# Patient Record
Sex: MALE | Race: WHITE | NOT HISPANIC OR LATINO | Employment: OTHER | URBAN - METROPOLITAN AREA
[De-identification: names, ages, dates, MRNs, and addresses within clinical notes are randomized per-mention and may not be internally consistent; named-entity substitution may affect disease eponyms.]

---

## 2017-01-05 ENCOUNTER — APPOINTMENT (OUTPATIENT)
Dept: LAB | Facility: CLINIC | Age: 82
End: 2017-01-05
Payer: MEDICARE

## 2017-01-05 ENCOUNTER — ALLSCRIPTS OFFICE VISIT (OUTPATIENT)
Dept: OTHER | Facility: OTHER | Age: 82
End: 2017-01-05

## 2017-01-05 DIAGNOSIS — M10.072 IDIOPATHIC GOUT, LEFT ANKLE AND FOOT: ICD-10-CM

## 2017-01-05 LAB
ANION GAP SERPL CALCULATED.3IONS-SCNC: 10 MMOL/L (ref 4–13)
BUN SERPL-MCNC: 29 MG/DL (ref 5–25)
CALCIUM SERPL-MCNC: 8.6 MG/DL (ref 8.3–10.1)
CHLORIDE SERPL-SCNC: 106 MMOL/L (ref 100–108)
CO2 SERPL-SCNC: 24 MMOL/L (ref 21–32)
CREAT SERPL-MCNC: 1.54 MG/DL (ref 0.6–1.3)
GFR SERPL CREATININE-BSD FRML MDRD: 43.3 ML/MIN/1.73SQ M
GLUCOSE SERPL-MCNC: 147 MG/DL (ref 65–140)
POTASSIUM SERPL-SCNC: 3.6 MMOL/L (ref 3.5–5.3)
SODIUM SERPL-SCNC: 140 MMOL/L (ref 136–145)

## 2017-01-05 PROCEDURE — 80048 BASIC METABOLIC PNL TOTAL CA: CPT

## 2017-01-05 PROCEDURE — 36415 COLL VENOUS BLD VENIPUNCTURE: CPT

## 2017-01-20 ENCOUNTER — LAB REQUISITION (OUTPATIENT)
Dept: LAB | Facility: HOSPITAL | Age: 82
End: 2017-01-20
Payer: MEDICARE

## 2017-01-20 ENCOUNTER — ALLSCRIPTS OFFICE VISIT (OUTPATIENT)
Dept: OTHER | Facility: OTHER | Age: 82
End: 2017-01-20

## 2017-01-20 DIAGNOSIS — C44.622 SQUAMOUS CELL CARCINOMA OF SKIN OF RIGHT UPPER LIMB, INCLUDING SHOULDER: ICD-10-CM

## 2017-01-20 PROCEDURE — 88305 TISSUE EXAM BY PATHOLOGIST: CPT | Performed by: DERMATOLOGY

## 2017-01-24 ENCOUNTER — ALLSCRIPTS OFFICE VISIT (OUTPATIENT)
Dept: OTHER | Facility: OTHER | Age: 82
End: 2017-01-24

## 2017-01-30 ENCOUNTER — ALLSCRIPTS OFFICE VISIT (OUTPATIENT)
Dept: OTHER | Facility: OTHER | Age: 82
End: 2017-01-30

## 2017-01-30 ENCOUNTER — GENERIC CONVERSION - ENCOUNTER (OUTPATIENT)
Dept: OTHER | Facility: OTHER | Age: 82
End: 2017-01-30

## 2017-02-15 ENCOUNTER — APPOINTMENT (OUTPATIENT)
Dept: LAB | Facility: CLINIC | Age: 82
End: 2017-02-15
Payer: MEDICARE

## 2017-02-15 ENCOUNTER — ALLSCRIPTS OFFICE VISIT (OUTPATIENT)
Dept: OTHER | Facility: OTHER | Age: 82
End: 2017-02-15

## 2017-02-15 DIAGNOSIS — I10 ESSENTIAL (PRIMARY) HYPERTENSION: ICD-10-CM

## 2017-02-15 LAB
ANION GAP SERPL CALCULATED.3IONS-SCNC: 8 MMOL/L (ref 4–13)
BASOPHILS # BLD MANUAL: 0.09 THOUSAND/UL (ref 0–0.1)
BASOPHILS NFR MAR MANUAL: 1 % (ref 0–1)
BUN SERPL-MCNC: 21 MG/DL (ref 5–25)
CALCIUM SERPL-MCNC: 8.8 MG/DL (ref 8.3–10.1)
CHLORIDE SERPL-SCNC: 102 MMOL/L (ref 100–108)
CO2 SERPL-SCNC: 29 MMOL/L (ref 21–32)
CREAT SERPL-MCNC: 1.35 MG/DL (ref 0.6–1.3)
EOSINOPHIL # BLD MANUAL: 0.09 THOUSAND/UL (ref 0–0.4)
EOSINOPHIL NFR BLD MANUAL: 1 % (ref 0–6)
ERYTHROCYTE [DISTWIDTH] IN BLOOD BY AUTOMATED COUNT: 14.6 % (ref 11.6–15.1)
GFR SERPL CREATININE-BSD FRML MDRD: 50.4 ML/MIN/1.73SQ M
GLUCOSE SERPL-MCNC: 131 MG/DL (ref 65–140)
HCT VFR BLD AUTO: 40.8 % (ref 36.5–49.3)
HGB BLD-MCNC: 13.6 G/DL (ref 12–17)
LYMPHOCYTES # BLD AUTO: 1.39 THOUSAND/UL (ref 0.6–4.47)
LYMPHOCYTES # BLD AUTO: 15 % (ref 14–44)
MCH RBC QN AUTO: 30.8 PG (ref 26.8–34.3)
MCHC RBC AUTO-ENTMCNC: 33.3 G/DL (ref 31.4–37.4)
MCV RBC AUTO: 93 FL (ref 82–98)
MONOCYTES # BLD AUTO: 0.37 THOUSAND/UL (ref 0–1.22)
MONOCYTES NFR BLD: 4 % (ref 4–12)
NEUTROPHILS # BLD MANUAL: 7.3 THOUSAND/UL (ref 1.85–7.62)
NEUTS SEG NFR BLD AUTO: 79 % (ref 43–75)
NRBC BLD AUTO-RTO: 0 /100 WBCS
PLATELET # BLD AUTO: 301 THOUSANDS/UL (ref 149–390)
PLATELET BLD QL SMEAR: ADEQUATE
PMV BLD AUTO: 10.9 FL (ref 8.9–12.7)
POTASSIUM SERPL-SCNC: 4.1 MMOL/L (ref 3.5–5.3)
RBC # BLD AUTO: 4.41 MILLION/UL (ref 3.88–5.62)
RBC MORPH BLD: NORMAL
SODIUM SERPL-SCNC: 139 MMOL/L (ref 136–145)
WBC # BLD AUTO: 9.24 THOUSAND/UL (ref 4.31–10.16)

## 2017-02-15 PROCEDURE — 80048 BASIC METABOLIC PNL TOTAL CA: CPT

## 2017-02-15 PROCEDURE — 85007 BL SMEAR W/DIFF WBC COUNT: CPT

## 2017-02-15 PROCEDURE — 36415 COLL VENOUS BLD VENIPUNCTURE: CPT

## 2017-02-15 PROCEDURE — 85027 COMPLETE CBC AUTOMATED: CPT

## 2017-03-31 ENCOUNTER — LAB (OUTPATIENT)
Dept: LAB | Facility: CLINIC | Age: 82
End: 2017-03-31
Payer: MEDICARE

## 2017-03-31 ENCOUNTER — TRANSCRIBE ORDERS (OUTPATIENT)
Dept: LAB | Facility: CLINIC | Age: 82
End: 2017-03-31

## 2017-03-31 DIAGNOSIS — E03.9 HYPOTHYROIDISM: ICD-10-CM

## 2017-03-31 DIAGNOSIS — I10 ESSENTIAL (PRIMARY) HYPERTENSION: ICD-10-CM

## 2017-03-31 DIAGNOSIS — I82.409 ACUTE EMBOLISM AND THROMBOSIS OF DEEP VEIN OF LOWER EXTREMITY (HCC): ICD-10-CM

## 2017-03-31 DIAGNOSIS — E78.00 PURE HYPERCHOLESTEROLEMIA: ICD-10-CM

## 2017-03-31 DIAGNOSIS — Z12.11 ENCOUNTER FOR SCREENING FOR MALIGNANT NEOPLASM OF COLON: ICD-10-CM

## 2017-03-31 DIAGNOSIS — E11.21 TYPE 2 DIABETES MELLITUS WITH DIABETIC NEPHROPATHY (HCC): ICD-10-CM

## 2017-03-31 LAB
ALBUMIN SERPL BCP-MCNC: 3.5 G/DL (ref 3.5–5)
ALP SERPL-CCNC: 87 U/L (ref 46–116)
ALT SERPL W P-5'-P-CCNC: 55 U/L (ref 12–78)
ANION GAP SERPL CALCULATED.3IONS-SCNC: 7 MMOL/L (ref 4–13)
AST SERPL W P-5'-P-CCNC: 29 U/L (ref 5–45)
BASOPHILS # BLD AUTO: 0.03 THOUSANDS/ΜL (ref 0–0.1)
BASOPHILS NFR BLD AUTO: 0 % (ref 0–1)
BILIRUB SERPL-MCNC: 1.99 MG/DL (ref 0.2–1)
BUN SERPL-MCNC: 16 MG/DL (ref 5–25)
CALCIUM SERPL-MCNC: 8.8 MG/DL (ref 8.3–10.1)
CHLORIDE SERPL-SCNC: 103 MMOL/L (ref 100–108)
CHOLEST SERPL-MCNC: 135 MG/DL (ref 50–200)
CK SERPL-CCNC: 57 U/L (ref 39–308)
CO2 SERPL-SCNC: 31 MMOL/L (ref 21–32)
CREAT SERPL-MCNC: 1.11 MG/DL (ref 0.6–1.3)
EOSINOPHIL # BLD AUTO: 0.31 THOUSAND/ΜL (ref 0–0.61)
EOSINOPHIL NFR BLD AUTO: 3 % (ref 0–6)
ERYTHROCYTE [DISTWIDTH] IN BLOOD BY AUTOMATED COUNT: 13.7 % (ref 11.6–15.1)
EST. AVERAGE GLUCOSE BLD GHB EST-MCNC: 151 MG/DL
GFR SERPL CREATININE-BSD FRML MDRD: >60 ML/MIN/1.73SQ M
GLUCOSE P FAST SERPL-MCNC: 110 MG/DL (ref 65–99)
HBA1C MFR BLD: 6.9 % (ref 4.2–6.3)
HCT VFR BLD AUTO: 42.3 % (ref 36.5–49.3)
HDLC SERPL-MCNC: 41 MG/DL (ref 40–60)
HGB BLD-MCNC: 14.4 G/DL (ref 12–17)
LDLC SERPL CALC-MCNC: 66 MG/DL (ref 0–100)
LYMPHOCYTES # BLD AUTO: 2.16 THOUSANDS/ΜL (ref 0.6–4.47)
LYMPHOCYTES NFR BLD AUTO: 18 % (ref 14–44)
MCH RBC QN AUTO: 31 PG (ref 26.8–34.3)
MCHC RBC AUTO-ENTMCNC: 34 G/DL (ref 31.4–37.4)
MCV RBC AUTO: 91 FL (ref 82–98)
MONOCYTES # BLD AUTO: 0.78 THOUSAND/ΜL (ref 0.17–1.22)
MONOCYTES NFR BLD AUTO: 6 % (ref 4–12)
NEUTROPHILS # BLD AUTO: 8.87 THOUSANDS/ΜL (ref 1.85–7.62)
NEUTS SEG NFR BLD AUTO: 73 % (ref 43–75)
NRBC BLD AUTO-RTO: 0 /100 WBCS
PLATELET # BLD AUTO: 273 THOUSANDS/UL (ref 149–390)
PMV BLD AUTO: 11 FL (ref 8.9–12.7)
POTASSIUM SERPL-SCNC: 3.1 MMOL/L (ref 3.5–5.3)
PROT SERPL-MCNC: 7.6 G/DL (ref 6.4–8.2)
RBC # BLD AUTO: 4.64 MILLION/UL (ref 3.88–5.62)
SODIUM SERPL-SCNC: 141 MMOL/L (ref 136–145)
TRIGL SERPL-MCNC: 138 MG/DL
TSH SERPL DL<=0.05 MIU/L-ACNC: 4.22 UIU/ML (ref 0.36–3.74)
WBC # BLD AUTO: 12.18 THOUSAND/UL (ref 4.31–10.16)

## 2017-03-31 PROCEDURE — 83036 HEMOGLOBIN GLYCOSYLATED A1C: CPT

## 2017-03-31 PROCEDURE — G0328 FECAL BLOOD SCRN IMMUNOASSAY: HCPCS

## 2017-03-31 PROCEDURE — 36415 COLL VENOUS BLD VENIPUNCTURE: CPT

## 2017-03-31 PROCEDURE — 82550 ASSAY OF CK (CPK): CPT

## 2017-03-31 PROCEDURE — 85025 COMPLETE CBC W/AUTO DIFF WBC: CPT

## 2017-03-31 PROCEDURE — 80061 LIPID PANEL: CPT

## 2017-03-31 PROCEDURE — 80053 COMPREHEN METABOLIC PANEL: CPT

## 2017-03-31 PROCEDURE — 84443 ASSAY THYROID STIM HORMONE: CPT

## 2017-04-01 LAB — HEMOCCULT STL QL IA: POSITIVE

## 2017-04-06 ENCOUNTER — ALLSCRIPTS OFFICE VISIT (OUTPATIENT)
Dept: OTHER | Facility: OTHER | Age: 82
End: 2017-04-06

## 2017-04-07 RX ORDER — GLIPIZIDE 10 MG/1
10 TABLET, FILM COATED, EXTENDED RELEASE ORAL DAILY
COMMUNITY
End: 2018-07-30 | Stop reason: SDUPTHER

## 2017-04-07 RX ORDER — MOMETASONE FUROATE 1 MG/G
CREAM TOPICAL DAILY
COMMUNITY
End: 2018-04-07 | Stop reason: CLARIF

## 2017-04-07 RX ORDER — BETAMETHASONE DIPROPIONATE 0.5 MG/G
CREAM TOPICAL 2 TIMES DAILY
COMMUNITY
End: 2018-08-12 | Stop reason: ALTCHOICE

## 2017-04-07 RX ORDER — LISINOPRIL 10 MG/1
10 TABLET ORAL DAILY
COMMUNITY
End: 2018-07-26 | Stop reason: SDUPTHER

## 2017-04-07 RX ORDER — ALLOPURINOL 100 MG/1
100 TABLET ORAL DAILY
Status: ON HOLD | COMMUNITY
End: 2019-03-25 | Stop reason: CLARIF

## 2017-04-07 RX ORDER — ESOMEPRAZOLE MAGNESIUM 40 MG/1
40 CAPSULE, DELAYED RELEASE ORAL
COMMUNITY
End: 2018-12-30 | Stop reason: SDUPTHER

## 2017-04-07 RX ORDER — OMEGA-3 FATTY ACIDS/FISH OIL 300-1000MG
CAPSULE ORAL DAILY
Status: ON HOLD | COMMUNITY
End: 2019-03-25 | Stop reason: CLARIF

## 2017-04-07 RX ORDER — GLIPIZIDE 10 MG/1
10 TABLET ORAL
Status: ON HOLD | COMMUNITY
End: 2017-04-12 | Stop reason: SDUPTHER

## 2017-04-07 RX ORDER — AMLODIPINE BESYLATE 5 MG/1
5 TABLET ORAL DAILY
COMMUNITY
End: 2018-07-26 | Stop reason: ALTCHOICE

## 2017-04-07 RX ORDER — POTASSIUM CHLORIDE 750 MG/1
10 CAPSULE, EXTENDED RELEASE ORAL 2 TIMES DAILY
COMMUNITY
End: 2018-03-21 | Stop reason: SDUPTHER

## 2017-04-07 RX ORDER — LEVOTHYROXINE SODIUM 0.05 MG/1
50 TABLET ORAL DAILY
COMMUNITY
End: 2018-07-05 | Stop reason: SDUPTHER

## 2017-04-07 RX ORDER — COLCHICINE 0.6 MG/1
0.6 TABLET ORAL DAILY
COMMUNITY
End: 2018-11-29 | Stop reason: SDUPTHER

## 2017-04-07 RX ORDER — ATORVASTATIN CALCIUM 10 MG/1
10 TABLET, FILM COATED ORAL DAILY
COMMUNITY
End: 2019-02-05 | Stop reason: SDUPTHER

## 2017-04-07 RX ORDER — FUROSEMIDE 80 MG
80 TABLET ORAL 2 TIMES DAILY
COMMUNITY
End: 2018-06-15 | Stop reason: SDUPTHER

## 2017-04-11 ENCOUNTER — ANESTHESIA EVENT (OUTPATIENT)
Dept: PERIOP | Facility: HOSPITAL | Age: 82
End: 2017-04-11
Payer: MEDICARE

## 2017-04-12 ENCOUNTER — ANESTHESIA (OUTPATIENT)
Dept: PERIOP | Facility: HOSPITAL | Age: 82
End: 2017-04-12
Payer: MEDICARE

## 2017-04-12 ENCOUNTER — GENERIC CONVERSION - ENCOUNTER (OUTPATIENT)
Dept: OTHER | Facility: OTHER | Age: 82
End: 2017-04-12

## 2017-04-12 ENCOUNTER — HOSPITAL ENCOUNTER (EMERGENCY)
Facility: HOSPITAL | Age: 82
Discharge: HOME | End: 2017-04-12
Attending: EMERGENCY MEDICINE
Payer: MEDICARE

## 2017-04-12 ENCOUNTER — HOSPITAL ENCOUNTER (OUTPATIENT)
Facility: HOSPITAL | Age: 82
Setting detail: OUTPATIENT SURGERY
Discharge: HOME | End: 2017-04-12
Attending: INTERNAL MEDICINE | Admitting: INTERNAL MEDICINE
Payer: MEDICARE

## 2017-04-12 ENCOUNTER — APPOINTMENT (EMERGENCY)
Dept: RADIOLOGY | Facility: HOSPITAL | Age: 82
End: 2017-04-12
Payer: MEDICARE

## 2017-04-12 VITALS
HEIGHT: 70 IN | BODY MASS INDEX: 31.18 KG/M2 | WEIGHT: 217.81 LBS | SYSTOLIC BLOOD PRESSURE: 130 MMHG | HEART RATE: 79 BPM | TEMPERATURE: 99 F | OXYGEN SATURATION: 99 % | RESPIRATION RATE: 16 BRPM | DIASTOLIC BLOOD PRESSURE: 77 MMHG

## 2017-04-12 VITALS
OXYGEN SATURATION: 99 % | BODY MASS INDEX: 29.07 KG/M2 | WEIGHT: 203.04 LBS | DIASTOLIC BLOOD PRESSURE: 86 MMHG | HEIGHT: 70 IN | HEART RATE: 81 BPM | RESPIRATION RATE: 24 BRPM | SYSTOLIC BLOOD PRESSURE: 151 MMHG | TEMPERATURE: 98.2 F

## 2017-04-12 DIAGNOSIS — I48.91 NEW ONSET ATRIAL FIBRILLATION (HCC): Primary | ICD-10-CM

## 2017-04-12 LAB
ANION GAP SERPL CALCULATED.3IONS-SCNC: 11 MMOL/L (ref 4–13)
APTT PPP: 27 SECONDS (ref 24–36)
ATRIAL RATE: 357 BPM
BASOPHILS # BLD AUTO: 0.05 THOUSANDS/ΜL (ref 0–0.1)
BASOPHILS NFR BLD AUTO: 1 % (ref 0–1)
BUN SERPL-MCNC: 16 MG/DL (ref 5–25)
CALCIUM SERPL-MCNC: 8.4 MG/DL (ref 8.3–10.1)
CHLORIDE SERPL-SCNC: 105 MMOL/L (ref 100–108)
CO2 SERPL-SCNC: 26 MMOL/L (ref 21–32)
CREAT SERPL-MCNC: 1.13 MG/DL (ref 0.6–1.3)
EOSINOPHIL # BLD AUTO: 0.13 THOUSAND/ΜL (ref 0–0.61)
EOSINOPHIL NFR BLD AUTO: 1 % (ref 0–6)
ERYTHROCYTE [DISTWIDTH] IN BLOOD BY AUTOMATED COUNT: 12.9 % (ref 11.6–15.1)
GFR SERPL CREATININE-BSD FRML MDRD: >60 ML/MIN/1.73SQ M
GLUCOSE SERPL-MCNC: 142 MG/DL (ref 65–140)
GLUCOSE SERPL-MCNC: 144 MG/DL (ref 65–140)
HCT VFR BLD AUTO: 40.3 % (ref 36.5–49.3)
HGB BLD-MCNC: 13.9 G/DL (ref 12–17)
INR PPP: 1.19 (ref 0.86–1.16)
LYMPHOCYTES # BLD AUTO: 1.57 THOUSANDS/ΜL (ref 0.6–4.47)
LYMPHOCYTES NFR BLD AUTO: 14 % (ref 14–44)
MCH RBC QN AUTO: 31.2 PG (ref 26.8–34.3)
MCHC RBC AUTO-ENTMCNC: 34.5 G/DL (ref 31.4–37.4)
MCV RBC AUTO: 91 FL (ref 82–98)
MONOCYTES # BLD AUTO: 0.65 THOUSAND/ΜL (ref 0.17–1.22)
MONOCYTES NFR BLD AUTO: 6 % (ref 4–12)
NEUTROPHILS # BLD AUTO: 8.56 THOUSANDS/ΜL (ref 1.85–7.62)
NEUTS SEG NFR BLD AUTO: 78 % (ref 43–75)
NRBC BLD AUTO-RTO: 0 /100 WBCS
PLATELET # BLD AUTO: 239 THOUSANDS/UL (ref 149–390)
PMV BLD AUTO: 10 FL (ref 8.9–12.7)
POTASSIUM SERPL-SCNC: 3.4 MMOL/L (ref 3.5–5.3)
PROTHROMBIN TIME: 14.9 SECONDS (ref 12–14.3)
QRS AXIS: 5 DEGREES
QRSD INTERVAL: 92 MS
QT INTERVAL: 408 MS
QTC INTERVAL: 459 MS
RBC # BLD AUTO: 4.45 MILLION/UL (ref 3.88–5.62)
SODIUM SERPL-SCNC: 142 MMOL/L (ref 136–145)
T WAVE AXIS: 50 DEGREES
TROPONIN I SERPL-MCNC: <0.02 NG/ML
VENTRICULAR RATE: 76 BPM
WBC # BLD AUTO: 11 THOUSAND/UL (ref 4.31–10.16)

## 2017-04-12 PROCEDURE — 80048 BASIC METABOLIC PNL TOTAL CA: CPT

## 2017-04-12 PROCEDURE — 71020 HB CHEST X-RAY 2VW FRONTAL&LATL: CPT

## 2017-04-12 PROCEDURE — 85025 COMPLETE CBC W/AUTO DIFF WBC: CPT

## 2017-04-12 PROCEDURE — 85610 PROTHROMBIN TIME: CPT

## 2017-04-12 PROCEDURE — 84484 ASSAY OF TROPONIN QUANT: CPT

## 2017-04-12 PROCEDURE — 36415 COLL VENOUS BLD VENIPUNCTURE: CPT

## 2017-04-12 PROCEDURE — 93005 ELECTROCARDIOGRAM TRACING: CPT

## 2017-04-12 PROCEDURE — 82948 REAGENT STRIP/BLOOD GLUCOSE: CPT

## 2017-04-12 PROCEDURE — 99285 EMERGENCY DEPT VISIT HI MDM: CPT

## 2017-04-12 PROCEDURE — 85730 THROMBOPLASTIN TIME PARTIAL: CPT

## 2017-04-12 RX ORDER — SODIUM CHLORIDE, SODIUM LACTATE, POTASSIUM CHLORIDE, CALCIUM CHLORIDE 600; 310; 30; 20 MG/100ML; MG/100ML; MG/100ML; MG/100ML
50 INJECTION, SOLUTION INTRAVENOUS CONTINUOUS
Status: DISCONTINUED | OUTPATIENT
Start: 2017-04-12 | End: 2017-04-12 | Stop reason: HOSPADM

## 2017-04-12 RX ORDER — SODIUM CHLORIDE, SODIUM LACTATE, POTASSIUM CHLORIDE, CALCIUM CHLORIDE 600; 310; 30; 20 MG/100ML; MG/100ML; MG/100ML; MG/100ML
INJECTION, SOLUTION INTRAVENOUS CONTINUOUS PRN
Status: DISCONTINUED | OUTPATIENT
Start: 2017-04-12 | End: 2017-04-12 | Stop reason: SURG

## 2017-04-12 RX ADMIN — SODIUM CHLORIDE, SODIUM LACTATE, POTASSIUM CHLORIDE, AND CALCIUM CHLORIDE 50 ML/HR: .6; .31; .03; .02 INJECTION, SOLUTION INTRAVENOUS at 07:21

## 2017-04-12 RX ADMIN — SODIUM CHLORIDE, SODIUM LACTATE, POTASSIUM CHLORIDE, AND CALCIUM CHLORIDE: .6; .31; .03; .02 INJECTION, SOLUTION INTRAVENOUS at 07:34

## 2017-04-21 ENCOUNTER — ALLSCRIPTS OFFICE VISIT (OUTPATIENT)
Dept: OTHER | Facility: OTHER | Age: 82
End: 2017-04-21

## 2017-04-24 ENCOUNTER — APPOINTMENT (OUTPATIENT)
Dept: LAB | Facility: CLINIC | Age: 82
End: 2017-04-24
Payer: MEDICARE

## 2017-04-24 DIAGNOSIS — D72.829 ELEVATED WHITE BLOOD CELL COUNT: ICD-10-CM

## 2017-04-24 LAB
ALBUMIN SERPL BCP-MCNC: 3.4 G/DL (ref 3.5–5)
ALP SERPL-CCNC: 93 U/L (ref 46–116)
ALT SERPL W P-5'-P-CCNC: 74 U/L (ref 12–78)
ANION GAP SERPL CALCULATED.3IONS-SCNC: 7 MMOL/L (ref 4–13)
AST SERPL W P-5'-P-CCNC: 29 U/L (ref 5–45)
BASOPHILS # BLD AUTO: 0.03 THOUSANDS/ΜL (ref 0–0.1)
BASOPHILS NFR BLD AUTO: 0 % (ref 0–1)
BILIRUB SERPL-MCNC: 0.9 MG/DL (ref 0.2–1)
BUN SERPL-MCNC: 19 MG/DL (ref 5–25)
CALCIUM SERPL-MCNC: 8.8 MG/DL (ref 8.3–10.1)
CHLORIDE SERPL-SCNC: 104 MMOL/L (ref 100–108)
CO2 SERPL-SCNC: 32 MMOL/L (ref 21–32)
CREAT SERPL-MCNC: 0.96 MG/DL (ref 0.6–1.3)
EOSINOPHIL # BLD AUTO: 0.28 THOUSAND/ΜL (ref 0–0.61)
EOSINOPHIL NFR BLD AUTO: 3 % (ref 0–6)
ERYTHROCYTE [DISTWIDTH] IN BLOOD BY AUTOMATED COUNT: 13.5 % (ref 11.6–15.1)
GFR SERPL CREATININE-BSD FRML MDRD: >60 ML/MIN/1.73SQ M
GLUCOSE P FAST SERPL-MCNC: 123 MG/DL (ref 65–99)
HCT VFR BLD AUTO: 40.7 % (ref 36.5–49.3)
HGB BLD-MCNC: 13.7 G/DL (ref 12–17)
LDH SERPL-CCNC: 169 U/L (ref 81–234)
LYMPHOCYTES # BLD AUTO: 1.66 THOUSANDS/ΜL (ref 0.6–4.47)
LYMPHOCYTES NFR BLD AUTO: 17 % (ref 14–44)
MCH RBC QN AUTO: 31.2 PG (ref 26.8–34.3)
MCHC RBC AUTO-ENTMCNC: 33.7 G/DL (ref 31.4–37.4)
MCV RBC AUTO: 93 FL (ref 82–98)
MONOCYTES # BLD AUTO: 0.89 THOUSAND/ΜL (ref 0.17–1.22)
MONOCYTES NFR BLD AUTO: 9 % (ref 4–12)
NEUTROPHILS # BLD AUTO: 7.05 THOUSANDS/ΜL (ref 1.85–7.62)
NEUTS SEG NFR BLD AUTO: 71 % (ref 43–75)
NRBC BLD AUTO-RTO: 0 /100 WBCS
PLATELET # BLD AUTO: 247 THOUSANDS/UL (ref 149–390)
PMV BLD AUTO: 10.8 FL (ref 8.9–12.7)
POTASSIUM SERPL-SCNC: 3.8 MMOL/L (ref 3.5–5.3)
PROT SERPL-MCNC: 7.4 G/DL (ref 6.4–8.2)
RBC # BLD AUTO: 4.39 MILLION/UL (ref 3.88–5.62)
SODIUM SERPL-SCNC: 143 MMOL/L (ref 136–145)
WBC # BLD AUTO: 9.95 THOUSAND/UL (ref 4.31–10.16)

## 2017-04-24 PROCEDURE — 36415 COLL VENOUS BLD VENIPUNCTURE: CPT

## 2017-04-24 PROCEDURE — 85025 COMPLETE CBC W/AUTO DIFF WBC: CPT

## 2017-04-24 PROCEDURE — 80053 COMPREHEN METABOLIC PANEL: CPT

## 2017-04-24 PROCEDURE — 83615 LACTATE (LD) (LDH) ENZYME: CPT

## 2017-04-26 ENCOUNTER — ALLSCRIPTS OFFICE VISIT (OUTPATIENT)
Dept: OTHER | Facility: OTHER | Age: 82
End: 2017-04-26

## 2017-05-02 ENCOUNTER — ALLSCRIPTS OFFICE VISIT (OUTPATIENT)
Dept: OTHER | Facility: OTHER | Age: 82
End: 2017-05-02

## 2017-05-10 ENCOUNTER — TRANSCRIBE ORDERS (OUTPATIENT)
Dept: MRI IMAGING | Facility: CLINIC | Age: 82
End: 2017-05-10

## 2017-05-10 ENCOUNTER — APPOINTMENT (OUTPATIENT)
Dept: LAB | Facility: CLINIC | Age: 82
End: 2017-05-10
Payer: MEDICARE

## 2017-05-10 DIAGNOSIS — E78.5 HYPERLIPIDEMIA: ICD-10-CM

## 2017-05-10 LAB
BASOPHILS # BLD AUTO: 0.03 THOUSANDS/ΜL (ref 0–0.1)
BASOPHILS NFR BLD AUTO: 0 % (ref 0–1)
EOSINOPHIL # BLD AUTO: 0.3 THOUSAND/ΜL (ref 0–0.61)
EOSINOPHIL NFR BLD AUTO: 3 % (ref 0–6)
ERYTHROCYTE [DISTWIDTH] IN BLOOD BY AUTOMATED COUNT: 13.3 % (ref 11.6–15.1)
HCT VFR BLD AUTO: 41 % (ref 36.5–49.3)
HGB BLD-MCNC: 13.6 G/DL (ref 12–17)
LYMPHOCYTES # BLD AUTO: 1.54 THOUSANDS/ΜL (ref 0.6–4.47)
LYMPHOCYTES NFR BLD AUTO: 15 % (ref 14–44)
MCH RBC QN AUTO: 30.6 PG (ref 26.8–34.3)
MCHC RBC AUTO-ENTMCNC: 33.2 G/DL (ref 31.4–37.4)
MCV RBC AUTO: 92 FL (ref 82–98)
MONOCYTES # BLD AUTO: 0.77 THOUSAND/ΜL (ref 0.17–1.22)
MONOCYTES NFR BLD AUTO: 7 % (ref 4–12)
NEUTROPHILS # BLD AUTO: 7.76 THOUSANDS/ΜL (ref 1.85–7.62)
NEUTS SEG NFR BLD AUTO: 75 % (ref 43–75)
NRBC BLD AUTO-RTO: 0 /100 WBCS
PLATELET # BLD AUTO: 261 THOUSANDS/UL (ref 149–390)
PMV BLD AUTO: 10.8 FL (ref 8.9–12.7)
RBC # BLD AUTO: 4.44 MILLION/UL (ref 3.88–5.62)
WBC # BLD AUTO: 10.43 THOUSAND/UL (ref 4.31–10.16)

## 2017-05-10 PROCEDURE — 36415 COLL VENOUS BLD VENIPUNCTURE: CPT

## 2017-05-10 PROCEDURE — 85025 COMPLETE CBC W/AUTO DIFF WBC: CPT

## 2017-05-22 ENCOUNTER — GENERIC CONVERSION - ENCOUNTER (OUTPATIENT)
Dept: OTHER | Facility: OTHER | Age: 82
End: 2017-05-22

## 2017-05-22 ENCOUNTER — HOSPITAL ENCOUNTER (OUTPATIENT)
Dept: NON INVASIVE DIAGNOSTICS | Facility: CLINIC | Age: 82
Discharge: HOME | End: 2017-05-22
Payer: MEDICARE

## 2017-05-22 DIAGNOSIS — I48.91 ATRIAL FIBRILLATION (HCC): ICD-10-CM

## 2017-05-22 DIAGNOSIS — I10 ESSENTIAL (PRIMARY) HYPERTENSION: ICD-10-CM

## 2017-05-22 PROCEDURE — C8929 TTE W OR WO FOL WCON,DOPPLER: HCPCS

## 2017-05-22 PROCEDURE — A9502 TC99M TETROFOSMIN: HCPCS

## 2017-05-22 PROCEDURE — 78452 HT MUSCLE IMAGE SPECT MULT: CPT

## 2017-05-22 PROCEDURE — 93017 CV STRESS TEST TRACING ONLY: CPT

## 2017-05-22 RX ADMIN — REGADENOSON 0.4 MG: 0.08 INJECTION, SOLUTION INTRAVENOUS at 13:15

## 2017-05-22 RX ADMIN — PERFLUTREN 2 ML/MIN: 6.52 INJECTION, SUSPENSION INTRAVENOUS at 11:48

## 2017-05-23 LAB
ARRHY DURING EX: NORMAL
CHEST PAIN STATEMENT: NORMAL
MAX DIASTOLIC BP: 68 MMHG
MAX HEART RATE: 133 BPM
MAX PREDICTED HEART RATE: 136 BPM
MAX. SYSTOLIC BP: 142 MMHG
PROTOCOL NAME: NORMAL
REASON FOR TERMINATION: NORMAL
TARGET HR FORMULA: NORMAL
TEST INDICATION: NORMAL
TIME IN EXERCISE PHASE: 180 S

## 2017-05-24 ENCOUNTER — GENERIC CONVERSION - ENCOUNTER (OUTPATIENT)
Dept: OTHER | Facility: OTHER | Age: 82
End: 2017-05-24

## 2017-06-02 ENCOUNTER — ALLSCRIPTS OFFICE VISIT (OUTPATIENT)
Dept: OTHER | Facility: OTHER | Age: 82
End: 2017-06-02

## 2017-06-13 ENCOUNTER — ANESTHESIA EVENT (OUTPATIENT)
Dept: PERIOP | Facility: HOSPITAL | Age: 82
End: 2017-06-13
Payer: MEDICARE

## 2017-06-14 ENCOUNTER — ANESTHESIA (OUTPATIENT)
Dept: PERIOP | Facility: HOSPITAL | Age: 82
End: 2017-06-14
Payer: MEDICARE

## 2017-06-14 ENCOUNTER — GENERIC CONVERSION - ENCOUNTER (OUTPATIENT)
Dept: OTHER | Facility: OTHER | Age: 82
End: 2017-06-14

## 2017-06-14 ENCOUNTER — HOSPITAL ENCOUNTER (OUTPATIENT)
Facility: HOSPITAL | Age: 82
Setting detail: OUTPATIENT SURGERY
Discharge: HOME | End: 2017-06-14
Attending: INTERNAL MEDICINE | Admitting: INTERNAL MEDICINE
Payer: MEDICARE

## 2017-06-14 VITALS
BODY MASS INDEX: 28.43 KG/M2 | TEMPERATURE: 97.8 F | OXYGEN SATURATION: 99 % | RESPIRATION RATE: 23 BRPM | HEIGHT: 71 IN | WEIGHT: 203.1 LBS | HEART RATE: 84 BPM | DIASTOLIC BLOOD PRESSURE: 94 MMHG | SYSTOLIC BLOOD PRESSURE: 140 MMHG

## 2017-06-14 DIAGNOSIS — R19.5 POSITIVE FIT (FECAL IMMUNOCHEMICAL TEST): ICD-10-CM

## 2017-06-14 LAB — GLUCOSE SERPL-MCNC: 152 MG/DL (ref 65–140)

## 2017-06-14 PROCEDURE — 82948 REAGENT STRIP/BLOOD GLUCOSE: CPT

## 2017-06-14 PROCEDURE — 88305 TISSUE EXAM BY PATHOLOGIST: CPT | Performed by: INTERNAL MEDICINE

## 2017-06-14 RX ORDER — SODIUM CHLORIDE, SODIUM LACTATE, POTASSIUM CHLORIDE, CALCIUM CHLORIDE 600; 310; 30; 20 MG/100ML; MG/100ML; MG/100ML; MG/100ML
50 INJECTION, SOLUTION INTRAVENOUS CONTINUOUS
Status: DISCONTINUED | OUTPATIENT
Start: 2017-06-14 | End: 2017-06-14 | Stop reason: HOSPADM

## 2017-06-14 RX ORDER — PROPOFOL 10 MG/ML
INJECTION, EMULSION INTRAVENOUS AS NEEDED
Status: DISCONTINUED | OUTPATIENT
Start: 2017-06-14 | End: 2017-06-14 | Stop reason: SURG

## 2017-06-14 RX ADMIN — PROPOFOL 30 MG: 10 INJECTION, EMULSION INTRAVENOUS at 07:33

## 2017-06-14 RX ADMIN — PROPOFOL 20 MG: 10 INJECTION, EMULSION INTRAVENOUS at 07:26

## 2017-06-14 RX ADMIN — LIDOCAINE HYDROCHLORIDE 100 MG: 20 INJECTION, SOLUTION INTRAVENOUS at 07:19

## 2017-06-14 RX ADMIN — PROPOFOL 130 MG: 10 INJECTION, EMULSION INTRAVENOUS at 07:19

## 2017-06-14 RX ADMIN — PROPOFOL 30 MG: 10 INJECTION, EMULSION INTRAVENOUS at 07:21

## 2017-06-14 RX ADMIN — PROPOFOL 20 MG: 10 INJECTION, EMULSION INTRAVENOUS at 07:28

## 2017-06-14 RX ADMIN — PROPOFOL 20 MG: 10 INJECTION, EMULSION INTRAVENOUS at 07:23

## 2017-06-14 RX ADMIN — SODIUM CHLORIDE, SODIUM LACTATE, POTASSIUM CHLORIDE, AND CALCIUM CHLORIDE 50 ML/HR: .6; .31; .03; .02 INJECTION, SOLUTION INTRAVENOUS at 06:52

## 2017-07-10 ENCOUNTER — ALLSCRIPTS OFFICE VISIT (OUTPATIENT)
Dept: OTHER | Facility: OTHER | Age: 82
End: 2017-07-10

## 2017-07-26 ENCOUNTER — ALLSCRIPTS OFFICE VISIT (OUTPATIENT)
Dept: OTHER | Facility: OTHER | Age: 82
End: 2017-07-26

## 2017-08-03 ENCOUNTER — ALLSCRIPTS OFFICE VISIT (OUTPATIENT)
Dept: OTHER | Facility: OTHER | Age: 82
End: 2017-08-03

## 2017-09-14 ENCOUNTER — ALLSCRIPTS OFFICE VISIT (OUTPATIENT)
Dept: OTHER | Facility: OTHER | Age: 82
End: 2017-09-14

## 2017-09-14 ENCOUNTER — LAB REQUISITION (OUTPATIENT)
Dept: LAB | Facility: HOSPITAL | Age: 82
End: 2017-09-14
Payer: MEDICARE

## 2017-09-14 DIAGNOSIS — L98.9 DISORDER OF SKIN OR SUBCUTANEOUS TISSUE: ICD-10-CM

## 2017-09-14 PROCEDURE — 88312 SPECIAL STAINS GROUP 1: CPT | Performed by: DERMATOLOGY

## 2017-09-14 PROCEDURE — 88305 TISSUE EXAM BY PATHOLOGIST: CPT | Performed by: DERMATOLOGY

## 2017-09-26 ENCOUNTER — GENERIC CONVERSION - ENCOUNTER (OUTPATIENT)
Dept: OTHER | Facility: OTHER | Age: 82
End: 2017-09-26

## 2017-10-26 DIAGNOSIS — E11.9 TYPE 2 DIABETES MELLITUS WITHOUT COMPLICATIONS (HCC): ICD-10-CM

## 2017-10-26 DIAGNOSIS — E78.00 PURE HYPERCHOLESTEROLEMIA: ICD-10-CM

## 2017-10-26 NOTE — PROGRESS NOTES
Assessment  1  Changing skin lesion (709 9) (L98 9)   2  Screening for skin condition (V82 0) (Z13 89)    Plan   · Wound care as instructed ; Status:Complete;   Done: 07TKL0444    Discussion/Summary  Discussion Summary:   Changing skin lesion question of a eruptive squamous cell carcinoma/keratoacanthoma versus prurigo nodularis await result of biopsy before proceeding with further therapy as indicated  Chief Complaint  Chief Complaint Free Text Note Form: 6 week fup fir treatment with steroids  History of Present Illness  HPI: 43-year-old male seen in follow-up secondary to the presumed prurigo nodularis lesions have not resolved with topical steroid use and some new lesions have developed      Review of Systems  Complete Male Dermatology [facility] Staff- Est Patient:   Constitutional: Denies constitutional symptoms  Eyes: Denies eye symptoms  ENT:  denies ear symptoms, nasal symptoms, mouth or throat symptoms  Cardiovascular: Denies cardiovascular symptoms  Respiratory: Denies respiratory symptoms  Gastrointestinal: Denies gastrointestinal symptoms  Musculoskeletal: Denies musculoskeletal symptoms  Integumentary: Denies skin, hair and nail symptoms  Neurological: Denies neurologic symptoms  Psychiatric: Denies psychiatric symptoms  Endocrine: Denies endocrine symptoms  Hematologic/Lymphatic: Denies hematologic symptoms  Active Problems  1  Actinic keratosis (702 0) (L57 0)   2  Acute idiopathic gout of left foot (274 01) (M10 072)   3  Adult hypothyroidism (244 9) (E03 9)   4  Atrial fibrillation (427 31) (I48 91)   5  Backache (724 5) (M54 9)   6  Benign essential hypertension (401 1) (I10)   7  Bilateral edema of lower extremity (782 3) (R60 0)   8  Borderline hypothyroidism (794 5) (R94 6)   9  Bruise (924 9) (T14 8)   10  Changing skin lesion (709 9) (L98 9)   11  Chronic kidney disease, stage 3 (585 3) (N18 3)   12  Colon cancer screening (V76 51) (Z12 11)   13   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,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 21)   14  Diabetes mellitus type 2, controlled (250 00) (E11 9)   15  DVT (deep venous thrombosis) (453 40) (I82 409)   16  Fecal occult blood test positive (792 1) (R19 5)   17  Flu vaccine need (V04 81) (Z23)   18  Foot pain, left (729 5) (M79 672)   19  Heme positive stool (792 1) (R19 5)   20  Hypercholesterolemia (272 0) (E78 00)   21  Hyperlipidemia (272 4) (E78 5)   22  Hypokalemia (276 8) (E87 6)   23  Leukocytosis (288 60) (D72 829)   24  Muscle Cramps (729 82)   25  Need for influenza vaccination (V04 81) (Z23)   26  Need for pneumococcal vaccination (V03 82) (Z23)   27  Need for prophylactic vaccination against Streptococcus pneumoniae (pneumococcus)    (V03 82) (Z23)   28  Otalgia, unspecified laterality (388 70) (H92 09)   29  Otitis externa, unspecified laterality   30  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)   31  Psoriasis (696 1) (L40 9)   32  Rash (782 1) (R21)   33  SCC (squamous cell carcinoma), hand, right (173 62) (C44 622)   34  Screening for genitourinary condition (V81 6) (Z13 89)   35  Screening for skin condition (V82 0) (Z13 89)   36  Seborrheic dermatitis of scalp (690 18) (L21 9)   37  Seborrheic keratosis (702 19) (L82 1)   38  Special screening examination for neoplasm of prostate (V76 44) (Z12 5)   39  Type 2 diabetes, uncontrolled, with renal manifestation (250 42) (E11 29,E11 65)    Past Medical History  1  History of Allergic contact dermatitis due to plants, except food (692 6) (L23 7)   2  History of Coagulation test abnormality (790 92) (R79 1)   3  History of Eczema (692 9) (L30 9)   4  H/O nonmelanoma skin cancer (V10 83) (Z85 828)   5  History of cataract (V12 49) (Z86 69)   6  History of lichen planus (S08 1) (Z87 2)   7  History of Injury Of Multiple Blood Vessels Of The Upper Extremity (903 8)   8  History of Proteinuria (791 0) (R80 9)   9   History of Special screening examination for neoplasm of prostate (V76 44) (Z12 5)   10  History of Thrombophlebitis Of Deep Vessels Of The Lower Extremity (V12 51)   11  History of Visit For: Single Organ System Diabetic Foot Exam (V72 9)  Past Medical History Reviewed- Derm:   The past medical history was reviewed  Surgical History  1  History of Cataract Surgery   2  History of Cholecystectomy   3  History of Tonsillectomy  Surgical History Reviewed Claudetta Casey- Derm:   Surgical History reviewed      Family History  Mother    1  Family history of Alcoholism  Father    2  Family history of Acute Myocardial Infarction (V17 3)   3  Family history of Father  At Age 79  Family History Reviewed- Derm:   Family History was reviewed      Social History   · Denied: History of Alcohol Use (History)   · Marital History - Currently    · Never A Smoker   · Never Used Drugs   · Occupation: Retired   · Sedentary Lifestyle (V69 0)   · Denied: History of Smoking Cigarettes  Social History Reviewed  [place]- Derm: The social history was reviewed      Current Meds   1  Allopurinol 100 MG Oral Tablet; TAKE 1 TABLET DAILY; Therapy: 79Axp0343 to (Evaluate:57Ndk1367)  Requested for: 82Bvr8436; Last   Rx:19Ipq5178; Status: ACTIVE - Transmit to Pharmacy - Awaiting Verification Ordered   2  AmLODIPine Besylate 5 MG Oral Tablet; TAKE 1 TABLET DAILY AS DIRECTED; Therapy: 70KJJ1966 to (Evaluate:2017)  Requested for: 2017; Last   Rx:2017 Ordered   3  Atorvastatin Calcium 10 MG Oral Tablet; TAKE 1/2 TABLET DAILY; Therapy: 93VGE8067 to (Ludin Citizen)  Requested for: 18Gex6994; Last   Rx:05Gey4281 Ordered   4  Betamethasone Dipropionate Aug 0 05 % External Ointment; APPLY SPARINGLY TO   AFFECTED AREA( S) elbows , palms and feet  TWICE DAILY; Therapy: 82CWR1214 to (Last Rx:94Fnt2337)  Requested for: 79Zlr7825 Ordered   5  Colcrys 0 6 MG Oral Tablet; TAKE 1 TABLET DAILY AS DIRECTED; Therapy: 44VKY6534 to (Evaluate:2018); Last Rx:2017 Ordered   6  Esomeprazole Magnesium 40 MG Oral Capsule Delayed Release; TAKE 1 CAPSULE   DAILY; Therapy: 43TJT0302 to (Evaluate:08Lkm5195)  Requested for: 56JKC0807; Last   Rx:50Wra3980 Ordered   7  Folbee 2 5-25-1 MG Oral Tablet; take one tablet by mouth every day; Therapy: 11PLD2532 to (Evaluate:23Apr2018)  Requested for: 39Lrz9085; Last   Rx:74Qxq6725 Ordered   8  Furosemide 80 MG Oral Tablet; TAKE 1 TABLET DAILY AS DIRECTED; Therapy: 20GUL6320 to (Evaluate:04Jun2018)  Requested for: 63TVP1804; Last   Rx:09Jun2017; Status: ACTIVE - Renewal Denied Ordered   9  GlipiZIDE ER 10 MG Oral Tablet Extended Release 24 Hour; TAKE 1 TABLET Twice daily   before am an pm meal;   Therapy: 85LZG9650 to (Evaluate:61Bzd9001)  Requested for: 56Vhq7356; Last   Rx:64Kzy3513 Ordered   10  Levothyroxine Sodium 50 MCG Oral Tablet; take one tablet by mouth daily; Therapy: 80YKL5194 to (Evaluate:58Csp6717)  Requested for: 74Etw9033; Last    Rx:66Wgo3932 Ordered   11  Lisinopril 10 MG Oral Tablet; TAKE 1 TABLET DAILY; Therapy: 79FCF3601 to ([id number])  Requested for: 25Oct2016; Last    Rx:92Zuy4510 Ordered   12  Metoprolol Tartrate 25 MG Oral Tablet; TAKE 1 TABLET TWICE DAILY; Therapy: 44SRU8450 to (Evaluate:23Apr2017)  Requested for: 25Oct2016; Last    Rx:15Mhi8335 Ordered   13  Mometasone Furoate 0 1 % External Solution; APPLY TO EAR TWICE DAILY; Therapy: 36MQF3486 to (Last Rx:52Emf2959)  Requested for: 33AAA4988 Ordered   14  Omega-3 Fish Oil 1000 MG Oral Capsule; TAKE 1 CAPSULE DAILY; Therapy: 39GTJ2509 to Recorded   15  Potassium Chloride ER 10 MEQ Oral Tablet Extended Release; TAKE 1 TABLET TWICE    DAILY; Therapy: 58FBR0743 to (NDCFUGNB:63JEF2773)  Requested for: 30Uza2128; Last    Rx:40Ely4245 Ordered   16  Triamcinolone Acetonide 0 1 % External Ointment; APPLY TO PSORIASIS ON ENTIRE    BODY TWICE DAILY; Therapy: 58LEY5286 to (Evaluate:02Jun2017)  Requested for: 01KQV7771; Last    Rx:40Jok1742 Ordered   17  Xarelto 20 MG Oral Tablet; Take 1 tablet daily; Therapy: 97ZNH0262 to (Evaluate:75Pid3635)  Requested for: 83Cwc4527; Last    Rx:55Zda8075 Ordered  Medication List Reviewed: The medication list was reviewed and updated today  Allergies  1  No Known Drug Allergies    Physical Exam    Constitutional   General appearance: Appears healthy and well developed  Lymphatic   No visible disturbance  Musculoskeletal   Digits and nails: No clubbing, cyanosis or edema  Cutaneous and nail exam normal     Skin   Scalp skin texture and hair distribution: Normal skin texture on scalp, normal hair distribution  Head: Abnormal     Neck: Normal turgor, no rashes, no lesions  Chest: Normal turgor, no rashes, no lesions  Abdomen: Normal turgor, no rashes, no lesions  Back: Normal turgor, no rashes, no lesions  Right upper extremity: Normal turgor, no rashes, no lesions  Left upper extremity: Normal turgor, no rashes, no lesions  Right lower extremity: Abnormal     Left lower extremity: Abnormal     Neuro/Psych   Alert and oriented x 3  Displays comfort and cooperation during encounterl  Affect is normal     Finding 4mm keratotic papule L cutaneous lip 6mm keratotic papule noted on R thigh numerous other lesions noted on R leg  Procedure    Procedure: skin biopsy  Indications for the procedure include squamous cell carcinoma suspected  Risks, benefits, alternatives, infection risk, bleeding risk, risk of allergic reaction and risk of scarring were discussed with the patient--   verbal consent was obtained prior to the procedure  Procedure Note:   Anesthesia: 1 ml of lidocaine 1% without epinephrine  The lesion was located on the Left cutaneous lip  The patient was prepped using alcohol  a shave biopsy of the lesion was taken  The hemostasis of the wound was achieved with aluminum chloride  Dressing: The wound was cleaned and petroleum jelly was applied and a sterile dressing was placed  Specimen: the excised lesion was place in buffered formalin and sent for pathology  Skin Lesion #2:   Indications for the procedure include squamous cell carcinoma suspected  Procedure Note:  Anesthesia: 1 ml of lidocaine 1% without epinephrine  The lesion was located on the Right thigh  The patient was prepped using alcohol  a shave biopsy of the lesion was taken  The hemostasis of the wound was achieved with aluminum chloride  Dressing: The wound was cleaned and petroleum jelly was applied and a sterile dressing was placed  Specimen: the excised lesion was place in buffered formalin and sent for pathology  Post-Procedure:   Patient Status: the patient tolerated the procedure well  Complications: there were no complications  Follow-up in the office  patient will be called in event skin cancer is found  -- Patient can call the office in 7-10 days for results if not contacted  Health Management  Colon cancer screening   COLONOSCOPY; every 5 years; Last 03ZPI2718; Next Due: 28WQF4056; Active  EGD; every 3 years; Last 76MOF7912; Next Due: 46KDG7660; Active  Diabetes mellitus type 2, controlled   *VB - Eye Exam; every 1 year; Last 38Fud0467; Next Due: 24QDG9119; Active  *VB - Foot Exam; every 1 year; Last 02QAH6275; Next Due: 74ATU3637; Active  Health Maintenance   *VB - Fall Risk Assessment  (Dx Z13 89 Screen for Neurologic Disorder); every 1 year; Last  16Pfh7989; Next Due: 23XTY7805; Overdue  DIGITAL RECTAL EXAM; every 1 year; Next Due: 10Vhd7825; Overdue  Influenza; every 1 year; Last 77Sus5095; Next Due: 28NHE2731; Near Due    Future Appointments    Date/Time Provider Specialty Site   11/07/2017 09:00 AM MINO Contreras   [facility]ology [facility]  [facility]   11/21/2017 01:00 PM Corey Rueda, [facility] Internal Medicine [facility]     Signatures   Electronically signed by : MINO Victoria ; Sep 14 2017  1:01PM EST                       (Author)

## 2017-10-28 ENCOUNTER — APPOINTMENT (OUTPATIENT)
Dept: LAB | Facility: CLINIC | Age: 82
End: 2017-10-28
Payer: MEDICARE

## 2017-10-28 DIAGNOSIS — E78.00 PURE HYPERCHOLESTEROLEMIA: ICD-10-CM

## 2017-10-28 DIAGNOSIS — E11.9 TYPE 2 DIABETES MELLITUS WITHOUT COMPLICATIONS (HCC): ICD-10-CM

## 2017-10-28 LAB
ALBUMIN SERPL BCP-MCNC: 3.5 G/DL (ref 3.5–5)
ALP SERPL-CCNC: 76 U/L (ref 46–116)
ALT SERPL W P-5'-P-CCNC: 55 U/L (ref 12–78)
ANION GAP SERPL CALCULATED.3IONS-SCNC: 8 MMOL/L (ref 4–13)
AST SERPL W P-5'-P-CCNC: 31 U/L (ref 5–45)
BACTERIA UR QL AUTO: ABNORMAL /HPF
BASOPHILS # BLD AUTO: 0.02 THOUSANDS/ΜL (ref 0–0.1)
BASOPHILS NFR BLD AUTO: 0 % (ref 0–1)
BILIRUB SERPL-MCNC: 1.67 MG/DL (ref 0.2–1)
BILIRUB UR QL STRIP: NEGATIVE
BUN SERPL-MCNC: 19 MG/DL (ref 5–25)
CALCIUM SERPL-MCNC: 8.7 MG/DL (ref 8.3–10.1)
CHLORIDE SERPL-SCNC: 105 MMOL/L (ref 100–108)
CHOLEST SERPL-MCNC: 119 MG/DL (ref 50–200)
CLARITY UR: CLEAR
CO2 SERPL-SCNC: 29 MMOL/L (ref 21–32)
COLOR UR: YELLOW
CREAT SERPL-MCNC: 1.23 MG/DL (ref 0.6–1.3)
EOSINOPHIL # BLD AUTO: 0.25 THOUSAND/ΜL (ref 0–0.61)
EOSINOPHIL NFR BLD AUTO: 2 % (ref 0–6)
ERYTHROCYTE [DISTWIDTH] IN BLOOD BY AUTOMATED COUNT: 14.5 % (ref 11.6–15.1)
EST. AVERAGE GLUCOSE BLD GHB EST-MCNC: 154 MG/DL
GFR SERPL CREATININE-BSD FRML MDRD: 54 ML/MIN/1.73SQ M
GLUCOSE P FAST SERPL-MCNC: 93 MG/DL (ref 65–99)
GLUCOSE UR STRIP-MCNC: NEGATIVE MG/DL
HBA1C MFR BLD: 7 % (ref 4.2–6.3)
HCT VFR BLD AUTO: 42.7 % (ref 36.5–49.3)
HDLC SERPL-MCNC: 46 MG/DL (ref 40–60)
HGB BLD-MCNC: 14.2 G/DL (ref 12–17)
HGB UR QL STRIP.AUTO: NEGATIVE
HYALINE CASTS #/AREA URNS LPF: ABNORMAL /LPF
KETONES UR STRIP-MCNC: NEGATIVE MG/DL
LDLC SERPL CALC-MCNC: 53 MG/DL (ref 0–100)
LEUKOCYTE ESTERASE UR QL STRIP: NEGATIVE
LYMPHOCYTES # BLD AUTO: 1.97 THOUSANDS/ΜL (ref 0.6–4.47)
LYMPHOCYTES NFR BLD AUTO: 18 % (ref 14–44)
MCH RBC QN AUTO: 29.7 PG (ref 26.8–34.3)
MCHC RBC AUTO-ENTMCNC: 33.3 G/DL (ref 31.4–37.4)
MCV RBC AUTO: 89 FL (ref 82–98)
MONOCYTES # BLD AUTO: 1 THOUSAND/ΜL (ref 0.17–1.22)
MONOCYTES NFR BLD AUTO: 9 % (ref 4–12)
NEUTROPHILS # BLD AUTO: 7.83 THOUSANDS/ΜL (ref 1.85–7.62)
NEUTS SEG NFR BLD AUTO: 71 % (ref 43–75)
NITRITE UR QL STRIP: NEGATIVE
NON-SQ EPI CELLS URNS QL MICRO: ABNORMAL /HPF
NRBC BLD AUTO-RTO: 0 /100 WBCS
PH UR STRIP.AUTO: 6 [PH] (ref 4.5–8)
PLATELET # BLD AUTO: 254 THOUSANDS/UL (ref 149–390)
PMV BLD AUTO: 10.8 FL (ref 8.9–12.7)
POTASSIUM SERPL-SCNC: 3.7 MMOL/L (ref 3.5–5.3)
PROT SERPL-MCNC: 7.7 G/DL (ref 6.4–8.2)
PROT UR STRIP-MCNC: ABNORMAL MG/DL
RBC # BLD AUTO: 4.78 MILLION/UL (ref 3.88–5.62)
RBC #/AREA URNS AUTO: ABNORMAL /HPF
SODIUM SERPL-SCNC: 142 MMOL/L (ref 136–145)
SP GR UR STRIP.AUTO: 1.02 (ref 1–1.03)
TRIGL SERPL-MCNC: 98 MG/DL
TSH SERPL DL<=0.05 MIU/L-ACNC: 3.28 UIU/ML (ref 0.36–3.74)
UROBILINOGEN UR QL STRIP.AUTO: 0.2 E.U./DL
WBC # BLD AUTO: 11.1 THOUSAND/UL (ref 4.31–10.16)
WBC #/AREA URNS AUTO: ABNORMAL /HPF

## 2017-10-28 PROCEDURE — 83036 HEMOGLOBIN GLYCOSYLATED A1C: CPT

## 2017-10-28 PROCEDURE — 36415 COLL VENOUS BLD VENIPUNCTURE: CPT

## 2017-10-28 PROCEDURE — 80053 COMPREHEN METABOLIC PANEL: CPT

## 2017-10-28 PROCEDURE — 81001 URINALYSIS AUTO W/SCOPE: CPT

## 2017-10-28 PROCEDURE — 84443 ASSAY THYROID STIM HORMONE: CPT

## 2017-10-28 PROCEDURE — 80061 LIPID PANEL: CPT

## 2017-10-28 PROCEDURE — 85025 COMPLETE CBC W/AUTO DIFF WBC: CPT

## 2017-11-03 ENCOUNTER — APPOINTMENT (OUTPATIENT)
Dept: LAB | Facility: CLINIC | Age: 82
End: 2017-11-03
Payer: MEDICARE

## 2017-11-03 DIAGNOSIS — D72.829 ELEVATED WHITE BLOOD CELL COUNT: ICD-10-CM

## 2017-11-03 LAB
ALBUMIN SERPL BCP-MCNC: 3.4 G/DL (ref 3.5–5)
ALP SERPL-CCNC: 78 U/L (ref 46–116)
ALT SERPL W P-5'-P-CCNC: 61 U/L (ref 12–78)
ANION GAP SERPL CALCULATED.3IONS-SCNC: 4 MMOL/L (ref 4–13)
AST SERPL W P-5'-P-CCNC: 31 U/L (ref 5–45)
BASOPHILS # BLD AUTO: 0.03 THOUSANDS/ΜL (ref 0–0.1)
BASOPHILS NFR BLD AUTO: 0 % (ref 0–1)
BILIRUB SERPL-MCNC: 1.76 MG/DL (ref 0.2–1)
BUN SERPL-MCNC: 21 MG/DL (ref 5–25)
CALCIUM SERPL-MCNC: 8.4 MG/DL (ref 8.3–10.1)
CHLORIDE SERPL-SCNC: 104 MMOL/L (ref 100–108)
CO2 SERPL-SCNC: 32 MMOL/L (ref 21–32)
CREAT SERPL-MCNC: 1.24 MG/DL (ref 0.6–1.3)
EOSINOPHIL # BLD AUTO: 0.28 THOUSAND/ΜL (ref 0–0.61)
EOSINOPHIL NFR BLD AUTO: 3 % (ref 0–6)
ERYTHROCYTE [DISTWIDTH] IN BLOOD BY AUTOMATED COUNT: 14.5 % (ref 11.6–15.1)
GFR SERPL CREATININE-BSD FRML MDRD: 53 ML/MIN/1.73SQ M
GLUCOSE P FAST SERPL-MCNC: 113 MG/DL (ref 65–99)
HCT VFR BLD AUTO: 39.9 % (ref 36.5–49.3)
HGB BLD-MCNC: 13.4 G/DL (ref 12–17)
LDH SERPL-CCNC: 169 U/L (ref 81–234)
LYMPHOCYTES # BLD AUTO: 1.97 THOUSANDS/ΜL (ref 0.6–4.47)
LYMPHOCYTES NFR BLD AUTO: 18 % (ref 14–44)
MCH RBC QN AUTO: 30 PG (ref 26.8–34.3)
MCHC RBC AUTO-ENTMCNC: 33.6 G/DL (ref 31.4–37.4)
MCV RBC AUTO: 89 FL (ref 82–98)
MONOCYTES # BLD AUTO: 0.85 THOUSAND/ΜL (ref 0.17–1.22)
MONOCYTES NFR BLD AUTO: 8 % (ref 4–12)
NEUTROPHILS # BLD AUTO: 7.52 THOUSANDS/ΜL (ref 1.85–7.62)
NEUTS SEG NFR BLD AUTO: 71 % (ref 43–75)
NRBC BLD AUTO-RTO: 0 /100 WBCS
PLATELET # BLD AUTO: 273 THOUSANDS/UL (ref 149–390)
PMV BLD AUTO: 10.3 FL (ref 8.9–12.7)
POTASSIUM SERPL-SCNC: 3.5 MMOL/L (ref 3.5–5.3)
PROT SERPL-MCNC: 7.4 G/DL (ref 6.4–8.2)
RBC # BLD AUTO: 4.47 MILLION/UL (ref 3.88–5.62)
SODIUM SERPL-SCNC: 140 MMOL/L (ref 136–145)
WBC # BLD AUTO: 10.69 THOUSAND/UL (ref 4.31–10.16)

## 2017-11-03 PROCEDURE — 80053 COMPREHEN METABOLIC PANEL: CPT

## 2017-11-03 PROCEDURE — 36415 COLL VENOUS BLD VENIPUNCTURE: CPT

## 2017-11-03 PROCEDURE — 83615 LACTATE (LD) (LDH) ENZYME: CPT

## 2017-11-03 PROCEDURE — 85025 COMPLETE CBC W/AUTO DIFF WBC: CPT

## 2017-11-07 ENCOUNTER — ALLSCRIPTS OFFICE VISIT (OUTPATIENT)
Dept: OTHER | Facility: OTHER | Age: 82
End: 2017-11-07

## 2017-11-08 NOTE — PROGRESS NOTES
Assessment  1  Leukocytosis (288 60) (D72 829)    Plan  Leukocytosis    · (1) CBC/PLT/DIFF; Status:Active; Requested SVC:87YDS3473; [facility]; YQA:05IKQ7839;DKIWQXE; For:Leukocytosis; Ordered By:German Veloz;   · (1) COMPREHENSIVE METABOLIC PANEL; Status:Active; Requested FUT:59YGW9596; [facility]; IOH:68UVJ8502;TWFQCWK; For:Leukocytosis; Ordered By:German Veloz;   · (1) LD (LDH); Status:Active; Requested BWY:12CIF5325; [facility]; WUV:46WST8641;RMUYFUC; For:Leukocytosis; Ordered By:German Veloz;   · Follow-up visit in 1 year Evaluation and Treatment  Follow-up  Status: Hold For -  Scheduling  Requested for: 30UAB7744   Ordered; For: Leukocytosis; Ordered By: Ashu Vega Performed:  Due: 26SLI5095        Follow-up visit in 3 months Evaluation and Treatment  Follow-up  Status: Hold For - Scheduling  Requested for: 93Tib3381  Ordered; For: Leukocytosis; Ordered By: Ashu Vega  Performed:   Due: 07SLS6150     Discussion/Summary  Discussion Summary:   The patient is a pleasant 77-year-old male who was referred to see us for mild leukocytosis  I ordered a myeloproliferative workup to include a flow cytometry, PCR for BCR/ABL and a JAK2 mutation  His workup was all negative  I explained to him that Options at this point included a bone marrow biopsy versus observation  The patient at 80years of age wisheD to stay on observation  His most recent white count is stable and actually his neutrophil count has returned to normal  Her wishes to continue to stay on observation  I think this is reasonable  If his counts deteriorate we agreed to consider a bone marrow biopsy  I will now see him back in 6 months  He does understand the importance of follow-up with his fluctuating white count and the risk of mild MDS or other bone marrow disorder so will see me back in 6 months  He had his EGD and colonoscopy   There were some polyps removed along with gastric and squamous junctional mucosa with intestinal metaplasia for which she will continue to follow with our colleagues in GI  Counseling Documentation With Imm: The patient was counseled regarding diagnostic results,-- instructions for management,-- prognosis  Goals and Barriers: The patient has the current Goals: Workup of leukocytosis  The patent has the current Barriers: None  Patient's Capacity to Self-Care: Patient is able to Self-Care  Medication SE Review and Pt Understands Tx: The treatment plan was reviewed with the patient/guardian  The patient/guardian understands and agrees with the treatment plan      Chief Complaint  Chief Complaint Free Text Note Form: Slightly elevated white count with normal hemoglobin and platelet count      History of Present Illness  Previous Therapy:   Workup       Current Therapy: Workup       Interval History: The patient returns for follow-up visit  He had presented with an elevated white count so I ordered a myeloproliferative workup  This testing was all negative  Flow cytometry did not show any CLL or any evidence of leukemia  He did have neutrophilia  We had a discussion and he decided to just be put on observation  His most recent blood work actually shows his white count was slightly elevated with neutrophil count is now normal  The patient himself is at baseline  He had an EGD and colonoscopy for which she is following with our colleagues in GI  Denies any nausea denies any vomiting denies any diarrhea  Overall is at baseline  Does have 2 lesions on his calf on the left side which I advised him to show dermatology as they may be superficial skin cancers  The rest of his 14 point review of systems today was negative  Review of Systems  Complete-Male: As stated in history of present illness otherwise her 14 point review of systems today was negative  Active Problems  1  Actinic keratosis (702 0) (L57 0)   2   Acute idiopathic gout of left foot (274 01) (M10 072)   3  Adult hypothyroidism (244 9) (E03 9)   4  Atrial fibrillation (427 31) (I48 91)   5  Backache (724 5) (M54 9)   6  Benign essential hypertension (401 1) (I10)   7  Bilateral edema of lower extremity (782 3) (R60 0)   8  Borderline hypothyroidism (794 5) (R94 6)   9  Bruise (924 9) (T14 8XXA)   10  Changing skin lesion (709 9) (L98 9)   11  Chronic kidney disease, stage 3 (585 3) (N18 3)   12  Colon cancer screening (V76 51) (Z12 11)   13  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,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 21)   14  Diabetes mellitus type 2, controlled (250 00) (E11 9)   15  DVT (deep venous thrombosis) (453 40) (I82 409)   16  Fecal occult blood test positive (792 1) (R19 5)   17  Flu vaccine need (V04 81) (Z23)   18  Foot pain, left (729 5) (M79 672)   19  Heme positive stool (792 1) (R19 5)   20  Hypercholesterolemia (272 0) (E78 00)   21  Hyperlipidemia (272 4) (E78 5)   22  Hypokalemia (276 8) (E87 6)   23  Leukocytosis (288 60) (D72 829)   24  Muscle Cramps (729 82)   25  Need for influenza vaccination (V04 81) (Z23)   26  Need for pneumococcal vaccination (V03 82) (Z23)   27  Need for prophylactic vaccination against Streptococcus pneumoniae (pneumococcus)    (V03 82) (Z23)   28  Otalgia, unspecified laterality (388 70) (H92 09)   29  Otitis externa, unspecified laterality   30  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)   31  Psoriasis (696 1) (L40 9)   32  Rash (782 1) (R21)   33  SCC (squamous cell carcinoma), hand, right (173 62) (C44 622)   34  Screening for genitourinary condition (V81 6) (Z13 89)   35  Screening for skin condition (V82 0) (Z13 89)   36  Seborrheic dermatitis of scalp (690 18) (L21 9)   37  Seborrheic keratosis (702 19) (L82 1)   38  Special screening examination for neoplasm of prostate (V76 44) (Z12 5)   39  Type 2 diabetes, uncontrolled, with renal manifestation (250 42) (E11 29,E11 65)    Past Medical History  1   History of Allergic contact dermatitis due to plants, except food (692 6) (L23 7)   2  History of Coagulation test abnormality (790 92) (R79 1)   3  History of Eczema (692 9) (L30 9)   4  H/O nonmelanoma skin cancer (V10 83) (Z85 828)   5  History of cataract (V12 49) (Z86 69)   6  History of influenza vaccination (V49 89) (Z92 29)   7  History of lichen planus (Q42 5) (Z87 2)   8  History of Injury Of Multiple Blood Vessels Of The Upper Extremity (903 8)   9  History of Proteinuria (791 0) (R80 9)   10  History of Special screening examination for neoplasm of prostate (V76 44) (Z12 5)   11  History of Thrombophlebitis Of Deep Vessels Of The Lower Extremity (V12 51)   12  History of Visit For: Single Organ System Diabetic Foot Exam (V72 9)  Active Problems And Past Medical History Reviewed: The active problems and past medical history were reviewed and updated today  Hypercholesterolemia, hypertension      Surgical History  1  History of Cataract Surgery   2  History of Cholecystectomy   3  History of Tonsillectomy  Surgical History Reviewed: The surgical history was reviewed and updated today  Family History  Mother    1  Family history of Alcoholism  Father    2  Family history of Acute Myocardial Infarction (V17 3)   3  Family history of Father  At Age 79  Family History Reviewed: The family history was reviewed and updated today  Social History   · Denied: History of Alcohol Use (History)   · Marital History - Currently    · Never A Smoker   · Never Used Drugs   · Occupation: Retired   · Sedentary Lifestyle (V69 0)   · Denied: History of Smoking Cigarettes  Social History Reviewed: The social history was reviewed and updated today  Does not smoke does not drink      Current Meds   1  Allopurinol 100 MG Oral Tablet; TAKE 1 TABLET DAILY; Therapy: 94Hhs4762 to (Evaluate:76Tkr3009)  Requested for: 03Zir2531; Last   Rx:90Gjd1879; Status: ACTIVE - Transmit to Pharmacy - Awaiting Verification Ordered   2   AmLODIPine Besylate 5 MG Oral Tablet; TAKE 1 TABLET DAILY AS DIRECTED; Therapy: 21GVY7184 to (Evaluate:15Fdk3223)  Requested for: 58OKH3525; Last   Rx:13Oct2017 Ordered   3  Atorvastatin Calcium 10 MG Oral Tablet; TAKE 1/2 TABLET DAILY; Therapy: 79WKP8527 to (Galloon Rohit)  Requested for: 79Vqw3933; Last   Rx:45Ioc6051 Ordered   4  Betamethasone Dipropionate Aug 0 05 % External Ointment; APPLY SPARINGLY TO   AFFECTED AREA( S) elbows , palms and feet  TWICE DAILY; Therapy: 18DXX8548 to (Last Rx:92Zji5028)  Requested for: 78Fce9329 Ordered   5  Colcrys 0 6 MG Oral Tablet; TAKE 1 TABLET DAILY AS DIRECTED; Therapy: 71MAH6661 to (Evaluate:08Jan2018); Last Rx:13Jan2017 Ordered   6  Esomeprazole Magnesium 40 MG Oral Capsule Delayed Release; TAKE 1 CAPSULE   DAILY; Therapy: 58XFY7998 to (Evaluate:04Qlh1029)  Requested for: 15JKI3848; Last   Rx:24Rmt0891 Ordered   7  Folbee 2 5-25-1 MG Oral Tablet; take one tablet by mouth every day; Therapy: 95YFF5599 to (Evaluate:75Vdj8631)  Requested for: 28Apr2017; Last   Rx:28Apr2017 Ordered   8  Furosemide 80 MG Oral Tablet; TAKE 1 TABLET DAILY AS DIRECTED; Therapy: 48VWU6134 to (Evaluate:04Jun2018)  Requested for: 36ZHS1298; Last   Rx:09Jun2017; Status: ACTIVE - Renewal Denied Ordered   9  GlipiZIDE ER 10 MG Oral Tablet Extended Release 24 Hour; TAKE 1 TABLET Twice daily   before am an pm meal;   Therapy: 65PNC4085 to (Evaluate:61Imd2925)  Requested for: 82MUQ5028; Last   Rx:71Unw2409 Ordered   10  Levothyroxine Sodium 50 MCG Oral Tablet; take one tablet by mouth daily; Therapy: 27FGH0800 to (Evaluate:88Grh9781)  Requested for: 07Efp1090; Last    Rx:85Uxy3005 Ordered   11  Lisinopril 10 MG Oral Tablet; TAKE 1 TABLET DAILY; Therapy: 27GCR1478 to (77 873 135)  Requested for: 25Oct2016; Last    Rx:25Oct2016 Ordered   12  Metoprolol Tartrate 25 MG Oral Tablet; TAKE 1 TABLET TWICE DAILY;     Therapy: 32JKT3227 to (60 124 37 75)  Requested for: 34MJX0118; Last    Rx:03Oct2017 Ordered   13  Mometasone Furoate 0 1 % External Solution; APPLY TO EAR TWICE DAILY; Therapy: 96WEZ6293 to (Last Rx:69Mfn9979)  Requested for: 06XLQ3406 Ordered   14  Omega-3 Fish Oil 1000 MG Oral Capsule; TAKE 1 CAPSULE DAILY; Therapy: 12EMU9418 to Recorded   15  Potassium Chloride ER 10 MEQ Oral Tablet Extended Release; TAKE 1 TABLET TWICE    DAILY; Therapy: 72IIL4388 to (CEZEWKXZ:87ZKO0911)  Requested for: 90Lfx9774; Last    Rx:51Hae1353 Ordered   16  Triamcinolone Acetonide 0 1 % External Ointment; APPLY TO PSORIASIS ON ENTIRE    BODY TWICE DAILY; Therapy: 56NYH6308 to [phone])  Requested for: 18YDD8083; Last    Rx:11Olh4271 Ordered   17  Xarelto 20 MG Oral Tablet; Take 1 tablet daily; Therapy: 86YTK5420 to (Evaluate:06Vws2364)  Requested for: 03Weq5009; Last    Rx:04Llx8834 Ordered  Medication List Reviewed: The medication list was reviewed and updated today  Allergies  1  No Known Drug Allergies    Vitals  Vital Signs    Recorded: 10CFC4129 09:05AM   Temperature 96 7 F   Heart Rate 67   Respiration 16   Systolic 425   Diastolic 88   Height 5 ft 7 72 in   Weight 205 lb    BMI Calculated 31 43   BSA Calculated 2 06   O2 Saturation 98   Pain Scale 0     Physical Exam    Constitutional   General appearance: No acute distress, well appearing and well nourished  Eyes   Conjunctiva and lids: No swelling, erythema, or discharge  Pupils and irises: Equal, round and reactive to light  Ears, Nose, Mouth, and Throat   External inspection of ears and nose: Normal     Nasal mucosa, septum, and turbinates: Normal without edema or erythema  Oropharynx: Normal with no erythema, edema, exudate or lesions  Pulmonary   Respiratory effort: No increased work of breathing or signs of respiratory distress  Auscultation of lungs: Clear to auscultation, equal breath sounds bilaterally, no wheezes, no rales, no rhonci  Cardiovascular   Palpation of heart: Normal PMI, no thrills  Auscultation of heart: Normal rate and rhythm, normal S1 and S2, without murmurs  Examination of extremities for edema and/or varicosities: Normal     Carotid pulses: Normal     Abdomen   Abdomen: Non-tender, no masses  Liver and spleen: No hepatomegaly or splenomegaly  Lymphatic   Palpation of lymph nodes in neck: No lymphadenopathy  Musculoskeletal   Gait and station: Normal     Digits and nails: Normal without clubbing or cyanosis  Inspection/palpation of joints, bones, and muscles: Normal     Skin   Skin and subcutaneous tissue: Normal without rashes or lesions  Neurologic   Cranial nerves: Cranial nerves 2-12 intact  Sensation: No sensory loss  Psychiatric   Orientation to person, place and time: Normal     Mood and affect: Normal           ECOG 1       Results/Data  (1) CBC/PLT/DIFF 90GJI5764 07:48AM Jass Lewis    Order Number: [id number]     Test Name Result Flag Reference   WBC COUNT 10 69 Thousand/uL H 4 31-10 16   RBC COUNT 4 47 Million/uL  3 88-5 62   HEMOGLOBIN 13 4 g/dL  12 0-17 0   HEMATOCRIT 39 9 %  36 5-49 3   MCV 89 fL  82-98   MCH 30 0 pg  26 8-34 3   MCHC 33 6 g/dL  31 4-37 4   RDW 14 5 %  11 6-15 1   MPV 10 3 fL  8 9-12 7   PLATELET COUNT 517 Thousands/uL  149-390   nRBC AUTOMATED 0 /100 WBCs     NEUTROPHILS RELATIVE PERCENT 71 %  43-75   LYMPHOCYTES RELATIVE PERCENT 18 %  14-44   MONOCYTES RELATIVE PERCENT 8 %  4-12   EOSINOPHILS RELATIVE PERCENT 3 %  0-6   BASOPHILS RELATIVE PERCENT 0 %  0-1   NEUTROPHILS ABSOLUTE COUNT 7 52 Thousands/? ??L  1 85-7 62   LYMPHOCYTES ABSOLUTE COUNT 1 97 Thousands/? ??L  0 60-4 47   MONOCYTES ABSOLUTE COUNT 0 85 Thousand/? ??L  0 17-1 22   EOSINOPHILS ABSOLUTE COUNT 0 28 Thousand/? ??L  0 00-0 61   BASOPHILS ABSOLUTE COUNT 0 03 Thousands/? ??L  0 00-0 10     (1) COMPREHENSIVE METABOLIC PANEL 88PNG6043 51:57HG Jass Lewis    Order Number: [id number]     Test Name Result Flag Reference   SODIUM 140 mmol/L  136-145 POTASSIUM 3 5 mmol/L  3 5-5 3   CHLORIDE 104 mmol/L  100-108   CARBON DIOXIDE 32 mmol/L  21-32   ANION GAP (CALC) 4 mmol/L  4-13   BLOOD UREA NITROGEN 21 mg/dL  5-25   CREATININE 1 24 mg/dL  0 60-1 30   Standardized to IDMS reference method   CALCIUM 8 4 mg/dL  8 3-10 1   BILI, TOTAL 1 76 mg/dL H 0 20-1 00   ALK PHOSPHATAS 78 U/L     ALT (SGPT) 61 U/L  12-78   Specimen collection should occur prior to Sulfasalazine and/or Sulfapyridine administration due to the potential for falsely depressed results  AST(SGOT) 31 U/L  5-45   Specimen collection should occur prior to Sulfasalazine administration due to the potential for falsely depressed results  ALBUMIN 3 4 g/dL L 3 5-5 0   TOTAL PROTEIN 7 4 g/dL  6 4-8 2   eGFR 53 ml/min/1 73sq m     [facility] Disease Education Program recommendations are as follows:  GFR calculation is accurate only with a steady state creatinine  Chronic Kidney disease less than 60 ml/min/1 73 sq  meters  Kidney failure less than 15 ml/min/1 73 sq  meters  GLUCOSE FASTING 113 mg/dL H 65-99   Specimen collection should occur prior to Sulfasalazine administration due to the potential for falsely depressed results  Specimen collection should occur prior to Sulfapyridine administration due to the potential for falsely elevated results  (1) LD (LDH) 42NGA3684 07:48AM Robi Hernandez    Order Number: [id number]     Test Name Result Flag Reference   LD (LDH) 169 U/L       Health Management  Colon cancer screening   COLONOSCOPY; every 5 years; Last 08KOM8079; Next Due: 83ADV7129; Active  EGD; every 3 years; Last 60IUV7475; Next Due: 70MGR2886; Active  Diabetes mellitus type 2, controlled   *VB - Eye Exam; every 1 year; Last 85Xey2155; Next Due: 16QLN9187; Active  *VB - Foot Exam; every 1 year; Last 07BMY8114; Next Due: 83MEE4850; Active  Health Maintenance   *VB - Fall Risk Assessment  (Dx Z13 89 Screen for Neurologic Disorder); every 1 year; Last  09Czn6031;  Next Due: 81Zbd1225; Overdue  DIGITAL RECTAL EXAM; every 1 year; Next Due: 18Kvq8313; Overdue  Influenza; every 1 year; Last 56GPT6486; Next Due: 72FUO7963; Overdue    Future Appointments    Date/Time Provider Specialty Site   11/07/2017 09:00 AM MINO Pak   [facility]ology [facility]   11/21/2017 01:00 PM Negrito Estes, [facility]     Signatures   Electronically signed by : MINO Chua ; Nov 7 2017  9:15AM EST                       (Author)

## 2017-11-21 ENCOUNTER — ALLSCRIPTS OFFICE VISIT (OUTPATIENT)
Dept: OTHER | Facility: OTHER | Age: 82
End: 2017-11-21

## 2017-11-23 NOTE — PROGRESS NOTES
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)    Plan  Hyperlipidemia    · Follow-up visit in 6 months Evaluation and Treatment  Follow-up  Status: Hold For - Scheduling Requested for: 21Nov2017    Discussion/Summary  Discussion Summary:   Doing well continue follow-up with Dermatology  I reviewed all of his recent labs is A1c is stable CBC chemistries all stable  Medication SE Review and Pt Understands Tx: Possible side effects of new medications were reviewed with the patient/guardian today  The treatment plan was reviewed with the patient/guardian  The patient/guardian understands and agrees with the treatment plan      Chief Complaint  Chief Complaint Chronic Condition [facility]: Patient is here today for follow up of chronic conditions described in HPI  History of Present Illness  HPI: He is here for follow-up he is doing well ongoing problem itchy skin nodules followed closely by dermatology  Has a few on his scalp otherwise ambulatory well no shortness breath chest falls dizziness or syncope appetite is good sleeping well   Atrial Fibrillation (Follow-Up): The patient presents with permanent atrial fibrillation  The treatment strategy for this patient is rate control  He states his atrial fibrillation has been well controlled since the last visit  He has no comorbid illnesses  He has no significant interval events  Symptoms: The patient is currently asymptomatic  Disease Management: the patient is doing well with his goals  Hyperlipidemia (Follow-Up): The patient states his hyperlipidemia has been under good control since the last visit  Comorbid Illnesses: hypertension  He has no significant interval events     Symptoms: The patient is currently asymptomatic  Hypertension (Follow-Up): The patient presents for follow-up of essential hypertension  The patient states he has been doing well with his blood pressure control since the last visit  He has no comorbid illnesses  He has no significant interval events  Symptoms: The patient is currently asymptomatic  Disease Management: the patient is doing well with his blood pressure goals  Review of Systems  Complete-Male:  Constitutional: No fever or chills, feels well, no tiredness, no recent weight gain or weight loss  Eyes: No complaints of eye pain, no red eyes, no discharge from eyes, no itchy eyes  ENT: no complaints of earache, no hearing loss, no nosebleeds, no nasal discharge, no sore throat, no hoarseness  Cardiovascular: as noted in HPI  Respiratory: No complaints of shortness of breath, no wheezing, no cough, no SOB on exertion, no orthopnea or PND  Gastrointestinal: No complaints of abdominal pain, no constipation, no nausea or vomiting, no diarrhea or bloody stools  Genitourinary: No complaints of dysuria, no incontinence, no hesitancy, no nocturia, no genital lesion, no testicular pain  Musculoskeletal: No complaints of arthralgia, no myalgias, no joint swelling or stiffness, no limb pain or swelling  Integumentary: as noted in HPI  Neurological: No compliants of headache, no confusion, no convulsions, no numbness or tingling, no dizziness or fainting, no limb weakness, no difficulty walking  Psychiatric: Is not suicidal, no sleep disturbances, no anxiety or depression, no change in personality, no emotional problems  Endocrine: No complaints of proptosis, no hot flashes, no muscle weakness, no erectile dysfunction, no deepening of the voice, no feelings of weakness  Hematologic/Lymphatic: No complaints of swollen glands, no swollen glands in the neck, does not bleed easily, no easy bruising  ROS Reviewed:   ROS reviewed  Active Problems    1   Actinic keratosis (702  0) (L57 0)   2  Acute idiopathic gout of left foot (274 01) (M10 072)   3  Adult hypothyroidism (244 9) (E03 9)   4  Atrial fibrillation (427 31) (I48 91)   5  Backache (724 5) (M54 9)   6  Benign essential hypertension (401 1) (I10)   7  Bilateral edema of lower extremity (782 3) (R60 0)   8  Borderline hypothyroidism (794 5) (R94 6)   9  Bruise (924 9) (T14 8XXA)   10  Changing skin lesion (709 9) (L98 9)   11  Chronic kidney disease, stage 3 (585 3) (N18 3)   12  Colon cancer screening (V76 51) (Z12 11)   13  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,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 21)   14  Diabetes mellitus type 2, controlled (250 00) (E11 9)   15  DVT (deep venous thrombosis) (453 40) (I82 409)   16  Fecal occult blood test positive (792 1) (R19 5)   17  Flu vaccine need (V04 81) (Z23)   18  Foot pain, left (729 5) (M79 672)   19  Heme positive stool (792 1) (R19 5)   20  Hypercholesterolemia (272 0) (E78 00)   21  Hyperlipidemia (272 4) (E78 5)   22  Hypokalemia (276 8) (E87 6)   23  Leukocytosis (288 60) (D72 829)   24  Muscle Cramps (729 82)   25  Need for influenza vaccination (V04 81) (Z23)   26  Need for pneumococcal vaccination (V03 82) (Z23)   27  Need for prophylactic vaccination against Streptococcus pneumoniae (pneumococcus) (V03 82)  (Z23)   28  Otalgia, unspecified laterality (388 70) (H92 09)   29  Otitis externa, unspecified laterality   30  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)   31  Psoriasis (696 1) (L40 9)   32  Rash (782 1) (R21)   33  SCC (squamous cell carcinoma), hand, right (173 62) (C44 622)   34  Screening for genitourinary condition (V81 6) (Z13 89)   35  Screening for skin condition (V82 0) (Z13 89)   36  Seborrheic dermatitis of scalp (690 18) (L21 9)   37  Seborrheic keratosis (702 19) (L82 1)   38  Special screening examination for neoplasm of prostate (V76 44) (Z12 5)   39   Type 2 diabetes, uncontrolled, with renal manifestation (250 42) (E11 29,E11 65)    Past Medical History  1  History of Allergic contact dermatitis due to plants, except food (692 6) (L23 7)   2  History of Coagulation test abnormality (790 92) (R79 1)   3  History of Eczema (692 9) (L30 9)   4  H/O nonmelanoma skin cancer (V10 83) (Z85 828)   5  History of cataract (V12 49) (Z86 69)   6  History of influenza vaccination (V49 89) (Z92 29)   7  History of lichen planus (A30 9) (Z87 2)   8  History of Injury Of Multiple Blood Vessels Of The Upper Extremity (903 8)   9  History of Proteinuria (791 0) (R80 9)   10  History of Special screening examination for neoplasm of prostate (V76 44) (Z12 5)   11  History of Thrombophlebitis Of Deep Vessels Of The Lower Extremity (V12 51)   12  History of Visit For: Single Organ System Diabetic Foot Exam (V72 9)  Active Problems And Past Medical History Reviewed: The active problems and past medical history were reviewed and updated today  Surgical History  1  History of Cataract Surgery   2  History of Cholecystectomy   3  History of Tonsillectomy  Surgical History Reviewed: The surgical history was reviewed and updated today  Family History  Mother    1  Family history of Alcoholism  Father    2  Family history of Acute Myocardial Infarction (V17 3)   3  Family history of Father  At Age 79  Family History Reviewed: The family history was reviewed and updated today  Social History     · Denied: History of Alcohol Use (History)   · Marital History - Currently    · Never A Smoker   · Never Used Drugs   · Occupation: Retired   · Sedentary Lifestyle (V69 0)   · Denied: History of Smoking Cigarettes  Social History Reviewed: The social history was reviewed and updated today  Current Meds   1  Allopurinol 100 MG Oral Tablet; TAKE 1 TABLET DAILY; Therapy: 13Tex4866 to (Evaluate:08Bqj6755)  Requested for: 74Qjf2052; Last Rx:06Kde6849; Status: ACTIVE - Transmit to Pharmacy - Awaiting Verification Ordered   2   AmLODIPine Besylate 5 MG Oral Tablet; TAKE 1 TABLET DAILY AS DIRECTED; Therapy: 75FNJ2970 to (Evaluate:01Xeq4622)  Requested for: 53YBP5757; Last Rx:13Oct2017 Ordered   3  Atorvastatin Calcium 10 MG Oral Tablet; TAKE 1/2 TABLET DAILY; Therapy: 92FSF6421 to ([employer])  Requested for: 75Zxu2641; Last Rx:95Syq6562 Ordered   4  Betamethasone Dipropionate Aug 0 05 % External Ointment; APPLY SPARINGLY TO AFFECTED AREA( S) elbows , palms and feet  TWICE DAILY; Therapy: 03UXW9646 to (Last Rx:09Kqe9328)  Requested for: 71Vpx1185 Ordered   5  Colcrys 0 6 MG Oral Tablet; TAKE 1 TABLET DAILY AS DIRECTED; Therapy: 34AUM6016 to (Evaluate:08Jan2018); Last Rx:13Jan2017 Ordered   6  Esomeprazole Magnesium 40 MG Oral Capsule Delayed Release; TAKE 1 CAPSULE DAILY; Therapy: 40FRM3240 to (Evaluate:70Ccx6752)  Requested for: 32VUJ9006; Last Rx:34Ftz1559 Ordered   7  Folbee 2 5-25-1 MG Oral Tablet; take one tablet by mouth every day; Therapy: 41RYL2618 to (Evaluate:23Apr2018)  Requested for: 30Flp8257; Last Rx:28Apr2017 Ordered   8  Furosemide 80 MG Oral Tablet; TAKE 1 TABLET DAILY AS DIRECTED; Therapy: 06QZW7078 to (Evaluate:04Jun2018)  Requested for: 44KVY1220; Last Rx:09Jun2017; Status: ACTIVE - Renewal Denied Ordered   9  GlipiZIDE ER 10 MG Oral Tablet Extended Release 24 Hour; TAKE 1 TABLET Twice daily before am an pm meal; Therapy: 90JRT3050 to (Evaluate:16Nov2017)  Requested for: 60IKG4177; Last Rx:73Dpp4828 Ordered   10  Levothyroxine Sodium 50 MCG Oral Tablet; take one tablet by mouth daily; Therapy: 14SEG7736 to (Evaluate:24Mhz3839)  Requested for: 35Xfs4502; Last Rx:94Lfc6585  Ordered   11  Lisinopril 10 MG Oral Tablet; TAKE 1 TABLET DAILY; Therapy: 77RNQ9026 to (21 )  Requested for: 25Oct2016; Last Rx:25Oct2016  Ordered   12  Metoprolol Tartrate 25 MG Oral Tablet; TAKE 1 TABLET TWICE DAILY; Therapy: 81PNV8011 to (Evaluate:01Apr2018)  Requested for: 50DQB8130; Last Rx:03Oct2017  Ordered   13   Mometasone Furoate 0 1 % External Solution; APPLY TO EAR TWICE DAILY; Therapy: 07PJF0234 to (Last Rx:59Wdw3042)  Requested for: 93HTJ5024 Ordered   14  Omega-3 Fish Oil 1000 MG Oral Capsule; TAKE 1 CAPSULE DAILY; Therapy: 79RPP1991 to Recorded   15  Potassium Chloride ER 10 MEQ Oral Tablet Extended Release; TAKE 1 TABLET TWICE DAILY; Therapy: 40WDX5506 to (SUFSXQCZ:18VGX0293)  Requested for: 82Jyy0388; Last Rx:17Bzt5527  Ordered   16  Triamcinolone Acetonide 0 1 % External Ointment; APPLY TO PSORIASIS ON ENTIRE BODY TWICE  DAILY; Therapy: 43NRA7193 to (03 17 74 30 53)  Requested for: 52HTB1932; Last Rx:09Ooo1977  Ordered   17  Xarelto 20 MG Oral Tablet; Take 1 tablet daily; Therapy: 77HZA3108 to (Evaluate:91Juo0320)  Requested for: 42Yjc4948; Last Rx:64Vhd4121  Ordered  Medication List Reviewed: The medication list was reviewed and updated today  Allergies  1  No Known Drug Allergies    Vitals  Vital Signs    Recorded: 21Nov2017 01:18PM   Heart Rate 62   Systolic 769   Diastolic 72   Height 5 ft 7 in   Weight 204 lb 8 oz   BMI Calculated 32 03   BSA Calculated 2 04   O2 Saturation 94       Physical Exam   Constitutional  General appearance: No acute distress, well appearing and well nourished  Eyes  Conjunctiva and lids: No swelling, erythema, or discharge  Pupils and irises: Equal, round and reactive to light  Ears, Nose, Mouth, and Throat  External inspection of ears and nose: Normal    Otoscopic examination: Tympanic membrance translucent with normal light reflex  Canals patent without erythema  Nasal mucosa, septum, and turbinates: Normal without edema or erythema  Oropharynx: Normal with no erythema, edema, exudate or lesions  Pulmonary  Respiratory effort: No increased work of breathing or signs of respiratory distress  Auscultation of lungs: Clear to auscultation, equal breath sounds bilaterally, no wheezes, no rales, no rhonci  Cardiovascular  Palpation of heart: Normal PMI, no thrills     Auscultation of heart: Normal rate and rhythm, normal S1 and S2, without murmurs  Examination of extremities for edema and/or varicosities: Normal    Carotid pulses: Normal    Abdomen  Abdomen: Non-tender, no masses  Liver and spleen: No hepatomegaly or splenomegaly  Lymphatic  Palpation of lymph nodes in neck: No lymphadenopathy  Musculoskeletal  Gait and station: Normal    Digits and nails: Normal without clubbing or cyanosis  Inspection/palpation of joints, bones, and muscles: Normal    Skin  Skin and subcutaneous tissue: Abnormal  -- Scattered water 2 centimeter slightly irritated skin nodules legs and scalp few on hisback  Neurologic  Cranial nerves: Cranial nerves 2-12 intact  Reflexes: 2+ and symmetric  Sensation: No sensory loss  Psychiatric  Orientation to person, place and time: Normal    Mood and affect: Normal          Health Management  Colon cancer screening   COLONOSCOPY; every 5 years; Last 05MJR0954; Next Due: 71QKK1042; Active  EGD; every 3 years; Last 11VQF9672; Next Due: 57SKL7228; Active  Diabetes mellitus type 2, controlled   *VB - Eye Exam; every 1 year; Last 27Qez9085; Next Due: 94IHT9468; Active  *VB - Foot Exam; every 1 year; Last 08GFN8265; Next Due: 65OZL3621; Active  Health Maintenance   *VB - Fall Risk Assessment  (Dx Z13 89 Screen for Neurologic Disorder); every 1 year; Lxfu15Sxh2733; Next Due: 37TKC2074; Overdue  DIGITAL RECTAL EXAM; every 1 year; Next Due: 77Vjv7751; Overdue  Influenza; every 1 year; Last 99OGA1372; Next Due: 19TRM4040; Overdue    Future Appointments    Date/Time Provider Specialty Site   11/27/2018 09:00 AM MINO Kerr  Hematology [facility]   05/08/2018 01:00 PM Jackie Rajan [facility]   12/20/2017 08:35 AM MINO Guerrero   Dermatology [facility]       Signatures   Electronically signed by : Bonnie Marlow, [facility]; Nov 21 2017  7:39PM EST (Author)    Electronically signed by : MINO Moura ; Nov 22 2017 12:05PM EST

## 2017-12-20 ENCOUNTER — ALLSCRIPTS OFFICE VISIT (OUTPATIENT)
Dept: OTHER | Facility: OTHER | Age: 82
End: 2017-12-20

## 2017-12-21 NOTE — PROGRESS NOTES
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)   2  Screening for skin condition (V82 0) (Z13 89)   3  Seborrheic keratosis (702 19) (L82 1)   4  H/O nonmelanoma skin cancer (V10 83) (Z85 828)    Plan    · Follow-up visit in 6 months Evaluation and Treatment  Follow-up  Status: Hold For -    Scheduling  Requested for: 18Lni0329   · Use a sun block product with an SPF of 15 or more ; Status:Complete;   Done:    88TVF7984   · Betamethasone Dipropionate Aug 0 05 % External Ointment; APPLY SPARINGLY    TO AFFECTED AREA( S) legs    Discussion/Summary   Discussion Summary- [facility]:      Assessment #1: history of skin cancer  Care Plan:      No recurrence nothing else atypical sunblock recommended  Assessment #2: Prurigo nodularis  Care Plan:      Patient advised that these lesions are caused from him picking and rubbing at the skin  Patient does not have any insight to this process  Will go ahead and continue with the topical steroids and evaluate in 6 months or earlier if necessary  Assessment #3: Seborrheic keratosis  Care Plan:      Patient reassured these are normal growths we acquire with age no treatment needed  Assessment #4: Screening for dermatologic disorders  Care Plan:      Nothing else of concern noted on complete exam sun protection recommended follow-up in 6 weeks  Chief Complaint   Chief Complaint Free Text Note Form: patient concerned with moles/lesions on his right calf and on his head      History of Present Illness   HPI: 70-year-old male presents for follow-up for his prurigo nodularis and continued irritation on his scalp and legs patient still concerned about potential skin cancer with previous history noted   Not aware that he is picking at these areas biopsied obtained previously did show lesions consistent with prurigo nodularis      Review of Systems   [facility] Patient:      Constitutional: Denies constitutional symptoms  Eyes: Denies eye symptoms  ENT:  denies ear symptoms, nasal symptoms, mouth or throat symptoms  Cardiovascular: Denies cardiovascular symptoms  Respiratory: Denies respiratory symptoms  Gastrointestinal: Denies gastrointestinal symptoms  Musculoskeletal: Denies musculoskeletal symptoms  Integumentary: Denies skin, hair and nail symptoms  Neurological: Denies neurologic symptoms  Psychiatric: Denies psychiatric symptoms  Endocrine: Denies endocrine symptoms  Hematologic/Lymphatic: Denies hematologic symptoms  Active Problems   1  Actinic keratosis (702 0) (L57 0)   2  Acute idiopathic gout of left foot (274 01) (M10 072)   3  Adult hypothyroidism (244 9) (E03 9)   4  Atrial fibrillation (427 31) (I48 91)   5  Backache (724 5) (M54 9)   6  Benign essential hypertension (401 1) (I10)   7  Bilateral edema of lower extremity (782 3) (R60 0)   8  Borderline hypothyroidism (794 5) (R94 6)   9  Bruise (924 9) (T14 8XXA)   10  Changing skin lesion (709 9) (L98 9)   11  Chronic kidney disease, stage 3 (585 3) (N18 3)   12  Colon cancer screening (V76 51) (Z12 11)   13  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,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 21)   14  Diabetes mellitus type 2, controlled (250 00) (E11 9)   15  DVT (deep venous thrombosis) (453 40) (I82 409)   16  Fecal occult blood test positive (792 1) (R19 5)   17  Flu vaccine need (V04 81) (Z23)   18  Foot pain, left (729 5) (M79 672)   19  Heme positive stool (792 1) (R19 5)   20  Hypercholesterolemia (272 0) (E78 00)   21  Hyperlipidemia (272 4) (E78 5)   22  Hypokalemia (276 8) (E87 6)   23  Leukocytosis (288 60) (D72 829)   24  Muscle Cramps (729 82)   25  Need for influenza vaccination (V04 81) (Z23)   26  Need for pneumococcal vaccination (V03 82) (Z23)   27  Need for prophylactic vaccination against Streptococcus pneumoniae (pneumococcus)      (V03 82) (Z23)   28   Otalgia, unspecified laterality (388 70) (H92 09)   29  Otitis externa, unspecified laterality   30  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)   31  Psoriasis (696 1) (L40 9)   32  Rash (782 1) (R21)   33  SCC (squamous cell carcinoma), hand, right (173 62) (C44 622)   34  Screening for genitourinary condition (V81 6) (Z13 89)   35  Screening for skin condition (V82 0) (Z13 89)   36  Seborrheic dermatitis of scalp (690 18) (L21 9)   37  Seborrheic keratosis (702 19) (L82 1)   38  Special screening examination for neoplasm of prostate (V76 44) (Z12 5)   39  Type 2 diabetes, uncontrolled, with renal manifestation (250 42) (E11 29,E11 65)    Past Medical History   1  History of Allergic contact dermatitis due to plants, except food (692 6) (L23 7)   2  History of Coagulation test abnormality (790 92) (R79 1)   3  History of Eczema (692 9) (L30 9)   4  H/O nonmelanoma skin cancer (V10 83) (Z85 828)   5  History of cataract (V12 49) (Z86 69)   6  History of influenza vaccination (V49 89) (Z92 29)   7  History of lichen planus (C91 0) (Z87 2)   8  History of Injury Of Multiple Blood Vessels Of The Upper Extremity (903 8)   9  History of Proteinuria (791 0) (R80 9)   10  History of Special screening examination for neoplasm of prostate (V76 44) (Z12 5)   11  History of Thrombophlebitis Of Deep Vessels Of The Lower Extremity (V12 51)   12  History of Visit For: Single Organ System Diabetic Foot Exam (V72 9)  Past Medical History Reviewed- Derm:    The past medical history was reviewed  Surgical History   1  History of Cataract Surgery   2  History of Cholecystectomy   3  History of Tonsillectomy  Surgical History Reviewed [facility]:    Surgical History reviewed      Family History   Mother    1  Family history of Alcoholism  Father    2  Family history of Acute Myocardial Infarction (V17 3)   3   Family history of Father  At Age 79  Family History Reviewed- Derm:    Family History was reviewed      Social History    · Denied: History of Alcohol Use (History)   · Marital History - Currently    · Never A Smoker   · Never Used Drugs   · Occupation: Retired   · Sedentary Lifestyle (V69 0)   · Denied: History of Smoking Cigarettes  Social History Reviewed [facility]- Derm: The social history was reviewed      Current Meds    1  Allopurinol 100 MG Oral Tablet; TAKE ONE TABLET BY MOUTH ONCE DAILY; Therapy: 68Ppx2551 to (Evaluate:12Apr2018)  Requested for: 64Kyl0571; Last     Rx:35Lxv1990 Ordered   2  AmLODIPine Besylate 5 MG Oral Tablet; TAKE 1 TABLET DAILY AS DIRECTED; Therapy: 81XYK0112 to (Evaluate:11Apr2018)  Requested for: 83MXG1304; Last     Rx:95Mno2224 Ordered   3  Atorvastatin Calcium 10 MG Oral Tablet; TAKE ONE-HALF TABLET BY MOUTH ONCE     DAILY; Therapy: 94WHM6135 to (Evaluate:12Mar2018)  Requested for: 15Yfg4891; Last     Rx:71Fdb4209 Ordered   4  Betamethasone Dipropionate Aug 0 05 % External Ointment; APPLY SPARINGLY TO     AFFECTED AREA( S) elbows , palms and feet  TWICE DAILY; Therapy: 87VHV3981 to (Last Rx:22Mni5055)  Requested for: 23Hnj3026 Ordered   5  Colcrys 0 6 MG Oral Tablet; TAKE 1 TABLET DAILY AS DIRECTED; Therapy: 45YWC5203 to (Evaluate:08Jan2018); Last Rx:13Jan2017 Ordered   6  Esomeprazole Magnesium 40 MG Oral Capsule Delayed Release; TAKE 1 CAPSULE     DAILY; Therapy: 81TNM5191 to (Evaluate:57Pyd1190)  Requested for: 70RER9255; Last     Rx:17Cih1859 Ordered   7  Folbee 2 5-25-1 MG Oral Tablet; take one tablet by mouth every day; Therapy: 41KHS6306 to (Evaluate:23Apr2018)  Requested for: 28Apr2017; Last     Rx:28Apr2017 Ordered   8  Furosemide 80 MG Oral Tablet; TAKE 1 TABLET DAILY AS DIRECTED; Therapy: 44UGW8203 to (Evaluate:04Jun2018)  Requested for: 74YXY3341; Last     Rx:09Jun2017; Status: ACTIVE - Renewal Denied Ordered   9  GlipiZIDE ER 10 MG Oral Tablet Extended Release 24 Hour; TAKE ONE TABLET BY     MOUTH TWICE DAILY BEFORE  MORNING  AND  EVENING  MEALS;      Therapy: 94LNI4750 to (Merrill Faires)  Requested for: 69UTG4450; Last     Rx:85Ewp7584 Ordered   10  Levothyroxine Sodium 50 MCG Oral Tablet; take one tablet by mouth daily; Therapy: 66LPO6095 to (Evaluate:57Met4266)  Requested for: 08Tcz7771; Last      Rx:39Tld0670 Ordered   11  Lisinopril 10 MG Oral Tablet; TAKE 1 TABLET DAILY; Therapy: 31GNS1183 to (Cee Monteiro)  Requested for: 38KWH0934; Last      Rx:00Ofz3696 Ordered   12  Metoprolol Tartrate 25 MG Oral Tablet; TAKE 1 TABLET TWICE DAILY; Therapy: 95EOW0750 to (Evaluate:01Apr2018)  Requested for: 85GUZ8600; Last      Rx:87Mym8272 Ordered   13  Mometasone Furoate 0 1 % External Solution; APPLY TO EAR TWICE DAILY; Therapy: 14DML0618 to (Last Rx:80Jbq5872)  Requested for: 30END7230 Ordered   14  Omega-3 Fish Oil 1000 MG Oral Capsule; TAKE 1 CAPSULE DAILY; Therapy: 66BNR3281 to Recorded   15  Potassium Chloride ER 10 MEQ Oral Tablet Extended Release; TAKE 1 TABLET TWICE      DAILY; Therapy: 20SGK2528 to (WYDTSVNR:25QME8847)  Requested for: 13Fet0036; Last      Rx:77Yua3602 Ordered   16  Triamcinolone Acetonide 0 1 % External Ointment; APPLY TO PSORIASIS ON ENTIRE      BODY TWICE DAILY; Therapy: 87RHF9002 to (05 12 73 93 30)  Requested for: 06WSH3464; Last      Rx:44Mpc0895 Ordered   17  Xarelto 20 MG Oral Tablet; Take 1 tablet daily; Therapy: 14EKO6112 to (Evaluate:04Dee1609)  Requested for: 62Uvx5302; Last      Rx:85Zpj6616 Ordered  Medication List Reviewed: The medication list was reviewed and updated today  Allergies   1  No Known Drug Allergies    Physical Exam        Constitutional      General appearance: Appears healthy and well developed  Lymphatic      No visible disturbance  Musculoskeletal      Digits and nails: No clubbing, cyanosis or edema  Cutaneous and nail exam normal        Skin      Scalp skin texture and hair distribution: Normal skin texture on scalp, normal hair distribution         Head: Abnormal        Neck: Normal turgor, no rashes, no lesions  Chest: Normal turgor, no rashes, no lesions  Abdomen: Normal turgor, no rashes, no lesions  Back: Normal turgor, no rashes, no lesions  Right upper extremity: Normal turgor, no rashes, no lesions  Left upper extremity: Normal turgor, no rashes, no lesions  Right lower extremity: Abnormal        Left lower extremity: Abnormal        Neuro/Psych      Alert and oriented x 3  Displays comfort and cooperation during encounterl  Affect is normal        Finding Previous sites of skin cancer well healed without recurrence occasional excoriations no noted on the left posterior scalp keratotic indurated nodules noted on the dorsum of the hands as well as on the lower legs nothing else remarkable noted on exam       Health Management   Colon cancer screening   COLONOSCOPY; every 5 years; Last 89WSC8147; Next Due: 99XDS2649; Active  EGD; every 3 years; Last 64FTJ5351; Next Due: 61HDM6285; Active  Diabetes mellitus type 2, controlled   *VB - Eye Exam; every 1 year; Last 28Pcp8044; Next Due: 88AQK8747; Active  *VB - Foot Exam; every 1 year; Last 80AFL0423; Next Due: 85Rzk8081; Near Due  Health Maintenance   *VB - Fall Risk Assessment  (Dx Z13 89 Screen for Neurologic Disorder); every 1 year; Last    19Ebc2378; Next Due: 55UWW8325; Overdue  DIGITAL RECTAL EXAM; every 1 year; Next Due: 31Xvu0039; Overdue  Influenza; every 1 year; Last 54ACO9297; Next Due: 45GDP7943; Overdue    Future Appointments      Date/Time Provider Specialty Site   [facility]   11/27/2018 09:00 AM MINO Kerr   Hematology [facility]  [facility] 18     Signatures    Electronically signed by : MINO Granado ; Dec 20 2017  8:30AM EST                       (Author)

## 2018-01-12 VITALS
DIASTOLIC BLOOD PRESSURE: 82 MMHG | RESPIRATION RATE: 16 BRPM | TEMPERATURE: 97.6 F | BODY MASS INDEX: 31.71 KG/M2 | SYSTOLIC BLOOD PRESSURE: 130 MMHG | OXYGEN SATURATION: 98 % | HEIGHT: 68 IN | HEART RATE: 88 BPM | WEIGHT: 209.25 LBS

## 2018-01-12 NOTE — RESULT NOTES
Verified Results  (1) TISSUE EXAM 04Ztj3693 02:09PM Nikky Berrios Order Number: [id number]     Test Name Result Flag Reference   LAB AP CASE REPORT (Report)     Surgical Pathology Report             Case: [id number]                   Authorizing Provider: Chelle Thorne MD     Collected:      09/14/2017 1409        Pathologist:      Kaylan Anand MD      Received:      09/15/2017 5995        Specimens:  A) - Skin, Other, Upper lip                                      B) - Skin, Other, Right thigh   LAB AP FINAL DIAGNOSIS (Report)     A  Skin, Upper lip, shave biopsy:  - Prurigo nodularis with scar  - Special stain for fungus (PAS) negative  - Negative for malignancy on multiple examined levels  B  Skin, Right thigh, shave biopsy:  - Benign verrucous keratosis with concurrent features of prurigo   nodularis  - Negative for malignancy on multiple examined levels  Interpretation performed at [place] [address]  Electronically signed by Kaylan Anand MD on 9/25/2017 at 10:02 PM   LAB AP SURGICAL ADDITIONAL INFORMATION (Report)     All controls performed with the immunohistochemical stains reported above   reacted appropriately  These tests were developed and their performance   characteristics determined by [facility] or   [facility]  They may not be cleared or approved by the [place]  Food and Drug Administration  The FDA has determined that such clearance   or approval is not necessary  These tests are used for clinical purposes  They should not be regarded as investigational or for research  This   laboratory has been approved by CLIA 88, designated as a high-complexity   laboratory and is qualified to perform these tests  LAB AP GROSS DESCRIPTION (Report)     A  The specimen is received in formalin, labeled with the patient's name   and hospital number, and is designated upper lip   The specimen consists   of a tan portion of skin measuring 0 6 x 0 4 x 0 1 cm  The epithelial   surface exhibits a white macule measuring 0 4 x 0 3 cm which abuts the   nearest peripheral margin  The apparent margin of resection is painted   with green ink  Bisected and entirely submitted in one cassette  B  The specimen is received in formalin, labeled with the patient's name   and hospital number, and is designated right thigh  The specimen   consists of a tan skin shave measuring 0 9 x 0 9 x 0 1 cm  The epithelial   surface exhibits a white papule measuring 0 8 x 0 8 x 0 2 cm which abuts   the nearest peripheral margin  The apparent margin of resection is painted   with green ink  The specimen is serially sectioned revealing white dense   cut surfaces which abuts the deep inked margin  Entirely submitted in 2   cassettes with the ends of resection in cassette 1 and the center bisected   in cassette 2  Note: The estimated total formalin fixation time based upon information   provided by the submitting clinician and the standard processing schedule   is over 72 hours   AEK   LAB AP CLINICAL INFORMATION      TW Order Number: [id number]  SCC suspected

## 2018-01-13 VITALS
HEART RATE: 102 BPM | BODY MASS INDEX: 31.22 KG/M2 | HEIGHT: 68 IN | WEIGHT: 206 LBS | TEMPERATURE: 95.7 F | DIASTOLIC BLOOD PRESSURE: 78 MMHG | RESPIRATION RATE: 16 BRPM | SYSTOLIC BLOOD PRESSURE: 132 MMHG | OXYGEN SATURATION: 98 %

## 2018-01-13 VITALS
SYSTOLIC BLOOD PRESSURE: 112 MMHG | OXYGEN SATURATION: 96 % | BODY MASS INDEX: 28.64 KG/M2 | HEART RATE: 48 BPM | WEIGHT: 205.38 LBS | TEMPERATURE: 98.2 F | DIASTOLIC BLOOD PRESSURE: 60 MMHG

## 2018-01-13 VITALS
DIASTOLIC BLOOD PRESSURE: 70 MMHG | SYSTOLIC BLOOD PRESSURE: 142 MMHG | WEIGHT: 205.13 LBS | TEMPERATURE: 97.8 F | BODY MASS INDEX: 30.74 KG/M2 | OXYGEN SATURATION: 97 % | HEART RATE: 83 BPM

## 2018-01-13 VITALS
BODY MASS INDEX: 30.48 KG/M2 | HEIGHT: 68 IN | SYSTOLIC BLOOD PRESSURE: 136 MMHG | DIASTOLIC BLOOD PRESSURE: 70 MMHG | WEIGHT: 201.13 LBS

## 2018-01-14 VITALS
WEIGHT: 204.38 LBS | BODY MASS INDEX: 30.98 KG/M2 | HEART RATE: 76 BPM | HEIGHT: 68 IN | SYSTOLIC BLOOD PRESSURE: 166 MMHG | DIASTOLIC BLOOD PRESSURE: 90 MMHG

## 2018-01-14 VITALS
HEIGHT: 68 IN | BODY MASS INDEX: 31.07 KG/M2 | WEIGHT: 205 LBS | HEART RATE: 67 BPM | OXYGEN SATURATION: 98 % | SYSTOLIC BLOOD PRESSURE: 128 MMHG | TEMPERATURE: 96.7 F | RESPIRATION RATE: 16 BRPM | DIASTOLIC BLOOD PRESSURE: 88 MMHG

## 2018-01-14 VITALS
HEART RATE: 62 BPM | BODY MASS INDEX: 32.1 KG/M2 | HEIGHT: 67 IN | WEIGHT: 204.5 LBS | OXYGEN SATURATION: 94 % | SYSTOLIC BLOOD PRESSURE: 124 MMHG | DIASTOLIC BLOOD PRESSURE: 72 MMHG

## 2018-01-14 VITALS
RESPIRATION RATE: 12 BRPM | BODY MASS INDEX: 30.12 KG/M2 | WEIGHT: 203.38 LBS | HEART RATE: 56 BPM | DIASTOLIC BLOOD PRESSURE: 70 MMHG | HEIGHT: 69 IN | SYSTOLIC BLOOD PRESSURE: 128 MMHG

## 2018-01-14 VITALS
HEART RATE: 80 BPM | DIASTOLIC BLOOD PRESSURE: 76 MMHG | HEIGHT: 68 IN | BODY MASS INDEX: 31.89 KG/M2 | SYSTOLIC BLOOD PRESSURE: 132 MMHG | TEMPERATURE: 97.7 F | OXYGEN SATURATION: 97 % | WEIGHT: 210.38 LBS

## 2018-01-15 NOTE — RESULT NOTES
Verified Results  ECHO COMPLETE WITH CONTRAST IF INDICATED 10DPL6566 10:55AM Zaina Bond Order Number: [id number]    - Patient Instructions: To schedule this appointment, please contact Central Scheduling at [phone]  Test Name Result Flag Reference   ECHO COMPLETE WITH CONTRAST IF INDICATED (Report)     [address] [facility], [address] [place] [address]   [phone]     Transthoracic Echocardiogram   2D, M-mode, Doppler, and Color Doppler     Study date: 22-May-2017     Patient: Deni Atwood   MR number: [id number]   Account number: [de-identified]   : 1932   Age: 80 years   Gender: Male   Status: Outpatient   Location: [facility]   Height: 68 in   Weight: 209 lb   BP: 130/ 82 mmHg     Indications: Atrial fibrillation  Diagnoses: I48 0 - Atrial fibrillation     Sonographer: Hernandez RCS   Interpreting Physician: Alessandro Polanco MD   Primary Physician: Sourav Sykes MD   Referring Physician: Don Moyer: [facility]     SUMMARY     LEFT VENTRICLE:   Systolic function was normal  Ejection fraction was estimated in the range of 55 % to 65 %  There were no regional wall motion abnormalities  Features were consistent with a pseudonormal left ventricular filling pattern, with concomitant abnormal relaxation and increased filling pressure (grade 2 diastolic dysfunction)  LEFT ATRIUM:   The atrium was mildly dilated  MITRAL VALVE:   There was mild regurgitation  TRICUSPID VALVE:   There was mild regurgitation  HISTORY: PRIOR HISTORY: Chronic kidney diseas  Risk factors: hypertension, diabetes, medication-treated hypercholesterolemia, and a family history of coronary artery disease  PROCEDURE: The study was performed in the [address] [place] [address]  This was a routine study  The transthoracic approach was used   The study included complete 2D imaging, M-mode, complete spectral Doppler, and color Doppler  The   heart rate was 85 bpm, at the start of the study  Images were obtained from the parasternal, apical, subcostal, and suprasternal notch acoustic windows  Intravenous contrast (Definity solution [1 3 ml Definity/8 7ml normal saline   solution], 2 ml) was administered to opacify the left ventricle  Image quality was adequate  LEFT VENTRICLE: Size was normal  Systolic function was normal  Ejection fraction was estimated in the range of 55 % to 65 %  There were no regional wall motion abnormalities  Wall thickness was normal  DOPPLER: Features were consistent   with a pseudonormal left ventricular filling pattern, with concomitant abnormal relaxation and increased filling pressure (grade 2 diastolic dysfunction)  RIGHT VENTRICLE: The size was normal  Systolic function was normal  Wall thickness was normal      LEFT ATRIUM: The atrium was mildly dilated  RIGHT ATRIUM: Size was normal      MITRAL VALVE: Valve structure was normal  There was normal leaflet separation  DOPPLER: The transmitral velocity was within the normal range  There was no evidence for stenosis  There was mild regurgitation  AORTIC VALVE: The valve was trileaflet  Leaflets exhibited normal cuspal separation and sclerosis  DOPPLER: Transaortic velocity was within the normal range  There was no evidence for stenosis  There was no regurgitation  TRICUSPID VALVE: The valve structure was normal  There was normal leaflet separation  DOPPLER: The transtricuspid velocity was within the normal range  There was no evidence for stenosis  There was mild regurgitation  PULMONIC VALVE: Leaflets exhibited normal thickness, no calcification, and normal cuspal separation  DOPPLER: The transpulmonic velocity was within the normal range  There was no regurgitation  PERICARDIUM: There was no pericardial effusion  The pericardium was normal in appearance  AORTA: The root exhibited normal size       SYSTEMIC VEINS: IVC: The inferior vena cava was normal in size and course  Respirophasic changes were normal      MEASUREMENT TABLES     2D MEASUREMENTS   Left atrium  (Reference normals)   LA  4 58 mm  (--)     SYSTEM MEASUREMENT TABLES     Apical four chamber   4 chamber Left Atrium Volume Index; Planimetry; End Systole; Apical four chamber;: 26 74 cm2   Left Ventricular Ejection Fraction; Method of Disks, Single Plane; Apical four chamber;: 60 1 %   Left Ventricular End Diastolic Volume; Method of Disks, Single Plane; Apical four chamber;: 103 8 ml   Left Ventricular End Systolic Volume; Method of Disks, Single Plane; Apical four chamber;: 41 4 ml   Right Atrium Systolic Area; Planimetry; End Systole; Apical four chamber;: 22 5 cm2   Right Ventricular Internal Diastolic Dimension; End Diastole; Apical four chamber;: 41 2 mm   TAPSE: 24 mm     Unspecified Scan Mode   Aortic Root Diameter; End Systole;: 32 5 mm   Gradient Pressure, Peak; Simplified Bernoulli; Antegrade Flow; Systole;: 7 3 mm[Hg]   Gradient pressure, average; Simplified Bernoulli; Antegrade Flow; Systole;: 4 2 mm[Hg]   Cardiac Output; Teichholz; Systole;: 4 98 L/min   Interventricular Septum Diastolic Thickness; Teichholz; End Diastole;: 10 8 mm   Left Ventricle Internal End Diastolic Dimension; Teichholz;: 49 5 mm   Left Ventricle Internal Systolic Dimension; Teichholz; End Systole;: 36 3 mm   Left Ventricle Posterior Wall Diastolic Thickness; Teichholz; End Diastole;: 10 4 mm   Left Ventricular Ejection Fraction; Teichholz;: 51 9 %   Stroke volume;  Thor [place];: 60 ml   Mitral Valve Area; Area by Pressure Half-Time; Systole;: 3 73 cm2   Maximum Tricuspid valve regurgitation pressure gradient; Regurgitant Flow; Systole;: 33 2 mm[Hg]     [facility] Accredited [facility]     Prepared and electronically signed by     Alex Fernandes MD   Signed [id number] 12:57:25

## 2018-01-16 NOTE — RESULT NOTES
Verified Results  NM MYOCARDIAL PERFUSION SPECT (RX STRESS AND/OR REST) [id number] 10:56AM Margo Downs   TW Order Number: [id number]    - Patient Instructions: To schedule this appointment, please contact Central Scheduling at [phone]  Test Name Result Flag Reference   NM MYOCARDIAL PERFUSION SPECT (RX STRESS AND/OR REST) (Report)     [facility] [address] [place], [address] [place] [address]   [phone]     Rest/Stress Gated SPECT Myocardial Perfusion Imaging After Regadenoson     Patient: Deni Atwood   MR number: [id number]   Account number: [de-identified]   : 1932   Age: 80 years   Gender: Male   Status: Outpatient   Location: [facility]   Height: 68 in   Weight: 209 lb   BP: 142/ 68 mmHg     Allergies: NO KNOWN ALLERGIES     Diagnosis: I48 0 - Atrial fibrillation     Interpreting Physician: Alessandro Polanco MD   Referring Physician: Alessandro Polanco MD   Technician: Niyah Dueñas BS, BA, AART(N)   Group: [facility]   Other: Sarah Do MS, CCT     INDICATIONS: A-Fib     HISTORY: The patient is a 80year old  male  Chest pain status: no chest pain  Other symptoms: palpitations  Coronary artery disease risk factors: dyslipidemia, hypertension, family history of premature coronary artery disease,   and diabetes mellitus  Cardiovascular history: arrhythmia  Co-morbidity: history of renal disease and obesity  Medications: a beta blocker, an ACE inhibitor/ARB, a diuretic, a lipid lowering agent, an antihypertensive agent, diabetic   medications, and thyroid medications  REST ECG: Atrial fibrillation  PROCEDURE: The study was performed at [address] [place] [address]  A regadenoson infusion pharmacologic stress test was performed  Gated SPECT myocardial perfusion imaging was performed after stress and at rest  Systolic blood pressure   was 142 mmHg, at the start of the study   Diastolic blood pressure was 68 mmHg, at the start of the study  The heart rate was 78 bpm, at the start of the study  Regadenoson protocol:   HR bpm SBP mmHg DBP mmHg Rhythm/conduct   Baseline 78 142 68 A-fib, frequent PVC's   Immediate 117 122 60 same as above   1 min 130 -- -- same as above   2 min 90 142 68 same as above   No medications or fluids given  STRESS SUMMARY: Duration of pharmacologic stress was 3 min  Maximal heart rate during stress was 117 bpm  The rate-pressure product for the peak heart rate and blood pressure was 67542  There was no chest pain during stress  The stress   test was terminated due to protocol completion  The stress ECG was negative for ischemia  Arrhythmia during stress: isolated premature ventricular beats  ISOTOPE ADMINISTRATION:   Resting isotope administration Stress isotope administration   Agent Tetrofosmin Tetrofosmin   Dose 9 78 mCi 31 7 mCi   Date 05/22/2017 05/22/2017     MYOCARDIAL PERFUSION IMAGING:   The image quality was good  Left ventricular size was normal      PERFUSION DEFECTS:   - There were no perfusion defects  GATED SPECT:   The calculated left ventricular ejection fraction was 57 %  Left ventricular ejection fraction was within normal limits by visual estimate  There was no left ventricular regional abnormality  SUMMARY:   - Stress results: There was no chest pain during stress  - ECG conclusions: The stress ECG was negative for ischemia  - Perfusion imaging: There were no perfusion defects    - Gated SPECT: The calculated left ventricular ejection fraction was 57 %  Left ventricular ejection fraction was within normal limits by visual estimate  There was no left ventricular regional abnormality  IMPRESSIONS: Normal study  Myocardial perfusion imaging was normal at rest and with stress  Based on this study, the risk of a perioperative complication due to myocardial ischemia appears to be low       Prepared and signed by     Oscar Lassiter MD   Signed 05/23/2017 10:44:39

## 2018-01-17 NOTE — RESULT NOTES
Message   appt made     Verified Results  (1) TISSUE EXAM [id number] 12:00AM Becki Boydton     Test Name Result Flag Reference   LAB AP CASE REPORT (Report)     Surgical Pathology Report             Case: [id number]                   Authorizing Provider: Isma Wilson MD     Collected:      12/12/2016           Pathologist:      Lori Asencio MD      Received:      12/13/2016 1055        Specimens:  A) - Skin, Other, Right hand, r/o scc                                 B) - Skin, Other, Left hand   LAB AP FINAL DIAGNOSIS (Report)     A  Skin, Right hand, r/o scc, shave biopsy:  - Atypical squamous proliferation, favor invasive well-differentiated   squamous cell    carcinoma, 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 and present at   peripheral    border and base of biopsy  See Note  B  Skin, Left hand, shave biopsy:  - Prurigo nodularis  - Negative for malignancy  Interpretation performed at [facility], [address] [place] [address]  Electronically signed by Lori Asencio MD on 12/15/2016 at 3:52 PM   LAB AP NOTE (Report)     In specimen A, sections reveal prurigo nodularis with a focal atypical   squamous proliferation composed of endophytic detached and irregular   squamous nests with cytologic atypia and mitoses  Although   pseudoepitheliomatous hyperplasia is considered, given the atypia and   irregular contours of many of the nests, focal superficially invasive   squamous cell carcinoma arising in the background of prurigo nodularis is   favored  Margins are involved  Excision to ensure complete removal of the   lesion is recommended  LAB AP SURGICAL ADDITIONAL INFORMATION (Report)     These tests were developed and their performance characteristics   determined by [facility] or [facility]  They may not be cleared or approved by the [place]  Food and   Drug Administration   The FDA has determined that such clearance or   approval is not necessary  These tests are used for clinical purposes  They should not be regarded as investigational or for research  This   laboratory has been approved by [place] [id number], designated as a high-complexity   laboratory and is qualified to perform these tests  LAB AP GROSS DESCRIPTION (Report)     A  The specimen is received in formalin, labeled with the patient's name   and hospital number, and is designated skin shave right hand  The   specimen consists of a tan superficial shave of skin measuring 0 9 x 0 9 x   0 1 cm  The skin surface exhibits a tan to pale-tan keratotic papule   measuring 0 9 x 0 8 x 0 2 cm  The margin is inked blue  The skin surface   is inked red  The specimen is trisected  Entirely submitted  One cassette  B  The specimen is received in formalin, labeled with the patient's name   and hospital number, and is designated skin shave left hand  The   specimen consists of a tan superficial shave of skin measuring 0 9 x 0 8 x   0 1 cm  The skin surface exhibits a tan to pale-tan keratotic papule   measuring 0 8 x 0 8 x 0 1 cm  The margin is inked blue  The skin surface   is inked red  The specimen is trisected  Entirely submitted  One cassette  Note: The estimated total formalin fixation time based upon information   provided by the submitting clinician and the standard processing schedule   is 33 5 hours   [facility]   LAB AP CLINICAL INFORMATION      A  R  Hand R/O SCC  B  L  Hand prurigo nodularis

## 2018-01-17 NOTE — RESULT NOTES
Verified Results  (1) TISSUE EXAM 20Jan2017 11:31AM Debra Christian Order Number: [id number]     Test Name Result Flag Reference   LAB AP CASE REPORT (Report)     Surgical Pathology Report             Case: [id number]                   Authorizing Provider: Pastora Montalvo MD     Collected:      01/20/2017 1131        Pathologist:      Swapnil Martini MD      Received:      01/23/2017 1135        Specimen:  Skin, Other, Right hand   LAB AP FINAL DIAGNOSIS (Report)     A  Skin, Right hand, excision:  - Scar and no residual squamous cell carcinoma; negative for malignancy   - No perineural or lymph-vascular invasion identified   - Adjacent solar lentigo with hyperkeratotic seborrheic keratosis  Interpretation performed at [facility], [address] [place] [address] 18  Electronically signed by Swapnil Martini MD on 1/28/2017 at 4:00 PM   LAB AP SURGICAL ADDITIONAL INFORMATION (Report)     These tests were developed and their performance characteristics   determined by [facility] or [address] [place] [address]  They may not be cleared or approved by the [place]  Food and   Drug Administration  The FDA has determined that such clearance or   approval is not necessary  These tests are used for clinical purposes  They should not be regarded as investigational or for research  This   laboratory has been approved by [place] [id number], designated as a high-complexity   laboratory and is qualified to perform these tests  LAB AP GROSS DESCRIPTION (Report)     A  The specimen is received in formalin, labeled with the patient's name   and hospital number, and is designated right hand SCC check margins  The   specimen consists of an unoriented tan ellipse of skin measuring 2 x 1 cm,   excised to a depth of 0 1 cm   The skin surface is hairbearing and exhibits   a pale tan to white keratotic papule measuring 1 x 0 9 x 0 1 cm which   appears to extend to within less than 1 mm of the unoriented peripheral   margin  The margin is inked blue  The skin surface tips are inked red  The   specimen is sectioned revealing somewhat firm tan cut surfaces  Entirely   submitted  Three cassettes  Tips in cassette 1; center sequentially   submitted in cassettes 2-3, 3 pieces in each cassette  Note: The estimated total formalin fixation time based upon information   provided by the submitting clinician and the standard processing schedule   is over 72 hours   [facility]   LAB AP CLINICAL INFORMATION      TW Order Number: [id number]  [facility] check margins

## 2018-02-09 ENCOUNTER — TELEPHONE (OUTPATIENT)
Dept: INTERNAL MEDICINE CLINIC | Facility: CLINIC | Age: 83
End: 2018-02-09

## 2018-02-09 NOTE — TELEPHONE ENCOUNTER
PT LFT MSG @ [facility]  Stephanie Blend BONNIE CALLED W/MSG   Stephanie Blend PT SD HE NEEDS COME PAPER WORK COMPLETED BY   ABOUT HIM GOING INTO A FACILITY    THAT'S ALL INFO THAT SHE HAD

## 2018-02-12 ENCOUNTER — TELEPHONE (OUTPATIENT)
Dept: INTERNAL MEDICINE CLINIC | Facility: CLINIC | Age: 83
End: 2018-02-12

## 2018-02-14 ENCOUNTER — OFFICE VISIT (OUTPATIENT)
Dept: INTERNAL MEDICINE CLINIC | Facility: CLINIC | Age: 83
End: 2018-02-14
Payer: MEDICARE

## 2018-02-14 VITALS
TEMPERATURE: 98.9 F | HEART RATE: 80 BPM | DIASTOLIC BLOOD PRESSURE: 84 MMHG | BODY MASS INDEX: 29.15 KG/M2 | WEIGHT: 208.2 LBS | SYSTOLIC BLOOD PRESSURE: 132 MMHG | HEIGHT: 71 IN | OXYGEN SATURATION: 93 %

## 2018-02-14 DIAGNOSIS — J31.0 RHINITIS, UNSPECIFIED CHRONICITY, UNSPECIFIED TYPE: Primary | ICD-10-CM

## 2018-02-14 DIAGNOSIS — E11.21 CONTROLLED TYPE 2 DIABETES MELLITUS WITH DIABETIC NEPHROPATHY, WITHOUT LONG-TERM CURRENT USE OF INSULIN (HCC): Primary | ICD-10-CM

## 2018-02-14 PROBLEM — I48.91 ATRIAL FIBRILLATION (HCC): Status: ACTIVE | Noted: 2017-04-21

## 2018-02-14 PROCEDURE — 99214 OFFICE O/P EST MOD 30 MIN: CPT | Performed by: PHYSICIAN ASSISTANT

## 2018-02-14 RX ORDER — GABAPENTIN 100 MG/1
100 CAPSULE ORAL 3 TIMES DAILY
Qty: 270 CAPSULE | Refills: 0 | Status: SHIPPED | OUTPATIENT
Start: 2018-02-14 | End: 2018-02-28

## 2018-02-14 NOTE — PROGRESS NOTES
Assessment/Plan:  He is moving into assisted living or at [address] [place]  He needs this  He has been living alone independently however he has had lapses and judgment some forgetfulness and some problems taking his medication  Also mobility problems related to peripheral neuropathy affecting his feet or and generalized weakness  Last week he went to get some slippers from under his bed and was unable to get up to a standing position for over an hour  His [facility] forms were filled out    No problem-specific Assessment & Plan notes found for this encounter  Diagnoses and all orders for this visit:    Controlled type 2 diabetes mellitus with diabetic nephropathy, without long-term current use of insulin (HCC)  -     gabapentin (NEURONTIN) 100 mg capsule; Take 1 capsule (100 mg total) by mouth 3 (three) times a day for 90 days          Subjective:      Patient ID: Catalina Rahman is a 80 y o  male  Pain in his right foot  He has constant pain in his right big toe area for 2 months which she feels could be related to gout  But he actually has is neuropathic pain no inflammatory arthritis  He is here for forms to be filled out to get into assisted living  [place] He is otherwise stable  No chest pain shortness of breath palpitations dizziness nausea vomiting no incontinence  His walking is slowing down but he has had no recent falls        The following portions of the patient's history were reviewed and updated as appropriate: allergies, current medications, past family history, past medical history, past social history, past surgical history and problem list     Review of Systems   Constitutional: Negative for activity change, appetite change, chills, diaphoresis, fatigue, fever and unexpected weight change     HENT: Negative for congestion, dental problem, drooling, ear discharge, ear pain, facial swelling, hearing loss, nosebleeds, postnasal drip, rhinorrhea, sinus pain, sinus pressure, sneezing, sore throat, tinnitus, trouble swallowing and voice change  Eyes: Negative for photophobia, pain, discharge, redness, itching and visual disturbance  Respiratory: Negative for apnea, cough, choking, chest tightness, shortness of breath, wheezing and stridor  Cardiovascular: Negative for chest pain, palpitations and leg swelling  Gastrointestinal: Negative for abdominal distention, abdominal pain, anal bleeding, blood in stool, constipation, diarrhea, nausea, rectal pain and vomiting  Endocrine: Negative for cold intolerance, heat intolerance, polydipsia, polyphagia and polyuria  Genitourinary: Negative for decreased urine volume, difficulty urinating, dysuria, enuresis, flank pain, frequency, genital sores, hematuria and urgency  Musculoskeletal: Positive for gait problem  Negative for arthralgias, back pain, joint swelling, myalgias, neck pain and neck stiffness  Skin: Negative for color change, pallor, rash and wound  Neurological: Negative for dizziness, tremors, seizures, syncope, facial asymmetry, speech difficulty, weakness, light-headedness, numbness and headaches  Hematological: Negative for adenopathy  Does not bruise/bleed easily  Psychiatric/Behavioral: Positive for decreased concentration  Negative for agitation, behavioral problems, confusion, dysphoric mood, hallucinations, self-injury, sleep disturbance and suicidal ideas  The patient is not nervous/anxious and is not hyperactive  Objective:    /84 (BP Location: Left arm, Patient Position: Sitting)   Pulse 80   Temp 98 9 °F (37 2 °C)   Ht 5' 11" (1 803 m)   Wt 94 4 kg (208 lb 3 2 oz)   SpO2 93%   BMI 29 04 kg/m²      Physical Exam   Constitutional: He is oriented to person, place, and time  He appears well-developed  HENT:   Head: Normocephalic     Right Ear: External ear normal    Left Ear: External ear normal    Mouth/Throat: Oropharynx is clear and moist    Eyes: Conjunctivae are normal  Pupils are equal, round, and reactive to light  Neck: Neck supple  No thyromegaly present  Cardiovascular: Normal rate, regular rhythm and intact distal pulses  Murmur heard  Pulmonary/Chest: Effort normal and breath sounds normal    Abdominal: Soft  Bowel sounds are normal    Musculoskeletal: Normal range of motion  Neurological: He is alert and oriented to person, place, and time  He has normal reflexes  Stiff gait   Skin: Skin is warm and dry      mycosis of the toenails venous stasis skin changes of both feet foot pulses are felt   Psychiatric:   Memory and judgment are somewhat impaired

## 2018-02-15 ENCOUNTER — TELEPHONE (OUTPATIENT)
Dept: INTERNAL MEDICINE CLINIC | Facility: CLINIC | Age: 83
End: 2018-02-15

## 2018-02-15 RX ORDER — FLUTICASONE PROPIONATE 50 MCG
SPRAY, SUSPENSION (ML) NASAL
Qty: 1 BOTTLE | Refills: 0 | Status: ON HOLD | OUTPATIENT
Start: 2018-02-15 | End: 2019-03-25 | Stop reason: CLARIF

## 2018-02-15 NOTE — TELEPHONE ENCOUNTER
PATIENT WANTS TO KNOW WHAT MEDICATION [facility] ORDER FOR THE PATIENT  DOES NOT KNOW WHY THESE ORDERED    SOME GO TO MAIL ORDER AND SOME GO TO LOCAL PHARMACY

## 2018-02-15 NOTE — TELEPHONE ENCOUNTER
Pt called back, needs a letter stating to whom it may concern stating need and reason for care in facility    He said him or daughter will p/u when ready

## 2018-02-15 NOTE — TELEPHONE ENCOUNTER
I spoke w/ pt, he was very confused about the medications and which pharm he is using, says he is using 3 different pharmacies bc of cost of meds  He said he got a call from [facility] for refills of furosemide  Last time we prescribed this it was sent to [facility] so I told him to call [facility] to clarify  He is also asking for a seprate letter [place]  I told him the office note states reasons for going to lilliana dolan and his daughter p/u yesterday to give to lilliana dolan    He said [facility] needs separate letter but doesn't remember what it needs to say, he will call back when he finds out info

## 2018-02-24 ENCOUNTER — TELEPHONE (OUTPATIENT)
Dept: INTERNAL MEDICINE CLINIC | Facility: CLINIC | Age: 83
End: 2018-02-24

## 2018-02-24 NOTE — TELEPHONE ENCOUNTER
PT HAS BEEN TAKING ALLOPURINOL & COLCRYS FOR GOUT FOR A LONG TIME  Fernando VALENTIN LOOKED AT HIS FEET AND SD HE DOESN'T HAVE GOUT    BUT HAS DIABETIC NEUROPATHY & PRESCRIBED GABAPENTIN 100 MG TID  PT ASKED IF HE SHOULD DC THE GOUT MED  Fernando Valenzuela AND HE SD No  SINCE HE TOOK THE GABAPENTIN,  HIS FEET PUFFED UP &  MID WAY TO FROM HEEL TO KNEE IS SWOLLEN AND RED AND HE HAS RED MARKS ON HIS LEGS  Fernando Valenzuela FEET ARE  PNFUL  Fernando Valenzuela TRYING TO WALK IS DIFFICULT  Fernando Valenzuela WHEN HE STANDS UP HE FEELS OFF BALANCE  Fernando Valenzuela ALSO SD HE'S BEEN PUTTING TRIAMCINOLONE ACETONIDE OINTMENT USP 0 1% ON HIS LEGS, AND PUTS SWEAT SOCKS On  HE'S BEEN DOING THIS ALL ALONG      PLS ADVISE WHAT HE SOUL Do  HE IS VERY CONCERNED

## 2018-02-24 NOTE — TELEPHONE ENCOUNTER
Called pt and informed him of Dr Nguyen Roque message, pt understood and was thankful for a cb, pt stated he will call office back if he has any other issues

## 2018-02-27 ENCOUNTER — TELEPHONE (OUTPATIENT)
Dept: INTERNAL MEDICINE CLINIC | Facility: CLINIC | Age: 83
End: 2018-02-27

## 2018-02-27 DIAGNOSIS — L40.9 PSORIASIS: Primary | ICD-10-CM

## 2018-02-27 NOTE — TELEPHONE ENCOUNTER
Pt cld stating that he doesn't think the redness and swelling is going down  He states that the skin is very stiff  He doesn't think that the meds are working  What should he do? Please call pt and advise [phone]

## 2018-02-28 ENCOUNTER — OFFICE VISIT (OUTPATIENT)
Dept: INTERNAL MEDICINE CLINIC | Facility: CLINIC | Age: 83
End: 2018-02-28
Payer: MEDICARE

## 2018-02-28 ENCOUNTER — HOSPITAL ENCOUNTER (OUTPATIENT)
Dept: NON INVASIVE DIAGNOSTICS | Facility: CLINIC | Age: 83
Discharge: HOME | End: 2018-02-28
Payer: MEDICARE

## 2018-02-28 ENCOUNTER — OFFICE VISIT (OUTPATIENT)
Dept: DERMATOLOGY | Facility: CLINIC | Age: 83
End: 2018-02-28
Payer: MEDICARE

## 2018-02-28 VITALS
BODY MASS INDEX: 32.65 KG/M2 | DIASTOLIC BLOOD PRESSURE: 90 MMHG | TEMPERATURE: 97.8 F | HEART RATE: 65 BPM | OXYGEN SATURATION: 98 % | HEIGHT: 67 IN | SYSTOLIC BLOOD PRESSURE: 125 MMHG | WEIGHT: 208 LBS

## 2018-02-28 DIAGNOSIS — R60.0 LEG EDEMA: ICD-10-CM

## 2018-02-28 DIAGNOSIS — R60.9 EDEMA, UNSPECIFIED TYPE: ICD-10-CM

## 2018-02-28 DIAGNOSIS — I87.2 VENOUS INSUFFICIENCY: ICD-10-CM

## 2018-02-28 DIAGNOSIS — R60.9 EDEMA, UNSPECIFIED TYPE: Primary | ICD-10-CM

## 2018-02-28 DIAGNOSIS — L40.9 PSORIASIS: Primary | ICD-10-CM

## 2018-02-28 PROCEDURE — PBRAD PB RADIOLOGY PLACEHOLDER: Performed by: SURGERY

## 2018-02-28 PROCEDURE — 99213 OFFICE O/P EST LOW 20 MIN: CPT | Performed by: DERMATOLOGY

## 2018-02-28 PROCEDURE — 93970 EXTREMITY STUDY: CPT

## 2018-02-28 PROCEDURE — 99214 OFFICE O/P EST MOD 30 MIN: CPT | Performed by: PHYSICIAN ASSISTANT

## 2018-02-28 NOTE — PROGRESS NOTES
Assessment/Plan:      Bilateral lower extremity edema, right slightly worse than left, check a stat Doppler to rule out DVT despite Xarelto  This is likely an element of venous insufficiency  The patient is advised to elevate his legs as much as possible while seated and wear compression stockings  There is no evidence of heart failure on recent testing and cardiac evaluation  No problem-specific Assessment & Plan notes found for this encounter  Diagnoses and all orders for this visit:    Edema, unspecified type  -     VAS lower limb venous duplex study, complete bilateral; Future          Subjective:      Patient ID: Jennifer Gould is a 80 y o  male  The patient comes in reporting edema of his legs  He says he noticed this problem ever since he started taking gabapentin about 2 weeks ago  He was seen here on February 14th by Papo Elena who prescribed gabapentin for neuropathy  Patient also takes allopurinol and colchicine for gout  He also takes Xarelto for Afib, history of DVT and factor 5 leiden  he saw a cardiologist back in April 2017 where lower extremity edema was noted  Patient today states that he was not aware that there was any swelling in his legs at that time  He was seen by Dr Jael Elkins who ordered an echo and stress test   These were both unremarkable  The lower extremity edema was felt to be chronic venous insufficiency  Today the patient reports that this edema is new  He says he may have had some mild edema but it was very minimal compared to what he has now  He feels that this started after he started the gabapentin  He did call here on Saturday, February 24th and reported the symptoms and was told by Dr Davis Espinoza to stop the gabapentin which he did  He denies any difficulty breathing, shortness of breath, PND or orthopnea  He denies any acute pain in his legs but they are uncomfortable because they are swollen    He denies any recent traveling by airplane or long car rides  The following portions of the patient's history were reviewed and updated as appropriate: allergies, current medications, past family history, past medical history, past social history, past surgical history and problem list     Review of Systems   Constitutional: Negative for chills, fatigue and fever  Respiratory: Negative for cough, chest tightness and shortness of breath  Cardiovascular: Positive for leg swelling  Negative for chest pain and palpitations  Gastrointestinal: Negative for abdominal pain and diarrhea  Skin: Positive for rash  Objective:      /90 (BP Location: Left arm, Patient Position: Sitting)   Pulse 65   Temp 97 8 °F (36 6 °C)   Ht 5' 7" (1 702 m)   Wt 94 3 kg (208 lb)   SpO2 98%   BMI 32 58 kg/m²          Physical Exam   Constitutional: He appears well-developed and well-nourished  HENT:   Head: Normocephalic and atraumatic  Right Ear: External ear normal    Left Ear: External ear normal    Eyes: Pupils are equal, round, and reactive to light  Neck: Normal range of motion  Cardiovascular: Normal rate and normal heart sounds  An irregularly irregular rhythm present  Bilateral lower extremity edema right slightly worse than left, 1 to 2+, pitting   Pulmonary/Chest: Effort normal and breath sounds normal  No respiratory distress  He has no wheezes  He has no rales  Abdominal: Soft  Bowel sounds are normal  There is no tenderness  Skin: Skin is warm and dry  There is erythema  Chronic stasis dermatitis, no heat to touch, no drainage or discharge

## 2018-02-28 NOTE — PROGRESS NOTES
[address] [facility] DERMATOLOGY  [address] [address]     MRN: [id number] : 1932  Encounter: [id number]  Patient Information: Yenifer Lance  Chief complaint: drug reaction to gabapentin    History of present illness:  70-year-old male presents because of concerns regarding red swollen legs since he started on gabapentin on several weeks ago  Patient with history of prurigo nodularis and history of psoriasis  Past Medical History:   Diagnosis Date    Atrial fibrillation ([facility] )     Cancer ([facility] )     skin cancer    Chronic kidney disease     Stage 3    Diabetes mellitus ([facility] )     Disease of thyroid gland     hypothyroidism    DVT (deep venous thrombosis) (HCC)     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HCC)     GERD (gastroesophageal reflux disease)     Gout     Hyperlipidemia     Hypertension     Hypokalemia     Leukocytosis      Past Surgical History:   Procedure Laterality Date    CATARACT EXTRACTION      CHOLECYSTECTOMY      NE ESOPHAGOGASTRODUODENOSCOPY TRANSORAL DIAGNOSTIC N/A 2017    Procedure: EGD AND COLONOSCOPY;  Surgeon: Hannah Maxwell MD;  Location: [facility]; Service: Gastroenterology    TONSILLECTOMY       Social History   History   Alcohol Use    Yes     Comment: occ     History   Drug Use No     History   Smoking Status    Never Smoker   Smokeless Tobacco    Never Used     No family history on file  Meds/Allergies   No Known Allergies    Meds:  Prior to Admission medications    Medication Sig Start Date End Date Taking?  Authorizing Provider   allopurinol (ZYLOPRIM) 100 mg tablet Take 100 mg by mouth daily   Yes Historical Provider, MD   amLODIPine (NORVASC) 5 mg tablet Take 5 mg by mouth daily   Yes Historical Provider, MD   atorvastatin (LIPITOR) 10 mg tablet Take 10 mg by mouth daily   Yes Historical Provider, MD   betamethasone dipropionate (DIPROSONE) 0 05 % cream Apply topically 2 (two) times a day   Yes Historical Provider, MD   colchicine (COLCRYS) 0 6 mg tablet Take 0 6 mg by mouth daily   Yes Historical Provider, MD   esomeprazole (NexIUM) 40 MG capsule Take 40 mg by mouth every morning before breakfast   Yes Historical Provider, MD   fluticasone (FLONASE) 50 mcg/act nasal spray USE 2 SPRAYS IN EACH NOSTRIL ONCE DAILY 2/15/18  Yes Silvina Sarabia PA-C   Folic Acid-Vit Q6-WIK A26 (FOLBEE) 2 5-25-1 MG TABS Take by mouth   Yes Historical Provider, MD   furosemide (LASIX) 80 mg tablet Take 80 mg by mouth 2 (two) times a day   Yes Historical Provider, MD   gabapentin (NEURONTIN) 100 mg capsule Take 1 capsule (100 mg total) by mouth 3 (three) times a day for 90 days 2/14/18 5/15/18 Yes Silvina Sarabia PA-C   glipiZIDE (GLUCOTROL XL) 10 mg 24 hr tablet Take 10 mg by mouth daily   Yes Historical Provider, MD   levothyroxine 50 mcg tablet Take 50 mcg by mouth daily   Yes Historical Provider, MD   lisinopril (ZESTRIL) 10 mg tablet Take 10 mg by mouth daily   Yes Historical Provider, MD   metoprolol tartrate (LOPRESSOR) 25 mg tablet Take 25 mg by mouth 2 (two) times a day   Yes Historical Provider, MD   mometasone (ELOCON) 0 1 % cream Apply topically daily   Yes Historical Provider, MD   Omega 3 1000 MG CAPS Take by mouth   Yes Historical Provider, MD   potassium chloride (MICRO-K) 10 MEQ CR capsule Take 10 mEq by mouth 2 (two) times a day   Yes Historical Provider, MD   rivaroxaban (XARELTO) 20 mg tablet Take 20 mg by mouth   Yes Historical Provider, MD       Subjective:     Review of Systems:    General: negative for - chills, fatigue, fever,  weight gain or weight loss  Psychological: negative for - anxiety, behavioral disorder, concentration difficulties, decreased libido, depression, irritability, memory difficulties, mood swings, sleep disturbances or suicidal ideation  ENT: negative for - hearing difficulties , nasal congestion, nasal discharge, oral lesions, sinus pain, sneezing, sore throat  Allergy and Immunology: negative for - hives, insect bite sensitivity,  Hematological and Lymphatic: negative for - bleeding problems, blood clots,bruising, swollen lymph nodes  Endocrine: negative for - hair pattern changes, hot flashes, malaise/lethargy, mood swings, palpitations, polydipsia/polyuria, skin changes, temperature intolerance or unexpected weight change  Respiratory: negative for - cough, hemoptysis, orthopnea, shortness of breath, or wheezing  Cardiovascular: negative for - chest pain, dyspnea on exertion, edema,  Gastrointestinal: negative for - abdominal pain, nausea/vomiting  Genito-Urinary: negative for - dysuria, incontinence, irregular/heavy menses or urinary frequency/urgency  Musculoskeletal: negative for - gait disturbance, joint pain, joint stiffness, joint swelling, muscle pain, muscular weakness  Dermatological:  As in HPI  Neurological: negative for confusion, dizziness, headaches, impaired coordination/balance, memory loss, numbness/tingling, seizures, speech problems, tremors or weakness       Objective: There were no vitals taken for this visit  Physical Exam:    General Appearance:    Alert, cooperative, no distress   Head:    Normocephalic, without obvious abnormality, atraumatic           Skin:    A full skin exam was performed including scalp, head scalp, eyes, ears, nose, lips, neck, chest, axilla, abdomen, back, buttocks, bilateral upper extremities, bilateral lower extremities, hands, feet, fingers, toes, fingernails, and toenails  Slight scaling erythema on the lower legs with marked edema 3+ pitting     Assessment:     1  Psoriasis     2  Leg edema           Plan:    psoriasis does not be to be a problem at present the edema appears to be an issue do not feel that this is drug related patient needs to be re-evaluated by primary care physician to find out why he is retaining fluid    Nothing else of concern noted on exam    Francisco Pulido MD  3/45/5792,47:69 AM    Portions of the record may have been created with voice recognition software   Occasional wrong word or "sound a like" substitutions may have occurred due to the inherent limitations of voice recognition software   Read the chart carefully and recognize, using context, where substitutions have occurred

## 2018-03-01 ENCOUNTER — TELEPHONE (OUTPATIENT)
Dept: INTERNAL MEDICINE CLINIC | Facility: CLINIC | Age: 83
End: 2018-03-01

## 2018-03-01 NOTE — TELEPHONE ENCOUNTER
PT CALLED TO SPEAK WITH NICOLAS  HE SAID HE SAW HER THIS WEEK  HE IS GETTING PAINS ON R LEG SINCE HE PUT COMPRESSION ON TODAY AT 10 Am   GAVE CALL TO Milla Elizondo

## 2018-03-12 DIAGNOSIS — I82.403 RECURRENT ACUTE DEEP VEIN THROMBOSIS (DVT) OF BOTH LOWER EXTREMITIES (HCC): Primary | ICD-10-CM

## 2018-03-12 RX ORDER — CYANOCOBALAMIN/FOLIC AC/VIT B6 1-2.5-25MG
1 TABLET ORAL DAILY
Qty: 90 EACH | Refills: 3 | Status: SHIPPED | OUTPATIENT
Start: 2018-03-12 | End: 2018-03-21 | Stop reason: SDUPTHER

## 2018-03-21 ENCOUNTER — TELEPHONE (OUTPATIENT)
Dept: INTERNAL MEDICINE CLINIC | Facility: CLINIC | Age: 83
End: 2018-03-21

## 2018-03-21 DIAGNOSIS — I82.403 RECURRENT ACUTE DEEP VEIN THROMBOSIS (DVT) OF BOTH LOWER EXTREMITIES (HCC): ICD-10-CM

## 2018-03-21 RX ORDER — CYANOCOBALAMIN/FOLIC AC/VIT B6 1-2.5-25MG
1 TABLET ORAL DAILY
Qty: 90 EACH | Refills: 3 | Status: SHIPPED | OUTPATIENT
Start: 2018-03-21 | End: 2018-03-22 | Stop reason: SDUPTHER

## 2018-03-21 RX ORDER — POTASSIUM CHLORIDE 750 MG/1
10 CAPSULE, EXTENDED RELEASE ORAL 2 TIMES DAILY
Qty: 90 CAPSULE | Refills: 3 | Status: SHIPPED | OUTPATIENT
Start: 2018-03-21 | End: 2018-03-22 | Stop reason: SDUPTHER

## 2018-03-22 DIAGNOSIS — I82.403 RECURRENT ACUTE DEEP VEIN THROMBOSIS (DVT) OF BOTH LOWER EXTREMITIES (HCC): ICD-10-CM

## 2018-03-22 RX ORDER — CYANOCOBALAMIN/FOLIC AC/VIT B6 1-2.5-25MG
1 TABLET ORAL DAILY
Qty: 90 EACH | Refills: 0 | Status: SHIPPED | OUTPATIENT
Start: 2018-03-22 | End: 2018-11-21 | Stop reason: SDUPTHER

## 2018-03-22 RX ORDER — POTASSIUM CHLORIDE 750 MG/1
10 CAPSULE, EXTENDED RELEASE ORAL 2 TIMES DAILY
Qty: 90 CAPSULE | Refills: 0 | Status: SHIPPED | OUTPATIENT
Start: 2018-03-22 | End: 2018-03-23 | Stop reason: SDUPTHER

## 2018-03-23 DIAGNOSIS — I82.403 RECURRENT ACUTE DEEP VEIN THROMBOSIS (DVT) OF BOTH LOWER EXTREMITIES (HCC): ICD-10-CM

## 2018-03-23 RX ORDER — POTASSIUM CHLORIDE 750 MG/1
10 CAPSULE, EXTENDED RELEASE ORAL 2 TIMES DAILY
Qty: 60 CAPSULE | Refills: 6 | Status: ON HOLD | OUTPATIENT
Start: 2018-03-23 | End: 2019-03-26 | Stop reason: SDUPTHER

## 2018-03-23 NOTE — TELEPHONE ENCOUNTER
Patient's Potassium Chloride 10 MEQ CR Capsules was sent to the pharmacy but: Patient said when he picked up the script it was only for 30 capsules which will only last him 15 days  Patient asked if this could be corrected and resent for 60 capsules (pt takes 2 daily) for 30 days and 3 refills  Patient also said that the last time he got this medication it was in tablet form? Patient wanted to know if this makes a difference (even though it is for the same dosage)? Please notify patient when this has been corrected  Reid Barros

## 2018-04-05 ENCOUNTER — TELEPHONE (OUTPATIENT)
Dept: INTERNAL MEDICINE CLINIC | Facility: CLINIC | Age: 83
End: 2018-04-05

## 2018-04-07 DIAGNOSIS — L28.1 PRURIGO NODULARIS: Primary | ICD-10-CM

## 2018-04-07 RX ORDER — BETAMETHASONE DIPROPIONATE 0.5 MG/G
OINTMENT TOPICAL 2 TIMES DAILY
Qty: 45 G | Refills: 1 | Status: SHIPPED | OUTPATIENT
Start: 2018-04-07 | End: 2018-05-08 | Stop reason: ALTCHOICE

## 2018-04-07 RX ORDER — MOMETASONE FUROATE 1 MG/ML
SOLUTION TOPICAL DAILY
Qty: 60 ML | Refills: 1 | Status: SHIPPED | OUTPATIENT
Start: 2018-04-07 | End: 2018-05-08 | Stop reason: SDUPTHER

## 2018-05-08 ENCOUNTER — OFFICE VISIT (OUTPATIENT)
Dept: INTERNAL MEDICINE CLINIC | Facility: CLINIC | Age: 83
End: 2018-05-08
Payer: MEDICARE

## 2018-05-08 VITALS
TEMPERATURE: 98.1 F | BODY MASS INDEX: 33.27 KG/M2 | RESPIRATION RATE: 16 BRPM | DIASTOLIC BLOOD PRESSURE: 84 MMHG | WEIGHT: 212 LBS | HEART RATE: 82 BPM | OXYGEN SATURATION: 97 % | SYSTOLIC BLOOD PRESSURE: 150 MMHG | HEIGHT: 67 IN

## 2018-05-08 DIAGNOSIS — L28.1 PRURIGO NODULARIS: ICD-10-CM

## 2018-05-08 DIAGNOSIS — N18.30 CHRONIC KIDNEY DISEASE, STAGE 3 (HCC): ICD-10-CM

## 2018-05-08 DIAGNOSIS — I48.20 CHRONIC ATRIAL FIBRILLATION (HCC): ICD-10-CM

## 2018-05-08 DIAGNOSIS — L40.9 PSORIASIS: Primary | ICD-10-CM

## 2018-05-08 PROCEDURE — 99214 OFFICE O/P EST MOD 30 MIN: CPT | Performed by: PHYSICIAN ASSISTANT

## 2018-05-08 RX ORDER — MOMETASONE FUROATE 1 MG/ML
SOLUTION TOPICAL DAILY
Qty: 60 ML | Refills: 0 | Status: SHIPPED | OUTPATIENT
Start: 2018-05-08 | End: 2018-08-12 | Stop reason: ALTCHOICE

## 2018-05-08 NOTE — PROGRESS NOTES
Assessment/Plan:  He is stable he is ambulatory his memory is declining he is applying for assisted living at [address]  He has questions about his skin  He is followed regularly by Dermatology  Will arrange follow-up for Cardiology as well he has chronic AFib six-month follow-up here labs at that time     Diagnoses and all orders for this visit:    Psoriasis  -     Ambulatory referral to Dermatology; Future    Prurigo nodularis  -     mometasone (ELOCON) 0 1 % lotion; Apply topically daily To ears As needed  -     Ambulatory referral to Dermatology; Future    Chronic kidney disease, stage 3    Chronic atrial fibrillation ([facility] [facility] )  -     Ambulatory referral to Cardiology; Future        No problem-specific Assessment & Plan notes found for this encounter  Subjective:      Patient ID: Christina Mcintyre is a 80 y o  male  He is here for follow-up  He has a lot of questions about dietary supplements  Also about the skin of his legs which is a chronic problem due to psoriasis  He is followed regularly by Dr Lavon West time  History of AFib on anticoagulation  He is asymptomatic he needs to be followed by Cardiology  I reviewed all of his medications  The following portions of the patient's history were reviewed and updated as appropriate:   He has a past medical history of Atrial fibrillation ([address] ); Cancer ([facility] [address] ); Chronic kidney disease; Diabetes mellitus ([address] ); Disease of thyroid gland; DVT (deep venous thrombosis) ([address] ); 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[address] ); GERD (gastroesophageal reflux disease); Gout; Hyperlipidemia; Hypertension; Hypokalemia; and Leukocytosis  ,   does not have any pertinent problems on file  ,   has a past surgical history that includes Cataract extraction; Cholecystectomy; Tonsillectomy; and pr esophagogastroduodenoscopy transoral diagnostic (N/A, 6/14/2017)  ,  family history is not on file  ,   reports that he has never smoked   He has never used smokeless tobacco  He reports that he drinks alcohol  He reports that he does not use drugs  ,  has No Known Allergies     Current Outpatient Prescriptions   Medication Sig Dispense Refill    allopurinol (ZYLOPRIM) 100 mg tablet Take 100 mg by mouth daily      amLODIPine (NORVASC) 5 mg tablet Take 5 mg by mouth daily      atorvastatin (LIPITOR) 10 mg tablet Take 10 mg by mouth daily      betamethasone dipropionate (DIPROSONE) 0 05 % cream Apply topically 2 (two) times a day      colchicine (COLCRYS) 0 6 mg tablet Take 0 6 mg by mouth daily      esomeprazole (NexIUM) 40 MG capsule Take 40 mg by mouth every morning before breakfast      fluticasone (FLONASE) 50 mcg/act nasal spray USE 2 SPRAYS IN EACH NOSTRIL ONCE DAILY 1 Bottle 0    FOLBEE 2 5-25-1 MG TABS Take 1 tablet by mouth daily 90 each 0    furosemide (LASIX) 80 mg tablet Take 80 mg by mouth 2 (two) times a day      glipiZIDE (GLUCOTROL XL) 10 mg 24 hr tablet Take 10 mg by mouth daily      levothyroxine 50 mcg tablet Take 50 mcg by mouth daily      lisinopril (ZESTRIL) 10 mg tablet Take 10 mg by mouth daily      metoprolol tartrate (LOPRESSOR) 25 mg tablet Take 25 mg by mouth 2 (two) times a day      mometasone (ELOCON) 0 1 % lotion Apply topically daily To ears As needed 60 mL 0    Omega 3 1000 MG CAPS Take by mouth      potassium chloride (MICRO-K) 10 MEQ CR capsule Take 1 capsule (10 mEq total) by mouth 2 (two) times a day 60 capsule 6    rivaroxaban (XARELTO) 20 mg tablet Take 1 tablet (20 mg total) by mouth daily with breakfast 90 tablet 3    triamcinolone (KENALOG) 0 1 % ointment Apply topically 2 (two) times a day To legs twice daily 80 g 3     No current facility-administered medications for this visit  Review of Systems   Constitutional: Negative for activity change, appetite change, chills, diaphoresis, fatigue, fever and unexpected weight change     HENT: Negative for congestion, dental problem, drooling, ear discharge, ear pain, facial swelling, hearing loss, nosebleeds, postnasal drip, rhinorrhea, sinus pain, sinus pressure, sneezing, sore throat, tinnitus, trouble swallowing and voice change  Eyes: Negative for photophobia, pain, discharge, redness, itching and visual disturbance  Respiratory: Negative for apnea, cough, choking, chest tightness, shortness of breath, wheezing and stridor  Cardiovascular: Negative for chest pain, palpitations and leg swelling  Gastrointestinal: Negative for abdominal distention, abdominal pain, anal bleeding, blood in stool, constipation, diarrhea, nausea, rectal pain and vomiting  Endocrine: Negative for cold intolerance, heat intolerance, polydipsia, polyphagia and polyuria  Genitourinary: Negative for decreased urine volume, difficulty urinating, dysuria, enuresis, flank pain, frequency, genital sores, hematuria and urgency  Musculoskeletal: Positive for arthralgias  Negative for back pain, gait problem, joint swelling, myalgias, neck pain and neck stiffness  Skin: Negative for color change, pallor, rash and wound  Neurological: Negative for dizziness, tremors, seizures, syncope, facial asymmetry, speech difficulty, weakness, light-headedness, numbness and headaches  Hematological: Negative for adenopathy  Does not bruise/bleed easily  Psychiatric/Behavioral: Positive for confusion and decreased concentration  Negative for agitation, behavioral problems, dysphoric mood, hallucinations, self-injury, sleep disturbance and suicidal ideas  The patient is not nervous/anxious and is not hyperactive  Objective:  Vitals:    05/08/18 1250   BP: 150/84   Pulse: 82   Resp: 16   Temp: 98 1 °F (36 7 °C)   SpO2: 97%   Weight: 96 2 kg (212 lb)   Height: 5' 7" (1 702 m)     Body mass index is 33 2 kg/m²  Physical Exam   Constitutional: He is oriented to person, place, and time  He appears well-developed  HENT:   Head: Normocephalic     Right Ear: External ear normal    Left Ear: External ear normal    Mouth/Throat: Oropharynx is clear and moist    Eyes: Conjunctivae are normal  Pupils are equal, round, and reactive to light  Neck: Neck supple  No thyromegaly present  Cardiovascular: Normal rate, normal heart sounds and intact distal pulses  Exam reveals no gallop and no friction rub  No murmur heard  Pulmonary/Chest: Effort normal and breath sounds normal  No respiratory distress  He has no wheezes  He has no rales  Abdominal: Soft  Bowel sounds are normal  He exhibits no distension and no mass  Musculoskeletal: Normal range of motion  He exhibits no edema, tenderness or deformity  Neurological: He is alert and oriented to person, place, and time  He has normal reflexes  He displays normal reflexes  No cranial nerve deficit  He exhibits normal muscle tone  Coordination normal    Skin: Skin is warm and dry  Rash (Both lower legs with crustiness some weeping) noted  There is erythema  Psychiatric: He has a normal mood and affect   His behavior is normal  Judgment normal  independent

## 2018-05-13 DIAGNOSIS — E11.21 CONTROLLED TYPE 2 DIABETES MELLITUS WITH DIABETIC NEPHROPATHY, WITHOUT LONG-TERM CURRENT USE OF INSULIN (HCC): ICD-10-CM

## 2018-05-14 RX ORDER — GABAPENTIN 100 MG/1
100 CAPSULE ORAL 3 TIMES DAILY
Qty: 270 CAPSULE | Refills: 0 | Status: SHIPPED | OUTPATIENT
Start: 2018-05-14 | End: 2018-06-14 | Stop reason: ALTCHOICE

## 2018-06-14 ENCOUNTER — APPOINTMENT (OUTPATIENT)
Dept: LAB | Facility: CLINIC | Age: 83
End: 2018-06-14
Payer: MEDICARE

## 2018-06-14 ENCOUNTER — OFFICE VISIT (OUTPATIENT)
Dept: CARDIOLOGY CLINIC | Facility: CLINIC | Age: 83
End: 2018-06-14
Payer: MEDICARE

## 2018-06-14 VITALS
SYSTOLIC BLOOD PRESSURE: 148 MMHG | DIASTOLIC BLOOD PRESSURE: 90 MMHG | OXYGEN SATURATION: 98 % | WEIGHT: 204.8 LBS | BODY MASS INDEX: 32.15 KG/M2 | HEART RATE: 108 BPM | HEIGHT: 67 IN

## 2018-06-14 DIAGNOSIS — I87.2 VENOUS INCOMPETENCE: ICD-10-CM

## 2018-06-14 DIAGNOSIS — N18.30 CHRONIC KIDNEY DISEASE, STAGE 3 (HCC): ICD-10-CM

## 2018-06-14 DIAGNOSIS — I83.893 VARICOSE VEINS OF LOWER EXTREMITY WITH EDEMA, BILATERAL: ICD-10-CM

## 2018-06-14 DIAGNOSIS — I48.20 CHRONIC ATRIAL FIBRILLATION (HCC): ICD-10-CM

## 2018-06-14 DIAGNOSIS — E78.00 HYPERCHOLESTEROLEMIA: ICD-10-CM

## 2018-06-14 DIAGNOSIS — I10 BENIGN ESSENTIAL HYPERTENSION: Primary | ICD-10-CM

## 2018-06-14 LAB
ANION GAP SERPL CALCULATED.3IONS-SCNC: 8 MMOL/L (ref 4–13)
BUN SERPL-MCNC: 25 MG/DL (ref 5–25)
CALCIUM SERPL-MCNC: 8.7 MG/DL (ref 8.3–10.1)
CHLORIDE SERPL-SCNC: 105 MMOL/L (ref 100–108)
CO2 SERPL-SCNC: 29 MMOL/L (ref 21–32)
CREAT SERPL-MCNC: 1.54 MG/DL (ref 0.6–1.3)
GFR SERPL CREATININE-BSD FRML MDRD: 41 ML/MIN/1.73SQ M
GLUCOSE SERPL-MCNC: 100 MG/DL (ref 65–140)
POTASSIUM SERPL-SCNC: 4.1 MMOL/L (ref 3.5–5.3)
SODIUM SERPL-SCNC: 142 MMOL/L (ref 136–145)

## 2018-06-14 PROCEDURE — 36415 COLL VENOUS BLD VENIPUNCTURE: CPT | Performed by: INTERNAL MEDICINE

## 2018-06-14 PROCEDURE — 80048 BASIC METABOLIC PNL TOTAL CA: CPT | Performed by: INTERNAL MEDICINE

## 2018-06-14 PROCEDURE — 99214 OFFICE O/P EST MOD 30 MIN: CPT | Performed by: INTERNAL MEDICINE

## 2018-06-14 NOTE — PROGRESS NOTES
Cardiology Consultation     Sadia Alfred  [id number]  11/21/1932  [address] [place] [id number]    1  Chronic atrial fibrillation ([facility] )  Ambulatory referral to Cardiology     C/C:  FOLLOW-UP OF CHRONIC AFIB    HPI:  80YEAR-OLD MALE WITH PAST MEDICAL HISTORY OF HYPERTENSION, CHRONIC AFIB, HYPERLIPIDEMIA, CKD AND DIABETES is here for follow-up  Patient complains of worsening lower extremity edema and pain in his a lower extremities  He denies having any shortness of breath on exertion, orthopnea paroxysmal nocturnal dyspnea  He denies having any palpitations, dizziness or syncope  Patient denies having any chest pain on exertion  Patient says the swelling is worse at the end of the day and better in the morning  Patient is currently on high dose Lasix for lower extremity edema  Patient Active Problem List   Diagnosis    Adult hypothyroidism    Atrial fibrillation ([address] [place] [address] )    Benign essential hypertension    Chronic kidney disease, stage 3    Controlled diabetes mellitus with diabetic nephropathy ([place] [address] )    Hypercholesterolemia    Psoriasis    Leg edema     Past Medical History:   Diagnosis Date    Atrial fibrillation ([place] [address] )     Cancer ([place] [address] )     skin cancer    Chronic kidney disease     Stage 3    Diabetes mellitus ([place] [address] )     Disease of thyroid gland     hypothyroidism    DVT (deep venous thrombosis) (HCC)     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HCC)     GERD (gastroesophageal reflux disease)     Gout     Hyperlipidemia     Hypertension     Hypokalemia     Leukocytosis      Social History     Social History    Marital status: /Civil Union     Spouse name: N/A    Number of children: N/A    Years of education: N/A     Occupational History    Not on file       Social History Main Topics    Smoking status: Never Smoker    Smokeless tobacco: Never Used    Alcohol use Yes Comment: occ    Drug use: No    Sexual activity: No     Other Topics Concern    Not on file     Social History Narrative    No narrative on file      History reviewed  No pertinent family history  Past Surgical History:   Procedure Laterality Date    CATARACT EXTRACTION      CHOLECYSTECTOMY      VT ESOPHAGOGASTRODUODENOSCOPY TRANSORAL DIAGNOSTIC N/A 6/14/2017    Procedure: EGD AND COLONOSCOPY;  Surgeon: Vesna Harrington MD;  Location: [facility];   Service: Gastroenterology    TONSILLECTOMY         Current Outpatient Prescriptions:     allopurinol (ZYLOPRIM) 100 mg tablet, Take 100 mg by mouth daily, Disp: , Rfl:     amLODIPine (NORVASC) 5 mg tablet, Take 5 mg by mouth daily, Disp: , Rfl:     atorvastatin (LIPITOR) 10 mg tablet, Take 10 mg by mouth daily, Disp: , Rfl:     betamethasone dipropionate (DIPROSONE) 0 05 % cream, Apply topically 2 (two) times a day, Disp: , Rfl:     colchicine (COLCRYS) 0 6 mg tablet, Take 0 6 mg by mouth daily, Disp: , Rfl:     esomeprazole (NexIUM) 40 MG capsule, Take 40 mg by mouth every morning before breakfast, Disp: , Rfl:     FOLBEE 2 5-25-1 MG TABS, Take 1 tablet by mouth daily, Disp: 90 each, Rfl: 0    furosemide (LASIX) 80 mg tablet, Take 80 mg by mouth 2 (two) times a day, Disp: , Rfl:     glipiZIDE (GLUCOTROL XL) 10 mg 24 hr tablet, Take 10 mg by mouth daily, Disp: , Rfl:     levothyroxine 50 mcg tablet, Take 50 mcg by mouth daily, Disp: , Rfl:     lisinopril (ZESTRIL) 10 mg tablet, Take 10 mg by mouth daily, Disp: , Rfl:     metoprolol tartrate (LOPRESSOR) 25 mg tablet, Take 25 mg by mouth 2 (two) times a day, Disp: , Rfl:     mometasone (ELOCON) 0 1 % lotion, Apply topically daily To ears As needed, Disp: 60 mL, Rfl: 0    Omega 3 1000 MG CAPS, Take by mouth, Disp: , Rfl:     potassium chloride (MICRO-K) 10 MEQ CR capsule, Take 1 capsule (10 mEq total) by mouth 2 (two) times a day, Disp: 60 capsule, Rfl: 6    rivaroxaban (XARELTO) 20 mg tablet, Take 1 tablet (20 mg total) by mouth daily with breakfast, Disp: 90 tablet, Rfl: 3    triamcinolone (KENALOG) 0 1 % ointment, Apply topically 2 (two) times a day To legs twice daily, Disp: 80 g, Rfl: 3    fluticasone (FLONASE) 50 mcg/act nasal spray, USE 2 SPRAYS IN EACH NOSTRIL ONCE DAILY, Disp: 1 Bottle, Rfl: 0  No Known Allergies  Vitals:    06/14/18 1509   BP: 148/90   Pulse: (!) 108   SpO2: 98%   Weight: 92 9 kg (204 lb 12 8 oz)   Height: 5' 7" (1 702 m)       Labs:  No visits with results within 2 Month(s) from this visit  Latest known visit with results is:   Appointment on 11/03/2017   Component Date Value    WBC 11/03/2017 10 69*    RBC 11/03/2017 4 47     Hemoglobin 11/03/2017 13 4     Hematocrit 11/03/2017 39 9     MCV 11/03/2017 89     MCH 11/03/2017 30 0     MCHC 11/03/2017 33 6     RDW 11/03/2017 14 5     MPV 11/03/2017 10 3     Platelets 07/76/1738 273     nRBC 11/03/2017 0     Neutrophils Relative 11/03/2017 71     Lymphocytes Relative 11/03/2017 18     Monocytes Relative 11/03/2017 8     Eosinophils Relative 11/03/2017 3     Basophils Relative 11/03/2017 0     Neutrophils Absolute 11/03/2017 7 52     Lymphocytes Absolute 11/03/2017 1 97     Monocytes Absolute 11/03/2017 0 85     Eosinophils Absolute 11/03/2017 0 28     Basophils Absolute 11/03/2017 0 03     Sodium 11/03/2017 140     Potassium 11/03/2017 3 5     Chloride 11/03/2017 104     CO2 11/03/2017 32     Anion Gap 11/03/2017 4     BUN 11/03/2017 21     Creatinine 11/03/2017 1 24     Glucose, Fasting 11/03/2017 113*    Calcium 11/03/2017 8 4     AST 11/03/2017 31     ALT 11/03/2017 61     Alkaline Phosphatase 11/03/2017 78     Total Protein 11/03/2017 7 4     Albumin 11/03/2017 3 4*    Total Bilirubin 11/03/2017 1 76*    eGFR 11/03/2017 53     LD 11/03/2017 169      Imaging: No results found  Review of Systems:  Review of Systems   Constitutional: Negative for diaphoresis and fatigue     HENT: Negative for congestion and facial swelling  Eyes: Negative for photophobia and visual disturbance  Respiratory: Negative for chest tightness and shortness of breath  Cardiovascular: Positive for leg swelling  Negative for chest pain and palpitations  Gastrointestinal: Negative for abdominal pain and blood in stool  Endocrine: Negative for cold intolerance and heat intolerance  Musculoskeletal: Negative for arthralgias and myalgias  Skin: Negative for pallor and rash  Neurological: Negative for dizziness and syncope  Physical Exam:  Physical Exam   Constitutional: He is oriented to person, place, and time  He appears well-developed and well-nourished  HENT:   Head: Normocephalic and atraumatic  Eyes: Conjunctivae and EOM are normal  Pupils are equal, round, and reactive to light  Neck: Normal range of motion  Neck supple  No JVD present  No thyromegaly present  Cardiovascular: Normal rate, normal heart sounds and intact distal pulses  An irregularly irregular rhythm present  Exam reveals no gallop and no friction rub  No murmur heard  2+ BILATERAL PITTING EDEMA   VARICOSE VEINS BILATERALLY   Pulmonary/Chest: Effort normal and breath sounds normal  No respiratory distress  He has no wheezes  He has no rales  Abdominal: Soft  Bowel sounds are normal  He exhibits no distension  There is no tenderness  Musculoskeletal: Normal range of motion  He exhibits no edema  Neurological: He is alert and oriented to person, place, and time  He has normal reflexes  Skin: Skin is warm and dry  Psychiatric: He has a normal mood and affect  Discussion/Summary:   1  CHRONIC AFIB, , rate controlled   on anticoagulation   continue current medication with no changes  2  Bilateral lower extremity edema   probably due to venous incompetence and Norvasc, probably not due to CHF  I will get echocardiogram to evaluate LV systolic function     patient is on high-dose Lasix, check BMP   compression stockings     3  Hypertension, blood pressure well controlled     4   Hyperlipidemia,  On statins     follow-up in 4 months

## 2018-06-15 DIAGNOSIS — R60.0 BILATERAL LOWER EXTREMITY EDEMA: Primary | ICD-10-CM

## 2018-06-15 RX ORDER — FUROSEMIDE 40 MG/1
40 TABLET ORAL 2 TIMES DAILY
Qty: 180 TABLET | Refills: 3 | Status: ON HOLD | OUTPATIENT
Start: 2018-06-15 | End: 2019-03-26 | Stop reason: SDUPTHER

## 2018-06-15 NOTE — TELEPHONE ENCOUNTER
Per results note on BMP:    Please call the patient and tell him to decrease the dose of Lasix to 40 mg twice a day from 80 mg twice a day   His serum creatinine is getting worse   Send a new prescription to his pharmacy    S/w patient, verbally understands  Med pending

## 2018-06-26 ENCOUNTER — TELEPHONE (OUTPATIENT)
Dept: INTERNAL MEDICINE CLINIC | Facility: CLINIC | Age: 83
End: 2018-06-26

## 2018-06-26 ENCOUNTER — TELEPHONE (OUTPATIENT)
Dept: CARDIOLOGY CLINIC | Facility: CLINIC | Age: 83
End: 2018-06-26

## 2018-06-26 ENCOUNTER — OFFICE VISIT (OUTPATIENT)
Dept: DERMATOLOGY | Facility: CLINIC | Age: 83
End: 2018-06-26
Payer: MEDICARE

## 2018-06-26 DIAGNOSIS — L28.1 PRURIGO NODULARIS: ICD-10-CM

## 2018-06-26 DIAGNOSIS — L82.1 SEBORRHEIC KERATOSIS: Primary | ICD-10-CM

## 2018-06-26 DIAGNOSIS — Z85.828 HISTORY OF SKIN CANCER: ICD-10-CM

## 2018-06-26 DIAGNOSIS — Z12.83 SCREENING FOR SKIN CANCER: ICD-10-CM

## 2018-06-26 PROCEDURE — 99213 OFFICE O/P EST LOW 20 MIN: CPT | Performed by: DERMATOLOGY

## 2018-06-26 NOTE — PATIENT INSTRUCTIONS
's nodules again patient advise that this is all induced from his scratching and picking difficult sun plan discern an actual skin cancer versus something he is picking at at present he also has been over using the topical steroid in the inframammary area causing cutaneous atrophy and ecchymosis advised him to stop the steroids at this point  Seborrheic keratosis patient reassured these are normal growths we acquire with age no treatment needed  History of skin cancer in no recurrence nothing else atypical sunblock recommended follow-up in 6 months  Screening for dermatologic disorders nothing else of concern noted on complete exam follow-up in 6 months

## 2018-06-26 NOTE — TELEPHONE ENCOUNTER
Pt saw Dr Ruthie Staples about a week ago and he changed his Furosemide from 80mg to 40mg but when he picked it up at that pharmacy the directions said to take two 40mg pills a day  Pt is confused, is he still suppose to be taking 80mg or is he only to take 40mg a day?

## 2018-06-26 NOTE — PROGRESS NOTES
[address] [facility] [address] [facility] DERMATOLOGY  [address] [address]     MRN: [id number] : 1932  Encounter: [id number]  Patient Information: Kathy Napier  Chief complaint:Recheck for history of 's nodules and skin cancer    History of present illness:  59-year-old male with history of skin cancers and 's nodules presents for overall checkup notes several lesions on his scalp and elsewhere again he has been scratching rubbing the area also using the Betamethasone dipropionate areas where he thinks he has psoriasis as well  Past Medical History:   Diagnosis Date    Allergic contact dermatitis due to plants, except food     Atrial fibrillation ([facility] )     Cancer ([facility] )     skin cancer    Cataract     Chronic kidney disease     Stage 3    Coagulation test abnormality     Diabetes mellitus ([facility] )     Disease of thyroid gland     hypothyroidism    DVT (deep venous thrombosis) (HCC)     Eczema     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[facility] )     GERD (gastroesophageal reflux disease)     Gout     Hyperlipidemia     Hypertension     Hypokalemia     Leukocytosis     Lichen planus     Nonmelanoma skin cancer     Phlebitis or thrombophlebitis of deep vessel of lower extremity (HCC)     Proteinuria      Past Surgical History:   Procedure Laterality Date    CATARACT EXTRACTION      CHOLECYSTECTOMY      [place] ESOPHAGOGASTRODUODENOSCOPY TRANSORAL DIAGNOSTIC N/A 2017    Procedure: EGD AND COLONOSCOPY;  Surgeon: Giselle Maddox MD;  Location: [facility];   Service: Gastroenterology    TONSILLECTOMY       Social History   History   Alcohol Use    Yes     Comment: occ     History   Drug Use No     History   Smoking Status    Never Smoker   Smokeless Tobacco    Never Used     Family History   Problem Relation Age of Onset    Alcohol abuse Mother     Heart attack Father      Meds/Allergies   No Known Allergies    Meds:  Prior to Admission medications Medication Sig Start Date End Date Taking?  Authorizing Provider   allopurinol (ZYLOPRIM) 100 mg tablet Take 100 mg by mouth daily   Yes Historical Provider, MD   amLODIPine (NORVASC) 5 mg tablet Take 5 mg by mouth daily   Yes Historical Provider, MD   atorvastatin (LIPITOR) 10 mg tablet Take 10 mg by mouth daily   Yes Historical Provider, MD   betamethasone dipropionate (DIPROSONE) 0 05 % cream Apply topically 2 (two) times a day   Yes Historical Provider, MD   colchicine (COLCRYS) 0 6 mg tablet Take 0 6 mg by mouth daily   Yes Historical Provider, MD   esomeprazole (NexIUM) 40 MG capsule Take 40 mg by mouth every morning before breakfast   Yes Historical Provider, MD   fluticasone (FLONASE) 50 mcg/act nasal spray USE 2 SPRAYS IN EACH NOSTRIL ONCE DAILY 2/15/18  Yes Tali Roa PA-C   FOLBEE 2 5-25-1 MG TABS Take 1 tablet by mouth daily 3/22/18  Yes Tali Roa PA-C   furosemide (LASIX) 40 mg tablet Take 1 tablet (40 mg total) by mouth 2 (two) times a day 6/15/18  Yes Venus Juarez PA-C   glipiZIDE (GLUCOTROL XL) 10 mg 24 hr tablet Take 10 mg by mouth daily   Yes Historical Provider, MD   levothyroxine 50 mcg tablet Take 50 mcg by mouth daily   Yes Historical Provider, MD   lisinopril (ZESTRIL) 10 mg tablet Take 10 mg by mouth daily   Yes Historical Provider, MD   metoprolol tartrate (LOPRESSOR) 25 mg tablet Take 25 mg by mouth 2 (two) times a day   Yes Historical Provider, MD   mometasone (ELOCON) 0 1 % lotion Apply topically daily To ears As needed 5/8/18  Yes Tali Roa PA-C   Omega 3 1000 MG CAPS Take by mouth   Yes Historical Provider, MD   potassium chloride (MICRO-K) 10 MEQ CR capsule Take 1 capsule (10 mEq total) by mouth 2 (two) times a day 3/23/18  Yes Tali Roa PA-C   rivaroxaban (XARELTO) 20 mg tablet Take 1 tablet (20 mg total) by mouth daily with breakfast 3/12/18  Yes Farhat Walsh MD   triamcinolone (KENALOG) 0 1 % ointment Apply topically 2 (two) times a day To legs twice daily 2/28/18  Yes Iker Anderson MD       Subjective:     Review of Systems:    General: negative for - chills, fatigue, fever,  weight gain or weight loss  Psychological: negative for - anxiety, behavioral disorder, concentration difficulties, decreased libido, depression, irritability, memory difficulties, mood swings, sleep disturbances or suicidal ideation  ENT: negative for - hearing difficulties , nasal congestion, nasal discharge, oral lesions, sinus pain, sneezing, sore throat  Allergy and Immunology: negative for - hives, insect bite sensitivity,  Hematological and Lymphatic: negative for - bleeding problems, blood clots,bruising, swollen lymph nodes  Endocrine: negative for - hair pattern changes, hot flashes, malaise/lethargy, mood swings, palpitations, polydipsia/polyuria, skin changes, temperature intolerance or unexpected weight change  Respiratory: negative for - cough, hemoptysis, orthopnea, shortness of breath, or wheezing  Cardiovascular: negative for - chest pain, dyspnea on exertion, edema,  Gastrointestinal: negative for - abdominal pain, nausea/vomiting  Genito-Urinary: negative for - dysuria, incontinence, irregular/heavy menses or urinary frequency/urgency  Musculoskeletal: negative for - gait disturbance, joint pain, joint stiffness, joint swelling, muscle pain, muscular weakness  Dermatological:  As in HPI  Neurological: negative for confusion, dizziness, headaches, impaired coordination/balance, memory loss, numbness/tingling, seizures, speech problems, tremors or weakness       Objective: There were no vitals taken for this visit      Physical Exam:    General Appearance:    Alert, cooperative, no distress   Head:    Normocephalic, without obvious abnormality, atraumatic           Skin:   A full skin exam was performed including scalp, head scalp, eyes, ears, nose, lips, neck, chest, axilla, abdomen, back, buttocks, bilateral upper extremities, bilateral lower extremities, hands, feet, fingers, toes, fingernails, and toenails  Scaling erythematous areas with keratotic patches noted on the scalp also on the hands no definitive atypical lesions noted previous sites of skin cancer well healed without recurrence nothing else atypical noted exam normal keratotic papules with greasy stuck on appearance     Assessment:     1  Seborrheic keratosis     2  Prurigo nodularis  Ambulatory referral to Dermatology   3  Screening for skin cancer     4  History of skin cancer           Plan:    's nodules again patient advise that this is all induced from his scratching and picking difficult sun plan discern an actual skin cancer versus something he is picking at at present he also has been over using the topical steroid in the inframammary area causing cutaneous atrophy and ecchymosis advised him to stop the steroids at this point  Seborrheic keratosis patient reassured these are normal growths we acquire with age no treatment needed  History of skin cancer in no recurrence nothing else atypical sunblock recommended follow-up in 6 months  Screening for dermatologic disorders nothing else of concern noted on complete exam follow-up in 6 months    Riaz Leger MD  6/26/2018,8:39 AM    Portions of the record may have been created with voice recognition software   Occasional wrong word or "sound a like" substitutions may have occurred due to the inherent limitations of voice recognition software   Read the chart carefully and recognize, using context, where substitutions have occurred

## 2018-07-05 DIAGNOSIS — E03.9 HYPOTHYROIDISM, UNSPECIFIED TYPE: Primary | ICD-10-CM

## 2018-07-05 RX ORDER — LEVOTHYROXINE SODIUM 0.05 MG/1
TABLET ORAL
Qty: 90 TABLET | Refills: 0 | Status: SHIPPED | OUTPATIENT
Start: 2018-07-05 | End: 2018-07-30 | Stop reason: SDUPTHER

## 2018-07-19 ENCOUNTER — TELEPHONE (OUTPATIENT)
Dept: INTERNAL MEDICINE CLINIC | Facility: CLINIC | Age: 83
End: 2018-07-19

## 2018-07-19 DIAGNOSIS — E03.9 HYPOTHYROIDISM, UNSPECIFIED TYPE: ICD-10-CM

## 2018-07-19 DIAGNOSIS — E78.5 HYPERLIPIDEMIA, UNSPECIFIED HYPERLIPIDEMIA TYPE: ICD-10-CM

## 2018-07-19 DIAGNOSIS — L28.1 PRURIGO NODULARIS: ICD-10-CM

## 2018-07-19 DIAGNOSIS — I10 ESSENTIAL HYPERTENSION: Primary | ICD-10-CM

## 2018-07-19 DIAGNOSIS — E11.8 TYPE 2 DIABETES MELLITUS WITH COMPLICATION, UNSPECIFIED WHETHER LONG TERM INSULIN USE: ICD-10-CM

## 2018-07-19 NOTE — TELEPHONE ENCOUNTER
Patient asked to speak w? Elaine Lewis He has an appt, 7 30 18 w/Dr General Oseguera    And thought he should have labs done prior    To have results in time for his appt     He hasn't seen Dr General Oseguera for a long time    Can any additional orders be put into his chart ? ? Then call and let him know to come in and have them done    Also needs to know if he should fast or not

## 2018-07-19 NOTE — TELEPHONE ENCOUNTER
Refill request     Betamethasone dipropionate 0 05% cream  Apply topically twice a day    Mometasone 0 1% lotion  Apply topically to ears prn       Send to [facility] [address]     Any questions call pt   [phone]

## 2018-07-20 ENCOUNTER — APPOINTMENT (OUTPATIENT)
Dept: LAB | Facility: CLINIC | Age: 83
End: 2018-07-20
Payer: MEDICARE

## 2018-07-20 DIAGNOSIS — I10 ESSENTIAL HYPERTENSION: ICD-10-CM

## 2018-07-20 DIAGNOSIS — E78.5 HYPERLIPIDEMIA, UNSPECIFIED HYPERLIPIDEMIA TYPE: ICD-10-CM

## 2018-07-20 DIAGNOSIS — E11.8 TYPE 2 DIABETES MELLITUS WITH COMPLICATION, UNSPECIFIED WHETHER LONG TERM INSULIN USE: ICD-10-CM

## 2018-07-20 DIAGNOSIS — E03.9 HYPOTHYROIDISM, UNSPECIFIED TYPE: ICD-10-CM

## 2018-07-20 LAB
ALBUMIN SERPL BCP-MCNC: 3.6 G/DL (ref 3.5–5)
ALP SERPL-CCNC: 91 U/L (ref 46–116)
ALT SERPL W P-5'-P-CCNC: 59 U/L (ref 12–78)
ANION GAP SERPL CALCULATED.3IONS-SCNC: 7 MMOL/L (ref 4–13)
AST SERPL W P-5'-P-CCNC: 35 U/L (ref 5–45)
BILIRUB SERPL-MCNC: 1.34 MG/DL (ref 0.2–1)
BUN SERPL-MCNC: 21 MG/DL (ref 5–25)
CALCIUM SERPL-MCNC: 8.7 MG/DL (ref 8.3–10.1)
CHLORIDE SERPL-SCNC: 106 MMOL/L (ref 100–108)
CHOLEST SERPL-MCNC: 132 MG/DL (ref 50–200)
CO2 SERPL-SCNC: 28 MMOL/L (ref 21–32)
CREAT SERPL-MCNC: 1.24 MG/DL (ref 0.6–1.3)
CREAT UR-MCNC: 169 MG/DL
EST. AVERAGE GLUCOSE BLD GHB EST-MCNC: 126 MG/DL
GFR SERPL CREATININE-BSD FRML MDRD: 53 ML/MIN/1.73SQ M
GLUCOSE P FAST SERPL-MCNC: 116 MG/DL (ref 65–99)
HBA1C MFR BLD: 6 % (ref 4.2–6.3)
HDLC SERPL-MCNC: 43 MG/DL (ref 40–60)
LDLC SERPL CALC-MCNC: 70 MG/DL (ref 0–100)
MICROALBUMIN UR-MCNC: 2440 MG/L (ref 0–20)
MICROALBUMIN/CREAT 24H UR: 1444 MG/G CREATININE (ref 0–30)
NONHDLC SERPL-MCNC: 89 MG/DL
POTASSIUM SERPL-SCNC: 3.6 MMOL/L (ref 3.5–5.3)
PROT SERPL-MCNC: 8.1 G/DL (ref 6.4–8.2)
SODIUM SERPL-SCNC: 141 MMOL/L (ref 136–145)
T4 FREE SERPL-MCNC: 1.13 NG/DL (ref 0.76–1.46)
TRIGL SERPL-MCNC: 97 MG/DL
TSH SERPL DL<=0.05 MIU/L-ACNC: 4.02 UIU/ML (ref 0.36–3.74)

## 2018-07-20 PROCEDURE — 80053 COMPREHEN METABOLIC PANEL: CPT

## 2018-07-20 PROCEDURE — 83036 HEMOGLOBIN GLYCOSYLATED A1C: CPT

## 2018-07-20 PROCEDURE — 84439 ASSAY OF FREE THYROXINE: CPT

## 2018-07-20 PROCEDURE — 84443 ASSAY THYROID STIM HORMONE: CPT

## 2018-07-20 PROCEDURE — 82043 UR ALBUMIN QUANTITATIVE: CPT

## 2018-07-20 PROCEDURE — 80061 LIPID PANEL: CPT

## 2018-07-20 PROCEDURE — 36415 COLL VENOUS BLD VENIPUNCTURE: CPT

## 2018-07-20 PROCEDURE — 82570 ASSAY OF URINE CREATININE: CPT

## 2018-07-20 RX ORDER — MOMETASONE FUROATE 1 MG/ML
SOLUTION TOPICAL DAILY
Qty: 60 ML | Refills: 0 | Status: CANCELLED | OUTPATIENT
Start: 2018-07-20

## 2018-07-20 RX ORDER — BETAMETHASONE DIPROPIONATE 0.5 MG/G
CREAM TOPICAL 2 TIMES DAILY
Qty: 30 G | Refills: 0 | OUTPATIENT
Start: 2018-07-20

## 2018-07-20 RX ORDER — MOMETASONE FUROATE 1 MG/ML
SOLUTION TOPICAL DAILY
Qty: 60 ML | Refills: 0 | OUTPATIENT
Start: 2018-07-20

## 2018-07-20 RX ORDER — BETAMETHASONE DIPROPIONATE 0.5 MG/G
CREAM TOPICAL 2 TIMES DAILY
Qty: 30 G | Refills: 0 | Status: CANCELLED | OUTPATIENT
Start: 2018-07-20

## 2018-07-24 ENCOUNTER — HOSPITAL ENCOUNTER (OUTPATIENT)
Dept: NON INVASIVE DIAGNOSTICS | Facility: CLINIC | Age: 83
Discharge: HOME | End: 2018-07-24
Payer: MEDICARE

## 2018-07-24 DIAGNOSIS — I48.20 CHRONIC ATRIAL FIBRILLATION (HCC): ICD-10-CM

## 2018-07-24 DIAGNOSIS — I10 BENIGN ESSENTIAL HYPERTENSION: ICD-10-CM

## 2018-07-24 PROCEDURE — 93306 TTE W/DOPPLER COMPLETE: CPT

## 2018-07-24 PROCEDURE — 93306 TTE W/DOPPLER COMPLETE: CPT | Performed by: INTERNAL MEDICINE

## 2018-07-26 ENCOUNTER — TELEPHONE (OUTPATIENT)
Dept: CARDIOLOGY CLINIC | Facility: CLINIC | Age: 83
End: 2018-07-26

## 2018-07-26 ENCOUNTER — OFFICE VISIT (OUTPATIENT)
Dept: CARDIOLOGY CLINIC | Facility: CLINIC | Age: 83
End: 2018-07-26
Payer: MEDICARE

## 2018-07-26 VITALS
SYSTOLIC BLOOD PRESSURE: 172 MMHG | WEIGHT: 204 LBS | BODY MASS INDEX: 32.02 KG/M2 | HEART RATE: 79 BPM | HEIGHT: 67 IN | DIASTOLIC BLOOD PRESSURE: 88 MMHG | OXYGEN SATURATION: 98 %

## 2018-07-26 DIAGNOSIS — I10 ESSENTIAL HYPERTENSION: ICD-10-CM

## 2018-07-26 DIAGNOSIS — I48.20 CHRONIC A-FIB (HCC): Primary | ICD-10-CM

## 2018-07-26 DIAGNOSIS — E78.00 HYPERCHOLESTEROLEMIA: ICD-10-CM

## 2018-07-26 DIAGNOSIS — I50.21 ACUTE SYSTOLIC CONGESTIVE HEART FAILURE (HCC): ICD-10-CM

## 2018-07-26 DIAGNOSIS — I42.9 CARDIOMYOPATHY, UNSPECIFIED TYPE (HCC): ICD-10-CM

## 2018-07-26 PROCEDURE — 99214 OFFICE O/P EST MOD 30 MIN: CPT | Performed by: INTERNAL MEDICINE

## 2018-07-26 RX ORDER — LISINOPRIL 40 MG/1
40 TABLET ORAL DAILY
Qty: 90 TABLET | Refills: 3 | Status: SHIPPED | OUTPATIENT
Start: 2018-07-26 | End: 2018-09-20 | Stop reason: SDUPTHER

## 2018-07-26 NOTE — TELEPHONE ENCOUNTER
Left message for pt to call back & schedule first available w/ Dr Salazar Montoya to go over test results

## 2018-07-26 NOTE — PROGRESS NOTES
[facility] [place]  [address] [facility]  [id number]  Cardiology Consultation     Andrae Beach  [id number]  11/21/1932      1  Chronic a-fib ([facility] )     2  Essential hypertension     3  Hypercholesterolemia     4  Acute systolic congestive heart failure (HCC)     5  Cardiomyopathy, unspecified type [facility])         Chief Complaint   Patient presents with    Follow-up     s/p testing       HPI:  Patient with history of chronic A Fib, HTN, HLD, CKD, DM presents for follow up visit  Had ECHO done for b/l leg swelling, was found to have new onset cardiomyopathy with EF of 45%  Leg swelling persistent  Denies chest pain, SOB, orthopnea, lightheadedness, palpitations, syncope  No prior history of CHF        Patient Active Problem List   Diagnosis    Adult hypothyroidism    Chronic a-fib ([facility] )    Essential hypertension    Chronic kidney disease, stage 3    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)    Hypercholesterolemia    Psoriasis    Leg edema    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, bilateral    Venous incompetence    Prurigo nodularis    Seborrheic keratosis     Past Medical History:   Diagnosis Date    Allergic contact dermatitis due to plants, except food     Atrial fibrillation (HCC)     Cancer (HCC)     skin cancer    Cataract     Chronic kidney disease     Stage 3    Coagulation test abnormality     [facility]     Disease of thyroid gland     hypothyroidism    DVT (deep venous thrombosis) (HCC)     Eczema     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HCC)     GERD (gastroesophageal reflux disease)     Gout     Hyperlipidemia     Hypertension     Hypokalemia     Leukocytosis     Lichen planus     Nonmelanoma skin cancer     Phlebitis or thrombophlebitis of deep vessel of lower extremity (HCC)     Proteinuria      Social History     Social History    Marital status: /Civil Union     Spouse name: N/A    Number of children: N/A    Years of education: N/A     Occupational History    retired      Social History Main Topics    Smoking status: Never Smoker    Smokeless tobacco: Never Used    Alcohol use Yes      Comment: occ    Drug use: No    Sexual activity: No     Other Topics Concern    Not on file     Social History Narrative    Sedentary lifestyle          Family History   Problem Relation Age of Onset    Alcohol abuse Mother     Heart attack Father      Past Surgical History:   Procedure Laterality Date    CATARACT EXTRACTION      CHOLECYSTECTOMY      [place] ESOPHAGOGASTRODUODENOSCOPY TRANSORAL DIAGNOSTIC N/A 6/14/2017    Procedure: EGD AND COLONOSCOPY;  Surgeon: Bryon Friedman MD;  Location: [facility];   Service: Gastroenterology    TONSILLECTOMY         Current Outpatient Prescriptions:     allopurinol (ZYLOPRIM) 100 mg tablet, Take 100 mg by mouth daily, Disp: , Rfl:     amLODIPine (NORVASC) 5 mg tablet, Take 5 mg by mouth daily, Disp: , Rfl:     atorvastatin (LIPITOR) 10 mg tablet, Take 10 mg by mouth daily, Disp: , Rfl:     colchicine (COLCRYS) 0 6 mg tablet, Take 0 6 mg by mouth daily, Disp: , Rfl:     esomeprazole (NexIUM) 40 MG capsule, Take 40 mg by mouth every morning before breakfast, Disp: , Rfl:     fluticasone (FLONASE) 50 mcg/act nasal spray, USE 2 SPRAYS IN EACH NOSTRIL ONCE DAILY, Disp: 1 Bottle, Rfl: 0    FOLBEE 2 5-25-1 MG TABS, Take 1 tablet by mouth daily, Disp: 90 each, Rfl: 0    furosemide (LASIX) 40 mg tablet, Take 1 tablet (40 mg total) by mouth 2 (two) times a day, Disp: 180 tablet, Rfl: 3    glipiZIDE (GLUCOTROL XL) 10 mg 24 hr tablet, Take 10 mg by mouth daily, Disp: , Rfl:     levothyroxine 50 mcg tablet, Take 1 tablet by mouth daily, Disp: 90 tablet, Rfl: 0    lisinopril (ZESTRIL) 10 mg tablet, Take 10 mg by mouth daily, Disp: , Rfl:     metoprolol tartrate (LOPRESSOR) 25 mg tablet, Take 25 mg by mouth 2 (two) times a day, Disp: , Rfl:     mometasone (ELOCON) 0 1 % lotion, Apply topically daily To ears As needed, Disp: 60 mL, Rfl: 0    potassium chloride (MICRO-K) 10 MEQ CR capsule, Take 1 capsule (10 mEq total) by mouth 2 (two) times a day, Disp: 60 capsule, Rfl: 6    rivaroxaban (XARELTO) 20 mg tablet, Take 1 tablet (20 mg total) by mouth daily with breakfast, Disp: 90 tablet, Rfl: 3    triamcinolone (KENALOG) 0 1 % ointment, Apply topically 2 (two) times a day To legs twice daily, Disp: 80 g, Rfl: 3    betamethasone dipropionate (DIPROSONE) 0 05 % cream, Apply topically 2 (two) times a day, Disp: , Rfl:     Omega 3 1000 MG CAPS, Take by mouth, Disp: , Rfl:   No Known Allergies  Vitals:    07/26/18 1445   BP: (!) 172/88   Pulse: 79   SpO2: 98%   Weight: 92 5 kg (204 lb)   Height: 5' 7" (1 702 m)       Labs:  Appointment on 07/20/2018   Component Date Value    Sodium 07/20/2018 141     Potassium 07/20/2018 3 6     Chloride 07/20/2018 106     CO2 07/20/2018 28     Anion Gap 07/20/2018 7     BUN 07/20/2018 21     Creatinine 07/20/2018 1 24     Glucose, Fasting 07/20/2018 116*    Calcium 07/20/2018 8 7     AST 07/20/2018 35     ALT 07/20/2018 59     Alkaline Phosphatase 07/20/2018 91     Total Protein 07/20/2018 8 1     Albumin 07/20/2018 3 6     Total Bilirubin 07/20/2018 1 34*    eGFR 07/20/2018 53     Hemoglobin A1C 07/20/2018 6 0     EAG 07/20/2018 126     Cholesterol 07/20/2018 132     Triglycerides 07/20/2018 97     HDL, Direct 07/20/2018 43     LDL Calculated 07/20/2018 70     Non-HDL-Chol (CHOL-HDL) 07/20/2018 89     TSH 3RD GENERATON 07/20/2018 4 020*    Creatinine, Ur 07/20/2018 169 0     Microalbum  ,U,Random 07/20/2018 2440 0*    Microalb Creat Ratio 07/20/2018 1444*    Free T4 07/20/2018 1 13    Office Visit on 06/14/2018   Component Date Value    Sodium 06/14/2018 142     Potassium 06/14/2018 4 1     Chloride 06/14/2018 105     CO2 06/14/2018 29     Anion Gap 06/14/2018 8     BUN 06/14/2018 25     Creatinine 06/14/2018 1 54*    Glucose 06/14/2018 100     Calcium 06/14/2018 8 7     eGFR 06/14/2018 41      Imaging: No results found  Review of Systems:  Review of Systems   Constitutional: Negative for diaphoresis, fatigue and fever  HENT: Negative for congestion, ear discharge, hearing loss, sinus pain and sore throat  Eyes: Negative for pain, redness and visual disturbance  Respiratory: Negative for cough, chest tightness, shortness of breath and wheezing  Cardiovascular: Negative for chest pain, palpitations and leg swelling  Gastrointestinal: Negative for abdominal distention, abdominal pain, constipation, diarrhea, nausea and vomiting  Endocrine: Negative for cold intolerance and heat intolerance  Genitourinary: Negative for difficulty urinating and hematuria  Musculoskeletal: Negative for arthralgias, back pain, gait problem, joint swelling, myalgias, neck pain and neck stiffness  Skin: Negative for color change, pallor, rash and wound  Neurological: Positive for light-headedness  Negative for dizziness, syncope, weakness, numbness and headaches  Hematological: Does not bruise/bleed easily  Psychiatric/Behavioral: Negative for agitation, behavioral problems and confusion  The patient is not nervous/anxious  BP (!) 172/88   Pulse 79   Ht 5' 7" (1 702 m)   Wt 92 5 kg (204 lb)   SpO2 98%   BMI 31 95 kg/m²     Physical Exam:  Physical Exam   Constitutional: He is oriented to person, place, and time  He appears well-developed and well-nourished  HENT:   Head: Normocephalic and atraumatic  Eyes: Conjunctivae and EOM are normal  Pupils are equal, round, and reactive to light  Neck: Normal range of motion  Neck supple  JVD present  Cardiovascular: Normal rate, normal heart sounds and intact distal pulses  Exam reveals no gallop and no friction rub  No murmur heard   +irregularly irregular   Pulmonary/Chest: Effort normal and breath sounds normal  No respiratory distress  He has no wheezes   He has no rales  He exhibits no tenderness  Abdominal: Soft  Bowel sounds are normal  He exhibits no distension  There is no rebound  Musculoskeletal: Normal range of motion  He exhibits edema  +2 edema   Neurological: He is alert and oriented to person, place, and time  He has normal reflexes  Skin: Skin is warm and dry  Psychiatric: He has a normal mood and affect  His behavior is normal  Judgment and thought content normal        Discussion/Summary:  1  New onset cardiomyopathy:  -ECHO July 2018 shows EF 45%, mild diffuse hypokinesis, mild to moderate MR, mild TR, estimated peak PA pressure 45 mmHg  -Cardiomyopathy is new, patient is volume overloaded on exam  -Ischemic vs nonischemic to be determined  Stress test May 2017 normal  Will order stress test   -Continue lisinopril, lopressor, PO lasix  -Will increase lisinopril to 40 mg  Will discontinue Norvasc as it could be contributing to leg swelling  2  Chronic atrial fibrillation: Continue lopressor for rate control, Xarelto for anticoagulation    3  HTN: Elevated today 172/88  Will discontinue Norvasc  Will increase lisinopril to 40 mg daily      4  HLD: Continue statin    F/u 2 months

## 2018-07-27 ENCOUNTER — TELEPHONE (OUTPATIENT)
Dept: CARDIOLOGY CLINIC | Facility: CLINIC | Age: 83
End: 2018-07-27

## 2018-07-27 NOTE — TELEPHONE ENCOUNTER
Per Dr Alma Shi  office note:    Increase dose of lisinopril to 40 mg   DC Norvasc as it could be contributing to worsening lower extremity edema  I s/w patient - advised the above  Verbally understands  Patient stated that he just picked up QTY 90 of 10 mg tabs  Pt advised that he could take 4 of the 10mg tabs until they are used up (advised to take at the same time)

## 2018-07-27 NOTE — TELEPHONE ENCOUNTER
PT HAD AN APPT W/DR WILLAMS YESTERDAY AND WAS GIVEN A NEW SCRIPT FOR LISINOPRIL 40MG  PT HAS LISINOPRIL 10MG AT HOME  THE PHARMACY WAS QUESTIONING THE STRENGTH  PT WANTS TO KNOW IF HE CAN TAKE 4 TABLETS OF THE 10MG HE HAS AT HOME  PT ALSO HAS A QUESTION ABOUT THE STRENGTH OF AMLODIPINE  PLEASE CALL PT TO DISCUSS   Andrew Rooney

## 2018-07-30 ENCOUNTER — OFFICE VISIT (OUTPATIENT)
Dept: INTERNAL MEDICINE CLINIC | Facility: CLINIC | Age: 83
End: 2018-07-30
Payer: MEDICARE

## 2018-07-30 VITALS
OXYGEN SATURATION: 97 % | WEIGHT: 202.6 LBS | BODY MASS INDEX: 31.8 KG/M2 | HEIGHT: 67 IN | HEART RATE: 54 BPM | DIASTOLIC BLOOD PRESSURE: 86 MMHG | SYSTOLIC BLOOD PRESSURE: 112 MMHG

## 2018-07-30 DIAGNOSIS — M1A.0790 CHRONIC IDIOPATHIC GOUT INVOLVING TOE WITHOUT TOPHUS, UNSPECIFIED LATERALITY: ICD-10-CM

## 2018-07-30 DIAGNOSIS — E11.29 CONTROLLED TYPE 2 DIABETES MELLITUS WITH MICROALBUMINURIA, WITHOUT LONG-TERM CURRENT USE OF INSULIN (HCC): ICD-10-CM

## 2018-07-30 DIAGNOSIS — R80.9 CONTROLLED TYPE 2 DIABETES MELLITUS WITH MICROALBUMINURIA, WITHOUT LONG-TERM CURRENT USE OF INSULIN (HCC): ICD-10-CM

## 2018-07-30 DIAGNOSIS — E03.9 ADULT HYPOTHYROIDISM: Primary | ICD-10-CM

## 2018-07-30 DIAGNOSIS — I48.20 CHRONIC A-FIB (HCC): ICD-10-CM

## 2018-07-30 DIAGNOSIS — Z12.5 SCREENING FOR PROSTATE CANCER: ICD-10-CM

## 2018-07-30 DIAGNOSIS — E03.9 HYPOTHYROIDISM, UNSPECIFIED TYPE: ICD-10-CM

## 2018-07-30 DIAGNOSIS — I10 ESSENTIAL HYPERTENSION: ICD-10-CM

## 2018-07-30 DIAGNOSIS — E78.00 HYPERCHOLESTEROLEMIA: ICD-10-CM

## 2018-07-30 DIAGNOSIS — Z23 IMMUNIZATION DUE: ICD-10-CM

## 2018-07-30 DIAGNOSIS — Z00.00 ENCOUNTER FOR MEDICARE ANNUAL WELLNESS EXAM: ICD-10-CM

## 2018-07-30 PROCEDURE — 90732 PPSV23 VACC 2 YRS+ SUBQ/IM: CPT | Performed by: INTERNAL MEDICINE

## 2018-07-30 PROCEDURE — G0009 ADMIN PNEUMOCOCCAL VACCINE: HCPCS | Performed by: INTERNAL MEDICINE

## 2018-07-30 PROCEDURE — 99214 OFFICE O/P EST MOD 30 MIN: CPT | Performed by: INTERNAL MEDICINE

## 2018-07-30 PROCEDURE — G0439 PPPS, SUBSEQ VISIT: HCPCS | Performed by: INTERNAL MEDICINE

## 2018-07-30 RX ORDER — GLIPIZIDE 5 MG/1
5 TABLET, FILM COATED, EXTENDED RELEASE ORAL DAILY
Qty: 30 TABLET | Refills: 11 | Status: SHIPPED | OUTPATIENT
Start: 2018-07-30 | End: 2020-02-13

## 2018-07-30 RX ORDER — LEVOTHYROXINE SODIUM 0.07 MG/1
75 TABLET ORAL DAILY
Qty: 30 TABLET | Refills: 11 | Status: SHIPPED | OUTPATIENT
Start: 2018-07-30 | End: 2018-09-10 | Stop reason: SDUPTHER

## 2018-07-30 NOTE — PROGRESS NOTES
Diabetic Foot Exam    Diabetic Foot Exam     Dry scaley skin with calus and toenail maceration  Pulses 2+ bilaterally dp and pt  Cap refill <2s  Sensory decreased on the dorsum to microfilament

## 2018-07-30 NOTE — PROGRESS NOTES
Assessment and Plan:    Problem List Items Addressed This Visit     Adult hypothyroidism - Primary    Chronic a-fib ([facility] )    Essential hypertension    Controlled diabetes mellitus with diabetic nephropathy ([facility] )    Hypercholesterolemia        Health Maintenance Due   Topic Date Due    Medicare Annual Wellness Visit (AWV)  11/21/1932    DTaP,Tdap,and Td Vaccines (1 - Tdap) 11/21/1953    PNEUMOCOCCAL POLYSACCHARIDE VACCINE AGE 72 AND OVER  11/21/1997    GLAUCOMA SCREENING 72 + YR  11/21/1999    DM Eye Exam  04/22/2015    Diabetic Foot Exam  02/15/2018         HPI:  Will Odell is a 80 y o  male here for his Subsequent Wellness Visit      Patient Active Problem List   Diagnosis    Adult hypothyroidism    [facility]    Essential hypertension    Chronic kidney disease, stage 3    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)    Hypercholesterolemia    Psoriasis    Leg edema    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, bilateral    Venous incompetence    Prurigo nodularis    Seborrheic keratosis    Acute systolic congestive heart failure ([facility] )    Cardiomyopathy ([place] [address] )    Encounter for Medicare annual wellness exam     Past Medical History:   Diagnosis Date    Allergic contact dermatitis due to plants, except food     Atrial fibrillation (HCC)     Cancer [facility]     skin cancer    Cataract     Chronic kidney disease     Stage 3    Coagulation test abnormality     Diabetes mellitus ([facility] )     Disease of thyroid gland     hypothyroidism    DVT (deep venous thrombosis) (HCC)     Eczema     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HCC)     GERD (gastroesophageal reflux disease)     Gout     Hyperlipidemia     Hypertension     Hypokalemia     Leukocytosis     Lichen planus     Nonmelanoma skin cancer     Phlebitis or thrombophlebitis of deep vessel of lower extremity (HCC)     Proteinuria      Past Surgical History:   Procedure Laterality Date    CATARACT EXTRACTION      CHOLECYSTECTOMY      [place] ESOPHAGOGASTRODUODENOSCOPY TRANSORAL DIAGNOSTIC N/A 6/14/2017    Procedure: EGD AND COLONOSCOPY;  Surgeon: Jona Carbajal MD;  Location: [facility];   Service: Gastroenterology    TONSILLECTOMY       Family History   Problem Relation Age of Onset    Alcohol abuse Mother     Heart attack Father      History   Smoking Status    Never Smoker   Smokeless Tobacco    Never Used     History   Alcohol Use    Yes     Comment: occ      History   Drug Use No       Current Outpatient Prescriptions   Medication Sig Dispense Refill    allopurinol (ZYLOPRIM) 100 mg tablet Take 100 mg by mouth daily      atorvastatin (LIPITOR) 10 mg tablet Take 10 mg by mouth daily      colchicine (COLCRYS) 0 6 mg tablet Take 0 6 mg by mouth daily      esomeprazole (NexIUM) 40 MG capsule Take 40 mg by mouth every morning before breakfast      fluticasone (FLONASE) 50 mcg/act nasal spray USE 2 SPRAYS IN EACH NOSTRIL ONCE DAILY 1 Bottle 0    FOLBEE 2 5-25-1 MG TABS Take 1 tablet by mouth daily 90 each 0    furosemide (LASIX) 40 mg tablet Take 1 tablet (40 mg total) by mouth 2 (two) times a day 180 tablet 3    glipiZIDE (GLUCOTROL XL) 10 mg 24 hr tablet Take 10 mg by mouth daily      levothyroxine 50 mcg tablet Take 1 tablet by mouth daily 90 tablet 0    lisinopril (ZESTRIL) 40 mg tablet Take 1 tablet (40 mg total) by mouth daily 90 tablet 3    metoprolol tartrate (LOPRESSOR) 25 mg tablet Take 25 mg by mouth 2 (two) times a day      mometasone (ELOCON) 0 1 % lotion Apply topically daily To ears As needed 60 mL 0    potassium chloride (MICRO-K) 10 MEQ CR capsule Take 1 capsule (10 mEq total) by mouth 2 (two) times a day 60 capsule 6    rivaroxaban (XARELTO) 20 mg tablet Take 1 tablet (20 mg total) by mouth daily with breakfast 90 tablet 3    betamethasone dipropionate (DIPROSONE) 0 05 % cream Apply topically 2 (two) times a day      Omega 3 1000 MG CAPS Take by mouth      triamcinolone (KENALOG) 0 1 % ointment Apply topically 2 (two) times a day To legs twice daily 80 g 3     No current facility-administered medications for this visit        No Known Allergies  Immunization History   Administered Date(s) Administered    Influenza Split High Dose Preservative Free IM 10/30/2014, 10/22/2015, 10/26/2017    Influenza TIV (IM) 10/17/2013, 10/25/2016    Pneumococcal Conjugate 13-Valent 12/16/2015    Tdap 11/21/1932       Patient Care Team:  Nancy Olsen MD as PCP - General  Jcarlos Hollingsworth MD      Medicare Screening Tests and Risk Assessments:  AWV Clinical     ISAR:   Previous hospitalizations?:  No       Once in a Lifetime Medicare Screening:   EKG performed?:  No    AAA screening performed? (if performed, please add date to Health Maintenance):  No       Medicare Screening Tests and Risk Assessment:   AAA Risk Assessment    Osteoporosis Risk Assessment    None indicated:  Yes    HIV Risk Assessment        Drug and Alcohol Use:   Tobacco use    Cigarettes:  never smoker    Smokeless:  never used smokeless tobacco    Tobacco use duration    Tobacco Cessation Readiness    Alcohol use    Alcohol use:  rare use    Alcohol Treatment Readiness   Illicit Drug Use    Drug use:  never        Diet & Exercise:   Diet   What is your diet?:  Regular   How many servings a day of the following:   Fruits and Vegetables:  5 or more    Whole Grains:  2 Simple Carbs:  2   Dairy:  1 Soda:  1   Coffee:  1    Exercise    Do you currently exercise?:  currently not exercising       Cognitive Impairment Screening:   Cognitive Impairment Screening        Functional Ability/Level of Safety:   Hearing    Hearing difficulties:  Yes    Left:  significantly decreased Right:  significantly decreased   Hearing aid:  Yes    Hearing Impairment Assessment    Current Activities    Status:  unlimited ADL's, unlimited driving, unlimited social activities   Help needed with the folllowing:    ADL    Fall Risk   Have you fallen in the last 12 months?: Yes Are you unsteady on your feet?:  Yes   How many times?:  1    Injury History       Home Safety:   Home Safety Risk Factors       Advanced Directives:   Advanced Directives    Living Will:  No Durable POA for healthcare:  No   Advanced directive:  No    Patient's End of Life Decisions    Reviewed with patient:  Yes Provider agrees with end of life decisions:  Yes   End of life decisions comments:  Patient would like to be a full code but doesn't want to be sustained on life support if he has no chance for a meaningful recovery  Urinary Incontinence:       Glaucoma:            Provider Screening     Preventative Screening/Counseling:   Cardiovascular Screening/Counseling:   (Labs Q5 years, EKG optional one-time)   General:  Screening Current           Diabetes Screening/Counseling:   (2 tests/year if Pre-Diabetes or 1 test/year if no Diabetes)   General:  Screening Current           Colorectal Cancer Screening/Counseling:   (FOBT Q1 yr; Flex Sig Q4 yrs or Q10 yrs after Screening Colonoscopy; Screening Colonoscpy Q2 yrs High Risk or Q10 yrs Low Risk; Barium Enema Q2 yrs High Risk or Q4 yrs Low Risk)   General:  Screening Current           Prostate Cancer Screening/Counseling:   (Annual)     Due for: Labs:  PSA         Breast Cancer Screening/Counseling:   (Baseline Age 28 - 43; Annual Age 36+)         Cervical Cancer Screening/Counseling:   (Annual for High Risk or Childbearing Age with Abnormal Pap in Last 3 yrs; Every 2 all others)         Osteoporosis Screening/Counseling:   (Every 2 Yrs if at risk or more if medically necessary)   General:  Screening Not Indicated           AAA Screening/Counseling:   (Once per Lifetime with risk factors)    General:  Screening Not Indicated           Glaucoma Screening/Counseling:   (Annual)   General:  Screening Current          HIV Screening/Counseling:   (Voluntary;  Once annually for high risk OR 3 times for Pregnancy at diagnosis of IUP; 3rd trimester; and at Labor General:  Screening Not Indicated           Hepatitis C Screening:   Hepatitis C Counseling Provided: Yes               Immunizations:   Influenza (annual): Influenza UTD This Year   Pneumococcal (Once in a Lifetime):  Pneumococcal Due Today   Hepatitis B Series (low risk patients):  Series Not Indicated   Zostavax (Medicare D Coverage, Pt >72 yo):  Patient Declines   TD (Non-Medicare Wellness  Visit required): Td Vaccine UTD   Tdap (Non-Medicare Wellness Visit required):   Tdap Vaccine UTD       Other Preventative Couseling (Non-Medicare Wellness Visit Required):       Referrals (Non-Medicare Wellness Visit Required):       Medical Equipment/Suppliers:

## 2018-07-30 NOTE — ASSESSMENT & PLAN NOTE
Lab Results   Component Value Date    HGBA1C 6 0 07/20/2018       No results for input(s): POCGLU in the last 72 hours  Blood Sugar Average: Last 72 hrs:  on maximum dose of lisinopril  a1c of 6 0   Will decrease glipizide to 5 mg daily

## 2018-07-30 NOTE — PROGRESS NOTES
Assessment/Plan:    Adult hypothyroidism  Subtherapeutic  Will increase to 75mcg  rechk tsh in 6 wks    Controlled diabetes mellitus with microalbuminuria ([facility] )  Lab Results   Component Value Date    HGBA1C 6 0 07/20/2018       No results for input(s): POCGLU in the last 72 hours  Blood Sugar Average: Last 72 hrs:  on maximum dose of lisinopril  a1c of 6 0  Will decrease glipizide to 5 mg daily    Hypercholesterolemia  Well controlled with ldl of 70    Immunization due  Pneumovax today    Encounter for Medicare annual wellness exam  Wellness form reviewed and exam completed       Diagnoses and all orders for this visit:    Adult hypothyroidism  -     TSH, 3rd generation; Future  -     TSH, 3rd generation; Future    Chronic a-fib (HCC)    Essential hypertension  -     Comprehensive metabolic panel; Future    Hypercholesterolemia  -     Comprehensive metabolic panel; Future  -     CK; Future  -     Lipid Panel with Direct LDL reflex; Future    Controlled type 2 diabetes mellitus with microalbuminuria, without long-term current use of insulin ([facility] )  -     Ambulatory referral to Podiatry; Future  -     glipiZIDE (GLUCOTROL XL) 5 mg 24 hr tablet; Take 1 tablet (5 mg total) by mouth daily  -     Comprehensive metabolic panel; Future  -     Hemoglobin A1C; Future  -     Microalbumin / creatinine urine ratio    Hypothyroidism, unspecified type  -     levothyroxine 75 mcg tablet; Take 1 tablet (75 mcg total) by mouth daily    Immunization due  -     Pneumococcal Polysaccharide Vaccine 23-Valent =>1yo SQ IM    Encounter for Medicare annual wellness exam    Chronic idiopathic gout involving toe without tophus, unspecified laterality  -     Uric acid; Future    Screening for prostate cancer  -     PSA, Total Screen; Future          Subjective:      Patient ID: Mariia Anderson is a 80 y o  male      Patient presents in follow up for his medical problems and to review recent lab work         The following portions of the patient's history were reviewed and updated as appropriate: allergies, current medications, past family history, past medical history, past social history, past surgical history and problem list     Review of Systems   Constitutional: Negative for activity change, appetite change, chills, diaphoresis, fatigue, fever and unexpected weight change  HENT: Negative for congestion, dental problem, drooling, ear discharge, ear pain, facial swelling, hearing loss, mouth sores, nosebleeds, postnasal drip, rhinorrhea, sinus pain, sinus pressure, sneezing, sore throat, tinnitus, trouble swallowing and voice change  Eyes: Negative for photophobia, pain, discharge, redness, itching and visual disturbance  Respiratory: Negative for apnea, cough, choking, chest tightness, shortness of breath, wheezing and stridor  Cardiovascular: Negative for chest pain, palpitations and leg swelling  Gastrointestinal: Negative for abdominal distention, abdominal pain, anal bleeding, blood in stool, constipation, diarrhea, nausea, rectal pain and vomiting  Endocrine: Negative for cold intolerance, heat intolerance, polydipsia, polyphagia and polyuria  Genitourinary: Negative for decreased urine volume, difficulty urinating, dysuria, enuresis, flank pain, frequency, genital sores, hematuria and urgency  Musculoskeletal: Negative for arthralgias, back pain, gait problem, joint swelling, myalgias, neck pain and neck stiffness  Skin: Negative for color change, pallor, rash and wound  Allergic/Immunologic: Negative for environmental allergies, food allergies and immunocompromised state  Neurological: Negative for dizziness, tremors, seizures, syncope, facial asymmetry, speech difficulty, weakness, light-headedness, numbness and headaches  Hematological: Negative for adenopathy  Does not bruise/bleed easily  Psychiatric/Behavioral: Negative for agitation, self-injury, sleep disturbance and suicidal ideas   The patient is not nervous/anxious  Objective:      /86   Pulse (!) 54   Ht 5' 7" (1 702 m)   Wt 91 9 kg (202 lb 9 6 oz)   SpO2 97%   BMI 31 73 kg/m²          Physical Exam   Constitutional: He is oriented to person, place, and time  He appears well-developed and well-nourished  No distress  HENT:   Head: Normocephalic and atraumatic  Right Ear: External ear normal    Left Ear: External ear normal    Mouth/Throat: Oropharynx is clear and moist    Eyes: Conjunctivae and EOM are normal  Pupils are equal, round, and reactive to light  No scleral icterus  Neck: Normal range of motion  Neck supple  No JVD present  No tracheal deviation present  No thyromegaly present  Cardiovascular: Normal rate, regular rhythm and intact distal pulses  Exam reveals no gallop and no friction rub  No murmur heard  Pulmonary/Chest: Effort normal and breath sounds normal  No respiratory distress  He has no wheezes  He has no rales  He exhibits no tenderness  Abdominal: Soft  Bowel sounds are normal  He exhibits no distension and no mass  There is no tenderness  There is no rebound and no guarding  Musculoskeletal: Normal range of motion  He exhibits no edema, tenderness or deformity  Lymphadenopathy:     He has no cervical adenopathy  Neurological: He is alert and oriented to person, place, and time  He has normal reflexes  No cranial nerve deficit  Coordination normal    Skin: Skin is warm and dry  No rash noted  He is not diaphoretic  No erythema  No pallor  Psychiatric: He has a normal mood and affect   His behavior is normal  Judgment and thought content normal

## 2018-08-02 ENCOUNTER — HOSPITAL ENCOUNTER (OUTPATIENT)
Dept: NON INVASIVE DIAGNOSTICS | Facility: CLINIC | Age: 83
Discharge: HOME | End: 2018-08-02
Payer: MEDICARE

## 2018-08-02 DIAGNOSIS — I50.21 ACUTE SYSTOLIC CONGESTIVE HEART FAILURE (HCC): ICD-10-CM

## 2018-08-02 DIAGNOSIS — I42.9 CARDIOMYOPATHY, UNSPECIFIED TYPE (HCC): ICD-10-CM

## 2018-08-02 LAB
MAX DIASTOLIC BP: 96 MMHG
MAX HEART RATE: 100 BPM
MAX PREDICTED HEART RATE: 135 BPM
MAX. SYSTOLIC BP: 174 MMHG
PROTOCOL NAME: NORMAL
REASON FOR TERMINATION: NORMAL
TARGET HR FORMULA: NORMAL
TIME IN EXERCISE PHASE: NORMAL

## 2018-08-02 PROCEDURE — A9502 TC99M TETROFOSMIN: HCPCS

## 2018-08-02 PROCEDURE — 93017 CV STRESS TEST TRACING ONLY: CPT

## 2018-08-02 PROCEDURE — 78452 HT MUSCLE IMAGE SPECT MULT: CPT

## 2018-08-02 RX ADMIN — REGADENOSON 0.4 MG: 0.08 INJECTION, SOLUTION INTRAVENOUS at 09:12

## 2018-08-11 DIAGNOSIS — L28.1 PRURIGO NODULARIS: ICD-10-CM

## 2018-08-12 RX ORDER — BETAMETHASONE DIPROPIONATE 0.5 MG/G
OINTMENT TOPICAL
Qty: 50 G | Refills: 1 | Status: SHIPPED | OUTPATIENT
Start: 2018-08-12 | End: 2018-12-26 | Stop reason: SDUPTHER

## 2018-08-22 ENCOUNTER — TELEPHONE (OUTPATIENT)
Dept: INTERNAL MEDICINE CLINIC | Facility: CLINIC | Age: 83
End: 2018-08-22

## 2018-08-22 NOTE — TELEPHONE ENCOUNTER
Pt is confused he received a letter about him not making a appointment with the podiatrist he called the number on the letter and he was sent to central scheduling please tsering;l pt and advise him on which podiatrist to make his appointment with

## 2018-09-10 ENCOUNTER — APPOINTMENT (OUTPATIENT)
Dept: LAB | Facility: CLINIC | Age: 83
End: 2018-09-10
Payer: MEDICARE

## 2018-09-10 DIAGNOSIS — E03.9 ADULT HYPOTHYROIDISM: ICD-10-CM

## 2018-09-10 DIAGNOSIS — E03.9 HYPOTHYROIDISM, UNSPECIFIED TYPE: ICD-10-CM

## 2018-09-10 LAB — TSH SERPL DL<=0.05 MIU/L-ACNC: 6.61 UIU/ML (ref 0.36–3.74)

## 2018-09-10 PROCEDURE — 84443 ASSAY THYROID STIM HORMONE: CPT

## 2018-09-10 PROCEDURE — 36415 COLL VENOUS BLD VENIPUNCTURE: CPT

## 2018-09-10 RX ORDER — LEVOTHYROXINE SODIUM 88 UG/1
88 TABLET ORAL DAILY
Qty: 90 TABLET | Refills: 3 | Status: SHIPPED | OUTPATIENT
Start: 2018-09-10 | End: 2019-02-05 | Stop reason: SDUPTHER

## 2018-09-20 DIAGNOSIS — I50.21 ACUTE SYSTOLIC CONGESTIVE HEART FAILURE (HCC): ICD-10-CM

## 2018-09-20 DIAGNOSIS — I10 ESSENTIAL HYPERTENSION: ICD-10-CM

## 2018-09-20 DIAGNOSIS — I42.9 CARDIOMYOPATHY, UNSPECIFIED TYPE (HCC): ICD-10-CM

## 2018-09-20 RX ORDER — LISINOPRIL 40 MG/1
40 TABLET ORAL DAILY
Qty: 90 TABLET | Refills: 1 | Status: ON HOLD | OUTPATIENT
Start: 2018-09-20 | End: 2019-03-13 | Stop reason: SDUPTHER

## 2018-09-27 ENCOUNTER — OFFICE VISIT (OUTPATIENT)
Dept: CARDIOLOGY CLINIC | Facility: CLINIC | Age: 83
End: 2018-09-27
Payer: MEDICARE

## 2018-09-27 VITALS
HEIGHT: 67 IN | WEIGHT: 196.2 LBS | OXYGEN SATURATION: 97 % | DIASTOLIC BLOOD PRESSURE: 88 MMHG | HEART RATE: 86 BPM | SYSTOLIC BLOOD PRESSURE: 146 MMHG | BODY MASS INDEX: 30.79 KG/M2

## 2018-09-27 DIAGNOSIS — I48.20 CHRONIC A-FIB (HCC): Primary | ICD-10-CM

## 2018-09-27 DIAGNOSIS — I10 HYPERTENSION, UNCONTROLLED: ICD-10-CM

## 2018-09-27 DIAGNOSIS — I42.0 DILATED CARDIOMYOPATHY (HCC): ICD-10-CM

## 2018-09-27 DIAGNOSIS — I83.893 VARICOSE VEINS OF LOWER EXTREMITY WITH EDEMA, BILATERAL: ICD-10-CM

## 2018-09-27 DIAGNOSIS — E78.00 HYPERCHOLESTEROLEMIA: ICD-10-CM

## 2018-09-27 DIAGNOSIS — I50.22 CHRONIC SYSTOLIC CHF (CONGESTIVE HEART FAILURE) (HCC): ICD-10-CM

## 2018-09-27 PROCEDURE — 99214 OFFICE O/P EST MOD 30 MIN: CPT | Performed by: INTERNAL MEDICINE

## 2018-09-27 RX ORDER — METOPROLOL TARTRATE 50 MG/1
50 TABLET, FILM COATED ORAL 2 TIMES DAILY
Qty: 60 TABLET | Refills: 11 | Status: ON HOLD | OUTPATIENT
Start: 2018-09-27 | End: 2019-03-25 | Stop reason: CLARIF

## 2018-09-27 NOTE — PROGRESS NOTES
Cardiology Consultation     Christina Mcintyre  [id number]  11/21/1932  [address] [facility] [address]    C/c: follow-up of chronic systolic CHF, chronic AFib, hypertension, hyperlipidemia  HPI: Patient with history of chronic A Fib,  Nonischemic cardiomyopathy, LVEF 45% HTN, HLD, CKD, DM presents for follow up  Patient had lower extremity edema last visit which has improved after stopping Norvasc  He also had high blood pressure which improved after increasing the dose of lisinopril  Patient is doing well he does get shortness of breath on mild-to-moderate exertion which is chronic  No chest pain  He denies having other cardiac symptoms including orthopnea paroxysmal nocturnal dyspnea  No palpitations, dizziness or syncope  Patient had nuclear stress test which did not show any reversible ischemia      Patient Active Problem List   Diagnosis    Adult hypothyroidism    [facility]    Essential hypertension    Chronic kidney disease, stage 3    Controlled diabetes mellitus with microalbuminuria ([facility] )    Hypercholesterolemia    Psoriasis    Leg edema    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     Cancer ([facility] )     skin cancer    Cataract     Chronic kidney disease     Stage 3    Coagulation test abnormality     Diabetes mellitus ([facility]     Disease of thyroid gland     hypothyroidism    DVT (deep venous thrombosis) (HCC)     Eczema     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HCC)     GERD (gastroesophageal reflux disease)     Gout     Hyperlipidemia     Hypertension     Hypokalemia     Leukocytosis     Lichen planus     Nonmelanoma skin cancer     Phlebitis or thrombophlebitis of deep vessel of lower extremity (HCC)     Proteinuria      Social History     Social History    Marital status: /Civil Union     Spouse name: N/A    Number of children: N/A    Years of education: N/A     Occupational History    retired      Social History Main Topics    Smoking status: Never Smoker    Smokeless tobacco: Never Used    Alcohol use Yes      Comment: occ    Drug use: No    Sexual activity: No     Other Topics Concern    Not on file     Social History Narrative    Sedentary lifestyle          Family History   Problem Relation Age of Onset    Alcohol abuse Mother     Heart attack Father      Past Surgical History:   Procedure Laterality Date    CATARACT EXTRACTION      CHOLECYSTECTOMY      [place] ESOPHAGOGASTRODUODENOSCOPY TRANSORAL DIAGNOSTIC N/A 6/14/2017    Procedure: EGD AND COLONOSCOPY;  Surgeon: Melisa Quigley MD;  Location: [facility];   Service: Gastroenterology    TONSILLECTOMY         Current Outpatient Prescriptions:     allopurinol (ZYLOPRIM) 100 mg tablet, Take 100 mg by mouth daily, Disp: , Rfl:     atorvastatin (LIPITOR) 10 mg tablet, Take 10 mg by mouth daily, Disp: , Rfl:     colchicine (COLCRYS) 0 6 mg tablet, Take 0 6 mg by mouth daily, Disp: , Rfl:     esomeprazole (NexIUM) 40 MG capsule, Take 40 mg by mouth every morning before breakfast, Disp: , Rfl:     fluticasone (FLONASE) 50 mcg/act nasal spray, USE 2 SPRAYS IN EACH NOSTRIL ONCE DAILY, Disp: 1 Bottle, Rfl: 0    FOLBEE 2 5-25-1 MG TABS, Take 1 tablet by mouth daily, Disp: 90 each, Rfl: 0    furosemide (LASIX) 40 mg tablet, Take 1 tablet (40 mg total) by mouth 2 (two) times a day, Disp: 180 tablet, Rfl: 3    glipiZIDE (GLUCOTROL XL) 5 mg 24 hr tablet, Take 1 tablet (5 mg total) by mouth daily, Disp: 30 tablet, Rfl: 11    levothyroxine 88 mcg tablet, Take 1 tablet (88 mcg total) by mouth daily, Disp: 90 tablet, Rfl: 3    lisinopril (ZESTRIL) 40 mg tablet, Take 1 tablet (40 mg total) by mouth daily, Disp: 90 tablet, Rfl: 1    metoprolol tartrate (LOPRESSOR) 25 mg tablet, Take 25 mg by mouth 2 (two) times a day, Disp: , Rfl:     Omega 3 1000 MG CAPS, Take by mouth, Disp: , Rfl:     potassium chloride (MICRO-K) 10 MEQ CR capsule, Take 1 capsule (10 mEq total) by mouth 2 (two) times a day, Disp: 60 capsule, Rfl: 6    rivaroxaban (XARELTO) 20 mg tablet, Take 1 tablet (20 mg total) by mouth daily with breakfast, Disp: 90 tablet, Rfl: 3    betamethasone, augmented, (DIPROLENE) 0 05 % ointment, APPLY 2 (TWO) TIMES A DAY TO IRRITATED AREAS TWICE DAILY, Disp: 50 g, Rfl: 1  No Known Allergies  Vitals:    09/27/18 0949   BP: 146/88   Pulse: 86   SpO2: 97%   Weight: 89 kg (196 lb 3 2 oz)   Height: 5' 7" (1 702 m)       Labs:  Appointment on 09/10/2018   Component Date Value    TSH 3RD [facility] 09/10/2018 [address] [place] [address] Outpatient Visit on 08/02/2018   Component Date Value    Protocol Name 08/02/2018 LEXISCAN-SIT     Time In Exercise Phase 08/02/2018 00:03:00     MAX  SYSTOLIC BP 36/42/8351 164     Max Diastolic Bp 34/56/0788 96     Max Heart Rate 08/02/2018 100     Max Predicted Heart Rate 08/02/2018 135     Reason for Termination 08/02/2018 Protocol Complete     Test Indication 08/02/2018                      Value:CHF  cardiomyopathy      Target Hr Formular 08/02/2018 (220 - Age)*85%      Imaging: No results found  Review of Systems:  Review of Systems   Constitutional: Negative for diaphoresis and fatigue  HENT: Negative for congestion and facial swelling  Eyes: Negative for photophobia and visual disturbance  Respiratory: Positive for shortness of breath  Negative for chest tightness  Cardiovascular: Positive for leg swelling  Negative for chest pain and palpitations     Gastrointestinal: Negative for abdominal pain and blood in stool    Endocrine: Negative for cold intolerance and heat intolerance  Musculoskeletal: Negative for arthralgias and myalgias  Skin: Negative for pallor and rash  Neurological: Negative for dizziness and syncope  Physical Exam:  Physical Exam   Constitutional: He is oriented to person, place, and time  He appears well-developed and well-nourished  HENT:   Head: Normocephalic and atraumatic  Eyes: Pupils are equal, round, and reactive to light  Conjunctivae and EOM are normal    Neck: Normal range of motion  Neck supple  No JVD present  No thyromegaly present  Cardiovascular: Normal rate, normal heart sounds and intact distal pulses  An irregularly irregular rhythm present  Exam reveals no gallop and no friction rub  No murmur heard  Pulmonary/Chest: Effort normal and breath sounds normal  No respiratory distress  He has no wheezes  He has no rales  Abdominal: Soft  Bowel sounds are normal  He exhibits no distension  There is no tenderness  Musculoskeletal: Normal range of motion  He exhibits no edema  Neurological: He is alert and oriented to person, place, and time  He has normal reflexes  Skin: Skin is warm and dry  Psychiatric: He has a normal mood and affect  Discussion/Summary:  1  New onset cardiomyopathy: LVEF 45 %  -ECHO July 2018 shows EF 45%, mild diffuse hypokinesis, mild to moderate MR, mild TR, estimated peak PA pressure 45 mmHg  - nuclear stress test did not show any reversible ischemia   -  INCREASE DOSE OF LOPRESSOR TO 50 B  I D   -  Continue lisinopril and Lasix     2  Chronic atrial fibrillation: Continue lopressor for rate control, Xarelto for anticoagulation     3  HTN:BLOOD PRESSURE IS IMPROVED SINCE LAST VISIT BUT STILL MILDLY ELEVATED  Will increase dose of beta-blocker      4   HLD: Continue statin     F/u 2 months

## 2018-10-02 DIAGNOSIS — L28.1 PRURIGO NODULARIS: Primary | ICD-10-CM

## 2018-11-02 ENCOUNTER — TELEPHONE (OUTPATIENT)
Dept: HEMATOLOGY ONCOLOGY | Facility: CLINIC | Age: 83
End: 2018-11-02

## 2018-11-02 DIAGNOSIS — D72.829 ELEVATED WHITE BLOOD CELL COUNT: ICD-10-CM

## 2018-11-02 NOTE — TELEPHONE ENCOUNTER
Patient called questioning when he should have his labs drawn for his Dr Gonzalez Grcristian appt at the end of November  I told him 1 week before his appt will be fine

## 2018-11-08 ENCOUNTER — OFFICE VISIT (OUTPATIENT)
Dept: CARDIOLOGY CLINIC | Facility: CLINIC | Age: 83
End: 2018-11-08
Payer: MEDICARE

## 2018-11-08 VITALS
HEART RATE: 99 BPM | OXYGEN SATURATION: 97 % | DIASTOLIC BLOOD PRESSURE: 86 MMHG | BODY MASS INDEX: 29.82 KG/M2 | HEIGHT: 67 IN | SYSTOLIC BLOOD PRESSURE: 156 MMHG | WEIGHT: 190 LBS

## 2018-11-08 DIAGNOSIS — E78.00 HYPERCHOLESTEROLEMIA: ICD-10-CM

## 2018-11-08 DIAGNOSIS — I48.20 CHRONIC A-FIB (HCC): Primary | ICD-10-CM

## 2018-11-08 DIAGNOSIS — I50.22 CHRONIC SYSTOLIC CHF (CONGESTIVE HEART FAILURE) (HCC): ICD-10-CM

## 2018-11-08 DIAGNOSIS — I10 ESSENTIAL HYPERTENSION: ICD-10-CM

## 2018-11-08 DIAGNOSIS — Z79.01 CHRONIC ANTICOAGULATION: ICD-10-CM

## 2018-11-08 PROCEDURE — 99214 OFFICE O/P EST MOD 30 MIN: CPT | Performed by: INTERNAL MEDICINE

## 2018-11-08 NOTE — PROGRESS NOTES
Cardiology Consultation     Concepción Book  [id number]  11/21/1932  [address] [address]    C/c: follow-up of chronic systolic CHF, chronic AFib, hypertension, hyperlipidemia      HPI: Patient with history of chronic A Fib,  Nonischemic cardiomyopathy, LVEF 45% HTN, HLD, CKD, DM presents for follow up  Patient is doing well from cardiac standpoint and denies having any shortness of breath, orthopnea, significant weight gain, lower extremity edema, paroxysmal nocturnal dyspnea  He denies having any palpitations, dizziness or syncope    PATIENT IS ON XARELTO AND DENIES HAVING ANY BLEEDING ISSUES     Patient Active Problem List   Diagnosis    Adult hypothyroidism    Chronic a-fib ([facility] )    Hypertension, uncontrolled    [facility] disease[facility] stage 3 ([facility]    Controlled diabetes mellitus with microalbuminuria ([facility] )    Hypercholesterolemia    Psoriasis    Leg edema    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, bilateral    Venous incompetence    Prurigo nodularis    Seborrheic keratosis    Acute systolic congestive heart failure ([facility] )    Cardiomyopathy ([facility] )    Encounter for Medicare annual wellness exam    Immunization due    Chronic systolic CHF (congestive heart failure) (HCC)     Past Medical History:   Diagnosis Date    Allergic contact dermatitis due to plants, except food     Atrial fibrillation (HCC)     Cancer (HCC)     skin cancer    Cataract     Chronic kidney disease     Stage 3    Coagulation test abnormality     Diabetes mellitus ([facility]     Disease of thyroid gland     hypothyroidism    DVT (deep venous thrombosis) (HCC)     Eczema     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HCC)     GERD (gastroesophageal reflux disease)     Gout     Hyperlipidemia     Hypertension     Hypokalemia     Leukocytosis     Lichen planus     Nonmelanoma skin cancer     Phlebitis or thrombophlebitis of deep vessel of lower extremity (HCC)     Proteinuria      Social History     Social History    Marital status: /Civil Union     Spouse name: N/A    Number of children: N/A    Years of education: N/A     Occupational History    retired      Social History Main Topics    Smoking status: Never Smoker    Smokeless tobacco: Never Used    Alcohol use Yes      Comment: occ    Drug use: No    Sexual activity: No     Other Topics Concern    Not on file     Social History Narrative    Sedentary lifestyle          Family History   Problem Relation Age of Onset    Alcohol abuse Mother     Heart attack Father      Past Surgical History:   Procedure Laterality Date    CATARACT EXTRACTION      CHOLECYSTECTOMY      NV ESOPHAGOGASTRODUODENOSCOPY TRANSORAL DIAGNOSTIC N/A 6/14/2017    Procedure: EGD AND COLONOSCOPY;  Surgeon: Blaine Wisdom MD;  Location: [facility];   Service: Gastroenterology    TONSILLECTOMY         Current Outpatient Prescriptions:     allopurinol (ZYLOPRIM) 100 mg tablet, Take 100 mg by mouth daily, Disp: , Rfl:     atorvastatin (LIPITOR) 10 mg tablet, Take 10 mg by mouth daily, Disp: , Rfl:     betamethasone, augmented, (DIPROLENE) 0 05 % ointment, APPLY 2 (TWO) TIMES A DAY TO IRRITATED AREAS TWICE DAILY, Disp: 50 g, Rfl: 1    colchicine (COLCRYS) 0 6 mg tablet, Take 0 6 mg by mouth daily, Disp: , Rfl:     fluticasone (FLONASE) 50 mcg/act nasal spray, USE 2 SPRAYS IN EACH NOSTRIL ONCE DAILY, Disp: 1 Bottle, Rfl: 0    FOLBEE 2 5-25-1 MG TABS, Take 1 tablet by mouth daily, Disp: 90 each, Rfl: 0    furosemide (LASIX) 40 mg tablet, Take 1 tablet (40 mg total) by mouth 2 (two) times a day, Disp: 180 tablet, Rfl: 3    glipiZIDE (GLUCOTROL XL) 5 mg 24 hr tablet, Take 1 tablet (5 mg total) by mouth daily, Disp: 30 tablet, Rfl: 11    levothyroxine 88 mcg tablet, Take 1 tablet (88 mcg total) by mouth daily, Disp: 90 tablet, Rfl: 3    lisinopril (ZESTRIL) 40 mg tablet, Take 1 tablet (40 mg total) by mouth daily, Disp: 90 tablet, Rfl: 1    metoprolol tartrate (LOPRESSOR) 50 mg tablet, Take 1 tablet (50 mg total) by mouth 2 (two) times a day, Disp: 60 tablet, Rfl: 11    potassium chloride (MICRO-K) 10 MEQ CR capsule, Take 1 capsule (10 mEq total) by mouth 2 (two) times a day, Disp: 60 capsule, Rfl: 6    rivaroxaban (XARELTO) 20 mg tablet, Take 1 tablet (20 mg total) by mouth daily with breakfast, Disp: 90 tablet, Rfl: 3    triamcinolone (KENALOG) 0 1 % ointment, Apply topically 2 (two) times a day To rash, Disp: 80 g, Rfl: 0    esomeprazole (NexIUM) 40 MG capsule, Take 40 mg by mouth every morning before breakfast, Disp: , Rfl:     Omega 3 1000 MG CAPS, Take by mouth, Disp: , Rfl:   No Known Allergies  Vitals:    11/08/18 1525   BP: 156/86   BP Location: Left arm   Patient Position: Sitting   Cuff Size: Adult   Pulse: 99   SpO2: 97%   Weight: 86 2 kg (190 lb)   Height: 5' 7" (1 702 m)       Labs:  Appointment on 09/10/2018   Component Date Value    TSH 3RD GENERATON 09/10/2018 6 610*     Imaging: No results found  Review of Systems:  Review of Systems   Constitutional: Negative for diaphoresis and fatigue  HENT: Negative for congestion and facial swelling  Eyes: Negative for photophobia and visual disturbance  Respiratory: Negative for chest tightness and shortness of breath  Cardiovascular: Negative for chest pain, palpitations and leg swelling  Gastrointestinal: Negative for abdominal pain and blood in stool  Endocrine: Negative for cold intolerance and heat intolerance  Musculoskeletal: Negative for arthralgias and myalgias  Skin: Negative for pallor and rash  Neurological: Negative for dizziness and syncope  Physical Exam:  Physical Exam   Constitutional: He is oriented to person, place, and time  He appears well-developed and well-nourished  HENT:   Head: Normocephalic and atraumatic     Eyes: Pupils are equal, round, and reactive to light  Conjunctivae and EOM are normal    Neck: Normal range of motion  Neck supple  No JVD present  No thyromegaly present  Cardiovascular: Normal rate, normal heart sounds and intact distal pulses  An irregularly irregular rhythm present  Exam reveals no gallop and no friction rub  No murmur heard  Pulmonary/Chest: Effort normal and breath sounds normal  No respiratory distress  He has no wheezes  He has no rales  Abdominal: Soft  Bowel sounds are normal  He exhibits no distension  There is no tenderness  Musculoskeletal: Normal range of motion  He exhibits no edema  Neurological: He is alert and oriented to person, place, and time  He has normal reflexes  Skin: Skin is warm and dry  Psychiatric: He has a normal mood and affect  Discussion/Summary:  1  New onset cardiomyopathy: LVEF 45 %  -ECHO July 2018 shows EF 45%, mild diffuse hypokinesis, mild to moderate MR, mild TR, estimated peak PA pressure 45 mmHg  Euvolemic  Continue metoprolol, lisinopril and Lasix     2  Chronic atrial fibrillation:  Rate controlled   Continue lopressor for rate control, Xarelto for anticoagulation     3  HTN:  ISOLATED SYSTOLIC  Blood pressure reasonable  Continue current medications     4   HLD: Continue statin     FOLLOW-UP IN 4 MONTHS

## 2018-11-21 DIAGNOSIS — I82.403 RECURRENT ACUTE DEEP VEIN THROMBOSIS (DVT) OF BOTH LOWER EXTREMITIES (HCC): ICD-10-CM

## 2018-11-21 RX ORDER — CYANOCOBALAMIN/FOLIC AC/VIT B6 1-2.5-25MG
1 TABLET ORAL DAILY
Qty: 90 EACH | Refills: 0 | Status: SHIPPED | OUTPATIENT
Start: 2018-11-21 | End: 2019-02-15 | Stop reason: SDUPTHER

## 2018-11-24 DIAGNOSIS — L28.1 PRURIGO NODULARIS: ICD-10-CM

## 2018-11-26 ENCOUNTER — TELEPHONE (OUTPATIENT)
Dept: HEMATOLOGY ONCOLOGY | Facility: HOSPITAL | Age: 83
End: 2018-11-26

## 2018-11-26 NOTE — TELEPHONE ENCOUNTER
Called pt and LMOM  Pt is to have labs drawn prior to his appt tomorrow 11/27 or we will have to reschedule his appt  Please advise

## 2018-11-27 ENCOUNTER — OFFICE VISIT (OUTPATIENT)
Dept: HEMATOLOGY ONCOLOGY | Facility: CLINIC | Age: 83
End: 2018-11-27
Payer: MEDICARE

## 2018-11-27 VITALS
HEART RATE: 85 BPM | RESPIRATION RATE: 16 BRPM | DIASTOLIC BLOOD PRESSURE: 72 MMHG | BODY MASS INDEX: 29.98 KG/M2 | OXYGEN SATURATION: 97 % | WEIGHT: 191 LBS | TEMPERATURE: 97.9 F | HEIGHT: 67 IN | SYSTOLIC BLOOD PRESSURE: 126 MMHG

## 2018-11-27 DIAGNOSIS — D72.828 OTHER ELEVATED WHITE BLOOD CELL (WBC) COUNT: Primary | ICD-10-CM

## 2018-11-27 PROBLEM — D72.829 LEUCOCYTOSIS: Status: ACTIVE | Noted: 2018-11-27

## 2018-11-27 PROCEDURE — 99214 OFFICE O/P EST MOD 30 MIN: CPT | Performed by: INTERNAL MEDICINE

## 2018-11-27 NOTE — PROGRESS NOTES
Hematology/Oncology Outpatient Follow- up Note  Da Rivas 80 y o  male MRN: @ Encounter: [id number]        Date:  11/27/2018    Presenting Complaint/Diagnosis : Elevated white count      Previous Hematologic/ Oncologic History:    Workup    Current Hematologic/ Oncologic Treatment:    Observation    Interval History:    The patient returns for follow-up visit  He states he is doing well  He has been on some supplements which he states have given him energy  He has a lot of articles with him about various things including stem cells for neuropathy  He had a lot of questions about this and I answered some  I advised him he needs to speak to his other physicians about his issues like the skin lesions which I suspect may be actinic keratoses but again advised him he needs to see his dermatologist  He has followed up with dermatology in the past  Denies any nausea denies any vomiting denies any diarrhea  Has not had any blood work drawn and I do not see a CBC since November 2017  I advised him to have this done today so I can evaluate this  He states he will go to the [facility] lab at [facility]  Denies any other complaints  The rest of his 14 point review of systems today was negative  He is aware he needs to go to that lab to have the bloodwork drawn as if he goes to [facility] or [facility]  We sometimes do not get the results back  I advised him if he is going to one of these labs anything to let us know when he is having the bloodwork drawn So we can follow-up on the results and he states he is quite sure he will go to [facility] lab  Test Results:    Imaging: No results found      Labs:   Lab Results   Component Value Date    WBC 10 69 (H) 11/03/2017    HGB 13 4 11/03/2017    HCT 39 9 11/03/2017    MCV 89 11/03/2017     11/03/2017     Lab Results   Component Value Date     12/16/2015    K 3 6 07/20/2018     07/20/2018    CO2 28 07/20/2018    ANIONGAP 5 12/16/2015    BUN 21 07/20/2018 CREATININE 1 24 07/20/2018    GLUCOSE 117 12/16/2015    GLUF 116 (H) 07/20/2018    CALCIUM 8 7 07/20/2018    AST 35 07/20/2018    ALT 59 07/20/2018    ALKPHOS 91 07/20/2018    PROT 7 8 11/27/2015    BILITOT 0 99 11/27/2015    EGFR 53 07/20/2018       Lab Results   Component Value Date    PSA 2 8 04/16/2016    PSA 2 96 08/11/2014         ROS: As stated in the history of present illness otherwise his 14 point review of systems today was negative        Active Problems:   Patient Active Problem List   Diagnosis    Adult hypothyroidism    [facility]    Essential hypertension    [facility], [facility] ([facility]    Controlled diabetes mellitus with microalbuminuria ([facility] )    Hypercholesterolemia    Psoriasis    Leg edema    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, bilateral    Venous incompetence    Prurigo nodularis    Seborrheic keratosis    Acute systolic congestive heart failure ([facility] )    Cardiomyopathy ([address] [address] )    Encounter for Medicare annual wellness exam    Immunization due    Chronic systolic CHF (congestive heart failure) (HCC)    Chronic anticoagulation       Past Medical History:   Past Medical History:   Diagnosis Date    Allergic contact dermatitis due to plants, except food     Atrial fibrillation (HCC)     Cancer ([facility] )     skin cancer    Cataract     Chronic kidney disease     Stage 3    Coagulation test abnormality     Diabetes mellitus ([facility] )     Disease of thyroid gland     hypothyroidism    DVT (deep venous thrombosis) (HCC)     Eczema     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HCC)     GERD (gastroesophageal reflux disease)     Gout     Hyperlipidemia     Hypertension     Hypokalemia     Leukocytosis     Lichen planus     Nonmelanoma skin cancer     Phlebitis or thrombophlebitis of deep vessel of lower extremity (HCC)     Proteinuria        Surgical History:   Past Surgical History:   Procedure Laterality Date    CATARACT EXTRACTION      CHOLECYSTECTOMY      GA ESOPHAGOGASTRODUODENOSCOPY TRANSORAL DIAGNOSTIC N/A 6/14/2017    Procedure: EGD AND COLONOSCOPY;  Surgeon: Patrick Pineda MD;  Location: [facility]; Service: Gastroenterology    TONSILLECTOMY         Family History:    Family History   Problem Relation Age of Onset    Alcohol abuse Mother     Heart attack Father        Cancer-related family history is not on file      Social History:   Social History     Social History    Marital status: /Civil Union     Spouse name: N/A    Number of children: N/A    Years of education: N/A     Occupational History    retired      Social History Main Topics    Smoking status: Never Smoker    Smokeless tobacco: Never Used    Alcohol use Yes      Comment: occ    Drug use: No    Sexual activity: No     Other Topics Concern    Not on file     Social History Narrative    Sedentary lifestyle           Current Medications:   Current Outpatient Prescriptions   Medication Sig Dispense Refill    allopurinol (ZYLOPRIM) 100 mg tablet Take 100 mg by mouth daily      atorvastatin (LIPITOR) 10 mg tablet Take 10 mg by mouth daily      betamethasone, augmented, (DIPROLENE) 0 05 % ointment APPLY 2 (TWO) TIMES A DAY TO IRRITATED AREAS TWICE DAILY 50 g 1    colchicine (COLCRYS) 0 6 mg tablet Take 0 6 mg by mouth daily      esomeprazole (NexIUM) 40 MG capsule Take 40 mg by mouth every morning before breakfast      fluticasone (FLONASE) 50 mcg/act nasal spray USE 2 SPRAYS IN EACH NOSTRIL ONCE DAILY 1 Bottle 0    FOLBEE 2 5-25-1 MG TABS Take 1 tablet by mouth daily 90 each 0    furosemide (LASIX) 40 mg tablet Take 1 tablet (40 mg total) by mouth 2 (two) times a day 180 tablet 3    glipiZIDE (GLUCOTROL XL) 5 mg 24 hr tablet Take 1 tablet (5 mg total) by mouth daily 30 tablet 11    levothyroxine 88 mcg tablet Take 1 tablet (88 mcg total) by mouth daily 90 tablet 3    lisinopril (ZESTRIL) 40 mg tablet Take 1 tablet (40 mg total) by mouth daily 90 tablet 1    metoprolol tartrate (LOPRESSOR) 50 mg tablet Take 1 tablet (50 mg total) by mouth 2 (two) times a day 60 tablet 11    Omega 3 1000 MG CAPS Take by mouth      potassium chloride (MICRO-K) 10 MEQ CR capsule Take 1 capsule (10 mEq total) by mouth 2 (two) times a day 60 capsule 6    rivaroxaban (XARELTO) 20 mg tablet Take 1 tablet (20 mg total) by mouth daily with breakfast 90 tablet 3    triamcinolone (KENALOG) 0 1 % ointment APPLY TOPICALLY 2 (TWO) TIMES A DAY TO RASH 80 g 0     No current facility-administered medications for this visit  Allergies: No Known Allergies    Physical Exam:    Body surface area is 1 98 meters squared  Wt Readings from Last 3 Encounters:   11/27/18 86 6 kg (191 lb)   11/08/18 86 2 kg (190 lb)   09/27/18 89 kg (196 lb 3 2 oz)        Temp Readings from Last 3 Encounters:   11/27/18 97 9 °F (36 6 °C) (Oral)   05/08/18 98 1 °F (36 7 °C)   02/28/18 97 8 °F (36 6 °C)        BP Readings from Last 3 Encounters:   11/27/18 126/72   11/08/18 156/86   09/27/18 146/88         Pulse Readings from Last 3 Encounters:   11/27/18 85   11/08/18 99   09/27/18 86        Physical Exam     Constitutional   General appearance: No acute distress, well appearing and well nourished  Eyes   Conjunctiva and lids: No swelling, erythema or discharge  Pupils and irises: Equal, round and reactive to light  Ears, Nose, Mouth, and Throat   External inspection of ears and nose: Normal     Nasal mucosa, septum, and turbinates: Normal without edema or erythema  Oropharynx: Normal with no erythema, edema, exudate or lesions  Pulmonary   Respiratory effort: No increased work of breathing or signs of respiratory distress  Auscultation of lungs: Clear to auscultation  Cardiovascular   Palpation of heart: Normal PMI, no thrills  Auscultation of heart: Normal rate and rhythm, normal S1 and S2, without murmurs      Examination of extremities for edema and/or varicosities: Normal     Carotid pulses: Normal  Abdomen   Abdomen: Non-tender, no masses  Liver and spleen: No hepatomegaly or splenomegaly  Lymphatic   Palpation of lymph nodes in neck: No lymphadenopathy  Musculoskeletal   Gait and station: Normal     Digits and nails: Normal without clubbing or cyanosis  Inspection/palpation of joints, bones, and muscles: Normal     Skin   Skin and subcutaneous tissue: He has some lesions on his scalp and neck which look like actinic keratoses   Neurologic   Cranial nerves: Cranial nerves 2-12 intact  Sensation: No sensory loss  Psychiatric   Orientation to person, place, and time: Normal     Mood and affect: Normal         Assessment / Plan:    The patient is a pleasant 78-year-old male who was referred to see us for mild leukocytosis  I ordered a myeloproliferative workup to include a flow cytometry, PCR for BCR/ABL and a JAK2 mutation  His workup was all negative  I explained to him that Options at this point included a bone marrow biopsy versus observation  The patient wished to stay on observation  He did not have any blood work drawn and I do not have a CBC since last year  I advised him he needs to have his blood work drawn for me to manage his counts  He will get Decadron today  I will then see him back in a year with repeat blood work  Her wishes to continue to stay on observation  I think this is reasonable  If his counts deteriorate we agreed to consider a bone marrow biopsy  I will now see him back in 12 months  He does understand the importance of follow-up with his fluctuating white count and the risk of mild MDS or other bone marrow disorder so will see me back in 6 months  He had his EGD and colonoscopy  He will see his dermatologist for his lesions under the skin  He will continue to follow with his other physicians for his other problems  I advised him to discuss the supplements he is taking with his family doctor        Goals and Barriers:  Current Goal:  Prolong Survival from Elevated white count  Barriers: None  Patient's Capacity to Self Care:  Patient able to self care  Portions of the record may have been created with voice recognition software   Occasional wrong word or "sound a like" substitutions may have occurred due to the inherent limitations of voice recognition software   Read the chart carefully and recognize, using context, where substitutions have occurred

## 2018-11-28 ENCOUNTER — LAB (OUTPATIENT)
Dept: LAB | Facility: CLINIC | Age: 83
End: 2018-11-28
Payer: MEDICARE

## 2018-11-28 LAB
ALBUMIN SERPL BCP-MCNC: 3.4 G/DL (ref 3.5–5)
ALP SERPL-CCNC: 88 U/L (ref 46–116)
ALT SERPL W P-5'-P-CCNC: 54 U/L (ref 12–78)
ANION GAP SERPL CALCULATED.3IONS-SCNC: 7 MMOL/L (ref 4–13)
AST SERPL W P-5'-P-CCNC: 29 U/L (ref 5–45)
BASOPHILS # BLD AUTO: 0.07 THOUSANDS/ΜL (ref 0–0.1)
BASOPHILS NFR BLD AUTO: 1 % (ref 0–1)
BILIRUB SERPL-MCNC: 1.39 MG/DL (ref 0.2–1)
BUN SERPL-MCNC: 29 MG/DL (ref 5–25)
CALCIUM SERPL-MCNC: 9.4 MG/DL (ref 8.3–10.1)
CHLORIDE SERPL-SCNC: 104 MMOL/L (ref 100–108)
CO2 SERPL-SCNC: 27 MMOL/L (ref 21–32)
CREAT SERPL-MCNC: 1.28 MG/DL (ref 0.6–1.3)
EOSINOPHIL # BLD AUTO: 0.32 THOUSAND/ΜL (ref 0–0.61)
EOSINOPHIL NFR BLD AUTO: 3 % (ref 0–6)
ERYTHROCYTE [DISTWIDTH] IN BLOOD BY AUTOMATED COUNT: 14 % (ref 11.6–15.1)
GFR SERPL CREATININE-BSD FRML MDRD: 50 ML/MIN/1.73SQ M
GLUCOSE SERPL-MCNC: 108 MG/DL (ref 65–140)
HCT VFR BLD AUTO: 44.1 % (ref 36.5–49.3)
HGB BLD-MCNC: 13.9 G/DL (ref 12–17)
IMM GRANULOCYTES # BLD AUTO: 0.04 THOUSAND/UL (ref 0–0.2)
IMM GRANULOCYTES NFR BLD AUTO: 0 % (ref 0–2)
LDH SERPL-CCNC: 184 U/L (ref 81–234)
LYMPHOCYTES # BLD AUTO: 2.27 THOUSANDS/ΜL (ref 0.6–4.47)
LYMPHOCYTES NFR BLD AUTO: 18 % (ref 14–44)
MCH RBC QN AUTO: 30.9 PG (ref 26.8–34.3)
MCHC RBC AUTO-ENTMCNC: 31.5 G/DL (ref 31.4–37.4)
MCV RBC AUTO: 98 FL (ref 82–98)
MONOCYTES # BLD AUTO: 1.08 THOUSAND/ΜL (ref 0.17–1.22)
MONOCYTES NFR BLD AUTO: 9 % (ref 4–12)
NEUTROPHILS # BLD AUTO: 8.93 THOUSANDS/ΜL (ref 1.85–7.62)
NEUTS SEG NFR BLD AUTO: 69 % (ref 43–75)
NRBC BLD AUTO-RTO: 0 /100 WBCS
PLATELET # BLD AUTO: 296 THOUSANDS/UL (ref 149–390)
PMV BLD AUTO: 10.4 FL (ref 8.9–12.7)
POTASSIUM SERPL-SCNC: 3.8 MMOL/L (ref 3.5–5.3)
PROT SERPL-MCNC: 7.8 G/DL (ref 6.4–8.2)
RBC # BLD AUTO: 4.5 MILLION/UL (ref 3.88–5.62)
SODIUM SERPL-SCNC: 138 MMOL/L (ref 136–145)
WBC # BLD AUTO: 12.71 THOUSAND/UL (ref 4.31–10.16)

## 2018-11-28 PROCEDURE — 36415 COLL VENOUS BLD VENIPUNCTURE: CPT | Performed by: INTERNAL MEDICINE

## 2018-11-28 PROCEDURE — 85025 COMPLETE CBC W/AUTO DIFF WBC: CPT | Performed by: INTERNAL MEDICINE

## 2018-11-28 PROCEDURE — 80053 COMPREHEN METABOLIC PANEL: CPT | Performed by: INTERNAL MEDICINE

## 2018-11-28 PROCEDURE — 83615 LACTATE (LD) (LDH) ENZYME: CPT | Performed by: INTERNAL MEDICINE

## 2018-11-29 DIAGNOSIS — M1A.0790 CHRONIC IDIOPATHIC GOUT INVOLVING TOE WITHOUT TOPHUS, UNSPECIFIED LATERALITY: Primary | ICD-10-CM

## 2018-11-29 NOTE — TELEPHONE ENCOUNTER
It is in the meds listed in [employer] that was prescribed last December for a year supply    Please put in a refill request for the drug to Dr vijaya junior and we will get to it

## 2018-11-30 RX ORDER — COLCHICINE 0.6 MG/1
0.6 TABLET ORAL DAILY
Qty: 90 TABLET | Refills: 0 | Status: SHIPPED | OUTPATIENT
Start: 2018-11-30 | End: 2019-03-01 | Stop reason: SDUPTHER

## 2018-12-16 DIAGNOSIS — L28.1 PRURIGO NODULARIS: ICD-10-CM

## 2018-12-26 ENCOUNTER — OFFICE VISIT (OUTPATIENT)
Dept: DERMATOLOGY | Facility: CLINIC | Age: 83
End: 2018-12-26
Payer: MEDICARE

## 2018-12-26 DIAGNOSIS — Z13.89 SCREENING FOR SKIN CONDITION: ICD-10-CM

## 2018-12-26 DIAGNOSIS — L30.8 PSORIASIFORM DERMATITIS: Primary | ICD-10-CM

## 2018-12-26 DIAGNOSIS — L98.9 UNKNOWN SKIN LESION: ICD-10-CM

## 2018-12-26 DIAGNOSIS — L82.1 SEBORRHEIC KERATOSIS: ICD-10-CM

## 2018-12-26 DIAGNOSIS — Z85.828 HISTORY OF SKIN CANCER: ICD-10-CM

## 2018-12-26 DIAGNOSIS — L28.1 PRURIGO NODULARIS: ICD-10-CM

## 2018-12-26 PROCEDURE — 99214 OFFICE O/P EST MOD 30 MIN: CPT | Performed by: DERMATOLOGY

## 2018-12-26 PROCEDURE — 88305 TISSUE EXAM BY PATHOLOGIST: CPT | Performed by: PATHOLOGY

## 2018-12-26 PROCEDURE — 11100 PR BIOPSY OF SKIN LESION: CPT | Performed by: DERMATOLOGY

## 2018-12-26 RX ORDER — BETAMETHASONE DIPROPIONATE 0.5 MG/G
OINTMENT TOPICAL 2 TIMES DAILY
Qty: 100 G | Refills: 3 | Status: ON HOLD | OUTPATIENT
Start: 2018-12-26 | End: 2019-03-25 | Stop reason: CLARIF

## 2018-12-26 NOTE — PATIENT INSTRUCTIONS
Psoriasiform dermatitis rash appears to have mixed picture consistent with both psoriasis at points 's nodules at points and morbid eczematous process in some areas  At present wonder about the possibility of atypical psoriasis  This could be caused by beta blockers and would suggest may be going on a holiday off of metoprolol if okay with his family physician    At present will continue with topical steroids consider phototherapy  Skin lesion possible squamous cell carcinoma await results of biopsy would require excision  Seborrheic keratosis patient reassured these are normal growths we acquire with age no treatment needed  History of skin cancer in no recurrence nothing else atypical sunblock recommended follow-up in 6 months  Screening for dermatologic disorders nothing else of concern noted on complete exam follow-up in 6

## 2018-12-26 NOTE — PROGRESS NOTES
[facility] DERMATOLOGY  [address] [place] [address]  [phone]  [phone]     MRN: [id number] : 1932  Encounter: [id number]  Patient Information: Concepción Book  Chief complaint:  Six-month checkup history of psoriasiform dermatitis and skin cancer    History of present illness:  30-year-old male presents for follow-up secondary to continues problems with itching and previous history of skin cancer patient with numerous biopsies in hassle consistent with an eczematous process consistent with prurigo nodularis patient notes worsening despite use of the topical steroids we had given him previously and recently switched to a Cerave product with pramoxine  Patient also has recently started a vitamin supplement  Past Medical History:   Diagnosis Date    Allergic contact dermatitis due to plants, except food     Atrial fibrillation ([facility]     Cancer ([facility] )     skin cancer    Cataract     Chronic kidney disease     Stage 3    Coagulation test abnormality     [facility]     Disease of thyroid gland     hypothyroidism    DVT (deep venous thrombosis) (HCC)     Eczema     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HCC)     GERD (gastroesophageal reflux disease)     Gout     Hyperlipidemia     Hypertension     Hypokalemia     Leukocytosis     Lichen planus     Nonmelanoma skin cancer     Phlebitis or thrombophlebitis of deep vessel of lower extremity (HCC)     Proteinuria      Past Surgical History:   Procedure Laterality Date    CATARACT EXTRACTION      CHOLECYSTECTOMY      OR ESOPHAGOGASTRODUODENOSCOPY TRANSORAL DIAGNOSTIC N/A 2017    Procedure: EGD AND COLONOSCOPY;  Surgeon: Jocelyn Jones MD;  Location: [facility];   Service: Gastroenterology    TONSILLECTOMY       Social History   History   Alcohol Use    Yes     Comment: occ     History   Drug Use No     History   Smoking Status    Never Smoker   Smokeless Tobacco    Never Used Family History   Problem Relation Age of Onset    Alcohol abuse Mother     Heart attack Father      Meds/Allergies   No Known Allergies    Meds:  Prior to Admission medications    Medication Sig Start Date End Date Taking?  Authorizing Provider   allopurinol (ZYLOPRIM) 100 mg tablet Take 100 mg by mouth daily   Yes Historical Provider, MD   betamethasone, augmented, (DIPROLENE) 0 05 % ointment APPLY 2 (TWO) TIMES A DAY TO IRRITATED AREAS TWICE DAILY 8/12/18  Yes Bg Shahid MD   colchicine (COLCRYS) 0 6 mg tablet Take 1 tablet (0 6 mg total) by mouth daily 11/30/18  Yes Wendy Everett MD   esomeprazole (NexIUM) 40 MG capsule Take 40 mg by mouth every morning before breakfast   Yes Historical Provider, MD   fluticasone (FLONASE) 50 mcg/act nasal spray USE 2 SPRAYS IN EACH NOSTRIL ONCE DAILY 2/15/18  Yes Trace Odell PA-C   FOLBEE 2 5-25-1 MG TABS Take 1 tablet by mouth daily 11/21/18  Yes Elaine Zuniga PA-C   furosemide (LASIX) 40 mg tablet Take 1 tablet (40 mg total) by mouth 2 (two) times a day 6/15/18  Yes Padmini Kingston PA-C   glipiZIDE (GLUCOTROL XL) 5 mg 24 hr tablet Take 1 tablet (5 mg total) by mouth daily 7/30/18  Yes Wendy Everett MD   levothyroxine 88 mcg tablet Take 1 tablet (88 mcg total) by mouth daily 9/10/18  Yes Wendy Everett MD   lisinopril (ZESTRIL) 40 mg tablet Take 1 tablet (40 mg total) by mouth daily 9/20/18  Yes Trace Odell PA-C   metoprolol tartrate (LOPRESSOR) 50 mg tablet Take 1 tablet (50 mg total) by mouth 2 (two) times a day 9/27/18  Yes Agus Irizarry MD   potassium chloride (MICRO-K) 10 MEQ CR capsule Take 1 capsule (10 mEq total) by mouth 2 (two) times a day 3/23/18  Yes Trace Odell PA-C   rivaroxaban (XARELTO) 20 mg tablet Take 1 tablet (20 mg total) by mouth daily with breakfast 3/12/18  Yes Wendy Everett MD   triamcinolone (KENALOG) 0 1 % ointment APPLY TOPICALLY 2 (TWO) TIMES A DAY TO RASH 12/16/18  Yes Bg Shahid MD   atorvastatin (LIPITOR) 10 mg tablet Take 10 mg by mouth daily    Historical Provider, MD   Omega 3 1000 MG CAPS Take by mouth    Historical Provider, MD       Subjective:     Review of Systems:    General: negative for - chills, fatigue, fever,  weight gain or weight loss  Psychological: negative for - anxiety, behavioral disorder, concentration difficulties, decreased libido, depression, irritability, memory difficulties, mood swings, sleep disturbances or suicidal ideation  ENT: negative for - hearing difficulties , nasal congestion, nasal discharge, oral lesions, sinus pain, sneezing, sore throat  Allergy and Immunology: negative for - hives, insect bite sensitivity,  Hematological and Lymphatic: negative for - bleeding problems, blood clots,bruising, swollen lymph nodes  Endocrine: negative for - hair pattern changes, hot flashes, malaise/lethargy, mood swings, palpitations, polydipsia/polyuria, skin changes, temperature intolerance or unexpected weight change  Respiratory: negative for - cough, hemoptysis, orthopnea, shortness of breath, or wheezing  Cardiovascular: negative for - chest pain, dyspnea on exertion, edema,  Gastrointestinal: negative for - abdominal pain, nausea/vomiting  Genito-Urinary: negative for - dysuria, incontinence, irregular/heavy menses or urinary frequency/urgency  Musculoskeletal: negative for - gait disturbance, joint pain, joint stiffness, joint swelling, muscle pain, muscular weakness  Dermatological:  As in HPI  Neurological: negative for confusion, dizziness, headaches, impaired coordination/balance, memory loss, numbness/tingling, seizures, speech problems, tremors or weakness       Objective: There were no vitals taken for this visit      Physical Exam:    General Appearance:    Alert, cooperative, no distress   Head:    Normocephalic, without obvious abnormality, atraumatic           Skin:   A full skin exam was performed including scalp, head scalp, eyes, ears, nose, lips, neck, chest, axilla, abdomen, back, buttocks, bilateral upper extremities, bilateral lower extremities, hands, feet, fingers, toes, fingernails, and toenails keratotic 4 mm papule noted on the left dorsum of the hand previous sites of skin cancer well healed without recurrence normal keratotic papules with greasy stuck on appearance widespread keratotic erythematous scaling patches some well-demarcated some nodular on widespread areas of the skin      Shave Biopsy Procedure Note    Pre-operative Diagnosis:  Rule out squamous cell carcinoma    Plan:  1  Instructed to keep the wound dry and covered for 24 and clean thereafter  2  Warning signs of infection were reviewed  3  Recommended that the patient use OTC acetaminophen as needed for pain  4  Return  Pending results of biopsy(ies)    Locations:  Left dorsum of the hand    Indications:  Growing lesion    Anesthesia: Lidocaine 1% without epinephrine without added sodium bicarbonate    Procedure Details     Patient informed of the risks (including bleeding and infection) and benefits of the   procedure and Verbal informed consent obtained  The lesion and surrounding area were given a sterile prep using alcohol and draped in the usual sterile fashion  A Blue blade razor was used to obtain a specimen  Hemostasis achieved with aluminum chloride  Petrolatum and a sterile dressing applied  The specimen was sent for pathologic examination  The patient tolerated the procedure(s) well  Complications:  none  Assessment:     1  Psoriasiform dermatitis     2  Unknown skin lesion     3  Screening for skin condition     4  Seborrheic keratosis     5  History of skin cancer           Plan:   Psoriasiform dermatitis rash appears to have mixed picture consistent with both psoriasis at points 's nodules at points and morbid eczematous process in some areas  At present wonder about the possibility of atypical psoriasis    This could be caused by beta blockers and would suggest may be going on a holiday off of metoprolol if okay with his family physician  At present will continue with topical steroids consider phototherapy  Skin lesion possible squamous cell carcinoma await results of biopsy would require excision  Seborrheic keratosis patient reassured these are normal growths we acquire with age no treatment needed  History of skin cancer in no recurrence nothing else atypical sunblock recommended follow-up in 6 months  Screening for dermatologic disorders nothing else of concern noted on complete exam follow-up in 6 months    Madisyn Cee MD  12/26/2018,10:29 AM    Portions of the record may have been created with voice recognition software   Occasional wrong word or "sound a like" substitutions may have occurred due to the inherent limitations of voice recognition software   Read the chart carefully and recognize, using context, where substitutions have occurred

## 2018-12-30 DIAGNOSIS — K21.9 GASTROESOPHAGEAL REFLUX DISEASE, ESOPHAGITIS PRESENCE NOT SPECIFIED: Primary | ICD-10-CM

## 2018-12-31 RX ORDER — ESOMEPRAZOLE MAGNESIUM 40 MG/1
CAPSULE, DELAYED RELEASE ORAL
Qty: 90 CAPSULE | Refills: 0 | Status: ON HOLD | OUTPATIENT
Start: 2018-12-31 | End: 2019-03-25 | Stop reason: CLARIF

## 2019-01-16 ENCOUNTER — TELEPHONE (OUTPATIENT)
Dept: INTERNAL MEDICINE CLINIC | Facility: CLINIC | Age: 84
End: 2019-01-16

## 2019-01-29 ENCOUNTER — OFFICE VISIT (OUTPATIENT)
Dept: DERMATOLOGY | Facility: CLINIC | Age: 84
End: 2019-01-29
Payer: MEDICARE

## 2019-01-29 DIAGNOSIS — L20.84 INTRINSIC ECZEMA: ICD-10-CM

## 2019-01-29 DIAGNOSIS — L28.1 PRURIGO NODULARIS: Primary | ICD-10-CM

## 2019-01-29 PROCEDURE — 99213 OFFICE O/P EST LOW 20 MIN: CPT | Performed by: DERMATOLOGY

## 2019-01-29 PROCEDURE — 96900 ACTINOTHERAPY UV LIGHT: CPT | Performed by: DERMATOLOGY

## 2019-01-29 RX ORDER — LEVOTHYROXINE SODIUM 0.07 MG/1
75 TABLET ORAL DAILY
Refills: 11 | COMMUNITY
Start: 2018-12-08 | End: 2019-02-05 | Stop reason: SDUPTHER

## 2019-01-29 NOTE — PROGRESS NOTES
[address]  [address]  [facility] [address]  [phone]  [phone]     MRN: [id number] : 1932  Encounter: [id number]  Patient Information: Yenifer Lance  Chief complaint:  Continued itching    History of present illness:  80-year-old male presents for follow-up for continued problems with itching patient notes that this has not been under control has not been real consistent with topical treatment using the topical therapy once every 2-3 days patient also was using some moisturizing cream and then decide to scrub it off and neck causes more irritation patient noted some bruising  Patient had a biopsy performed in December which showed consistent with prurigo nodularis  Past Medical History:   Diagnosis Date    Allergic contact dermatitis due to plants, except food     Atrial fibrillation (HCC)     Cancer (HCC)     skin cancer    Cataract     Chronic kidney disease     Stage 3    Coagulation test abnormality     Diabetes mellitus ([address] )     Disease of thyroid gland     hypothyroidism    DVT (deep venous thrombosis) (HCC)     Eczema     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HCC)     GERD (gastroesophageal reflux disease)     Gout     Hyperlipidemia     Hypertension     Hypokalemia     Leukocytosis     Lichen planus     Nonmelanoma skin cancer     Phlebitis or thrombophlebitis of deep vessel of lower extremity (HCC)     Proteinuria      Past Surgical History:   Procedure Laterality Date    CATARACT EXTRACTION      CHOLECYSTECTOMY      AL ESOPHAGOGASTRODUODENOSCOPY TRANSORAL DIAGNOSTIC N/A 2017    Procedure: EGD AND COLONOSCOPY;  Surgeon: Hannah Maxwell MD;  Location: [facility];   Service: Gastroenterology    TONSILLECTOMY       Social History   History   Alcohol Use    Yes     Comment: occ     History   Drug Use No     History   Smoking Status    Never Smoker   Smokeless Tobacco    Never Used     Family History   Problem Relation Age of Onset    Alcohol abuse Mother     Heart attack Father      Meds/Allergies   No Known Allergies    Meds:  Prior to Admission medications    Medication Sig Start Date End Date Taking?  Authorizing Provider   allopurinol (ZYLOPRIM) 100 mg tablet Take 100 mg by mouth daily    Historical Provider, MD   atorvastatin (LIPITOR) 10 mg tablet Take 10 mg by mouth daily    Historical Provider, MD   betamethasone, augmented, (DIPROLENE) 0 05 % ointment Apply topically 2 (two) times a day To rash until resolved 12/26/18   Kari Oseguera MD   colchicine (COLCRYS) 0 6 mg tablet Take 1 tablet (0 6 mg total) by mouth daily 11/30/18   Keron Ceballos MD   esomeprazole (NexIUM) 40 MG capsule TAKE 1 CAPSULE DAILY 12/31/18   Elaine Zuniga PA-C   fluticasone (FLONASE) 50 mcg/act nasal spray USE 2 SPRAYS IN [facility] NOSTRIL ONCE DAILY 2/15/18   Manju Elmore PA-C   FOLBEE 2 5-25-1 MG TABS Take 1 tablet by mouth daily 11/21/18   Elaine Zuniga PA-C   furosemide (LASIX) 40 mg tablet Take 1 tablet (40 mg total) by mouth 2 (two) times a day 6/15/18   Delaney Mcgraw PA-C   glipiZIDE (GLUCOTROL XL) 5 mg 24 hr tablet Take 1 tablet (5 mg total) by mouth daily 7/30/18   Keron Ceballos MD   levothyroxine 75 mcg tablet Take 75 mcg by mouth daily 12/8/18   Historical Provider, MD   levothyroxine 88 mcg tablet Take 1 tablet (88 mcg total) by mouth daily 9/10/18   Keron Ceballos MD   lisinopril (ZESTRIL) 40 mg tablet Take 1 tablet (40 mg total) by mouth daily 9/20/18   Manju Elmore PA-C   metoprolol tartrate (LOPRESSOR) 50 mg tablet Take 1 tablet (50 mg total) by mouth 2 (two) times a day 9/27/18   Marisol Cha MD   Omega 3 1000 MG CAPS Take by mouth    Historical Provider, MD   potassium chloride (MICRO-K) 10 MEQ CR capsule Take 1 capsule (10 mEq total) by mouth 2 (two) times a day 3/23/18   Manju Elmore PA-C   rivaroxaban (XARELTO) 20 mg tablet Take 1 tablet (20 mg total) by mouth daily with breakfast 3/12/18   Keron Ceballos MD triamcinolone (KENALOG) 0 1 % ointment APPLY TOPICALLY 2 (TWO) TIMES A DAY TO RASH 12/16/18   Derick Almeida MD       Subjective:     Review of Systems:    General: negative for - chills, fatigue, fever,  weight gain or weight loss  Psychological: negative for - anxiety, behavioral disorder, concentration difficulties, decreased libido, depression, irritability, memory difficulties, mood swings, sleep disturbances or suicidal ideation  ENT: negative for - hearing difficulties , nasal congestion, nasal discharge, oral lesions, sinus pain, sneezing, sore throat  Allergy and Immunology: negative for - hives, insect bite sensitivity,  Hematological and Lymphatic: negative for - bleeding problems, blood clots,bruising, swollen lymph nodes  Endocrine: negative for - hair pattern changes, hot flashes, malaise/lethargy, mood swings, palpitations, polydipsia/polyuria, skin changes, temperature intolerance or unexpected weight change  Respiratory: negative for - cough, hemoptysis, orthopnea, shortness of breath, or wheezing  Cardiovascular: negative for - chest pain, dyspnea on exertion, edema,  Gastrointestinal: negative for - abdominal pain, nausea/vomiting  Genito-Urinary: negative for - dysuria, incontinence, irregular/heavy menses or urinary frequency/urgency  Musculoskeletal: negative for - gait disturbance, joint pain, joint stiffness, joint swelling, muscle pain, muscular weakness  Dermatological:  As in HPI  Neurological: negative for confusion, dizziness, headaches, impaired coordination/balance, memory loss, numbness/tingling, seizures, speech problems, tremors or weakness       Objective: There were no vitals taken for this visit      Physical Exam:    General Appearance:    Alert, cooperative, no distress   Head:    Normocephalic, without obvious abnormality, atraumatic           Skin:   A full skin exam was performed including scalp, head scalp, eyes, ears, nose, lips, neck, chest, axilla, abdomen, back, buttocks, bilateral upper extremities, bilateral lower extremities, hands, feet, fingers, toes, fingernails, and toenails scaling erythema excoriations noted on widespread areas of body with ecchymosis noted from scratching  Some underlying eczematous process noted nothing else     Assessment:     1  Prurigo nodularis     2  Intrinsic eczema           Plan:   Present will go ahead and continue with topical therapy as previously outlined also work start him on for a phototherapy and to see if this will help get this under control will go ahead and start him on 0 4 joules narrowband UVB in hopefully get this under control will plan on 3 times a week treatment    Jose Slade MD  7/73/7265,2:10 AM    Portions of the record may have been created with voice recognition software   Occasional wrong word or "sound a like" substitutions may have occurred due to the inherent limitations of voice recognition software   Read the chart carefully and recognize, using context, where substitutions have occurred

## 2019-01-29 NOTE — PATIENT INSTRUCTIONS
Present will go ahead and continue with topical therapy as previously outlined also work start him on for a phototherapy and to see if this will help get this under control will go ahead and start him on 0 4 joules narrowband UVB in hopefully get this under control will plan on 3 times a week treatment

## 2019-01-30 ENCOUNTER — LAB (OUTPATIENT)
Dept: LAB | Facility: CLINIC | Age: 84
End: 2019-01-30
Payer: MEDICARE

## 2019-01-30 DIAGNOSIS — I10 ESSENTIAL HYPERTENSION: ICD-10-CM

## 2019-01-30 DIAGNOSIS — E78.00 HYPERCHOLESTEROLEMIA: ICD-10-CM

## 2019-01-30 DIAGNOSIS — E11.29 CONTROLLED TYPE 2 DIABETES MELLITUS WITH MICROALBUMINURIA, WITHOUT LONG-TERM CURRENT USE OF INSULIN (HCC): ICD-10-CM

## 2019-01-30 DIAGNOSIS — Z12.5 SCREENING FOR PROSTATE CANCER: ICD-10-CM

## 2019-01-30 DIAGNOSIS — E03.9 ADULT HYPOTHYROIDISM: ICD-10-CM

## 2019-01-30 DIAGNOSIS — R80.9 CONTROLLED TYPE 2 DIABETES MELLITUS WITH MICROALBUMINURIA, WITHOUT LONG-TERM CURRENT USE OF INSULIN (HCC): ICD-10-CM

## 2019-01-30 DIAGNOSIS — M1A.0790 CHRONIC IDIOPATHIC GOUT INVOLVING TOE WITHOUT TOPHUS, UNSPECIFIED LATERALITY: ICD-10-CM

## 2019-01-30 LAB
ALBUMIN SERPL BCP-MCNC: 3.4 G/DL (ref 3.5–5)
ALP SERPL-CCNC: 102 U/L (ref 46–116)
ALT SERPL W P-5'-P-CCNC: 50 U/L (ref 12–78)
ANION GAP SERPL CALCULATED.3IONS-SCNC: 6 MMOL/L (ref 4–13)
AST SERPL W P-5'-P-CCNC: 34 U/L (ref 5–45)
BILIRUB SERPL-MCNC: 1.88 MG/DL (ref 0.2–1)
BUN SERPL-MCNC: 26 MG/DL (ref 5–25)
CALCIUM SERPL-MCNC: 8.7 MG/DL (ref 8.3–10.1)
CHLORIDE SERPL-SCNC: 103 MMOL/L (ref 100–108)
CHOLEST SERPL-MCNC: 193 MG/DL (ref 50–200)
CK SERPL-CCNC: 79 U/L (ref 39–308)
CO2 SERPL-SCNC: 29 MMOL/L (ref 21–32)
CREAT SERPL-MCNC: 1.34 MG/DL (ref 0.6–1.3)
EST. AVERAGE GLUCOSE BLD GHB EST-MCNC: 126 MG/DL
GFR SERPL CREATININE-BSD FRML MDRD: 48 ML/MIN/1.73SQ M
GLUCOSE P FAST SERPL-MCNC: 110 MG/DL (ref 65–99)
HBA1C MFR BLD: 6 % (ref 4.2–6.3)
HDLC SERPL-MCNC: 50 MG/DL (ref 40–60)
LDLC SERPL CALC-MCNC: 117 MG/DL (ref 0–100)
POTASSIUM SERPL-SCNC: 4 MMOL/L (ref 3.5–5.3)
PROT SERPL-MCNC: 7.7 G/DL (ref 6.4–8.2)
PSA SERPL-MCNC: 5 NG/ML (ref 0–4)
SODIUM SERPL-SCNC: 138 MMOL/L (ref 136–145)
TRIGL SERPL-MCNC: 128 MG/DL
TSH SERPL DL<=0.05 MIU/L-ACNC: 5.47 UIU/ML (ref 0.36–3.74)
URATE SERPL-MCNC: 8 MG/DL (ref 4.2–8)

## 2019-01-30 PROCEDURE — 82550 ASSAY OF CK (CPK): CPT

## 2019-01-30 PROCEDURE — 84443 ASSAY THYROID STIM HORMONE: CPT

## 2019-01-30 PROCEDURE — 80053 COMPREHEN METABOLIC PANEL: CPT

## 2019-01-30 PROCEDURE — 80061 LIPID PANEL: CPT

## 2019-01-30 PROCEDURE — 83036 HEMOGLOBIN GLYCOSYLATED A1C: CPT

## 2019-01-30 PROCEDURE — 84550 ASSAY OF BLOOD/URIC ACID: CPT

## 2019-01-30 PROCEDURE — G0103 PSA SCREENING: HCPCS

## 2019-01-30 PROCEDURE — 36415 COLL VENOUS BLD VENIPUNCTURE: CPT

## 2019-01-31 ENCOUNTER — CLINICAL SUPPORT (OUTPATIENT)
Dept: DERMATOLOGY | Facility: CLINIC | Age: 84
End: 2019-01-31
Payer: MEDICARE

## 2019-01-31 DIAGNOSIS — L40.9 PSORIASIS: ICD-10-CM

## 2019-01-31 PROCEDURE — 96900 ACTINOTHERAPY UV LIGHT: CPT

## 2019-02-04 ENCOUNTER — CLINICAL SUPPORT (OUTPATIENT)
Dept: DERMATOLOGY | Facility: CLINIC | Age: 84
End: 2019-02-04
Payer: MEDICARE

## 2019-02-04 DIAGNOSIS — L40.9 PSORIASIS: ICD-10-CM

## 2019-02-04 PROCEDURE — 96900 ACTINOTHERAPY UV LIGHT: CPT

## 2019-02-05 ENCOUNTER — OFFICE VISIT (OUTPATIENT)
Dept: INTERNAL MEDICINE CLINIC | Facility: CLINIC | Age: 84
End: 2019-02-05
Payer: MEDICARE

## 2019-02-05 ENCOUNTER — TELEPHONE (OUTPATIENT)
Dept: NEUROLOGY | Facility: CLINIC | Age: 84
End: 2019-02-05

## 2019-02-05 VITALS
SYSTOLIC BLOOD PRESSURE: 124 MMHG | DIASTOLIC BLOOD PRESSURE: 74 MMHG | HEIGHT: 67 IN | OXYGEN SATURATION: 97 % | WEIGHT: 196 LBS | BODY MASS INDEX: 30.76 KG/M2 | HEART RATE: 100 BPM

## 2019-02-05 DIAGNOSIS — E11.29 CONTROLLED TYPE 2 DIABETES MELLITUS WITH MICROALBUMINURIA, WITHOUT LONG-TERM CURRENT USE OF INSULIN (HCC): Primary | ICD-10-CM

## 2019-02-05 DIAGNOSIS — E03.9 HYPOTHYROIDISM, UNSPECIFIED TYPE: ICD-10-CM

## 2019-02-05 DIAGNOSIS — E78.00 HYPERCHOLESTEROLEMIA: ICD-10-CM

## 2019-02-05 DIAGNOSIS — I48.20 CHRONIC A-FIB (HCC): ICD-10-CM

## 2019-02-05 DIAGNOSIS — R97.20 PSA ELEVATION: ICD-10-CM

## 2019-02-05 DIAGNOSIS — E03.9 ADULT HYPOTHYROIDISM: ICD-10-CM

## 2019-02-05 DIAGNOSIS — R80.9 CONTROLLED TYPE 2 DIABETES MELLITUS WITH MICROALBUMINURIA, WITHOUT LONG-TERM CURRENT USE OF INSULIN (HCC): Primary | ICD-10-CM

## 2019-02-05 DIAGNOSIS — I10 ESSENTIAL HYPERTENSION: ICD-10-CM

## 2019-02-05 DIAGNOSIS — R41.3 MEMORY LOSS: ICD-10-CM

## 2019-02-05 PROCEDURE — 99214 OFFICE O/P EST MOD 30 MIN: CPT | Performed by: INTERNAL MEDICINE

## 2019-02-05 RX ORDER — ATORVASTATIN CALCIUM 10 MG/1
10 TABLET, FILM COATED ORAL DAILY
Qty: 90 TABLET | Refills: 3 | Status: ON HOLD | OUTPATIENT
Start: 2019-02-05 | End: 2019-03-25 | Stop reason: CLARIF

## 2019-02-05 RX ORDER — LEVOTHYROXINE SODIUM 88 UG/1
88 TABLET ORAL DAILY
Qty: 90 TABLET | Refills: 3 | Status: ON HOLD | OUTPATIENT
Start: 2019-02-05 | End: 2019-03-26 | Stop reason: SDUPTHER

## 2019-02-05 NOTE — PROGRESS NOTES
Assessment/Plan:    Controlled diabetes mellitus with microalbuminuria ([facility] [address] )  Lab Results   Component Value Date    HGBA1C 6 0 01/30/2019       No results for input(s): POCGLU in the last 72 hours  Blood Sugar Average: Last 72 hrs: Well controlled  Follow fbs a1c and ratio      Adult hypothyroidism  Sub-theraputic on 75 mcg  Will increase to 88 mcg and recheck tsh in 6 wks     Essential hypertension  Well controlled  Bmp stable    Chronic a-fib (HCC)  Rate controlled and anticoagulated     Hypercholesterolemia  He stopped his lipitor  He will restart it and follow up in 6 mths  PSA elevation  Likely age related but will rule out prostate ca  Diagnoses and all orders for this visit:    Controlled type 2 diabetes mellitus with microalbuminuria, without long-term current use of insulin ([facility])  -     POCT diabetic eye exam    Adult hypothyroidism    Essential hypertension    Hypercholesterolemia  -     atorvastatin (LIPITOR) 10 mg tablet; Take 1 tablet (10 mg total) by mouth daily    Chronic a-fib (HCC)    Hypothyroidism, unspecified type  -     levothyroxine 88 mcg tablet; Take 1 tablet (88 mcg total) by mouth daily  -     TSH, 3rd generation; Future    PSA elevation  -     Ambulatory referral to Urology; Future    Memory loss  -     Ambulatory referral to Neurology; Future          Subjective:      Patient ID: Vikram Velasquez is a 80 y o  male  Patient presents in follow up for his medical problems and to review recent lab work  The following portions of the patient's history were reviewed and updated as appropriate: allergies, current medications, past family history, past medical history, past social history, past surgical history and problem list     Review of Systems   Constitutional: Negative for activity change, appetite change, chills, diaphoresis, fatigue, fever and unexpected weight change     HENT: Negative for congestion, dental problem, drooling, ear discharge, ear pain, facial swelling, hearing loss, mouth sores, nosebleeds, postnasal drip, rhinorrhea, sinus pain, sinus pressure, sneezing, sore throat, tinnitus, trouble swallowing and voice change  Eyes: Negative for photophobia, pain, discharge, redness, itching and visual disturbance  Respiratory: Negative for apnea, cough, choking, chest tightness, shortness of breath, wheezing and stridor  Cardiovascular: Negative for chest pain, palpitations and leg swelling  Gastrointestinal: Negative for abdominal distention, abdominal pain, anal bleeding, blood in stool, constipation, diarrhea, nausea, rectal pain and vomiting  Endocrine: Negative for cold intolerance, heat intolerance, polydipsia, polyphagia and polyuria  Genitourinary: Negative for decreased urine volume, difficulty urinating, dysuria, enuresis, flank pain, frequency, genital sores, hematuria and urgency  Musculoskeletal: Negative for arthralgias, back pain, gait problem, joint swelling, myalgias, neck pain and neck stiffness  Skin: Negative for color change, pallor, rash and wound  pruritis   Allergic/Immunologic: Negative for environmental allergies, food allergies and immunocompromised state  Neurological: Negative for dizziness, tremors, seizures, syncope, facial asymmetry, speech difficulty, weakness, light-headedness, numbness and headaches  Hematological: Negative for adenopathy  Does not bruise/bleed easily  Psychiatric/Behavioral: Negative for agitation, self-injury, sleep disturbance and suicidal ideas  The patient is not nervous/anxious  Objective:      /74   Pulse 100   Ht 5' 7" (1 702 m)   Wt 88 9 kg (196 lb)   SpO2 97%   BMI 30 70 kg/m²          Physical Exam   Constitutional: He is oriented to person, place, and time  He appears well-developed and well-nourished  No distress  HENT:   Head: Normocephalic and atraumatic     Right Ear: External ear normal    Left Ear: External ear normal    Mouth/Throat: Oropharynx is clear and moist    Eyes: Pupils are equal, round, and reactive to light  Conjunctivae and EOM are normal  No scleral icterus  Neck: Normal range of motion  Neck supple  No JVD present  No tracheal deviation present  No thyromegaly present  Cardiovascular: Normal rate, regular rhythm and intact distal pulses  Exam reveals no gallop and no friction rub  No murmur heard  Pulmonary/Chest: Effort normal and breath sounds normal  No respiratory distress  He has no wheezes  He has no rales  He exhibits no tenderness  Abdominal: Soft  Bowel sounds are normal  He exhibits no distension and no mass  There is no tenderness  There is no rebound and no guarding  Musculoskeletal: Normal range of motion  He exhibits no edema, tenderness or deformity  Lymphadenopathy:     He has no cervical adenopathy  Neurological: He is alert and oriented to person, place, and time  He has normal reflexes  No cranial nerve deficit  Coordination normal    Skin: Skin is warm and dry  No rash noted  He is not diaphoretic  No erythema  No pallor  Psychiatric: He has a normal mood and affect   His behavior is normal  Judgment and thought content normal

## 2019-02-05 NOTE — ASSESSMENT & PLAN NOTE
Lab Results   Component Value Date    HGBA1C 6 0 01/30/2019       No results for input(s): POCGLU in the last 72 hours  Blood Sugar Average: Last 72 hrs: Well controlled   Follow fbs a1c and ratio

## 2019-02-06 ENCOUNTER — CLINICAL SUPPORT (OUTPATIENT)
Dept: DERMATOLOGY | Facility: CLINIC | Age: 84
End: 2019-02-06
Payer: MEDICARE

## 2019-02-06 ENCOUNTER — TELEPHONE (OUTPATIENT)
Dept: INTERNAL MEDICINE CLINIC | Facility: CLINIC | Age: 84
End: 2019-02-06

## 2019-02-06 DIAGNOSIS — L40.9 PSORIASIS: ICD-10-CM

## 2019-02-06 PROCEDURE — 96900 ACTINOTHERAPY UV LIGHT: CPT

## 2019-02-06 NOTE — TELEPHONE ENCOUNTER
Mauricio Webster saw Dr Bhavya Dickson yesterday  He hurt his rt side on the tread mill at [facility]  Can Dr Bhavya Dickson put in an order for a reclining chair? Mauircio Webster or his daughter can  the order on Fri 2/8- he'll be on his way to the [facility] in [place]- appt for [place] [facility] is [address] can process the order- does this require an auth through Medicare? Can the chair be delivered to [facility]? [facility]      Please call him when the order is in at this p#: [phone]

## 2019-02-07 DIAGNOSIS — I83.893 VARICOSE VEINS OF LOWER EXTREMITY WITH EDEMA, BILATERAL: Primary | ICD-10-CM

## 2019-02-07 DIAGNOSIS — I42.0 DILATED CARDIOMYOPATHY (HCC): ICD-10-CM

## 2019-02-15 DIAGNOSIS — I82.403 RECURRENT ACUTE DEEP VEIN THROMBOSIS (DVT) OF BOTH LOWER EXTREMITIES (HCC): ICD-10-CM

## 2019-02-15 RX ORDER — CYANOCOBALAMIN/FOLIC AC/VIT B6 1-2.5-25MG
1 TABLET ORAL DAILY
Qty: 90 EACH | Refills: 0 | Status: ON HOLD | OUTPATIENT
Start: 2019-02-15 | End: 2019-03-25 | Stop reason: CLARIF

## 2019-03-01 DIAGNOSIS — M1A.0790 CHRONIC IDIOPATHIC GOUT INVOLVING TOE WITHOUT TOPHUS, UNSPECIFIED LATERALITY: ICD-10-CM

## 2019-03-01 RX ORDER — COLCHICINE 0.6 MG/1
0.6 TABLET ORAL DAILY
Qty: 90 TABLET | Refills: 0 | Status: SHIPPED | OUTPATIENT
Start: 2019-03-01 | End: 2019-04-26 | Stop reason: SDUPTHER

## 2019-03-11 ENCOUNTER — HOSPITAL ENCOUNTER (INPATIENT)
Facility: HOSPITAL | Age: 84
LOS: 1 days | DRG: 292 | End: 2019-03-14
Attending: EMERGENCY MEDICINE | Admitting: INTERNAL MEDICINE
Payer: MEDICARE

## 2019-03-11 ENCOUNTER — APPOINTMENT (EMERGENCY)
Dept: RADIOLOGY | Facility: HOSPITAL | Age: 84
DRG: 292 | End: 2019-03-11
Payer: MEDICARE

## 2019-03-11 ENCOUNTER — TELEPHONE (OUTPATIENT)
Dept: INTERNAL MEDICINE CLINIC | Facility: CLINIC | Age: 84
End: 2019-03-11

## 2019-03-11 ENCOUNTER — APPOINTMENT (EMERGENCY)
Dept: CT IMAGING | Facility: HOSPITAL | Age: 84
DRG: 292 | End: 2019-03-11
Payer: MEDICARE

## 2019-03-11 DIAGNOSIS — R26.2 AMBULATORY DYSFUNCTION: Primary | ICD-10-CM

## 2019-03-11 DIAGNOSIS — I50.9 CHF (CONGESTIVE HEART FAILURE) (HCC): ICD-10-CM

## 2019-03-11 DIAGNOSIS — Z91.14 HISTORY OF MEDICATION NONCOMPLIANCE: ICD-10-CM

## 2019-03-11 PROBLEM — R60.9 EDEMA: Status: ACTIVE | Noted: 2019-03-11

## 2019-03-11 LAB
ALBUMIN SERPL BCP-MCNC: 2.8 G/DL (ref 3.5–5)
ALP SERPL-CCNC: 99 U/L (ref 46–116)
ALT SERPL W P-5'-P-CCNC: 33 U/L (ref 12–78)
ANION GAP SERPL CALCULATED.3IONS-SCNC: 8 MMOL/L (ref 4–13)
AST SERPL W P-5'-P-CCNC: 19 U/L (ref 5–45)
BACTERIA UR QL AUTO: NORMAL /HPF
BASOPHILS # BLD AUTO: 0.04 THOUSANDS/ΜL (ref 0–0.1)
BASOPHILS NFR BLD AUTO: 0 % (ref 0–1)
BILIRUB SERPL-MCNC: 1.1 MG/DL (ref 0.2–1)
BILIRUB UR QL STRIP: NEGATIVE
BUN SERPL-MCNC: 17 MG/DL (ref 5–25)
CALCIUM SERPL-MCNC: 8.2 MG/DL (ref 8.3–10.1)
CHLORIDE SERPL-SCNC: 103 MMOL/L (ref 100–108)
CLARITY UR: CLEAR
CO2 SERPL-SCNC: 31 MMOL/L (ref 21–32)
COLOR UR: YELLOW
CREAT SERPL-MCNC: 1.27 MG/DL (ref 0.6–1.3)
EOSINOPHIL # BLD AUTO: 0.24 THOUSAND/ΜL (ref 0–0.61)
EOSINOPHIL NFR BLD AUTO: 2 % (ref 0–6)
ERYTHROCYTE [DISTWIDTH] IN BLOOD BY AUTOMATED COUNT: 13.2 % (ref 11.6–15.1)
GFR SERPL CREATININE-BSD FRML MDRD: 51 ML/MIN/1.73SQ M
GLUCOSE SERPL-MCNC: 112 MG/DL (ref 65–140)
GLUCOSE SERPL-MCNC: 175 MG/DL (ref 65–140)
GLUCOSE UR STRIP-MCNC: NEGATIVE MG/DL
HCT VFR BLD AUTO: 39.4 % (ref 36.5–49.3)
HGB BLD-MCNC: 12.5 G/DL (ref 12–17)
HGB UR QL STRIP.AUTO: NEGATIVE
IMM GRANULOCYTES # BLD AUTO: 0.05 THOUSAND/UL (ref 0–0.2)
IMM GRANULOCYTES NFR BLD AUTO: 0 % (ref 0–2)
KETONES UR STRIP-MCNC: NEGATIVE MG/DL
LEUKOCYTE ESTERASE UR QL STRIP: NEGATIVE
LYMPHOCYTES # BLD AUTO: 0.86 THOUSANDS/ΜL (ref 0.6–4.47)
LYMPHOCYTES NFR BLD AUTO: 7 % (ref 14–44)
MCH RBC QN AUTO: 30.6 PG (ref 26.8–34.3)
MCHC RBC AUTO-ENTMCNC: 31.7 G/DL (ref 31.4–37.4)
MCV RBC AUTO: 97 FL (ref 82–98)
MONOCYTES # BLD AUTO: 0.88 THOUSAND/ΜL (ref 0.17–1.22)
MONOCYTES NFR BLD AUTO: 8 % (ref 4–12)
NEUTROPHILS # BLD AUTO: 9.7 THOUSANDS/ΜL (ref 1.85–7.62)
NEUTS SEG NFR BLD AUTO: 83 % (ref 43–75)
NITRITE UR QL STRIP: NEGATIVE
NON-SQ EPI CELLS URNS QL MICRO: NORMAL /HPF
NRBC BLD AUTO-RTO: 0 /100 WBCS
NT-PROBNP SERPL-MCNC: 2135 PG/ML
PH UR STRIP.AUTO: 7 [PH]
PLATELET # BLD AUTO: 272 THOUSANDS/UL (ref 149–390)
PMV BLD AUTO: 9.7 FL (ref 8.9–12.7)
POTASSIUM SERPL-SCNC: 3.6 MMOL/L (ref 3.5–5.3)
PROT SERPL-MCNC: 6.5 G/DL (ref 6.4–8.2)
PROT UR STRIP-MCNC: ABNORMAL MG/DL
RBC # BLD AUTO: 4.08 MILLION/UL (ref 3.88–5.62)
RBC #/AREA URNS AUTO: NORMAL /HPF
SODIUM SERPL-SCNC: 142 MMOL/L (ref 136–145)
SP GR UR STRIP.AUTO: 1.02 (ref 1–1.03)
TROPONIN I SERPL-MCNC: <0.02 NG/ML
TSH SERPL DL<=0.05 MIU/L-ACNC: 14.71 UIU/ML (ref 0.36–3.74)
UROBILINOGEN UR QL STRIP.AUTO: 0.2 E.U./DL
WBC # BLD AUTO: 11.77 THOUSAND/UL (ref 4.31–10.16)
WBC #/AREA URNS AUTO: NORMAL /HPF

## 2019-03-11 PROCEDURE — 36415 COLL VENOUS BLD VENIPUNCTURE: CPT | Performed by: EMERGENCY MEDICINE

## 2019-03-11 PROCEDURE — 84484 ASSAY OF TROPONIN QUANT: CPT | Performed by: EMERGENCY MEDICINE

## 2019-03-11 PROCEDURE — 84484 ASSAY OF TROPONIN QUANT: CPT | Performed by: GENERAL PRACTICE

## 2019-03-11 PROCEDURE — 70450 CT HEAD/BRAIN W/O DYE: CPT

## 2019-03-11 PROCEDURE — 82948 REAGENT STRIP/BLOOD GLUCOSE: CPT

## 2019-03-11 PROCEDURE — 85025 COMPLETE CBC W/AUTO DIFF WBC: CPT | Performed by: EMERGENCY MEDICINE

## 2019-03-11 PROCEDURE — 99220 PR INITIAL OBSERVATION CARE/DAY 70 MINUTES: CPT | Performed by: GENERAL PRACTICE

## 2019-03-11 PROCEDURE — 83880 ASSAY OF NATRIURETIC PEPTIDE: CPT | Performed by: EMERGENCY MEDICINE

## 2019-03-11 PROCEDURE — 93005 ELECTROCARDIOGRAM TRACING: CPT

## 2019-03-11 PROCEDURE — 80053 COMPREHEN METABOLIC PANEL: CPT | Performed by: EMERGENCY MEDICINE

## 2019-03-11 PROCEDURE — 84145 PROCALCITONIN (PCT): CPT | Performed by: GENERAL PRACTICE

## 2019-03-11 PROCEDURE — 84443 ASSAY THYROID STIM HORMONE: CPT | Performed by: GENERAL PRACTICE

## 2019-03-11 PROCEDURE — 81001 URINALYSIS AUTO W/SCOPE: CPT | Performed by: EMERGENCY MEDICINE

## 2019-03-11 PROCEDURE — 96374 THER/PROPH/DIAG INJ IV PUSH: CPT

## 2019-03-11 PROCEDURE — 99285 EMERGENCY DEPT VISIT HI MDM: CPT

## 2019-03-11 PROCEDURE — 71046 X-RAY EXAM CHEST 2 VIEWS: CPT

## 2019-03-11 RX ORDER — PANTOPRAZOLE SODIUM 40 MG/1
40 TABLET, DELAYED RELEASE ORAL
Status: DISCONTINUED | OUTPATIENT
Start: 2019-03-12 | End: 2019-03-14 | Stop reason: HOSPADM

## 2019-03-11 RX ORDER — ALLOPURINOL 100 MG/1
100 TABLET ORAL DAILY
Status: DISCONTINUED | OUTPATIENT
Start: 2019-03-12 | End: 2019-03-14 | Stop reason: HOSPADM

## 2019-03-11 RX ORDER — ATORVASTATIN CALCIUM 10 MG/1
10 TABLET, FILM COATED ORAL DAILY
Status: DISCONTINUED | OUTPATIENT
Start: 2019-03-12 | End: 2019-03-14 | Stop reason: HOSPADM

## 2019-03-11 RX ORDER — METOPROLOL TARTRATE 50 MG/1
50 TABLET, FILM COATED ORAL 2 TIMES DAILY
Status: DISCONTINUED | OUTPATIENT
Start: 2019-03-11 | End: 2019-03-14 | Stop reason: HOSPADM

## 2019-03-11 RX ORDER — CHOLECALCIFEROL (VITAMIN D3) 10 MCG
1 TABLET ORAL
Status: DISCONTINUED | OUTPATIENT
Start: 2019-03-12 | End: 2019-03-11

## 2019-03-11 RX ORDER — LISINOPRIL 20 MG/1
40 TABLET ORAL DAILY
Status: DISCONTINUED | OUTPATIENT
Start: 2019-03-12 | End: 2019-03-14 | Stop reason: HOSPADM

## 2019-03-11 RX ORDER — POTASSIUM CHLORIDE 20 MEQ/1
20 TABLET, EXTENDED RELEASE ORAL 2 TIMES DAILY
Status: DISCONTINUED | OUTPATIENT
Start: 2019-03-11 | End: 2019-03-14 | Stop reason: HOSPADM

## 2019-03-11 RX ORDER — FUROSEMIDE 10 MG/ML
40 INJECTION INTRAMUSCULAR; INTRAVENOUS
Status: DISCONTINUED | OUTPATIENT
Start: 2019-03-11 | End: 2019-03-14 | Stop reason: HOSPADM

## 2019-03-11 RX ORDER — ACETAMINOPHEN 325 MG/1
650 TABLET ORAL EVERY 6 HOURS PRN
Status: DISCONTINUED | OUTPATIENT
Start: 2019-03-11 | End: 2019-03-14 | Stop reason: HOSPADM

## 2019-03-11 RX ORDER — FLUTICASONE PROPIONATE 50 MCG
2 SPRAY, SUSPENSION (ML) NASAL DAILY
Status: DISCONTINUED | OUTPATIENT
Start: 2019-03-12 | End: 2019-03-14 | Stop reason: HOSPADM

## 2019-03-11 RX ORDER — LEVOTHYROXINE SODIUM 88 UG/1
88 TABLET ORAL DAILY
Status: DISCONTINUED | OUTPATIENT
Start: 2019-03-12 | End: 2019-03-14 | Stop reason: HOSPADM

## 2019-03-11 RX ORDER — COLCHICINE 0.6 MG/1
0.6 TABLET ORAL DAILY
Status: DISCONTINUED | OUTPATIENT
Start: 2019-03-12 | End: 2019-03-14 | Stop reason: HOSPADM

## 2019-03-11 RX ORDER — FUROSEMIDE 10 MG/ML
40 INJECTION INTRAMUSCULAR; INTRAVENOUS ONCE
Status: COMPLETED | OUTPATIENT
Start: 2019-03-11 | End: 2019-03-11

## 2019-03-11 RX ADMIN — FUROSEMIDE 40 MG: 10 INJECTION, SOLUTION INTRAMUSCULAR; INTRAVENOUS at 14:01

## 2019-03-11 RX ADMIN — INSULIN LISPRO 1 UNITS: 100 INJECTION, SOLUTION INTRAVENOUS; SUBCUTANEOUS at 21:47

## 2019-03-11 RX ADMIN — POTASSIUM CHLORIDE 20 MEQ: 1500 TABLET, EXTENDED RELEASE ORAL at 20:28

## 2019-03-11 RX ADMIN — METOPROLOL TARTRATE 50 MG: 50 TABLET, FILM COATED ORAL at 20:27

## 2019-03-11 RX ADMIN — FUROSEMIDE 40 MG: 10 INJECTION, SOLUTION INTRAMUSCULAR; INTRAVENOUS at 20:27

## 2019-03-11 NOTE — H&P
H&P- Cristin Villanueva 11/21/1932, 80 y o  male MRN: [id number]    Unit/Bed#: ED 07 Encounter: [id number]    Primary Care Provider: Mel Heath MD   Date and time admitted to hospital: 3/11/2019 12:41 PM        Edema  Assessment & Plan  IV Lasix    Leucocytosis  Assessment & Plan  Mild will follow  Ordered procalcitonin   doubt cellulitis lower extremities looks like stasis dermatitis    Chronic anticoagulation  Assessment & Plan  Will continue home anticoagulation-xarelto      Chronic systolic CHF (congestive heart failure) ([facility])  Assessment & Plan  cxr negative; check echo and IV lasix  Has not been taking meds for 2 days      Controlled diabetes mellitus with microalbuminuria ([facility])  Assessment & Plan  Lab Results   Component Value Date    HGBA1C 6 0 01/30/2019       No results for input(s): POCGLU in the last 72 hours  Blood Sugar Average: Last 72 hrs: Will do sliding-scale coverage as needed      Essential hypertension  Assessment & Plan  Has not been taking home medication  Will resume and monitor    Chronic a-fib ([address] )  Assessment & Plan  Will continue home medication  Presently rate controlled    Adult hypothyroidism  Assessment & Plan  Continue Synthroid check TSH                VTE Prophylaxis: Rivaroxaban (Xarelto)  / sequential compression device   Code Status: full  POLST: POLST is not applicable to this patient  Discussion with family:     Anticipated Length of Stay:  Patient will be admitted on an Observation basis with an anticipated length of stay of  < 2 midnights  Justification for Hospital Stay: edema        Total Time for Visit, including Counseling / Coordination of Care: 30 minutes  Greater than 50% of this total time spent on direct patient counseling and coordination of care  Chief Complaint:   Can't walk    History of Present Illness:    Cristin Villanueva is a 80 y o  male who presents with can't walk    Patient says he has been engaging in more activities than usual lately having assisted-living facility  He is forgot to take his pills for the last 2 days and has had increasing lower extremity swelling and difficulty walking he also complains of some difficulty passing his urine  He denies any chest pains or shortness of breath  He says it feels as if his left leg below the knee isn't working well    Review of Systems:    Review of Systems   Constitutional: Negative  Eyes: Negative  Respiratory: Negative  Cardiovascular: Positive for leg swelling  Negative for chest pain and palpitations  Gastrointestinal: Negative  Genitourinary: Positive for difficulty urinating  Musculoskeletal: Positive for arthralgias and myalgias  Skin: Negative  Neurological: Negative  Hematological: Negative  Psychiatric/Behavioral: Negative  Past Medical and Surgical History:     Past Medical History:   Diagnosis Date    Allergic contact dermatitis due to plants, except food     Atrial fibrillation (HCC)     Cancer (HCC)     skin cancer    Cataract     Chronic kidney disease     Stage 3    Coagulation test abnormality     Diabetes mellitus ([address] )     Disease of thyroid gland     hypothyroidism    DVT (deep venous thrombosis) (HCC)     Eczema     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HCC)     GERD (gastroesophageal reflux disease)     Gout     Hyperlipidemia     Hypertension     Hypokalemia     Leukocytosis     Lichen planus     Nonmelanoma skin cancer     Phlebitis or thrombophlebitis of deep vessel of lower extremity (HCC)     Proteinuria        Past Surgical History:   Procedure Laterality Date    CATARACT EXTRACTION      CHOLECYSTECTOMY      IA ESOPHAGOGASTRODUODENOSCOPY TRANSORAL DIAGNOSTIC N/A 6/14/2017    Procedure: EGD AND COLONOSCOPY;  Surgeon: Thomas Gimenez MD;  Location: [facility]; Service: Gastroenterology    TONSILLECTOMY         Meds/Allergies:    Prior to Admission medications    Medication Sig Start Date End Date Taking?  Authorizing Provider allopurinol (ZYLOPRIM) 100 mg tablet Take 100 mg by mouth daily    Historical Provider, MD   atorvastatin (LIPITOR) 10 mg tablet Take 1 tablet (10 mg total) by mouth daily 2/5/19   Franko Campbell MD   betamethasone, augmented, (DIPROLENE) 0 05 % ointment Apply topically 2 (two) times a day To rash until resolved 12/26/18   Cali Ford MD   colchicine (COLCRYS) 0 6 mg tablet Take 1 tablet (0 6 mg total) by mouth daily 3/1/19   Elaine Zuniga PA-C   esomeprazole (NexIUM) 40 MG capsule TAKE 1 CAPSULE DAILY 12/31/18   Elaine Zuniga PA-C   fluticasone (FLONASE) 50 mcg/act nasal spray USE 2 SPRAYS IN [facility] NOSTRIL ONCE DAILY 2/15/18   Rodriguez Gates PA-C   FOLBEE 2 5-25-1 MG TABS Take 1 tablet by mouth daily 2/15/19   Rodriguez Gates PA-C   furosemide (LASIX) 40 mg tablet Take 1 tablet (40 mg total) by mouth 2 (two) times a day 6/15/18   Madelojackie Rubio PA-C   glipiZIDE (GLUCOTROL XL) 5 mg 24 hr tablet Take 1 tablet (5 mg total) by mouth daily 7/30/18   Franko Campbell MD   levothyroxine 88 mcg tablet Take 1 tablet (88 mcg total) by mouth daily 2/5/19   Franko Campbell MD   lisinopril (ZESTRIL) 40 mg tablet Take 1 tablet (40 mg total) by mouth daily 9/20/18   Rodriguez Gates PA-C   metoprolol tartrate (LOPRESSOR) 50 mg tablet Take 1 tablet (50 mg total) by mouth 2 (two) times a day 9/27/18   Payal Reddy MD   Omega 3 1000 MG CAPS Take by mouth    Historical Provider, MD   potassium chloride (MICRO-K) 10 MEQ CR capsule Take 1 capsule (10 mEq total) by mouth 2 (two) times a day 3/23/18   Rodriguez Gates PA-C   rivaroxaban (XARELTO) 20 mg tablet Take 1 tablet (20 mg total) by mouth daily with breakfast 3/12/18   Franko Campbell MD   triamcinolone (KENALOG) 0 1 % ointment Apply topically 2 (two) times a day To rash 1/29/19   Cali Ford MD     I have reviewed home medications with patient personally      Allergies: No Known Allergies    Social History:     Marital Status: /Civil Union   Occupation:   Patient Pre-hospital Living Situation:   Patient Pre-hospital Level of Mobility:   Patient Pre-hospital Diet Restrictions:   Substance Use History:   Social History     Substance and Sexual Activity   Alcohol Use Yes    Comment: occ     Social History     Tobacco Use   Smoking Status Never Smoker   Smokeless Tobacco Never Used     Social History     Substance and Sexual Activity   Drug Use No       Family History:    non-contributory    Physical Exam:     Vitals:   Blood Pressure: (!) 182/96 (03/11/19 1930)  Pulse: 98 (03/11/19 1930)  Temperature: 97 6 °F (36 4 °C) (03/11/19 1245)  Temp Source: Oral (03/11/19 1245)  Respirations: (!) 28 (03/11/19 1930)  SpO2: 100 % (03/11/19 1930)    Physical Exam   Constitutional: He is oriented to person, place, and time  He appears well-developed and well-nourished  HENT:   Head: Normocephalic and atraumatic  Eyes: Pupils are equal, round, and reactive to light  Neck: Neck supple  Pulmonary/Chest: Effort normal  No stridor  No respiratory distress  He has no wheezes  Abdominal: Soft  Bowel sounds are normal    Musculoskeletal: He exhibits edema  Neurological: He is alert and oriented to person, place, and time  Skin: There is erythema  Stasis dermatitis bilateral lower extremities           Additional Data:     Lab Results: I have personally reviewed pertinent reports        Results from last 7 days   Lab Units 03/11/19  1332   WBC Thousand/uL 11 77*   HEMOGLOBIN g/dL 12 5   HEMATOCRIT % 39 4   PLATELETS Thousands/uL 272   NEUTROS PCT % 83*   LYMPHS PCT % 7*   MONOS PCT % 8   EOS PCT % 2     Results from last 7 days   Lab Units 03/11/19  1332   SODIUM mmol/L 142   POTASSIUM mmol/L 3 6   CHLORIDE mmol/L 103   CO2 mmol/L 31   BUN mg/dL 17   CREATININE mg/dL 1 27   ANION GAP mmol/L 8   CALCIUM mg/dL 8 2*   ALBUMIN g/dL 2 8*   TOTAL BILIRUBIN mg/dL 1 10*   ALK PHOS U/L 99   ALT U/L 33   AST U/L 19   GLUCOSE RANDOM mg/dL 112                       Imaging: I have personally reviewed pertinent reports  XR chest 2 views   ED Interpretation by Fanta Glaser DO (03/11 1422)   Posterior infiltrate verses CHF  Final Result by Ramin Fletcher DO (03/11 1618)      No acute cardiopulmonary disease  Workstation performed: [id number]         CT head without contrast   ED Interpretation by Fanta Glaser DO (03/11 1421)   No acute intracranial abnormality  Final Result by Yaneli Rivear MD (03/11 1670)      No acute intracranial abnormality  Workstation performed: [id number]             EKG, Pathology, and Other Studies Reviewed on Admission:   · EKG:     Allscripts / Epic Records Reviewed: Yes     ** Please Note: This note has been constructed using a voice recognition system   **

## 2019-03-11 NOTE — ASSESSMENT & PLAN NOTE
Has not been taking diuretic  Having difficulty ambulating due to increasing lower extremity swelling  Will resume  Venous Doppler lower extremities

## 2019-03-11 NOTE — TELEPHONE ENCOUNTER
[facility] is requesting that the most current medication list for [place] be sent over to them     FAX: [phone]

## 2019-03-11 NOTE — ASSESSMENT & PLAN NOTE
Lab Results   Component Value Date    HGBA1C 6 0 01/30/2019       No results for input(s): POCGLU in the last 72 hours  Blood Sugar Average: Last 72 hrs:     Will do sliding-scale coverage as needed

## 2019-03-11 NOTE — ED NOTES
Patient's daughter has expressed concern that patient has not been taking his medications for some time   When pt was confronted, he replied with "has it been that long?"     Susan Skaggs RN  03/11/19 0630

## 2019-03-12 PROBLEM — R26.2 AMBULATORY DYSFUNCTION: Status: ACTIVE | Noted: 2019-03-12

## 2019-03-12 LAB
ANION GAP SERPL CALCULATED.3IONS-SCNC: 8 MMOL/L (ref 4–13)
ATRIAL RATE: 202 BPM
BASOPHILS # BLD AUTO: 0.06 THOUSANDS/ΜL (ref 0–0.1)
BASOPHILS NFR BLD AUTO: 1 % (ref 0–1)
BUN SERPL-MCNC: 20 MG/DL (ref 5–25)
CALCIUM SERPL-MCNC: 8.4 MG/DL (ref 8.3–10.1)
CHLORIDE SERPL-SCNC: 103 MMOL/L (ref 100–108)
CO2 SERPL-SCNC: 31 MMOL/L (ref 21–32)
CREAT SERPL-MCNC: 1.23 MG/DL (ref 0.6–1.3)
EOSINOPHIL # BLD AUTO: 0.23 THOUSAND/ΜL (ref 0–0.61)
EOSINOPHIL NFR BLD AUTO: 2 % (ref 0–6)
ERYTHROCYTE [DISTWIDTH] IN BLOOD BY AUTOMATED COUNT: 13.2 % (ref 11.6–15.1)
GFR SERPL CREATININE-BSD FRML MDRD: 53 ML/MIN/1.73SQ M
GLUCOSE P FAST SERPL-MCNC: 133 MG/DL (ref 65–99)
GLUCOSE SERPL-MCNC: 123 MG/DL (ref 65–140)
GLUCOSE SERPL-MCNC: 126 MG/DL (ref 65–140)
GLUCOSE SERPL-MCNC: 133 MG/DL (ref 65–140)
GLUCOSE SERPL-MCNC: 162 MG/DL (ref 65–140)
GLUCOSE SERPL-MCNC: 217 MG/DL (ref 65–140)
HCT VFR BLD AUTO: 42.7 % (ref 36.5–49.3)
HGB BLD-MCNC: 13.9 G/DL (ref 12–17)
IMM GRANULOCYTES # BLD AUTO: 0.08 THOUSAND/UL (ref 0–0.2)
IMM GRANULOCYTES NFR BLD AUTO: 1 % (ref 0–2)
LYMPHOCYTES # BLD AUTO: 1.12 THOUSANDS/ΜL (ref 0.6–4.47)
LYMPHOCYTES NFR BLD AUTO: 9 % (ref 14–44)
MCH RBC QN AUTO: 30.8 PG (ref 26.8–34.3)
MCHC RBC AUTO-ENTMCNC: 32.6 G/DL (ref 31.4–37.4)
MCV RBC AUTO: 95 FL (ref 82–98)
MONOCYTES # BLD AUTO: 0.86 THOUSAND/ΜL (ref 0.17–1.22)
MONOCYTES NFR BLD AUTO: 7 % (ref 4–12)
NEUTROPHILS # BLD AUTO: 10.14 THOUSANDS/ΜL (ref 1.85–7.62)
NEUTS SEG NFR BLD AUTO: 80 % (ref 43–75)
NRBC BLD AUTO-RTO: 0 /100 WBCS
PLATELET # BLD AUTO: 315 THOUSANDS/UL (ref 149–390)
PMV BLD AUTO: 10 FL (ref 8.9–12.7)
POTASSIUM SERPL-SCNC: 3.4 MMOL/L (ref 3.5–5.3)
PROCALCITONIN SERPL-MCNC: <0.05 NG/ML
QRS AXIS: 5 DEGREES
QRSD INTERVAL: 82 MS
QT INTERVAL: 384 MS
QTC INTERVAL: 467 MS
RBC # BLD AUTO: 4.51 MILLION/UL (ref 3.88–5.62)
SODIUM SERPL-SCNC: 142 MMOL/L (ref 136–145)
T WAVE AXIS: 23 DEGREES
VENTRICULAR RATE: 89 BPM
WBC # BLD AUTO: 12.49 THOUSAND/UL (ref 4.31–10.16)

## 2019-03-12 PROCEDURE — 82948 REAGENT STRIP/BLOOD GLUCOSE: CPT

## 2019-03-12 PROCEDURE — 80048 BASIC METABOLIC PNL TOTAL CA: CPT | Performed by: GENERAL PRACTICE

## 2019-03-12 PROCEDURE — 36415 COLL VENOUS BLD VENIPUNCTURE: CPT | Performed by: GENERAL PRACTICE

## 2019-03-12 PROCEDURE — G8979 MOBILITY GOAL STATUS: HCPCS

## 2019-03-12 PROCEDURE — G8978 MOBILITY CURRENT STATUS: HCPCS

## 2019-03-12 PROCEDURE — 99225 PR SBSQ OBSERVATION CARE/DAY 25 MINUTES: CPT | Performed by: INTERNAL MEDICINE

## 2019-03-12 PROCEDURE — 85025 COMPLETE CBC W/AUTO DIFF WBC: CPT | Performed by: GENERAL PRACTICE

## 2019-03-12 PROCEDURE — G8988 SELF CARE GOAL STATUS: HCPCS

## 2019-03-12 PROCEDURE — 97163 PT EVAL HIGH COMPLEX 45 MIN: CPT

## 2019-03-12 PROCEDURE — G8987 SELF CARE CURRENT STATUS: HCPCS

## 2019-03-12 PROCEDURE — 97167 OT EVAL HIGH COMPLEX 60 MIN: CPT

## 2019-03-12 PROCEDURE — 93010 ELECTROCARDIOGRAM REPORT: CPT | Performed by: INTERNAL MEDICINE

## 2019-03-12 RX ORDER — CLOBETASOL PROPIONATE 0.5 MG/G
CREAM TOPICAL 2 TIMES DAILY
Status: DISCONTINUED | OUTPATIENT
Start: 2019-03-12 | End: 2019-03-14 | Stop reason: HOSPADM

## 2019-03-12 RX ORDER — HYDRALAZINE HYDROCHLORIDE 20 MG/ML
10 INJECTION INTRAMUSCULAR; INTRAVENOUS EVERY 6 HOURS PRN
Status: DISCONTINUED | OUTPATIENT
Start: 2019-03-12 | End: 2019-03-14 | Stop reason: HOSPADM

## 2019-03-12 RX ORDER — CLOBETASOL PROPIONATE 0.5 MG/G
OINTMENT TOPICAL 2 TIMES DAILY
Status: DISCONTINUED | OUTPATIENT
Start: 2019-03-12 | End: 2019-03-12

## 2019-03-12 RX ADMIN — CLOBETASOL PROPIONATE: 0.5 CREAM TOPICAL at 20:23

## 2019-03-12 RX ADMIN — RIVAROXABAN 20 MG: 20 TABLET, FILM COATED ORAL at 08:10

## 2019-03-12 RX ADMIN — ALLOPURINOL 100 MG: 100 TABLET ORAL at 08:04

## 2019-03-12 RX ADMIN — FUROSEMIDE 40 MG: 10 INJECTION, SOLUTION INTRAMUSCULAR; INTRAVENOUS at 15:52

## 2019-03-12 RX ADMIN — METOPROLOL TARTRATE 50 MG: 50 TABLET, FILM COATED ORAL at 17:39

## 2019-03-12 RX ADMIN — TRIAMCINOLONE ACETONIDE: 1 OINTMENT TOPICAL at 20:22

## 2019-03-12 RX ADMIN — INSULIN LISPRO 1 UNITS: 100 INJECTION, SOLUTION INTRAVENOUS; SUBCUTANEOUS at 21:45

## 2019-03-12 RX ADMIN — INSULIN LISPRO 1 UNITS: 100 INJECTION, SOLUTION INTRAVENOUS; SUBCUTANEOUS at 12:07

## 2019-03-12 RX ADMIN — FUROSEMIDE 40 MG: 10 INJECTION, SOLUTION INTRAMUSCULAR; INTRAVENOUS at 08:10

## 2019-03-12 RX ADMIN — LEVOTHYROXINE SODIUM 88 MCG: 88 TABLET ORAL at 05:20

## 2019-03-12 RX ADMIN — ATORVASTATIN CALCIUM 10 MG: 10 TABLET, FILM COATED ORAL at 08:04

## 2019-03-12 RX ADMIN — PANTOPRAZOLE SODIUM 40 MG: 40 TABLET, DELAYED RELEASE ORAL at 05:20

## 2019-03-12 RX ADMIN — POTASSIUM CHLORIDE 20 MEQ: 1500 TABLET, EXTENDED RELEASE ORAL at 08:10

## 2019-03-12 RX ADMIN — METOPROLOL TARTRATE 50 MG: 50 TABLET, FILM COATED ORAL at 08:10

## 2019-03-12 RX ADMIN — FLUTICASONE PROPIONATE 2 SPRAY: 50 SPRAY, METERED NASAL at 08:11

## 2019-03-12 RX ADMIN — LISINOPRIL 40 MG: 20 TABLET ORAL at 08:04

## 2019-03-12 RX ADMIN — POTASSIUM CHLORIDE 20 MEQ: 1500 TABLET, EXTENDED RELEASE ORAL at 17:39

## 2019-03-12 RX ADMIN — COLCHICINE 0.6 MG: 0.6 TABLET, FILM COATED ORAL at 08:10

## 2019-03-12 NOTE — PLAN OF CARE
Problem: Potential for Falls  Goal: Patient will remain free of falls  Description  INTERVENTIONS:  - Assess patient frequently for physical needs  -  Identify cognitive and physical deficits and behaviors that affect risk of falls    -  [place] fall precautions as indicated by assessment   - Educate patient/family on patient safety including physical limitations  - Instruct patient to call for assistance with activity based on assessment  - Modify environment to reduce risk of injury  - Consider OT/PT consult to assist with strengthening/mobility  Outcome: Progressing

## 2019-03-12 NOTE — SOCIAL WORK
Cm met with patient at bedside, patient alert and oriented during interview  Patient reports residing in [place] and has been a resident there for 3 weeks  Patient mentioned prior to residing at [address] [facility] he was residing alone in a private a private apartment  Patient mentioned his wife passed away May 25th of 2015 and has resided alone ever since  Patient stated prior to hospitalization he was completely independent with ADL's, no dme use or vna was driving independently  Patient mentioned using a mail order for his medications receiving approximately a 3 month supply at a time  Patient mentioned his daughter Samina Mcnulty is his POA and resides locally for assistance and support if needed  Cm reviewed patient physical therapy recommendations for str  Patient stated he would be open to str when medically stable however, feels he could return to [place] once stable  Patient agreeable to referrals to local str  cm team will continue to follow and assess for needs  CM reviewed discharge planning process including the following: identifying help at home, patient preference for discharge planning needs, pharmacy preference, and availability of treatment team to discuss questions or concerns patient and/or family may have regarding understanding medications and recognizing signs and symptoms once discharged  CM also encouraged patient to follow up with all recommended appointments after discharge  Patient advised of importance for patient and family to participate in managing patients medical well being

## 2019-03-12 NOTE — PLAN OF CARE
Addended by: JACOB CUMMINGS on: 6/19/2017 04:53 PM     Modules accepted: Orders     Problem: PHYSICAL THERAPY ADULT  Goal: Performs mobility at highest level of function for planned discharge setting  See evaluation for individualized goals  Description  Treatment/Interventions: Functional transfer training, LE strengthening/ROM, Therapeutic exercise, Endurance training, Patient/family training, Equipment eval/education, Bed mobility, Continued evaluation, Spoke to nursing, OT  Equipment Recommended: (TBD)       See flowsheet documentation for full assessment, interventions and recommendations  Note:   Prognosis: Good  Problem List: Decreased strength, Decreased endurance, Impaired balance, Decreased mobility, Decreased coordination, Decreased safety awareness, Pain  Assessment: Pt is 80 y o  male seen for PT evaluation on 3/12/2019 s/p admit to [facility] on 3/11/2019 w/ Edema  Pt presents with difficulty walking from [facility]  Per H&P: pt has been engaging in more activities than  Usual lately, forgot to take his pills for last 2 days and has had increased LE swelling and difficulty walking; also notes L leg below the knee "isn't working well"  PT consulted to assess pt's functional mobility and d/c needs  Order placed for PT eval and tx, w/ up w/ A order  Performed at least 2 patient identifiers during session: Name and wristband  Comorbidities affecting pt's physical performance at time of assessment include: A fib, skin CA, cataract, CKD stage 3, coagulation test abnormality, DM, hypothyroidism, DVT, eczema, factor 5 deficiency, GERD, gout, HLD, HTN, hypokalemia, leukocytosis, lichen planus, phlebitis or thrombophlebitis of deep vessel of lower extremity, proteinuria  PTA, pt was independent w/ all functional mobility w/ no AD/DME, ambulates community distances and elevations and resident of [facility], I with ADLs, has assistance from facility staff for IADLs   Personal factors affecting pt at time of IE include: inaccessible home environment, inability to navigate level surfaces w/o external assistance, unable to perform dynamic tasks in community, positive fall history and decreased initiation and engagement  Please find objective findings from PT assessment regarding body systems outlined above with impairments and limitations including weakness, impaired balance, decreased endurance, impaired coordination, pain, decreased activity tolerance, decreased safety awareness and fall risk, as well as mobility assessment (need for cueing for mobility technique and limited to bedlevel assessment)  The following objective measures performed on IE also reveal limitations: Barthel Index: 40/100 and Modified Jessi: 5 (severe disability)  Pt's clinical presentation is currently unstable/unpredictable seen in pt's presentation of abnormal lab value(s), need for input for task focus and mobility technique, on telemetry monitoring and ongoing medical assessment  Pt to benefit from continued PT tx to address deficits as defined above and maximize level of functional independent mobility and consistency  From PT/mobility standpoint, recommendation at time of d/c would be STR pending progress in order to facilitate return to PLOF  Barriers to Discharge: Inaccessible home environment, Decreased caregiver support     Recommendation: Short-term skilled PT     PT - OK to Discharge: No    See flowsheet documentation for full assessment

## 2019-03-12 NOTE — PLAN OF CARE
Problem: Potential for Falls  Goal: Patient will remain free of falls  Description  INTERVENTIONS:  - Assess patient frequently for physical needs  -  Identify cognitive and physical deficits and behaviors that affect risk of falls    -  [place] fall precautions as indicated by assessment   - Educate patient/family on patient safety including physical limitations  - Instruct patient to call for assistance with activity based on assessment  - Modify environment to reduce risk of injury  - Consider OT/PT consult to assist with strengthening/mobility  Outcome: Progressing     Problem: DISCHARGE PLANNING - CARE MANAGEMENT  Goal: Discharge to post-acute care or home with appropriate resources  Description  INTERVENTIONS:  - Conduct assessment to determine patient/family and health care team treatment goals, and need for post-acute services based on payer coverage, community resources, and patient preferences, and barriers to discharge  - Address psychosocial, clinical, and financial barriers to discharge as identified in assessment in conjunction with the patient/family and health care team  - Arrange appropriate level of post-acute services according to patient?s   needs and preference and payer coverage in collaboration with the physician and health care team  - Communicate with and update the patient/family, physician, and health care team regarding progress on the discharge plan  - Arrange appropriate transportation to post-acute venues  Outcome: Progressing

## 2019-03-12 NOTE — SOCIAL WORK
Cm met with MD Abebe Gordon, and daughter Kd Arroyo regarding patient OBS status  Daughter stated she does not desire patient to discharge to [facility] or any local snf however, would like patient to discharge to [facility] [address]  Daughter also informed that patient would be a private pay for snf admission and would have to explore [place] facilities  Daughter agreeable to [facility] and [facility]  [facility] reports they need updated physical therapy recommendations for better assessment for admission to their building  [facility] return cm call and stated if patient is not ambulating [address] he would need to complete str prior to returning to their independent living facility

## 2019-03-12 NOTE — PROGRESS NOTES
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, bilateral    Chronic anticoagulation    Leucocytosis    Edema      Plan:    · 1  Ambulatory dysfunction- Patient left leg weak  PT/OT evaluated  Patient recommended for rehab  · 2  HTN- on metoprolol, lisinopril and hydralazine prn  Will adjust as needed  · 3  Chronic systolic HF- on lasix  · 4  Afib- stable  On xarelto  · 5  DM- on ISS  · 6  CKD3 stable  · 7  Hypothyroidism- on levothyroxine       VTE Pharmacologic Prophylaxis:   Pharmacologic: Rivaroxaban (Xarelto)  Mechanical VTE Prophylaxis in Place: Yes    Patient Centered Rounds: I have performed bedside rounds with nursing staff today  Discussions with Specialists or Other Care Team Provider:     Education and Discussions with Family / Patient:     Time Spent for Care: 20 minutes  More than 50% of total time spent on counseling and coordination of care as described above  Current Length of Stay: 0 day(s)    Current Patient Status: Observation   Certification Statement: The patient will continue to require additional inpatient hospital stay due to ambulatory dysfunction    Discharge Plan / Estimated Discharge Date: am    Code Status: Level 1 - Full Code      Subjective:   Patient seen and examined at bedside  Patient has no new complaints      Objective:     Vitals:   Temp (24hrs), Av 7 °F (36 5 °C), Min:97 5 °F (36 4 °C), Max:97 8 °F (36 6 °C)    Temp:  [97 5 °F (36 4 °C)-97 8 °F (36 6 °C)] 97 8 °F (36 6 °C)  HR:  [69-98] 90  Resp:  [14-28] 18  BP: (132-199)/() 180/93  SpO2:  [95 %-100 %] 97 %  There is no height or weight on file to calculate BMI  Input and Output Summary (last 24 hours): Intake/Output Summary (Last 24 hours) at 3/12/2019 1531  Last data filed at 3/12/2019 1401  Gross per 24 hour   Intake    Output 4000 ml   Net -4000 ml       Physical Exam:     Physical Exam   Constitutional: He is oriented to person, place, and time  He appears well-developed and well-nourished  HENT:   Head: Normocephalic and atraumatic  Eyes: Pupils are equal, round, and reactive to light  EOM are normal    Neck: Normal range of motion  Neck supple  No JVD present  No tracheal deviation present  No thyromegaly present  Cardiovascular: Normal rate, regular rhythm and normal heart sounds  Exam reveals no gallop and no friction rub  No murmur heard  Pulmonary/Chest: Effort normal and breath sounds normal  No stridor  No respiratory distress  He has no wheezes  Abdominal: Soft  Bowel sounds are normal  He exhibits no distension  There is no tenderness  There is no guarding  Musculoskeletal: He exhibits no edema  Left leg strength 3/5  Right leg strength 5/5   Neurological: He is alert and oriented to person, place, and time  Skin: Skin is warm and dry  Additional Data:     Labs:    Results from last 7 days   Lab Units 03/12/19  0534   WBC Thousand/uL 12 49*   HEMOGLOBIN g/dL 13 9   HEMATOCRIT % 42 7   PLATELETS Thousands/uL 315   NEUTROS PCT % 80*   LYMPHS PCT % 9*   MONOS PCT % 7   EOS PCT % 2     Results from last 7 days   Lab Units 03/12/19  0534 03/11/19  1332   POTASSIUM mmol/L 3 4* 3 6   CHLORIDE mmol/L 103 103   CO2 mmol/L 31 31   BUN mg/dL 20 17   CREATININE mg/dL 1 23 1 27   CALCIUM mg/dL 8 4 8 2*   ALK PHOS U/L  --  99   ALT U/L  --  33   AST U/L  --  19           * I Have Reviewed All Lab Data Listed Above  * Additional Pertinent Lab Tests Reviewed:  [address] [facility] Admission Reviewed    Imaging:    Imaging Reports Reviewed Today Include:   Imaging Personally Reviewed by Myself Includes:      Recent Cultures (last 7 days):           Last 24 Hours Medication List:     Current Facility-Administered Medications:  acetaminophen 650 mg Oral Q6H PRN Rebeka Barrera MD   allopurinol 100 mg Oral Daily Rebeka Barrera MD   atorvastatin 10 mg Oral Daily Rebeka Barrera MD   colchicine 0 6 mg Oral Daily Rebeka Barrera MD   fluticasone 2 spray Each Nare Daily Joan Subramanian MD   furosemide 40 mg Intravenous BID (diuretic) Nelida Subramanian MD   hydrALAZINE 10 mg Intravenous Q6H PRN Vincenzo Phan MD   insulin lispro 1-5 Units Subcutaneous TID AC Joan Subramanian MD   insulin lispro 1-5 Units Subcutaneous HS Rebeka Barrera MD   levothyroxine 88 mcg Oral Daily Rebeka Barrera MD   lisinopril 40 mg Oral Daily Nelida Subramanian MD   metoprolol tartrate 50 mg Oral BID Rebeka Barrera MD   pantoprazole 40 mg Oral Early Morning Joan Subramanian MD   potassium chloride 20 mEq Oral BID Rebeka Barrera MD   rivaroxaban 20 mg Oral Daily With Breakfast Rebeka Barrera MD        Today, Patient Was Seen By: Vincenzo Phan MD    ** Please Note: This note has been constructed using a voice recognition system   **

## 2019-03-12 NOTE — PHYSICAL THERAPY NOTE
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, bilateral    Venous incompetence    Prurigo nodularis    Seborrheic keratosis    Acute systolic congestive heart failure ([place] [address] )    Cardiomyopathy ([place] [address] )    Encounter for Medicare annual wellness exam    Immunization due    Chronic systolic CHF (congestive heart failure) (HCC)    Chronic anticoagulation    Leucocytosis    PSA elevation    Edema       Past Medical History  Past Medical History:   Diagnosis Date    Allergic contact dermatitis due to plants, except food     Atrial fibrillation (HCC)     Cancer (HCC)     skin cancer    Cataract     Chronic kidney disease     Stage 3    Coagulation test abnormality     Diabetes mellitus ([place] [address] )     Disease of thyroid gland     hypothyroidism    DVT (deep venous thrombosis) (HCC)     Eczema     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HCC)     GERD (gastroesophageal reflux disease)     Gout     Hyperlipidemia     Hypertension     Hypokalemia     Leukocytosis     Lichen planus     Nonmelanoma skin cancer     Phlebitis or thrombophlebitis of deep vessel of lower extremity (HCC)     Proteinuria        Past Surgical History  Past Surgical History:   Procedure Laterality Date    CATARACT EXTRACTION      CHOLECYSTECTOMY      AR ESOPHAGOGASTRODUODENOSCOPY TRANSORAL DIAGNOSTIC N/A 6/14/2017    Procedure: EGD AND COLONOSCOPY;  Surgeon: Roxane Salgado MD;  Location: [facility];   Service: Gastroenterology    TONSILLECTOMY            03/12/19 0932   Note Type   Note type Eval only   Pain Assessment   Pain Assessment 0-10 Pain Score 4  (pt reporting upon PT/OT arrival)   Pain Type   (for the last 5-6 days per pt)   Pain Location Left Foot   Pain Radiating Towards "it feels like I have a hard time controlling it"   Home Living   Type of Home Other (Comment)  ([facility]- independent living)   Home Layout Two level;Elevator  (pt prefers to navigate the stairs to his 2nd level set up vs elevator)   Bathroom Shower/Tub Walk-in shower   Bathroom Toilet Standard   Bathroom Equipment Grab bars in shower;Built-in shower seat;Grab bars around toilet   [address] Other (Comment)  (no AD used at baseline)   Additional Comments Pt enjoys walking for exercise at baseline and engaging in exercise activities  Prior Function   Level of [place] Independent with ADLs and functional mobility   Lives With Facility staff   Receives Help From Other (Comment)  (facility staff)   ADL Assistance Independent   IADLs Needs assistance   Falls in the last 6 months 0  (several (+) "near falls")   Vocational Retired   Restrictions/Precautions   [place] Bearing Precautions Per Order No   Braces or Orthoses Other (Comment)   Other Precautions Telemetry;Multiple lines; Fall Risk;Pain  (back safety precautions)   General   Additional Pertinent History compression socks, pt reports h/o velcro "waist band" back brace he wears when exercises, reports he does not wear all the time at baseline   Family/Caregiver Present No   Cognition   Overall Cognitive Status WFL   Arousal/Participation Alert   Orientation Level Oriented to person;Oriented to place;Oriented to time;Oriented to situation  (increased time to process)   Memory Decreased recall of recent events   Following Commands Follows one step commands with increased time or repetition   Comments pt agreeable to PT evaluation   RUE Assessment   RUE Assessment   (defer to OT eval for comments)   LUE Assessment   LUE Assessment   (defer to OT eval for comments)   RLE Assessment   RLE Assessment WFL   LLE Assessment   LLE Assessment X  (ataxic movements c attempt at heel to shin)   Strength LLE   L Knee Extension 3-/5  (did not formally MMT, observed with spontaneous movements)   Coordination   Movements are Fluid and Coordinated 0   Coordination and Movement Description Pt describes L LE sx as "jerking" or "jumping" movement to L leg intermittently, sometimes is at rest, other times is facilitated when he moves his L leg  Mild impaired coordinated movements to L leg observed throughout assessment  Pt reports seated at EOB as though L leg feels like "it just slides out away from me"  RN notified of all of pt's reports  R leg heel to shin normal, mild dysmetria with L leg observed, however is inconsistent  PT to see and perform further coordination and proprioception tests next session  Sensation WFL  (pt denies any N/T)   Light Touch   RLE Light Touch Grossly intact   LLE Light Touch Grossly intact   Bed Mobility   Rolling R 4  Minimal assistance   Additional items Assist x 1;HOB elevated; Increased time required;Verbal cues;LE management   Rolling L 4  Minimal assistance   Additional items Assist x 1;HOB elevated; Increased time required;Verbal cues;LE management   Supine to Sit 4  Minimal assistance   Additional items Assist x 1;HOB elevated; Increased time required;Verbal cues;LE management   Sit to Supine 4  Minimal assistance   Additional items Assist x 1;HOB elevated; Increased time required;Verbal cues;LE management   Additional Comments log roll technique  vitals upon arrival: 92bpm, 161/89mmHg, 99% SPO2 on RA   Transfers   Sit to Stand Unable to assess   Additional Comments further mobility deferred 2/2 elevated heart rate s/p supine to sit transfer, HR reading in the 120s bpm at EOB  Pt denies any lightheadedness/dizziness symptoms  Pt returned BTB, with HR dropping into the 80-90s bpm with rest x1-2 min duration     Ambulation/Elevation   Gait pattern Not tested   Balance Static Sitting Fair +   Dynamic Sitting Fair   Static Standing   (DNT)   Endurance Deficit   Endurance Deficit Yes   Activity Tolerance   Activity Tolerance Patient limited by fatigue; Other (Comment)  (elevated heart rate with bedlevel transfer, RN notified of such)   Medical Staff Made Aware OT [facility]   Nurse Made Aware LARS Meredith verbalized pt appropriate to see, made aware of session outcome/recs  Discussed with RN pt may benefit from neuro consult if MD warrants  Assessment   Prognosis Good   Problem List Decreased strength;Decreased endurance; Impaired balance;Decreased mobility; Decreased coordination;Decreased safety awareness;Pain   Assessment Pt is a 80 y o  male seen for PT evaluation on 3/12/2019 s/p admit to [facility] on 3/11/2019 w/ Edema  Pt presents with difficulty walking from [facility]  Per H&P: pt has been engaging in more activities than  Usual lately, forgot to take his pills for last 2 days and has had increased LE swelling and difficulty walking; also notes L leg below the knee "isn't working well"  PT consulted to assess pt's functional mobility and d/c needs  Order placed for PT eval and tx, w/ up w/ A order  Performed at least 2 patient identifiers during session: Name and wristband  Comorbidities affecting pt's physical performance at time of assessment include: A fib, skin CA, cataract, CKD stage 3, coagulation test abnormality, DM, hypothyroidism, DVT, eczema, factor 5 deficiency, GERD, gout, HLD, HTN, hypokalemia, leukocytosis, lichen planus, phlebitis or thrombophlebitis of deep vessel of lower extremity, proteinuria  PTA, pt was independent w/ all functional mobility w/ no AD/DME, ambulates community distances and elevations and is a resident of [facility], I with ADLs, has assistance from facility staff for IADLs   Personal factors affecting pt at time of IE include: inaccessible home environment, inability to navigate level surfaces w/o external assistance, unable to perform dynamic tasks in community, positive fall history and decreased initiation and engagement  Please find objective findings from PT assessment regarding body systems outlined above with impairments and limitations including weakness, impaired balance, decreased endurance, impaired coordination, pain, decreased activity tolerance, decreased safety awareness and fall risk, as well as mobility assessment (need for cueing for mobility technique and limited to bedlevel assessment)  The following objective measures performed on IE also reveal limitations: Barthel Index: 40/100 and Modified Sampson: 5 (severe disability)  Pt's clinical presentation is currently unstable/unpredictable seen in pt's presentation of abnormal lab value(s), need for input for task focus and mobility technique, on telemetry monitoring and ongoing medical assessment  Pt to benefit from continued PT tx to address deficits as defined above and maximize level of functional independent mobility and consistency  From PT/mobility standpoint, recommendation at time of d/c would be STR pending progress in order to facilitate return to PLOF     Barriers to Discharge Inaccessible home environment;Decreased caregiver support   Goals   Patient Goals to return to high PLOF   STG Expiration Date 03/22/19   Short Term Goal #1 In 7-10 days: Increase bilateral LE strength 1/2 grade to facilitate independent mobility, Perform all bed mobility tasks modified independent to decrease caregiver burden, Increase static and dynamic sitting balance 1/2 grade to decrease risk for falls, Tolerate seated at EOB 30 minutes c stable vitals to facilitate functional task performance, Improve Barthel Index score to 55 or greater to facilitate independence and PT to see and establish goals for sit<>stand transfers and ambulation when appropriate   Treatment Day 0   Plan   Treatment/Interventions Functional transfer training;LE strengthening/ROM; Therapeutic exercise; Endurance training;Patient/family training;Equipment eval/education; Bed mobility;Continued evaluation;Spoke to nursing;OT   PT Frequency   (3-5x/wk)   Recommendation   Recommendation Short-term skilled PT   Equipment Recommended   (TBD)   PT - OK to Discharge No   Modified Gonzales Scale   Modified Gonzales Scale 5   Barthel Index   Feeding 5   Bathing 0   Grooming Score 0   Dressing Score 5   Bladder Score 10   Bowels Score 10   Toilet Use Score 5   Transfers (Bed/Chair) Score 5   Mobility (Level Surface) Score 0   Stairs Score 0   Barthel Index Score 40         Cyn Like, PT, DPT

## 2019-03-12 NOTE — PROGRESS NOTES
Pt present with complaints of unsteady gait, and lower extremity edema  Pt is alert and oriented x 4  Assist x2 with walker  Pt IV acces is 20 Left forearm  Patient has no complaints of pain   Pt has orders for QID BS checks

## 2019-03-12 NOTE — PLAN OF CARE
Problem: DISCHARGE PLANNING - CARE MANAGEMENT  Goal: Discharge to post-acute care or home with appropriate resources  Description  INTERVENTIONS:  - Conduct assessment to determine patient/family and health care team treatment goals, and need for post-acute services based on payer coverage, community resources, and patient preferences, and barriers to discharge  - Address psychosocial, clinical, and financial barriers to discharge as identified in assessment in conjunction with the patient/family and health care team  - Arrange appropriate level of post-acute services according to patient?s   needs and preference and payer coverage in collaboration with the physician and health care team  - Communicate with and update the patient/family, physician, and health care team regarding progress on the discharge plan  - Arrange appropriate transportation to post-acute venues  Outcome: Progressing  Note:   Patient is from [facility] at this time has a  SNF recommendation, referrals sent

## 2019-03-12 NOTE — OCCUPATIONAL THERAPY NOTE
Occupational Therapy Evaluation        Patient Name: Vikram Velasquez  LWLEW'P Date: 3/12/2019       03/12/19 4888   Note Type   Note type Eval only   Restrictions/Precautions   Weight Bearing Precautions Per Order No   Braces or Orthoses Other (Comment)   Other Precautions Multiple lines;Telemetry; Fall Risk;Pain   Pain Assessment   Pain Assessment 0-10   Pain Score 4   Pain Type Acute pain   Pain Location Foot   Pain Orientation Left   Pain Radiating Towards "it feels like I have a hard time controlling it"   Home Living   Type of Home Other (Comment)  ([facility] [address])   Home Layout Two level;Elevator   Bathroom Shower/Tub Walk-in shower   Bathroom Toilet Standard   Bathroom Equipment Grab bars in shower;Built-in shower seat;Grab bars around toilet   [address] Other (Comment)  (No AD at baseline)   Prior Function   Level of [place] Independent with ADLs and functional mobility   Lives With Facility staff   Receives Help From Other (Comment)  (facility staff)   ADL Assistance Independent   IADLs Needs assistance   Falls in the last 6 months 0  (several (+) "near falls")   Vocational Retired   Lifestyle   Autonomy Patient reporting independent with ADLs/, ambulates with no AD  Patient lives [address]  Patient manages his own medications, acknowledged not taking any medicines on the last 2 days prior to admission  Reciprocal Relationships Supportive Family   Intrinsic Gratification Pt enjoys walking for exercise at baseline     Psychosocial   Psychosocial (WDL) WDL   ADL   Eating Assistance 5  Supervision/Setup   Grooming Assistance 5  Supervision/Setup   UB Bathing Assistance 4  Minimal Assistance   LB Bathing Assistance 3  Moderate Assistance   UB Dressing Assistance 4  Minimal Assistance   LB Dressing Assistance 2  Maximal [address]  3  Moderate Assistance   Functional Assistance 2  Maximal Assistance   Bed Mobility Rolling R 4  Minimal assistance   Additional items Assist x 1;HOB elevated; Bedrails; Increased time required;Verbal cues;LE management   Rolling L 4  Minimal assistance   Additional items Assist x 1;HOB elevated; Increased time required;Verbal cues;LE management   Supine to Sit 4  Minimal assistance   Additional items Assist x 1; Increased time required;Verbal cues;LE management   Sit to Supine 4  Minimal assistance   Additional items Assist x 2; Increased time required;Verbal cues; Bedrails   Additional Comments log roll technique  vitals upon arrival: 92bpm, 161/89mmHg, 99% SPO2 on RA   Transfers   Sit to Stand Unable to assess  (deferred lighteadedness with unstable HR)   Additional Comments further mobility deferred 2/2 elevated heart rate s/p supine to sit transfer, HR reading in 120s bpm at EOB  Pt denies any lightheadedness/dizziness  Pt returned BTB, with HR dropping into the 80s bpm    Balance   Static Sitting Fair +   Dynamic Sitting Fair   Activity Tolerance   Activity Tolerance Patient limited by fatigue; Other (Comment)  (elevated heart rate)   Nurse Made Aware appropriate to see, made aware of session outcome/recs   RUE Assessment   RUE Assessment WFL   LUE Assessment   LUE Assessment WFL   Hand Function   Gross Motor Coordination Impaired   Fine Motor Coordination Functional   Sensation   Light Touch No apparent deficits  (BUEs)   Vision-Basic Assessment   Current Vision Wears glasses all the time   Cognition   Overall Cognitive Status [facility]   Arousal/Participation Alert; Responsive; Cooperative   Attention Within functional limits   Orientation Level Oriented to person;Oriented to place;Oriented to time;Oriented to situation  (increased time to process)   Memory Decreased recall of recent events   Following Commands Follows one step commands with increased time or repetition   Assessment   Limitation Decreased ADL status; Decreased Safe judgement during ADL;Decreased endurance;Decreased self-care trans;Decreased high-level ADLs   Prognosis Good   Assessment Patient is a 80 y o  male seen for OT evaluation s/p admit to [address] [facility] on 3/11/2019 w/Ambulatory dysfunction  Commorbidities affecting patient's functional performance at time of assessment include: A fib, skin CA, cataract, CKD stage 3, coagulation test abnormality, DM, hypothyroidism, DVT, eczema, factor 5 deficiency, GERD, gout, HLD, HTN, hypokalemia, leukocytosis, lichen planus, phlebitis or thrombophlebitis of deep vessel of lower extremity, proteinuria  Orders placed for OT evaluation and treatment   Performed at least two patient identifiers during session including name and wristband  Prior to admission, Patient reporting independent with ADLs/, ambulates with no AD  Patient lives [address]  Patient manages his own medications, acknowledged not taking any medicines on the last 2 days prior to admission  Personal factors affecting patient at time of initial evaluation include: decreased initiation and engagement and difficulty performing ADLs  Upon evaluation, patient requires minimal  and moderate assist for UB ADLs, moderate and maximal assist for LB ADLs, further mobility deferred 2/2 elevated heart rate s/p supine to sit transfer, HR reading in 120s bpm at EOB  Pt returned BTB, with HR dropping into the 80s bpm Occupational performance is affected by the following deficits: degenerative arthritic joint changes, impaired gross motor coordination, dynamic sit/ stand balance deficit with poor standing tolerance time for self care and functional mobility, decreased activity tolerance, increased pain and postural control and postural alignment deficit, requiring external assistance to complete transitional movements   Therapist completed  extensive additional review of medical records and additional review of physical, cognitive or psychosocial history, clinical examination identifying 5 or more performance deficits, clinical decision making of a high complexity , consistent with a high complexity level evaluation  Patient to benefit from continued Occupational Therapy treatment while in the hospital to address deficits as defined above and maximize level of functional independence with ADLs and functional mobility  Occupational Performance areas to address include: grooming , bathing/ shower, dressing, toilet hygiene, transfer to all surfaces, functional mobility, emergency response, health maintenance, medication routine/ management, IADLs: safety procedures, Leisure Participation and Social participation  Goals   Patient Goals to return to high PLOF   Plan   Treatment Interventions ADL retraining;Functional transfer training;UE strengthening/ROM; Endurance training;Patient/family training;Equipment evaluation/education; Fine motor coordination activities; Compensatory technique education;Continued evaluation;Cardiac education; Energy conservation; Activityengagement   Goal Expiration Date 03/26/19   OT Frequency 3-5x/wk   Recommendation   OT Discharge Recommendation Short Term Rehab   Barthel Index   Feeding 5   Bathing 0   Grooming Score 0   Dressing Score 5   Bladder Score 10   Bowels Score 10   Toilet Use Score 5   Transfers (Bed/Chair) Score 5   Mobility (Level Surface) Score 0   Stairs Score 0   Barthel Index Score 40   Modified Jessi Scale   Modified Jessi Scale 5       Occupational Therapy goals: In 7-14 days:     1- Patient will verbalize and demonstrate use of energy conservation/ deep breathing technique and work simplification skills during functional activity with no verbal cues  2-Patient will verbalize and demonstrate good body mechanics and joint protection techniques during  ADLs/ IADLs with no verbal cues  3- Patient will increase OOB/ sitting tolerance to 2-4 hours per day for increased participation in self care and leisure tasks with no s/s of exertion    4-Patient will increase standing tolerance time to 5  minutes with unilateral UE support to complete sink level ADLs@ mod I level   5- Patient will increase sitting tolerance at edge of bed to 20 minutes to complete UB ADLs @ set up assist level  6- Patient will transfer bed to Chair / toilet at Set up assist level with AD as indicated  7- Patient will complete UB ADLs with set up assist  8- Patient will complete LB ADLs with min assist with the use of adaptive equipment  9- Patient will complete toileting hygiene with set up assist/ supervision for thoroughness    10-Patient/ Family  will demonstrate competency with UE Home Exercise Program

## 2019-03-12 NOTE — UTILIZATION REVIEW
Initial Clinical Review    Admission: Date/Time/Statement: OBS 3/11   1827  Orders Placed This Encounter   Procedures    Place in Observation     Standing Status:   Standing     Number of Occurrences:   1     Order Specific Question:   Admitting Physician     Answer:   Shant Reece [[id number]]     Order Specific Question:   Level of Care     Answer:   Med Surg [16]     Order Specific Question:   Bed request comments     Answer:   tele     ED: Date/Time/Mode of Arrival:   ED Arrival Information     Expected Arrival Acuity Means of Arrival Escorted By Service Admission Type    - 3/11/2019 12:40 Emergent Ambulance [address] Emergency    Arrival Complaint    LEG SPASM        Chief Complaint:   Chief Complaint   Patient presents with    Difficulty Walking     pt lives independently at [facility]  started having difficulty walking today  no others symptoms  alert and oriented face is symmetrical       Assessment/Plan: 81 yo male to ED from assisted living w/ inc swelling and difficulty walking   Forgot to take his pills the last 2 days   + difficulty passing urine         Edema  Assessment & Plan  IV Lasix   Leucocytosis  Assessment & Plan  Mild will follow  Ordered procalcitonin   doubt cellulitis lower extremities looks like stasis dermatitis   Chronic anticoagulation  Assessment & Plan  Will continue home anticoagulation-xarelto  Chronic systolic CHF (congestive heart failure) (HCC)  Assessment & Plan  cxr negative; check echo and IV lasix  Has not been taking meds for 2 days   Controlled diabetes mellitus with microalbuminuria ([facility] [address] )  Assessment & Plan        Lab Results   Component Value Date     HGBA1C 6 0 01/30/2019      No results for input(s): POCGLU in the last 72 hours    Blood Sugar Average: Last 72 hrs:   Will do sliding-scale coverage as needed   Essential hypertension  Assessment & Plan  Has not been taking home medication  Will resume and monitor   Chronic a-fib (HCC)  Assessment & Plan  Will continue home medication  Presently rate controlled   Adult hypothyroidism  Assessment & Plan  Continue Synthroid check TSH       ED Vital Signs:   ED Triage Vitals   Temperature Pulse Respirations Blood Pressure SpO2   03/11/19 1245 03/11/19 1245 03/11/19 1245 03/11/19 1245 03/11/19 1245   97 6 °F (36 4 °C) 100 18 (!) 170/101 98 %      Temp Source Heart Rate Source Patient Position - Orthostatic VS BP Location FiO2 (%)   03/11/19 1245 03/11/19 1245 03/11/19 1245 03/11/19 1245 --   Oral Monitor Lying Right arm       Pain Score       03/11/19 1531       No Pain        Wt Readings from Last 1 Encounters:   02/05/19 88 9 kg (196 lb)     Vital Signs (abnormal):   03/11/19 2031 -- 93 26 135/84 99 % -- [facility]   03/11/19 1930 -- 98 28 182/96 100 % -- [facility]   03/11/19 1900 -- 93 18 181/110 100 % -- [facility]   03/11/19 1616 -- 94 16 197/109 97 % -- [facility]     Pertinent Labs/Diagnostic Test Results: gluc  175  BNP  2135  Pa 8 2 alb  2 8 total bili  1 10 wbc 11 77  CXR - wnl   CT head- wnl   EKG- afib   ED Treatment:   Medication Administration from 03/11/2019 1240 to 03/12/2019 0744       Date/Time Order Dose Route Action Action by Comments     03/11/2019 1401 furosemide (LASIX) injection 40 mg 40 mg Intravenous Given Susan Skaggs RN      03/12/2019 0520 pantoprazole (PROTONIX) EC tablet 40 mg 40 mg Oral Given uRbén Arriaza RN      03/12/2019 0520 levothyroxine tablet 88 mcg 88 mcg Oral Given Johana Ng RN      03/11/2019 2027 metoprolol tartrate (LOPRESSOR) tablet 50 mg 50 mg Oral Given Rich Greco RN      03/11/2019 2027 furosemide (LASIX) injection 40 mg 40 mg Intravenous Given Rich Greco RN      03/11/2019 2002 furosemide (LASIX) injection 40 mg 0 mg Intravenous Hold Rcih Greco RN given at 1400, retime for 2200 tonight only     03/11/2019 2028 potassium chloride (K-DUR,KLOR-CON) CR tablet 20 mEq 20 mEq Oral Given Rich Greco RN      03/11/2019 2147 insulin lispro (HumaLOG) 100 units/mL subcutaneous injection 1-5 Units 1 Units Subcutaneous Given Bruce Prado RN         Past Medical/Surgical History:    Active Ambulatory Problems     Diagnosis Date Noted    Adult hypothyroidism 12/16/2015    Chronic a-fib ([facility] ) 04/21/2017    Essential hypertension 03/07/2014    Chronic kidney disease, stage 3 ([facility] ) 03/07/2014    Controlled diabetes mellitus with microalbuminuria ([facility] ) 12/16/2015    Hypercholesterolemia 03/07/2014    Psoriasis 03/05/2015    Leg edema 02/28/2018    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, bilateral 06/14/2018    Venous incompetence 06/14/2018    Prurigo nodularis 06/26/2018    Seborrheic keratosis 62/84/4627    Acute systolic congestive heart failure ([place] [address] ) 07/26/2018    Cardiomyopathy ([place] [address] ) 07/26/2018    Encounter for Medicare annual wellness exam 07/30/2018    Immunization due 07/30/2018    Chronic systolic CHF (congestive heart failure) ([place] [address] ) 09/27/2018    Chronic anticoagulation 11/08/2018    Leucocytosis 11/27/2018    PSA elevation 02/05/2019     Past Medical History:   Diagnosis Date    Allergic contact dermatitis due to plants, except food     Atrial fibrillation (HCC)     Cancer ([place] [address] )     Cataract     Chronic kidney disease     Coagulation test abnormality     Diabetes mellitus ([place] [address] )     Disease of thyroid gland     DVT (deep venous thrombosis) (HCC)     Eczema     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HCC)     GERD (gastroesophageal reflux disease)     Gout     Hyperlipidemia     Hypertension     Hypokalemia     Leukocytosis     Lichen planus     Nonmelanoma skin cancer     Phlebitis or thrombophlebitis of deep vessel of lower extremity (HCC)     Proteinuria      Admitting Diagnosis: Difficulty walking [R26 2]  Age/Sex: 80 y o  male  Admission Orders:  Scheduled Meds:   Current Facility-Administered Medications:  acetaminophen 650 mg Oral Q6H PRN    allopurinol 100 mg Oral Daily    atorvastatin 10 mg Oral Daily    colchicine 0 6 mg Oral Daily fluticasone 2 spray Each Nare Daily    furosemide 40 mg Intravenous BID (diuretic)    insulin lispro 1-5 Units Subcutaneous TID AC    insulin lispro 1-5 Units Subcutaneous HS    levothyroxine 88 mcg Oral Daily    lisinopril 40 mg Oral Daily    metoprolol tartrate 50 mg Oral BID    pantoprazole 40 mg Oral Early Morning    potassium chloride 20 mEq Oral BID    rivaroxaban 20 mg Oral Daily With Breakfast      Up w/ assist   PT OT eval   I&O   Tele   Fingerstick ac and hs   Reg diet   SCD  Daily weight   Serial trop - all wnl   3/12 cbc , bmp   K3 4  Wbc 12 49

## 2019-03-12 NOTE — PLAN OF CARE
Problem: Potential for Falls  Goal: Patient will remain free of falls  Description  INTERVENTIONS:  - Assess patient frequently for physical needs  -  Identify cognitive and physical deficits and behaviors that affect risk of falls    -  Winters fall precautions as indicated by assessment   - Educate patient/family on patient safety including physical limitations  - Instruct patient to call for assistance with activity based on assessment  - Modify environment to reduce risk of injury  - Consider OT/PT consult to assist with strengthening/mobility  3/12/2019 1841 by Bjorn Masters, RN  Outcome: Progressing  3/12/2019 1704 by Bjorn Masters, RN  Outcome: Progressing

## 2019-03-12 NOTE — SOCIAL WORK
CM received call from Raoul Feng at [facility] - she reports after review of therapy notes from today they would not be able to accept pt unless he was doing more with therapy (only bed level today due to 's)  Raoul Feng will continue to follow for changes in pt's participation in therapy   CM department will continue to follow

## 2019-03-12 NOTE — ASSESSMENT & PLAN NOTE
Mild will follow  Ordered procalcitonin   doubt cellulitis lower extremities looks like stasis dermatitis

## 2019-03-13 DIAGNOSIS — I50.21 ACUTE SYSTOLIC CONGESTIVE HEART FAILURE (HCC): ICD-10-CM

## 2019-03-13 DIAGNOSIS — I10 ESSENTIAL HYPERTENSION: ICD-10-CM

## 2019-03-13 DIAGNOSIS — I42.9 CARDIOMYOPATHY, UNSPECIFIED TYPE (HCC): ICD-10-CM

## 2019-03-13 LAB
ANION GAP SERPL CALCULATED.3IONS-SCNC: 9 MMOL/L (ref 4–13)
BASOPHILS # BLD AUTO: 0.07 THOUSANDS/ΜL (ref 0–0.1)
BASOPHILS NFR BLD AUTO: 1 % (ref 0–1)
BUN SERPL-MCNC: 22 MG/DL (ref 5–25)
CALCIUM SERPL-MCNC: 8.5 MG/DL (ref 8.3–10.1)
CHLORIDE SERPL-SCNC: 104 MMOL/L (ref 100–108)
CO2 SERPL-SCNC: 32 MMOL/L (ref 21–32)
CREAT SERPL-MCNC: 1.31 MG/DL (ref 0.6–1.3)
EOSINOPHIL # BLD AUTO: 0.22 THOUSAND/ΜL (ref 0–0.61)
EOSINOPHIL NFR BLD AUTO: 2 % (ref 0–6)
ERYTHROCYTE [DISTWIDTH] IN BLOOD BY AUTOMATED COUNT: 13.2 % (ref 11.6–15.1)
GFR SERPL CREATININE-BSD FRML MDRD: 49 ML/MIN/1.73SQ M
GLUCOSE P FAST SERPL-MCNC: 138 MG/DL (ref 65–99)
GLUCOSE SERPL-MCNC: 135 MG/DL (ref 65–140)
GLUCOSE SERPL-MCNC: 138 MG/DL (ref 65–140)
GLUCOSE SERPL-MCNC: 165 MG/DL (ref 65–140)
GLUCOSE SERPL-MCNC: 199 MG/DL (ref 65–140)
GLUCOSE SERPL-MCNC: 343 MG/DL (ref 65–140)
HCT VFR BLD AUTO: 43.7 % (ref 36.5–49.3)
HGB BLD-MCNC: 14.2 G/DL (ref 12–17)
IMM GRANULOCYTES # BLD AUTO: 0.17 THOUSAND/UL (ref 0–0.2)
IMM GRANULOCYTES NFR BLD AUTO: 1 % (ref 0–2)
LYMPHOCYTES # BLD AUTO: 1.2 THOUSANDS/ΜL (ref 0.6–4.47)
LYMPHOCYTES NFR BLD AUTO: 10 % (ref 14–44)
MCH RBC QN AUTO: 30.6 PG (ref 26.8–34.3)
MCHC RBC AUTO-ENTMCNC: 32.5 G/DL (ref 31.4–37.4)
MCV RBC AUTO: 94 FL (ref 82–98)
MONOCYTES # BLD AUTO: 1.02 THOUSAND/ΜL (ref 0.17–1.22)
MONOCYTES NFR BLD AUTO: 8 % (ref 4–12)
NEUTROPHILS # BLD AUTO: 9.76 THOUSANDS/ΜL (ref 1.85–7.62)
NEUTS SEG NFR BLD AUTO: 78 % (ref 43–75)
NRBC BLD AUTO-RTO: 0 /100 WBCS
PLATELET # BLD AUTO: 319 THOUSANDS/UL (ref 149–390)
PMV BLD AUTO: 9.6 FL (ref 8.9–12.7)
POTASSIUM SERPL-SCNC: 3.2 MMOL/L (ref 3.5–5.3)
RBC # BLD AUTO: 4.64 MILLION/UL (ref 3.88–5.62)
SODIUM SERPL-SCNC: 145 MMOL/L (ref 136–145)
WBC # BLD AUTO: 12.44 THOUSAND/UL (ref 4.31–10.16)

## 2019-03-13 PROCEDURE — 97530 THERAPEUTIC ACTIVITIES: CPT

## 2019-03-13 PROCEDURE — 85025 COMPLETE CBC W/AUTO DIFF WBC: CPT | Performed by: INTERNAL MEDICINE

## 2019-03-13 PROCEDURE — G0515 COGNITIVE SKILLS DEVELOPMENT: HCPCS

## 2019-03-13 PROCEDURE — 82948 REAGENT STRIP/BLOOD GLUCOSE: CPT

## 2019-03-13 PROCEDURE — 99232 SBSQ HOSP IP/OBS MODERATE 35: CPT | Performed by: INTERNAL MEDICINE

## 2019-03-13 PROCEDURE — 80048 BASIC METABOLIC PNL TOTAL CA: CPT | Performed by: INTERNAL MEDICINE

## 2019-03-13 RX ORDER — LISINOPRIL 40 MG/1
TABLET ORAL
Qty: 90 TABLET | Refills: 1 | Status: ON HOLD | OUTPATIENT
Start: 2019-03-13 | End: 2019-03-25 | Stop reason: CLARIF

## 2019-03-13 RX ADMIN — LISINOPRIL 40 MG: 20 TABLET ORAL at 08:26

## 2019-03-13 RX ADMIN — TRIAMCINOLONE ACETONIDE: 1 OINTMENT TOPICAL at 17:47

## 2019-03-13 RX ADMIN — METOPROLOL TARTRATE 50 MG: 50 TABLET, FILM COATED ORAL at 08:27

## 2019-03-13 RX ADMIN — FLUTICASONE PROPIONATE 2 SPRAY: 50 SPRAY, METERED NASAL at 08:33

## 2019-03-13 RX ADMIN — ATORVASTATIN CALCIUM 10 MG: 10 TABLET, FILM COATED ORAL at 08:26

## 2019-03-13 RX ADMIN — CLOBETASOL PROPIONATE: 0.5 CREAM TOPICAL at 17:48

## 2019-03-13 RX ADMIN — INSULIN LISPRO 3 UNITS: 100 INJECTION, SOLUTION INTRAVENOUS; SUBCUTANEOUS at 12:54

## 2019-03-13 RX ADMIN — FUROSEMIDE 40 MG: 10 INJECTION, SOLUTION INTRAMUSCULAR; INTRAVENOUS at 08:27

## 2019-03-13 RX ADMIN — LEVOTHYROXINE SODIUM 88 MCG: 88 TABLET ORAL at 05:03

## 2019-03-13 RX ADMIN — POTASSIUM CHLORIDE 20 MEQ: 1500 TABLET, EXTENDED RELEASE ORAL at 08:27

## 2019-03-13 RX ADMIN — FUROSEMIDE 40 MG: 10 INJECTION, SOLUTION INTRAMUSCULAR; INTRAVENOUS at 17:48

## 2019-03-13 RX ADMIN — TRIAMCINOLONE ACETONIDE: 1 OINTMENT TOPICAL at 08:33

## 2019-03-13 RX ADMIN — INSULIN LISPRO 1 UNITS: 100 INJECTION, SOLUTION INTRAVENOUS; SUBCUTANEOUS at 22:17

## 2019-03-13 RX ADMIN — ALLOPURINOL 100 MG: 100 TABLET ORAL at 08:26

## 2019-03-13 RX ADMIN — COLCHICINE 0.6 MG: 0.6 TABLET, FILM COATED ORAL at 08:26

## 2019-03-13 RX ADMIN — METOPROLOL TARTRATE 50 MG: 50 TABLET, FILM COATED ORAL at 17:48

## 2019-03-13 RX ADMIN — POTASSIUM CHLORIDE 20 MEQ: 1500 TABLET, EXTENDED RELEASE ORAL at 17:48

## 2019-03-13 RX ADMIN — INSULIN LISPRO 1 UNITS: 100 INJECTION, SOLUTION INTRAVENOUS; SUBCUTANEOUS at 17:48

## 2019-03-13 RX ADMIN — HYDRALAZINE HYDROCHLORIDE 10 MG: 20 INJECTION INTRAMUSCULAR; INTRAVENOUS at 03:13

## 2019-03-13 RX ADMIN — PANTOPRAZOLE SODIUM 40 MG: 40 TABLET, DELAYED RELEASE ORAL at 05:03

## 2019-03-13 RX ADMIN — RIVAROXABAN 20 MG: 20 TABLET, FILM COATED ORAL at 08:26

## 2019-03-13 RX ADMIN — CLOBETASOL PROPIONATE: 0.5 CREAM TOPICAL at 08:33

## 2019-03-13 NOTE — PROGRESS NOTES
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, bilateral    Chronic anticoagulation    Leucocytosis    Edema      Plan:    · 1  Ambulatory dysfunction- Patient left leg weak  PT/OT evaluated  Patient recommended for rehab  · 2  HTN- on metoprolol, lisinopril and hydralazine prn  Will adjust as needed  · 3  Chronic systolic HF- on lasix IV  · 4  Afib- stable  On xarelto  · 5  DM- on ISS  · 6  CKD3 stable  · 7  Hypothyroidism- on levothyroxine       VTE Pharmacologic Prophylaxis:   Pharmacologic: Rivaroxaban (Xarelto)  Mechanical VTE Prophylaxis in Place: Yes    Patient Centered Rounds: I have performed bedside rounds with nursing staff today  Discussions with Specialists or Other Care Team Provider:     Education and Discussions with Family / Patient:     Time Spent for Care: 20 minutes  More than 50% of total time spent on counseling and coordination of care as described above  Current Length of Stay: 0 day(s)    Current Patient Status: Observation   Certification Statement: The patient will continue to require additional inpatient hospital stay due to ambulatory dysfunction    Discharge Plan / Estimated Discharge Date: am    Code Status: Level 1 - Full Code      Subjective:   Patient seen and examined at bedside  Patient has no new complaints      Objective:     Vitals:   Temp (24hrs), Av 8 °F (36 6 °C), Min:97 4 °F (36 3 °C), Max:98 3 °F (36 8 °C)    Temp:  [97 4 °F (36 3 °C)-98 3 °F (36 8 °C)] 98 3 °F (36 8 °C)  HR:  [68-90] 68  Resp:  [17-18] 18  BP: (140-199)/() 158/90  SpO2:  [97 %-100 %] 99 %  Body mass index is 25 37 kg/m²  Input and Output Summary (last 24 hours): Intake/Output Summary (Last 24 hours) at 3/13/2019 1347  Last data filed at 3/13/2019 0920  Gross per 24 hour   Intake    Output 3100 ml   Net -3100 ml       Physical Exam:     Physical Exam   Constitutional: He is oriented to person, place, and time  He appears well-developed and well-nourished  HENT:   Head: Normocephalic and atraumatic  Eyes: Pupils are equal, round, and reactive to light  EOM are normal    Neck: Normal range of motion  Neck supple  No JVD present  No tracheal deviation present  No thyromegaly present  Cardiovascular: Normal rate, regular rhythm and normal heart sounds  Exam reveals no gallop and no friction rub  No murmur heard  Pulmonary/Chest: Effort normal and breath sounds normal  No stridor  No respiratory distress  He has no wheezes  Abdominal: Soft  Bowel sounds are normal  He exhibits no distension  There is no tenderness  There is no guarding  Musculoskeletal: He exhibits no edema  Left leg strength 3/5  Right leg strength 5/5   Neurological: He is alert and oriented to person, place, and time  Skin: Skin is warm and dry  Additional Data:     Labs:    Results from last 7 days   Lab Units 03/13/19  0535   WBC Thousand/uL 12 44*   HEMOGLOBIN g/dL 14 2   HEMATOCRIT % 43 7   PLATELETS Thousands/uL 319   NEUTROS PCT % 78*   LYMPHS PCT % 10*   MONOS PCT % 8   EOS PCT % 2     Results from last 7 days   Lab Units 03/13/19  0535  03/11/19  1332   POTASSIUM mmol/L 3 2*   < > 3 6   CHLORIDE mmol/L 104   < > 103   CO2 mmol/L 32   < > 31   BUN mg/dL 22   < > 17   CREATININE mg/dL 1 31*   < > 1 27   CALCIUM mg/dL 8 5   < > 8 2*   ALK PHOS U/L  --   --  99   ALT U/L  --   --  33   AST U/L  --   --  19    < > = values in this interval not displayed  * I Have Reviewed All Lab Data Listed Above    * Additional Pertinent Lab Tests Reviewed: [facility]    Imaging:    Imaging Reports Reviewed Today Include:   Imaging Personally Reviewed by Myself Includes:      Recent Cultures (last 7 days):           Last 24 Hours Medication List:     Current Facility-Administered Medications:  acetaminophen 650 mg Oral Q6H PRN Ariel Patel MD   allopurinol 100 mg Oral Daily Ariel Patel MD   atorvastatin 10 mg Oral Daily Ariel Patel MD   clobetasol  Topical BID Hill Tate MD   colchicine 0 6 mg Oral Daily Ariel Patel MD   fluticasone 2 spray Each Nare Daily Joan Subramanian MD   furosemide 40 mg Intravenous BID (diuretic) Johnny Subramanian MD   hydrALAZINE 10 mg Intravenous Q6H PRN Hill Tate MD   insulin lispro 1-5 Units Subcutaneous TID AC Joan Subramanian MD   insulin lispro 1-5 Units Subcutaneous HS Ariel Patel MD   levothyroxine 88 mcg Oral Daily Ariel Patel MD   lisinopril 40 mg Oral Daily Johnny Subramanian MD   metoprolol tartrate 50 mg Oral BID Ariel Patel MD   pantoprazole 40 mg Oral Early Morning Joan Subramanian MD   potassium chloride 20 mEq Oral BID Ariel Patel MD   rivaroxaban 20 mg Oral Daily With Breakfast Ariel Patel MD   triamcinolone  Topical BID Hill Tate MD        Today, Patient Was Seen By: Hill Tate MD    ** Please Note: This note has been constructed using a voice recognition system   **
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
## 2019-03-13 NOTE — PLAN OF CARE
Problem: PHYSICAL THERAPY ADULT  Goal: Performs mobility at highest level of function for planned discharge setting  See evaluation for individualized goals  Description  Treatment/Interventions: Functional transfer training, LE strengthening/ROM, Therapeutic exercise, Endurance training, Patient/family training, Equipment eval/education, Bed mobility, Continued evaluation, Spoke to nursing, OT  Equipment Recommended: (TBD)       See flowsheet documentation for full assessment, interventions and recommendations     Outcome: Progressing

## 2019-03-13 NOTE — SOCIAL WORK
Pt has been accepted to the Acute Rehab at [facility]  CM discussed same with pt and daughter Edson Hernandez  Both are in agreement with same  CM discussed pt with Dr Florida Rowe in care coordination rounds  He will switch pt to PO Lasix as requested by acute rehab  Pt is non ambulatory and will qualify for BLS transport  Medical necessity completed and placed in pt chart  CM unable to secure transport with [facility] until late this evening  [facility] unable to accept pt late  Transport set for 1000 on 3/14 with [facility] BLS  Layla at [facility] aware and in agreement  Dr Florida Rowe also aware and in agreement  Pt and daughter made aware and in agreement  Nursing report to [facility] requested at [phone], AVS can be faxed to [phone]

## 2019-03-13 NOTE — PHYSICIAN ADVISOR
Current patient class: Observation  The patient is currently on Hospital Day: 3 at [address]      This patient was originally admitted to the hospital under observation status  After admission the patient was reevaluated and determined to require further hospitalization  The patient is now documented to require at least a 2nd midnight in the hospital  As such the patient is now expected to satisfy the 2 midnight benchmark and is therefore eligible for inpatient admission  After review of the relevant documentation, labs, vital signs and test results, the patient is appropriate for INPATIENT ADMISSION  Admission to the hospital as an inpatient is a complex decision making process which requires the practitioner to consider the patients presenting complaint, history and physical examination and all relevant testing  With this in mind, in this case, the patient was deemed appropriate for INPATIENT ADMISSION  After review of the documentation and testing available at the time of the admission I concur with this clinical determination of medical necessity  Rationale is as follows: The patient is an 80-year-old male who presented to the hospital on March 11, 2019  He has been under observation status  The patient had increased difficulty ambulating but also had significant lower extremity edema which likely made ambulation more difficult  He also had some difficulty passing urine  The patient has been receiving intravenous Lasix twice daily (5 doses thus far)  Blood pressure has also been uncontrolled at times  Maximum blood pressure yesterday was 199/109  This morning his blood pressure was 174/96 and he received a dose of intravenous hydralazine  He is being recommended for short-term skilled physical therapy  He may need to pursue this prior to returning to his independent living facility  The two midnight benchmark has been met    At this point, he is continuing to receive acute hospital management and became appropriate for change in status to inpatient level care  The patients vitals on arrival were ED Triage Vitals   Temperature Pulse Respirations Blood Pressure SpO2   03/11/19 1245 03/11/19 1245 03/11/19 1245 03/11/19 1245 03/11/19 1245   97 6 °F (36 4 °C) 100 18 (!) 170/101 98 %      Temp Source Heart Rate Source Patient Position - Orthostatic VS BP Location FiO2 (%)   03/11/19 1245 03/11/19 1245 03/11/19 1245 03/11/19 1245 --   Oral Monitor Lying Right arm       Pain Score       03/11/19 1531       No Pain           Past Medical History:   Diagnosis Date    Allergic contact dermatitis due to plants, except food     Atrial fibrillation (HCC)     Cancer (HCC)     skin cancer    Cataract     Chronic kidney disease     Stage 3    Coagulation test abnormality     Diabetes mellitus (HCC)     Disease of thyroid gland     hypothyroidism    DVT (deep venous thrombosis) (HCC)     Eczema     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HCC)     GERD (gastroesophageal reflux disease)     Gout     Hyperlipidemia     Hypertension     Hypokalemia     Leukocytosis     Lichen planus     Nonmelanoma skin cancer     Phlebitis or thrombophlebitis of deep vessel of lower extremity (HCC)     Proteinuria      Past Surgical History:   Procedure Laterality Date    CATARACT EXTRACTION      CHOLECYSTECTOMY      AL ESOPHAGOGASTRODUODENOSCOPY TRANSORAL DIAGNOSTIC N/A 6/14/2017    Procedure: EGD AND COLONOSCOPY;  Surgeon: David Ortega MD;  Location: [facility];   Service: Gastroenterology    TONSILLECTOMY         The patient was admitted to the hospital at N/A on N/A for the following diagnosis:  CHF (congestive heart failure) ([facility] [address] ) [I50 9]  Difficulty walking [R26 2]  History of medication noncompliance [Z91 14]  Ambulatory dysfunction [R26 2]    Consults have been placed to:   IP CONSULT TO PHYSICAL MEDICINE REHAB    Vitals:    03/13/19 0419 03/13/19 0600 03/13/19 0700 03/13/19 1100   BP: 144/88  161/81 158/90   BP Location: Left arm  Right arm Right arm   Pulse:   83 68   Resp:   18 18   Temp:   97 7 °F (36 5 °C) 98 3 °F (36 8 °C)   TempSrc:   Oral Oral   SpO2:   100% 99%   Weight:  82 5 kg (181 lb 14 1 oz)     Height:           Most recent labs:    Recent Labs     03/11/19  1332  03/11/19 2024 03/13/19  0535   WBC 11 77*  --   --    < > 12 44*   HGB 12 5  --   --    < > 14 2   HCT 39 4  --   --    < > 43 7     --   --    < > 319   K 3 6  --   --    < > 3 2*   CALCIUM 8 2*  --   --    < > 8 5   BUN 17  --   --    < > 22   CREATININE 1 27  --   --    < > 1 31*   TROPONINI <0 02   < > <0 02  --   --    AST 19  --   --   --   --    ALT 33  --   --   --   --    ALKPHOS 99  --   --   --   --     < > = values in this interval not displayed         Scheduled Meds:  Current Facility-Administered Medications:  acetaminophen 650 mg Oral Q6H PRN Lore Persaud MD   allopurinol 100 mg Oral Daily Lore Persaud MD   atorvastatin 10 mg Oral Daily Lore Persaud MD   clobetasol  Topical BID Doug Joaquin MD   colchicine 0 6 mg Oral Daily Lore Persaud MD   fluticasone 2 spray Each Nare Daily Joan Subramanian MD   furosemide 40 mg Intravenous BID (diuretic) Tatyana Subramanian MD   hydrALAZINE 10 mg Intravenous Q6H PRN Doug Joaquin MD   insulin lispro 1-5 Units Subcutaneous TID AC Joan Subramanian MD   insulin lispro 1-5 Units Subcutaneous HS Lore Persaud MD   levothyroxine 88 mcg Oral Daily Lore Persaud MD   lisinopril 40 mg Oral Daily Tatyana Subramanian MD   metoprolol tartrate 50 mg Oral BID Lore Persaud MD   pantoprazole 40 mg Oral Early Morning Joan Subramanian MD   potassium chloride 20 mEq Oral BID Lore Persaud MD   rivaroxaban 20 mg Oral Daily With Breakfast Lore Persaud MD   triamcinolone  Topical BID Doug Joaquin MD     Continuous Infusions:   PRN Meds:   acetaminophen   hydrALAZINE    Surgical procedures (if appropriate):

## 2019-03-13 NOTE — PLAN OF CARE
Problem: Potential for Falls  Goal: Patient will remain free of falls  Description  INTERVENTIONS:  - Assess patient frequently for physical needs  -  Identify cognitive and physical deficits and behaviors that affect risk of falls    -  [place] fall precautions as indicated by assessment   - Educate patient/family on patient safety including physical limitations  - Instruct patient to call for assistance with activity based on assessment  - Modify environment to reduce risk of injury  - Consider OT/PT consult to assist with strengthening/mobility  Outcome: Progressing     Problem: DISCHARGE PLANNING - CARE MANAGEMENT  Goal: Discharge to post-acute care or home with appropriate resources  Description  INTERVENTIONS:  - Conduct assessment to determine patient/family and health care team treatment goals, and need for post-acute services based on payer coverage, community resources, and patient preferences, and barriers to discharge  - Address psychosocial, clinical, and financial barriers to discharge as identified in assessment in conjunction with the patient/family and health care team  - Arrange appropriate level of post-acute services according to patient?s   needs and preference and payer coverage in collaboration with the physician and health care team  - Communicate with and update the patient/family, physician, and health care team regarding progress on the discharge plan  - Arrange appropriate transportation to post-acute venues  Outcome: Progressing     Problem: PAIN - ADULT  Goal: Verbalizes/displays adequate comfort level or baseline comfort level  Description  Interventions:  - Encourage patient to monitor pain and request assistance  - Assess pain using appropriate pain scale  - Administer analgesics based on type and severity of pain and evaluate response  - Implement non-pharmacological measures as appropriate and evaluate response  - Consider cultural and social influences on pain and pain management  - Notify physician/advanced practitioner if interventions unsuccessful or patient reports new pain  Outcome: Progressing     Problem: INFECTION - ADULT  Goal: Absence or prevention of progression during hospitalization  Description  INTERVENTIONS:  - Assess and monitor for signs and symptoms of infection  - Monitor lab/diagnostic results  - Monitor all insertion sites, i e  indwelling lines, tubes, and drains  - Monitor endotracheal (as able) and nasal secretions for changes in amount and color  - [place] appropriate cooling/warming therapies per order  - Administer medications as ordered  - Instruct and encourage patient and family to use good hand hygiene technique  - Identify and instruct in appropriate isolation precautions for identified infection/condition  Outcome: Progressing  Goal: Absence of fever/infection during neutropenic period  Description  INTERVENTIONS:  - Monitor WBC  - Implement neutropenic guidelines  Outcome: Progressing     Problem: SAFETY ADULT  Goal: Maintain or return to baseline ADL function  Description  INTERVENTIONS:  -  Assess patient's ability to carry out ADLs; assess patient's baseline for ADL function and identify physical deficits which impact ability to perform ADLs (bathing, care of mouth/teeth, toileting, grooming, dressing, etc )  - Assess/evaluate cause of self-care deficits   - Assess range of motion  - Assess patient's mobility; develop plan if impaired  - Assess patient's need for assistive devices and provide as appropriate  - Encourage maximum independence but intervene and supervise when necessary  ¯ Involve family in performance of ADLs  ¯ Assess for home care needs following discharge   ¯ Request OT consult to assist with ADL evaluation and planning for discharge  ¯ Provide patient education as appropriate  Outcome: Progressing  Goal: Maintain or return mobility status to optimal level  Description  INTERVENTIONS:  - Assess patient's baseline mobility status (ambulation, transfers, stairs, etc )    - Identify cognitive and physical deficits and behaviors that affect mobility  - Identify mobility aids required to assist with transfers and/or ambulation (gait belt, sit-to-stand, lift, walker, cane, etc )  - [place] fall precautions as indicated by assessment  - Record patient progress and toleration of activity level on Mobility SBAR; progress patient to next Phase/Stage  - Instruct patient to call for assistance with activity based on assessment  - Request Rehabilitation consult to assist with strengthening/weightbearing, etc   Outcome: Progressing     Problem: DISCHARGE PLANNING  Goal: Discharge to home or other facility with appropriate resources  Description  INTERVENTIONS:  - Identify barriers to discharge w/patient and caregiver  - Arrange for needed discharge resources and transportation as appropriate  - Identify discharge learning needs (meds, wound care, etc )  - Arrange for interpretive services to assist at discharge as needed  - Refer to Case Management Department for coordinating discharge planning if the patient needs post-hospital services based on physician/advanced practitioner order or complex needs related to functional status, cognitive ability, or social support system  Outcome: Progressing     Problem: Knowledge Deficit  Goal: Patient/family/caregiver demonstrates understanding of disease process, treatment plan, medications, and discharge instructions  Description  Complete learning assessment and assess knowledge base    Interventions:  - Provide teaching at level of understanding  - Provide teaching via preferred learning methods  Outcome: Progressing

## 2019-03-13 NOTE — OCCUPATIONAL THERAPY NOTE
Occupational Therapy Treatment Note        Patient Name: Ludy Hendrix  CPSNF'X Date: 3/13/2019         03/13/19 1041   Restrictions/Precautions   Weight Bearing Precautions Per Order No   Other Precautions Telemetry; Fall Risk   Pain Assessment   Pain Assessment No/denies pain   Pain Score No Pain   ADL   Eating Assistance 6  Modified independent   Grooming Assistance 5  Supervision/Setup   UB Bathing Assistance 4  Minimal Assistance   LB Bathing Assistance 2  Maximal Assistance   UB Dressing Assistance 5  Supervision/Setup   LB Dressing Assistance 2  Maximal Assistance   Toileting Assistance  3  Moderate Assistance   Functional Standing Tolerance   Time 1-2 minutes   Activity transfer training   Comments patient required BUE support on RW and min assist of 1 to maintain static standing position  Bed Mobility   Additional Comments pt received OOB in recliner upon arrival, patient reported assist of 2 with RW  Transfers   Sit to Stand 4  Minimal assistance   Additional items Assist x 2;Armrests; Verbal cues   Stand to Sit 4  Minimal assistance   Additional items Assist x 2; Increased time required;Verbal cues   Toilet Transfers   Toilet Transfers Comments patient will require use of bedside commode, stand pivot transfers with assist of 2  Cognition   Overall Cognitive Status WFL   Arousal/Participation Alert; Responsive; Cooperative   Attention Within functional limits   Orientation Level Oriented X4   Memory Decreased recall of recent events   Following Commands Follows one step commands with increased time or repetition   Comments Therapist involved patient in discussion about recent events,  sequencing of steps during functional tasks, attention to task, orientation and memory recall  Patient  demonstrated  to be oriented x4, with good memory recall and safety awareness during  functional activities     Activity Tolerance   Activity Tolerance Patient tolerated treatment well   Medical Staff Made Aware Discussed with PT the need to inquire lack of motor control of  LLE with inability to sustain staic / dynamic weight shifting during stance  Therapist suggested  that a consult with Physiatry services may be beneficial to address this issue  Assessment   Assessment Patient participated in Skilled OT session this date with interventions consisting of ADL re training with the use of correct body mechnaics, safety awareness and fall prevention techniques and  therapeutic activities to: increase activity tolerance  Cognitive strategies to ascertain current level of cognitive functioning  Therapist involved patient in discussion about recent events,  sequencing of steps during functional tasks, attention to task, orientation and memory recall  Patient  demonstrated  to be oriented x4, with good memory recall and safety awareness during  functional activities  Discussed with PT the need to inquire about lack of motor control of  LLE with inability to sustain staic / dynamic weight shifting during stance  Therapist suggested  that a consult with Physiatry services may be beneficial to address this issue  Recommendation was discussed with attending physician  Patient agreeable to OT treatment session, upon arrival patient was found seated OOB to Recliner, alert, responsive  and in no apparent distress  Patient requiring verbal cues for safety  Patient continues to be functioning below baseline level, occupational performance remains limited secondary to factors listed above and increased risk for falls and injury  From OT standpoint, recommendation at time of d/c would be Short Term Rehab  Patient to benefit from continued Occupational Therapy treatment while in the hospital to address deficits as defined above and maximize level of functional independence with ADLs and functional mobility  Plan   Treatment Interventions ADL retraining;Functional transfer training; Endurance training;Patient/family training;Equipment evaluation/education; Compensatory technique education;Continued evaluation; Energy conservation; Activityengagement   Goal Expiration Date 03/27/19   OT Frequency 3-5x/wk   Recommendation   OT Discharge Recommendation Short Term Rehab

## 2019-03-13 NOTE — PHYSICAL THERAPY NOTE
Physical Therapy Treatment Note       03/13/19 1015   Pain Assessment   Pain Assessment No/denies pain    (0/10 pre and post session)   Pain Score No Pain   Restrictions/Precautions   Weight Bearing Precautions Per Order No   Braces or Orthoses Other (Comment)   Other Precautions Telemetry;Multiple lines; Fall Risk;Pain  (back safety precautions)   General   Chart Reviewed Yes   Additional Pertinent History MMT: hip flexion R 5/5, L 4/5  quad R 5/5, L 4/5  light touch sensation B LE intact, pt able to discern 3/3 trials with eyes closed  B heel to shin test intact, no dysmetria noted this date  Pt continues to have "jumping" per pt report to L leg  proprioception intact to B LE on 2/2 trials with eyes closed   Response to Previous Treatment Patient with no complaints from previous session  Family/Caregiver Present No   Cognition   Overall Cognitive Status WFL   Arousal/Participation Alert; Responsive; Cooperative   Attention Within functional limits   Orientation Level Oriented X4   Memory Decreased recall of recent events   Following Commands Follows one step commands with increased time or repetition   Comments pt agreeable to PT session   Subjective   Subjective "I want to go back to [facility] [place]"   Bed Mobility   Additional Comments pt received OOB in recliner upon arrival   Transfers   Sit to Stand 4  Minimal assistance   Additional items Assist x 2;Armrests; Increased time required;Verbal cues   Stand to Sit 4  Minimal assistance   Additional items Assist x 2;Armrests; Increased time required;Verbal cues   Additional Comments HR upon arrival: 65-95bpm, 98% on RA  vitals s/p mobility: 104bpm, 99% on RA  pt able to demonstrate components of egress test  pt with poor coordination advancing LLE forward/backward, lacking motor control   Ambulation/Elevation   Gait pattern Decreased foot clearance;L Knee Lorin; Short stride; Step to;Excessively slow   Gait Assistance 3  Moderate assist   Additional items Assist x 2;Verbal cues; Tactile cues   Balance   Static Sitting Fair +   Dynamic Sitting Fair   Static Standing Poor +   Dynamic Standing Poor   Ambulatory Poor   Endurance Deficit   Endurance Deficit Yes   Activity Tolerance   Activity Tolerance Patient limited by fatigue   Medical Staff Made Aware discussed case with Dr John Jimenez prior to PT session- verbalized pt appropriate for pt to see  OT Marleni Weber co treatment  Discussed case with Dr John Jimenez post session and recommendations   Nurse Made Aware RN Vesta Kaufman verbalized pt appropriate to see, made aware of session outcome/recs  Reports pt has been mobilizing OOB with NSG A x2   Assessment   Prognosis Good   Problem List Decreased strength;Decreased endurance; Impaired balance;Decreased mobility; Decreased coordination;Decreased safety awareness;Pain   Assessment Co treatment with OT secondary to complex medical condition of pt, mod-max A of 2 required to achieve and maintain transitional movements, requiring the need of skilled therapeutic intervention of 2 therapists to achieve delivery of services  Pt seen for PT treatment session this date with interventions consisting of therapeutic activity consisting of training: sit<>stand transfers, static standing tolerance for 1 minute w/ B UE support, vc and tactile cues for static standing posture faciliation and initiation at advance/retreat, components of egress test, 1 step forward with close chair follow with mod A x2  Pt agreeable to PT treatment session upon arrival, pt found seated OOB in recliner, in no apparent distress and responsive  In comparison to previous session, pt with improvements in demonstration of OOB mobility, requiring A x2 for safety with transfers and gait trial, pt unable to demonstrate further gait 2/2 L knee buckling, poor motor control   Post session: pt returned back to recliner, chair alarm engaged, all needs in reach and RN notified of session findings/recommendations and NSG staff educated/encouraged to mobilize A x2 with RW for safety, perform SPT towards R side  Continue to recommend STR at time of d/c in order to maximize pt's functional independence and safety w/ mobility  Pt continues to be functioning below baseline level, and remains limited 2* factors listed above  PT will continue to see pt while here in order to address the deficits listed above and provide interventions consistent w/ POC in effort to achieve STGs  Recommend initiation of therapeutic exercises next session to pt's tolerance  Barriers to Discharge Inaccessible home environment;Decreased caregiver support   Goals   Patient Goals to get back to high PLOF   STG Expiration Date 03/22/19   Short Term Goal #1 Additional goals: Perform all transfers with SBA to improve independence and Ambulate > 25 ft  with least restrictive assistive device with SBA w/o LOB and w/ normalized gait pattern 100% of the time   Treatment Day 1   Plan   Treatment/Interventions Functional transfer training;LE strengthening/ROM; Therapeutic exercise; Endurance training;Patient/family training;Equipment eval/education; Bed mobility;Continued evaluation;Spoke to nursing;OT   Progress Slow progress, decreased activity tolerance   PT Frequency 5x/wk   Recommendation   Recommendation Rehab consult; Short-term skilled PT   Equipment Recommended Walker  (TBD, continue trials with RW)   PT - OK to Discharge Yes  (when medically cleared if to STR)       Oleksandr Barnett, PT, DPT    Time of PT treatment session: [id number]

## 2019-03-13 NOTE — UTILIZATION REVIEW
Continued Stay Review    OBS order 3/11  1827 converted to IP on 3/13 1524 for treatment of HTN   Admitting Physician PAT BARKER [facility]    Level of Care Med Surg    Estimated length of stay More than 2 Midnights    Certification I certify that inpatient services are medically necessary for this patient for a duration of greater than two midnights  See H&P and MD Progress Notes for additional information about the patient's course of treatment  Date: 3/13  Vital Signs: /68 (BP Location: Left arm)   Pulse 84   Temp 98 °F (36 7 °C) (Oral)   Resp 18   Ht 5' 11" (1 803 m)   Wt 82 5 kg (181 lb 14 1 oz) Comment: patient assist of 2 oob  SpO2 98%   BMI 25 37 kg/m²   Assessment/Plan: 79 yo male admitted w/ amb dysfunction and HTN , requiring IV hydralazine and lasix      Medications:   Scheduled Meds:   Current Facility-Administered Medications:  acetaminophen 650 mg Oral Q6H PRN    allopurinol 100 mg Oral Daily    atorvastatin 10 mg Oral Daily    clobetasol  Topical BID    colchicine 0 6 mg Oral Daily    fluticasone 2 spray Each Nare Daily    furosemide 40 mg Intravenous BID (diuretic)    hydrALAZINE 10 mg Intravenous Q6H PRN    insulin lispro 1-5 Units Subcutaneous TID AC    insulin lispro 1-5 Units Subcutaneous HS    levothyroxine 88 mcg Oral Daily    lisinopril 40 mg Oral Daily    metoprolol tartrate 50 mg Oral BID    pantoprazole 40 mg Oral Early Morning    potassium chloride 20 mEq Oral BID    rivaroxaban 20 mg Oral Daily With Breakfast    triamcinolone  Topical BID    Pertinent Labs/Diagnostic Results: wbc 12 44, K   3 2, BUN creat   22 1 31  Age/Sex: 80 y o  male   Discharge Plan: TBD

## 2019-03-14 ENCOUNTER — HOSPITAL ENCOUNTER (INPATIENT)
Facility: HOSPITAL | Age: 84
LOS: 12 days | Discharge: HOME HEALTH | DRG: 947 | End: 2019-03-26
Attending: PHYSICAL MEDICINE & REHABILITATION | Admitting: PHYSICAL MEDICINE & REHABILITATION
Payer: MEDICARE

## 2019-03-14 VITALS
OXYGEN SATURATION: 99 % | HEIGHT: 71 IN | RESPIRATION RATE: 18 BRPM | WEIGHT: 183.42 LBS | DIASTOLIC BLOOD PRESSURE: 66 MMHG | BODY MASS INDEX: 25.68 KG/M2 | SYSTOLIC BLOOD PRESSURE: 121 MMHG | HEART RATE: 85 BPM | TEMPERATURE: 97.6 F

## 2019-03-14 DIAGNOSIS — E03.9 HYPOTHYROIDISM, UNSPECIFIED TYPE: ICD-10-CM

## 2019-03-14 DIAGNOSIS — E83.42 HYPOMAGNESEMIA: ICD-10-CM

## 2019-03-14 DIAGNOSIS — R60.0 BILATERAL LOWER EXTREMITY EDEMA: ICD-10-CM

## 2019-03-14 DIAGNOSIS — I82.403 RECURRENT ACUTE DEEP VEIN THROMBOSIS (DVT) OF BOTH LOWER EXTREMITIES (HCC): ICD-10-CM

## 2019-03-14 DIAGNOSIS — N18.9 ACUTE ON CHRONIC RENAL FAILURE (HCC): ICD-10-CM

## 2019-03-14 DIAGNOSIS — I50.21 ACUTE SYSTOLIC CONGESTIVE HEART FAILURE (HCC): Chronic | ICD-10-CM

## 2019-03-14 DIAGNOSIS — E03.9 ADULT HYPOTHYROIDISM: Primary | ICD-10-CM

## 2019-03-14 DIAGNOSIS — N17.9 ACUTE ON CHRONIC RENAL FAILURE (HCC): ICD-10-CM

## 2019-03-14 PROBLEM — E87.6 HYPOKALEMIA: Status: ACTIVE | Noted: 2019-03-14

## 2019-03-14 PROBLEM — K21.9 GERD (GASTROESOPHAGEAL REFLUX DISEASE): Chronic | Status: ACTIVE | Noted: 2019-03-14

## 2019-03-14 PROBLEM — M10.9 GOUT: Chronic | Status: ACTIVE | Noted: 2019-03-14

## 2019-03-14 PROBLEM — I48.20 CHRONIC A-FIB (HCC): Chronic | Status: ACTIVE | Noted: 2017-04-21

## 2019-03-14 PROBLEM — M10.9 GOUT: Status: ACTIVE | Noted: 2019-03-14

## 2019-03-14 PROBLEM — N18.30 STAGE 3 CHRONIC KIDNEY DISEASE (HCC): Chronic | Status: ACTIVE | Noted: 2018-09-27

## 2019-03-14 PROBLEM — K21.9 GERD (GASTROESOPHAGEAL REFLUX DISEASE): Status: ACTIVE | Noted: 2019-03-14

## 2019-03-14 PROBLEM — L98.9 SKIN ABNORMALITIES: Status: ACTIVE | Noted: 2018-06-26

## 2019-03-14 PROBLEM — N18.30 STAGE 3 CHRONIC KIDNEY DISEASE (HCC): Status: ACTIVE | Noted: 2018-09-27

## 2019-03-14 LAB
GLUCOSE SERPL-MCNC: 130 MG/DL (ref 65–140)
GLUCOSE SERPL-MCNC: 137 MG/DL (ref 65–140)
GLUCOSE SERPL-MCNC: 254 MG/DL (ref 65–140)

## 2019-03-14 PROCEDURE — 82948 REAGENT STRIP/BLOOD GLUCOSE: CPT

## 2019-03-14 PROCEDURE — 99239 HOSP IP/OBS DSCHRG MGMT >30: CPT | Performed by: INTERNAL MEDICINE

## 2019-03-14 PROCEDURE — 99223 1ST HOSP IP/OBS HIGH 75: CPT | Performed by: PHYSICAL MEDICINE & REHABILITATION

## 2019-03-14 PROCEDURE — 99222 1ST HOSP IP/OBS MODERATE 55: CPT | Performed by: HOSPITALIST

## 2019-03-14 RX ORDER — TRIAMCINOLONE ACETONIDE 1 MG/G
CREAM TOPICAL 2 TIMES DAILY
Status: DISCONTINUED | OUTPATIENT
Start: 2019-03-14 | End: 2019-03-26 | Stop reason: HOSPADM

## 2019-03-14 RX ORDER — METOPROLOL TARTRATE 50 MG/1
50 TABLET, FILM COATED ORAL 2 TIMES DAILY
Status: DISCONTINUED | OUTPATIENT
Start: 2019-03-14 | End: 2019-03-14

## 2019-03-14 RX ORDER — CLOBETASOL PROPIONATE 0.5 MG/G
CREAM TOPICAL 2 TIMES DAILY
Status: DISCONTINUED | OUTPATIENT
Start: 2019-03-14 | End: 2019-03-26 | Stop reason: HOSPADM

## 2019-03-14 RX ORDER — LEVOTHYROXINE SODIUM 88 UG/1
88 TABLET ORAL DAILY
Status: DISCONTINUED | OUTPATIENT
Start: 2019-03-15 | End: 2019-03-19

## 2019-03-14 RX ORDER — POLYETHYLENE GLYCOL 3350 17 G/17G
17 POWDER, FOR SOLUTION ORAL DAILY PRN
Status: DISCONTINUED | OUTPATIENT
Start: 2019-03-14 | End: 2019-03-26 | Stop reason: HOSPADM

## 2019-03-14 RX ORDER — POTASSIUM CHLORIDE 20 MEQ/1
20 TABLET, EXTENDED RELEASE ORAL 2 TIMES DAILY
Status: DISCONTINUED | OUTPATIENT
Start: 2019-03-14 | End: 2019-03-26

## 2019-03-14 RX ORDER — FUROSEMIDE 40 MG/1
40 TABLET ORAL
Status: DISCONTINUED | OUTPATIENT
Start: 2019-03-14 | End: 2019-03-26

## 2019-03-14 RX ORDER — ACETAMINOPHEN 325 MG/1
650 TABLET ORAL EVERY 6 HOURS PRN
Status: DISCONTINUED | OUTPATIENT
Start: 2019-03-14 | End: 2019-03-26 | Stop reason: HOSPADM

## 2019-03-14 RX ORDER — METOPROLOL TARTRATE 50 MG/1
50 TABLET, FILM COATED ORAL EVERY 12 HOURS SCHEDULED
Status: DISCONTINUED | OUTPATIENT
Start: 2019-03-14 | End: 2019-03-14

## 2019-03-14 RX ORDER — PANTOPRAZOLE SODIUM 40 MG/1
40 TABLET, DELAYED RELEASE ORAL
Status: DISCONTINUED | OUTPATIENT
Start: 2019-03-15 | End: 2019-03-26 | Stop reason: HOSPADM

## 2019-03-14 RX ORDER — ATORVASTATIN CALCIUM 10 MG/1
10 TABLET, FILM COATED ORAL
Status: DISCONTINUED | OUTPATIENT
Start: 2019-03-15 | End: 2019-03-26 | Stop reason: HOSPADM

## 2019-03-14 RX ORDER — ATORVASTATIN CALCIUM 10 MG/1
10 TABLET, FILM COATED ORAL DAILY
Status: DISCONTINUED | OUTPATIENT
Start: 2019-03-15 | End: 2019-03-14

## 2019-03-14 RX ORDER — FUROSEMIDE 10 MG/ML
40 INJECTION INTRAMUSCULAR; INTRAVENOUS
Status: DISCONTINUED | OUTPATIENT
Start: 2019-03-14 | End: 2019-03-14

## 2019-03-14 RX ORDER — COLCHICINE 0.6 MG/1
0.6 TABLET ORAL DAILY
Status: DISCONTINUED | OUTPATIENT
Start: 2019-03-15 | End: 2019-03-26 | Stop reason: HOSPADM

## 2019-03-14 RX ORDER — METOPROLOL TARTRATE 50 MG/1
50 TABLET, FILM COATED ORAL EVERY 12 HOURS SCHEDULED
Status: DISCONTINUED | OUTPATIENT
Start: 2019-03-14 | End: 2019-03-26 | Stop reason: HOSPADM

## 2019-03-14 RX ORDER — LISINOPRIL 20 MG/1
40 TABLET ORAL DAILY
Status: DISCONTINUED | OUTPATIENT
Start: 2019-03-15 | End: 2019-03-26 | Stop reason: HOSPADM

## 2019-03-14 RX ADMIN — ALLOPURINOL 100 MG: 100 TABLET ORAL at 08:20

## 2019-03-14 RX ADMIN — FUROSEMIDE 40 MG: 10 INJECTION, SOLUTION INTRAMUSCULAR; INTRAVENOUS at 08:22

## 2019-03-14 RX ADMIN — TRIAMCINOLONE ACETONIDE: 1 OINTMENT TOPICAL at 08:22

## 2019-03-14 RX ADMIN — FLUTICASONE PROPIONATE 2 SPRAY: 50 SPRAY, METERED NASAL at 08:22

## 2019-03-14 RX ADMIN — PANTOPRAZOLE SODIUM 40 MG: 40 TABLET, DELAYED RELEASE ORAL at 05:29

## 2019-03-14 RX ADMIN — METOPROLOL TARTRATE 50 MG: 50 TABLET, FILM COATED ORAL at 21:26

## 2019-03-14 RX ADMIN — FUROSEMIDE 40 MG: 40 TABLET ORAL at 16:24

## 2019-03-14 RX ADMIN — LEVOTHYROXINE SODIUM 88 MCG: 88 TABLET ORAL at 05:29

## 2019-03-14 RX ADMIN — LISINOPRIL 40 MG: 20 TABLET ORAL at 08:20

## 2019-03-14 RX ADMIN — POTASSIUM CHLORIDE 20 MEQ: 1500 TABLET, EXTENDED RELEASE ORAL at 17:31

## 2019-03-14 RX ADMIN — ATORVASTATIN CALCIUM 10 MG: 10 TABLET, FILM COATED ORAL at 08:21

## 2019-03-14 RX ADMIN — METOPROLOL TARTRATE 50 MG: 50 TABLET, FILM COATED ORAL at 08:21

## 2019-03-14 RX ADMIN — CLOBETASOL PROPIONATE: 0.5 CREAM TOPICAL at 08:22

## 2019-03-14 RX ADMIN — RIVAROXABAN 20 MG: 20 TABLET, FILM COATED ORAL at 08:21

## 2019-03-14 RX ADMIN — COLCHICINE 0.6 MG: 0.6 TABLET, FILM COATED ORAL at 08:21

## 2019-03-14 RX ADMIN — POTASSIUM CHLORIDE 20 MEQ: 1500 TABLET, EXTENDED RELEASE ORAL at 08:20

## 2019-03-14 RX ADMIN — INSULIN LISPRO 2 UNITS: 100 INJECTION, SOLUTION INTRAVENOUS; SUBCUTANEOUS at 21:26

## 2019-03-14 NOTE — PLAN OF CARE
Problem: DISCHARGE PLANNING - CARE MANAGEMENT  Goal: Discharge to post-acute care or home with appropriate resources  Description  INTERVENTIONS:  - Conduct assessment to determine patient/family and health care team treatment goals, and need for post-acute services based on payer coverage, community resources, and patient preferences, and barriers to discharge  - Address psychosocial, clinical, and financial barriers to discharge as identified in assessment in conjunction with the patient/family and health care team  - Arrange appropriate level of post-acute services according to patient?s   needs and preference and payer coverage in collaboration with the physician and health care team  - Communicate with and update the patient/family, physician, and health care team regarding progress on the discharge plan  - Arrange appropriate transportation to post-acute venues  Outcome: Progressing  Note:   CM verified med necessity was in pt sticker book - RN and SLIM aware of d/c for this AM

## 2019-03-14 NOTE — DISCHARGE SUMMARY
Discharge Summary - [address] [facility]    Patient Information: Jennifer Gould 80 y o  male MRN: [id number]  Unit/Bed#: -01 Encounter: [id number]    Discharging Physician / Practitioner: Hill Tate MD  PCP: Irasema Harvey MD  Admission Date: 3/11/2019  Discharge Date: 03/14/19    Disposition:     Other: Acute rehab    Reason for Admission: Ambulatory dysfunction, Lower extremity swelling    Discharge Diagnoses:     Principal Problem:    Ambulatory dysfunction  Active Problems:    Adult hypothyroidism    Chronic a-fib ([place] [address] )    Essential hypertension    Chronic kidney disease, stage 3 ([place] [address] )    Controlled diabetes mellitus with microalbuminuria ([place] [address] )    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, bilateral    Chronic anticoagulation    Leucocytosis    Edema  Resolved Problems:    * No resolved hospital problems  *      Consultations During Hospital Stay:  · none    Procedures Performed:     · none    Significant Findings / Test Results: Incidental Findings:   ·      Test Results Pending at Discharge (will require follow up):   ·      Outpatient Tests Requested:  ·     Complications:  None    History of Present Illness:     Jennifer Gould is a 80 y o  male who presents with can't walk  Patient says he has been engaging in more activities than usual lately having assisted-living facility  He is forgot to take his pills for the last 2 days and has had increasing lower extremity swelling and difficulty walking he also complains of some difficulty passing his urine  He denies any chest pains or shortness of breath  He says it feels as if his left leg below the knee isn't working well        Hospital Course:     Jennifer Gould is a 80 y o  male patient who originally presented to the hospital on 3/11/2019 due to complaints of increased lower extremity swelling and ambulatory dysfunction  Patient was placed on IV lasix for this chronic HF  Patient lower extremity swelling improved    He also had some ambulatory dysfunction and was evaluated by physical therapy and they recommended acute rehab  He is currently hemodynamically stable and will be discharged to rehab  Condition at Discharge: stable     Discharge Day Visit / Exam:     Subjective:  Patient seen and examined at bedside  Patient has no new complaints  Vitals: Blood Pressure: 121/66 (03/14/19 0725)  Pulse: 85 (03/14/19 0725)  Temperature: 97 6 °F (36 4 °C) (03/14/19 0725)  Temp Source: Oral (03/14/19 0725)  Respirations: 18 (03/14/19 0725)  Height: 5' 11" (180 3 cm) (03/12/19 1935)  Weight - Scale: 83 2 kg (183 lb 6 8 oz)(patient assist of 2 oob) (03/14/19 0600)  SpO2: 99 % (03/14/19 0725)  Exam:   Physical Exam   Constitutional: He is oriented to person, place, and time  He appears well-developed and well-nourished  HENT:   Head: Normocephalic and atraumatic  Eyes: Pupils are equal, round, and reactive to light  EOM are normal    Neck: Normal range of motion  Neck supple  No JVD present  No tracheal deviation present  No thyromegaly present  Cardiovascular: Normal rate, regular rhythm and normal heart sounds  Exam reveals no gallop and no friction rub  No murmur heard  Pulmonary/Chest: Effort normal and breath sounds normal  No stridor  No respiratory distress  He has no wheezes  Abdominal: Soft  Bowel sounds are normal  He exhibits no distension  There is no tenderness  There is no guarding  Musculoskeletal: Normal range of motion  He exhibits no edema  Neurological: He is alert and oriented to person, place, and time  He displays normal reflexes  No cranial nerve deficit  Coordination normal    Vitals reviewed  Discussion with Family:     Discharge instructions/Information to patient and family:   See after visit summary for information provided to patient and family  Provisions for Follow-Up Care:  See after visit summary for information related to follow-up care and any pertinent home health orders        Planned Readmission: none     Discharge Statement:  I spent 50 minutes discharging the patient  This time was spent on the day of discharge  I had direct contact with the patient on the day of discharge  Greater than 50% of the total time was spent examining patient, answering all patient questions, arranging and discussing plan of care with patient as well as directly providing post-discharge instructions  Additional time then spent on discharge activities  Discharge Medications:  See after visit summary for reconciled discharge medications provided to patient and family        ** Please Note: This note has been constructed using a voice recognition system **

## 2019-03-14 NOTE — PLAN OF CARE
Problem: Potential for Falls  Goal: Patient will remain free of falls  Description  INTERVENTIONS:  - Assess patient frequently for physical needs  -  Identify cognitive and physical deficits and behaviors that affect risk of falls    -  [place] fall precautions as indicated by assessment   - Educate patient/family on patient safety including physical limitations  - Instruct patient to call for assistance with activity based on assessment  - Modify environment to reduce risk of injury  - Consider OT/PT consult to assist with strengthening/mobility  Outcome: Progressing     Problem: DISCHARGE PLANNING - CARE MANAGEMENT  Goal: Discharge to post-acute care or home with appropriate resources  Description  INTERVENTIONS:  - Conduct assessment to determine patient/family and health care team treatment goals, and need for post-acute services based on payer coverage, community resources, and patient preferences, and barriers to discharge  - Address psychosocial, clinical, and financial barriers to discharge as identified in assessment in conjunction with the patient/family and health care team  - Arrange appropriate level of post-acute services according to patient?s   needs and preference and payer coverage in collaboration with the physician and health care team  - Communicate with and update the patient/family, physician, and health care team regarding progress on the discharge plan  - Arrange appropriate transportation to post-acute venues  Outcome: Progressing     Problem: PAIN - ADULT  Goal: Verbalizes/displays adequate comfort level or baseline comfort level  Description  Interventions:  - Encourage patient to monitor pain and request assistance  - Assess pain using appropriate pain scale  - Administer analgesics based on type and severity of pain and evaluate response  - Implement non-pharmacological measures as appropriate and evaluate response  - Consider cultural and social influences on pain and pain management  - Notify physician/advanced practitioner if interventions unsuccessful or patient reports new pain  Outcome: Progressing     Problem: INFECTION - ADULT  Goal: Absence or prevention of progression during hospitalization  Description  INTERVENTIONS:  - Assess and monitor for signs and symptoms of infection  - Monitor lab/diagnostic results  - Monitor all insertion sites, i e  indwelling lines, tubes, and drains  - Monitor endotracheal (as able) and nasal secretions for changes in amount and color  - [place] appropriate cooling/warming therapies per order  - Administer medications as ordered  - Instruct and encourage patient and family to use good hand hygiene technique  - Identify and instruct in appropriate isolation precautions for identified infection/condition  Outcome: Progressing  Goal: Absence of fever/infection during neutropenic period  Description  INTERVENTIONS:  - Monitor WBC  - Implement neutropenic guidelines  Outcome: Progressing     Problem: SAFETY ADULT  Goal: Maintain or return to baseline ADL function  Description  INTERVENTIONS:  -  Assess patient's ability to carry out ADLs; assess patient's baseline for ADL function and identify physical deficits which impact ability to perform ADLs (bathing, care of mouth/teeth, toileting, grooming, dressing, etc )  - Assess/evaluate cause of self-care deficits   - Assess range of motion  - Assess patient's mobility; develop plan if impaired  - Assess patient's need for assistive devices and provide as appropriate  - Encourage maximum independence but intervene and supervise when necessary  ¯ Involve family in performance of ADLs  ¯ Assess for home care needs following discharge   ¯ Request OT consult to assist with ADL evaluation and planning for discharge  ¯ Provide patient education as appropriate  Outcome: Progressing  Goal: Maintain or return mobility status to optimal level  Description  INTERVENTIONS:  - Assess patient's baseline mobility status (ambulation, transfers, stairs, etc )    - Identify cognitive and physical deficits and behaviors that affect mobility  - Identify mobility aids required to assist with transfers and/or ambulation (gait belt, sit-to-stand, lift, walker, cane, etc )  - [place] fall precautions as indicated by assessment  - Record patient progress and toleration of activity level on Mobility SBAR; progress patient to next Phase/Stage  - Instruct patient to call for assistance with activity based on assessment  - Request Rehabilitation consult to assist with strengthening/weightbearing, etc   Outcome: Progressing     Problem: DISCHARGE PLANNING  Goal: Discharge to home or other facility with appropriate resources  Description  INTERVENTIONS:  - Identify barriers to discharge w/patient and caregiver  - Arrange for needed discharge resources and transportation as appropriate  - Identify discharge learning needs (meds, wound care, etc )  - Arrange for interpretive services to assist at discharge as needed  - Refer to Case Management Department for coordinating discharge planning if the patient needs post-hospital services based on physician/advanced practitioner order or complex needs related to functional status, cognitive ability, or social support system  Outcome: Progressing     Problem: Knowledge Deficit  Goal: Patient/family/caregiver demonstrates understanding of disease process, treatment plan, medications, and discharge instructions  Description  Complete learning assessment and assess knowledge base    Interventions:  - Provide teaching at level of understanding  - Provide teaching via preferred learning methods  Outcome: Progressing     Problem: Prexisting or High Potential for Compromised Skin Integrity  Goal: Skin integrity is maintained or improved  Description  INTERVENTIONS:  - Identify patients at risk for skin breakdown  - Assess and monitor skin integrity  - Assess and monitor nutrition and hydration status  - Monitor labs (i e  albumin)  - Assess for incontinence   - Turn and reposition patient  - Assist with mobility/ambulation  - Relieve pressure over bony prominences  - Avoid friction and shearing  - Provide appropriate hygiene as needed including keeping skin clean and dry  - Evaluate need for skin moisturizer/barrier cream  - Collaborate with interdisciplinary team (i e  Nutrition, Rehabilitation, etc )   - Patient/family teaching  Outcome: Progressing

## 2019-03-15 ENCOUNTER — TRANSITIONAL CARE MANAGEMENT (OUTPATIENT)
Dept: INTERNAL MEDICINE CLINIC | Facility: CLINIC | Age: 84
End: 2019-03-15

## 2019-03-15 PROBLEM — E83.42 HYPOMAGNESEMIA: Status: ACTIVE | Noted: 2019-03-15

## 2019-03-15 LAB
ANION GAP SERPL CALCULATED.3IONS-SCNC: 9 MMOL/L (ref 4–13)
BUN SERPL-MCNC: 28 MG/DL (ref 7–25)
CALCIUM SERPL-MCNC: 8.5 MG/DL (ref 8.6–10.5)
CHLORIDE SERPL-SCNC: 100 MMOL/L (ref 98–107)
CO2 SERPL-SCNC: 30 MMOL/L (ref 21–31)
CREAT SERPL-MCNC: 1.28 MG/DL (ref 0.7–1.3)
GFR SERPL CREATININE-BSD FRML MDRD: 50 ML/MIN/1.73SQ M
GLUCOSE SERPL-MCNC: 124 MG/DL (ref 65–99)
GLUCOSE SERPL-MCNC: 127 MG/DL (ref 65–140)
GLUCOSE SERPL-MCNC: 182 MG/DL (ref 65–140)
GLUCOSE SERPL-MCNC: 192 MG/DL (ref 65–140)
GLUCOSE SERPL-MCNC: 238 MG/DL (ref 65–140)
MAGNESIUM SERPL-MCNC: 1.7 MG/DL (ref 1.9–2.7)
POTASSIUM SERPL-SCNC: 3.4 MMOL/L (ref 3.5–5.5)
SODIUM SERPL-SCNC: 139 MMOL/L (ref 134–143)

## 2019-03-15 PROCEDURE — 97110 THERAPEUTIC EXERCISES: CPT

## 2019-03-15 PROCEDURE — 97116 GAIT TRAINING THERAPY: CPT

## 2019-03-15 PROCEDURE — 97535 SELF CARE MNGMENT TRAINING: CPT

## 2019-03-15 PROCEDURE — 97166 OT EVAL MOD COMPLEX 45 MIN: CPT

## 2019-03-15 PROCEDURE — 83735 ASSAY OF MAGNESIUM: CPT | Performed by: PHYSICAL MEDICINE & REHABILITATION

## 2019-03-15 PROCEDURE — 99232 SBSQ HOSP IP/OBS MODERATE 35: CPT | Performed by: PHYSICAL MEDICINE & REHABILITATION

## 2019-03-15 PROCEDURE — 97530 THERAPEUTIC ACTIVITIES: CPT

## 2019-03-15 PROCEDURE — 80048 BASIC METABOLIC PNL TOTAL CA: CPT | Performed by: PHYSICAL MEDICINE & REHABILITATION

## 2019-03-15 PROCEDURE — 97163 PT EVAL HIGH COMPLEX 45 MIN: CPT

## 2019-03-15 PROCEDURE — 97542 WHEELCHAIR MNGMENT TRAINING: CPT

## 2019-03-15 PROCEDURE — 82948 REAGENT STRIP/BLOOD GLUCOSE: CPT

## 2019-03-15 RX ORDER — POTASSIUM CHLORIDE 20 MEQ/1
20 TABLET, EXTENDED RELEASE ORAL ONCE
Status: DISCONTINUED | OUTPATIENT
Start: 2019-03-15 | End: 2019-03-15

## 2019-03-15 RX ADMIN — LISINOPRIL 40 MG: 20 TABLET ORAL at 08:17

## 2019-03-15 RX ADMIN — FUROSEMIDE 40 MG: 40 TABLET ORAL at 08:17

## 2019-03-15 RX ADMIN — LEVOTHYROXINE SODIUM 88 MCG: 88 TABLET ORAL at 05:35

## 2019-03-15 RX ADMIN — FUROSEMIDE 40 MG: 40 TABLET ORAL at 16:45

## 2019-03-15 RX ADMIN — COLCHICINE 0.6 MG: 0.6 TABLET, FILM COATED ORAL at 08:17

## 2019-03-15 RX ADMIN — RIVAROXABAN 15 MG: 15 TABLET, FILM COATED ORAL at 07:19

## 2019-03-15 RX ADMIN — CLOBETASOL PROPIONATE: 0.5 CREAM TOPICAL at 18:22

## 2019-03-15 RX ADMIN — METOPROLOL TARTRATE 50 MG: 50 TABLET, FILM COATED ORAL at 08:18

## 2019-03-15 RX ADMIN — ATORVASTATIN CALCIUM 10 MG: 10 TABLET, FILM COATED ORAL at 16:45

## 2019-03-15 RX ADMIN — INSULIN LISPRO 1 UNITS: 100 INJECTION, SOLUTION INTRAVENOUS; SUBCUTANEOUS at 12:03

## 2019-03-15 RX ADMIN — PANTOPRAZOLE SODIUM 40 MG: 40 TABLET, DELAYED RELEASE ORAL at 05:35

## 2019-03-15 RX ADMIN — INSULIN LISPRO 2 UNITS: 100 INJECTION, SOLUTION INTRAVENOUS; SUBCUTANEOUS at 21:05

## 2019-03-15 RX ADMIN — CLOBETASOL PROPIONATE: 0.5 CREAM TOPICAL at 08:22

## 2019-03-15 RX ADMIN — INSULIN LISPRO 1 UNITS: 100 INJECTION, SOLUTION INTRAVENOUS; SUBCUTANEOUS at 17:00

## 2019-03-15 RX ADMIN — POTASSIUM CHLORIDE 20 MEQ: 1500 TABLET, EXTENDED RELEASE ORAL at 18:22

## 2019-03-15 RX ADMIN — METOPROLOL TARTRATE 50 MG: 50 TABLET, FILM COATED ORAL at 21:05

## 2019-03-15 RX ADMIN — MAGNESIUM SULFATE HEPTAHYDRATE 1 G: 500 INJECTION, SOLUTION INTRAMUSCULAR; INTRAVENOUS at 19:32

## 2019-03-15 RX ADMIN — POTASSIUM CHLORIDE 20 MEQ: 1500 TABLET, EXTENDED RELEASE ORAL at 08:18

## 2019-03-15 RX ADMIN — TRIAMCINOLONE ACETONIDE: 1 CREAM TOPICAL at 18:22

## 2019-03-15 RX ADMIN — TRIAMCINOLONE ACETONIDE: 1 CREAM TOPICAL at 08:22

## 2019-03-16 LAB
ANION GAP SERPL CALCULATED.3IONS-SCNC: 9 MMOL/L (ref 4–13)
BUN SERPL-MCNC: 25 MG/DL (ref 7–25)
CALCIUM SERPL-MCNC: 8.8 MG/DL (ref 8.6–10.5)
CHLORIDE SERPL-SCNC: 102 MMOL/L (ref 98–107)
CO2 SERPL-SCNC: 30 MMOL/L (ref 21–31)
CREAT SERPL-MCNC: 1.14 MG/DL (ref 0.7–1.3)
GFR SERPL CREATININE-BSD FRML MDRD: 58 ML/MIN/1.73SQ M
GLUCOSE SERPL-MCNC: 141 MG/DL (ref 65–140)
GLUCOSE SERPL-MCNC: 142 MG/DL (ref 65–140)
GLUCOSE SERPL-MCNC: 142 MG/DL (ref 65–99)
GLUCOSE SERPL-MCNC: 161 MG/DL (ref 65–140)
GLUCOSE SERPL-MCNC: 227 MG/DL (ref 65–140)
MAGNESIUM SERPL-MCNC: 2 MG/DL (ref 1.9–2.7)
POTASSIUM SERPL-SCNC: 3.9 MMOL/L (ref 3.5–5.5)
SODIUM SERPL-SCNC: 141 MMOL/L (ref 134–143)

## 2019-03-16 PROCEDURE — 97116 GAIT TRAINING THERAPY: CPT

## 2019-03-16 PROCEDURE — 97530 THERAPEUTIC ACTIVITIES: CPT

## 2019-03-16 PROCEDURE — 97110 THERAPEUTIC EXERCISES: CPT

## 2019-03-16 PROCEDURE — 83735 ASSAY OF MAGNESIUM: CPT | Performed by: PHYSICAL MEDICINE & REHABILITATION

## 2019-03-16 PROCEDURE — 97537 COMMUNITY/WORK REINTEGRATION: CPT

## 2019-03-16 PROCEDURE — 82948 REAGENT STRIP/BLOOD GLUCOSE: CPT

## 2019-03-16 PROCEDURE — 80048 BASIC METABOLIC PNL TOTAL CA: CPT | Performed by: PHYSICAL MEDICINE & REHABILITATION

## 2019-03-16 RX ADMIN — LEVOTHYROXINE SODIUM 88 MCG: 88 TABLET ORAL at 05:35

## 2019-03-16 RX ADMIN — METOPROLOL TARTRATE 50 MG: 50 TABLET, FILM COATED ORAL at 08:06

## 2019-03-16 RX ADMIN — INSULIN LISPRO 1 UNITS: 100 INJECTION, SOLUTION INTRAVENOUS; SUBCUTANEOUS at 11:43

## 2019-03-16 RX ADMIN — FUROSEMIDE 40 MG: 40 TABLET ORAL at 15:57

## 2019-03-16 RX ADMIN — INSULIN LISPRO 2 UNITS: 100 INJECTION, SOLUTION INTRAVENOUS; SUBCUTANEOUS at 21:19

## 2019-03-16 RX ADMIN — PANTOPRAZOLE SODIUM 40 MG: 40 TABLET, DELAYED RELEASE ORAL at 05:35

## 2019-03-16 RX ADMIN — ATORVASTATIN CALCIUM 10 MG: 10 TABLET, FILM COATED ORAL at 15:57

## 2019-03-16 RX ADMIN — COLCHICINE 0.6 MG: 0.6 TABLET, FILM COATED ORAL at 08:04

## 2019-03-16 RX ADMIN — FUROSEMIDE 40 MG: 40 TABLET ORAL at 07:46

## 2019-03-16 RX ADMIN — TRIAMCINOLONE ACETONIDE: 1 CREAM TOPICAL at 08:08

## 2019-03-16 RX ADMIN — POTASSIUM CHLORIDE 20 MEQ: 1500 TABLET, EXTENDED RELEASE ORAL at 17:55

## 2019-03-16 RX ADMIN — CLOBETASOL PROPIONATE: 0.5 CREAM TOPICAL at 17:59

## 2019-03-16 RX ADMIN — CLOBETASOL PROPIONATE: 0.5 CREAM TOPICAL at 08:07

## 2019-03-16 RX ADMIN — TRIAMCINOLONE ACETONIDE: 1 CREAM TOPICAL at 17:59

## 2019-03-16 RX ADMIN — POTASSIUM CHLORIDE 20 MEQ: 1500 TABLET, EXTENDED RELEASE ORAL at 08:06

## 2019-03-16 RX ADMIN — RIVAROXABAN 15 MG: 15 TABLET, FILM COATED ORAL at 07:47

## 2019-03-16 RX ADMIN — LISINOPRIL 40 MG: 20 TABLET ORAL at 08:06

## 2019-03-16 RX ADMIN — METOPROLOL TARTRATE 50 MG: 50 TABLET, FILM COATED ORAL at 21:19

## 2019-03-17 LAB
GLUCOSE SERPL-MCNC: 126 MG/DL (ref 65–140)
GLUCOSE SERPL-MCNC: 152 MG/DL (ref 65–140)
GLUCOSE SERPL-MCNC: 244 MG/DL (ref 65–140)
GLUCOSE SERPL-MCNC: 96 MG/DL (ref 65–140)

## 2019-03-17 PROCEDURE — 82948 REAGENT STRIP/BLOOD GLUCOSE: CPT

## 2019-03-17 RX ADMIN — FUROSEMIDE 40 MG: 40 TABLET ORAL at 08:12

## 2019-03-17 RX ADMIN — POTASSIUM CHLORIDE 20 MEQ: 1500 TABLET, EXTENDED RELEASE ORAL at 09:49

## 2019-03-17 RX ADMIN — CLOBETASOL PROPIONATE: 0.5 CREAM TOPICAL at 09:48

## 2019-03-17 RX ADMIN — ATORVASTATIN CALCIUM 10 MG: 10 TABLET, FILM COATED ORAL at 17:40

## 2019-03-17 RX ADMIN — PANTOPRAZOLE SODIUM 40 MG: 40 TABLET, DELAYED RELEASE ORAL at 05:37

## 2019-03-17 RX ADMIN — POTASSIUM CHLORIDE 20 MEQ: 1500 TABLET, EXTENDED RELEASE ORAL at 17:40

## 2019-03-17 RX ADMIN — COLCHICINE 0.6 MG: 0.6 TABLET, FILM COATED ORAL at 09:49

## 2019-03-17 RX ADMIN — TRIAMCINOLONE ACETONIDE: 1 CREAM TOPICAL at 09:48

## 2019-03-17 RX ADMIN — RIVAROXABAN 15 MG: 15 TABLET, FILM COATED ORAL at 08:12

## 2019-03-17 RX ADMIN — FUROSEMIDE 40 MG: 40 TABLET ORAL at 17:39

## 2019-03-17 RX ADMIN — INSULIN LISPRO 2 UNITS: 100 INJECTION, SOLUTION INTRAVENOUS; SUBCUTANEOUS at 12:10

## 2019-03-17 RX ADMIN — CLOBETASOL PROPIONATE: 0.5 CREAM TOPICAL at 17:40

## 2019-03-17 RX ADMIN — LISINOPRIL 40 MG: 20 TABLET ORAL at 09:49

## 2019-03-17 RX ADMIN — INSULIN LISPRO 1 UNITS: 100 INJECTION, SOLUTION INTRAVENOUS; SUBCUTANEOUS at 07:59

## 2019-03-17 RX ADMIN — LEVOTHYROXINE SODIUM 88 MCG: 88 TABLET ORAL at 05:37

## 2019-03-17 RX ADMIN — TRIAMCINOLONE ACETONIDE: 1 CREAM TOPICAL at 17:40

## 2019-03-17 RX ADMIN — METOPROLOL TARTRATE 50 MG: 50 TABLET, FILM COATED ORAL at 09:49

## 2019-03-17 RX ADMIN — METOPROLOL TARTRATE 50 MG: 50 TABLET, FILM COATED ORAL at 21:11

## 2019-03-18 LAB
GLUCOSE SERPL-MCNC: 133 MG/DL (ref 65–140)
GLUCOSE SERPL-MCNC: 137 MG/DL (ref 65–140)
GLUCOSE SERPL-MCNC: 220 MG/DL (ref 65–140)
GLUCOSE SERPL-MCNC: 227 MG/DL (ref 65–140)

## 2019-03-18 PROCEDURE — 97530 THERAPEUTIC ACTIVITIES: CPT

## 2019-03-18 PROCEDURE — 97110 THERAPEUTIC EXERCISES: CPT

## 2019-03-18 PROCEDURE — 97116 GAIT TRAINING THERAPY: CPT

## 2019-03-18 PROCEDURE — 82948 REAGENT STRIP/BLOOD GLUCOSE: CPT

## 2019-03-18 PROCEDURE — 97537 COMMUNITY/WORK REINTEGRATION: CPT

## 2019-03-18 PROCEDURE — 99232 SBSQ HOSP IP/OBS MODERATE 35: CPT | Performed by: PHYSICAL MEDICINE & REHABILITATION

## 2019-03-18 RX ADMIN — COLCHICINE 0.6 MG: 0.6 TABLET, FILM COATED ORAL at 08:20

## 2019-03-18 RX ADMIN — POTASSIUM CHLORIDE 20 MEQ: 1500 TABLET, EXTENDED RELEASE ORAL at 08:21

## 2019-03-18 RX ADMIN — RIVAROXABAN 15 MG: 15 TABLET, FILM COATED ORAL at 08:20

## 2019-03-18 RX ADMIN — CLOBETASOL PROPIONATE: 0.5 CREAM TOPICAL at 17:33

## 2019-03-18 RX ADMIN — CLOBETASOL PROPIONATE: 0.5 CREAM TOPICAL at 08:23

## 2019-03-18 RX ADMIN — TRIAMCINOLONE ACETONIDE: 1 CREAM TOPICAL at 08:23

## 2019-03-18 RX ADMIN — PANTOPRAZOLE SODIUM 40 MG: 40 TABLET, DELAYED RELEASE ORAL at 05:26

## 2019-03-18 RX ADMIN — METOPROLOL TARTRATE 50 MG: 50 TABLET, FILM COATED ORAL at 08:20

## 2019-03-18 RX ADMIN — TRIAMCINOLONE ACETONIDE: 1 CREAM TOPICAL at 17:33

## 2019-03-18 RX ADMIN — INSULIN LISPRO 2 UNITS: 100 INJECTION, SOLUTION INTRAVENOUS; SUBCUTANEOUS at 11:55

## 2019-03-18 RX ADMIN — LISINOPRIL 40 MG: 20 TABLET ORAL at 08:20

## 2019-03-18 RX ADMIN — METOPROLOL TARTRATE 50 MG: 50 TABLET, FILM COATED ORAL at 21:11

## 2019-03-18 RX ADMIN — ATORVASTATIN CALCIUM 10 MG: 10 TABLET, FILM COATED ORAL at 16:25

## 2019-03-18 RX ADMIN — LEVOTHYROXINE SODIUM 88 MCG: 88 TABLET ORAL at 05:26

## 2019-03-18 RX ADMIN — FUROSEMIDE 40 MG: 40 TABLET ORAL at 16:25

## 2019-03-18 RX ADMIN — FUROSEMIDE 40 MG: 40 TABLET ORAL at 08:20

## 2019-03-18 RX ADMIN — POTASSIUM CHLORIDE 20 MEQ: 1500 TABLET, EXTENDED RELEASE ORAL at 17:32

## 2019-03-18 RX ADMIN — INSULIN LISPRO 1 UNITS: 100 INJECTION, SOLUTION INTRAVENOUS; SUBCUTANEOUS at 21:11

## 2019-03-19 LAB
GLUCOSE SERPL-MCNC: 139 MG/DL (ref 65–140)
GLUCOSE SERPL-MCNC: 148 MG/DL (ref 65–140)
GLUCOSE SERPL-MCNC: 193 MG/DL (ref 65–140)
GLUCOSE SERPL-MCNC: 239 MG/DL (ref 65–140)

## 2019-03-19 PROCEDURE — 97110 THERAPEUTIC EXERCISES: CPT

## 2019-03-19 PROCEDURE — 97530 THERAPEUTIC ACTIVITIES: CPT

## 2019-03-19 PROCEDURE — 82948 REAGENT STRIP/BLOOD GLUCOSE: CPT

## 2019-03-19 PROCEDURE — 99233 SBSQ HOSP IP/OBS HIGH 50: CPT | Performed by: PHYSICAL MEDICINE & REHABILITATION

## 2019-03-19 PROCEDURE — 97535 SELF CARE MNGMENT TRAINING: CPT

## 2019-03-19 PROCEDURE — 97537 COMMUNITY/WORK REINTEGRATION: CPT

## 2019-03-19 PROCEDURE — 97116 GAIT TRAINING THERAPY: CPT

## 2019-03-19 RX ORDER — LEVOTHYROXINE SODIUM 0.1 MG/1
100 TABLET ORAL DAILY
Status: DISCONTINUED | OUTPATIENT
Start: 2019-03-20 | End: 2019-03-26 | Stop reason: HOSPADM

## 2019-03-19 RX ADMIN — POTASSIUM CHLORIDE 20 MEQ: 1500 TABLET, EXTENDED RELEASE ORAL at 17:11

## 2019-03-19 RX ADMIN — TRIAMCINOLONE ACETONIDE: 1 CREAM TOPICAL at 08:13

## 2019-03-19 RX ADMIN — METOPROLOL TARTRATE 50 MG: 50 TABLET, FILM COATED ORAL at 21:15

## 2019-03-19 RX ADMIN — COLCHICINE 0.6 MG: 0.6 TABLET, FILM COATED ORAL at 08:09

## 2019-03-19 RX ADMIN — TRIAMCINOLONE ACETONIDE: 1 CREAM TOPICAL at 17:11

## 2019-03-19 RX ADMIN — POTASSIUM CHLORIDE 20 MEQ: 1500 TABLET, EXTENDED RELEASE ORAL at 08:10

## 2019-03-19 RX ADMIN — PANTOPRAZOLE SODIUM 40 MG: 40 TABLET, DELAYED RELEASE ORAL at 05:40

## 2019-03-19 RX ADMIN — CLOBETASOL PROPIONATE: 0.5 CREAM TOPICAL at 17:11

## 2019-03-19 RX ADMIN — RIVAROXABAN 15 MG: 15 TABLET, FILM COATED ORAL at 08:10

## 2019-03-19 RX ADMIN — LISINOPRIL 40 MG: 20 TABLET ORAL at 08:09

## 2019-03-19 RX ADMIN — ATORVASTATIN CALCIUM 10 MG: 10 TABLET, FILM COATED ORAL at 16:37

## 2019-03-19 RX ADMIN — FUROSEMIDE 40 MG: 40 TABLET ORAL at 16:37

## 2019-03-19 RX ADMIN — INSULIN LISPRO 2 UNITS: 100 INJECTION, SOLUTION INTRAVENOUS; SUBCUTANEOUS at 12:37

## 2019-03-19 RX ADMIN — INSULIN LISPRO 1 UNITS: 100 INJECTION, SOLUTION INTRAVENOUS; SUBCUTANEOUS at 21:15

## 2019-03-19 RX ADMIN — CLOBETASOL PROPIONATE: 0.5 CREAM TOPICAL at 08:13

## 2019-03-19 RX ADMIN — LEVOTHYROXINE SODIUM 88 MCG: 88 TABLET ORAL at 05:40

## 2019-03-19 RX ADMIN — FUROSEMIDE 40 MG: 40 TABLET ORAL at 08:10

## 2019-03-19 RX ADMIN — METOPROLOL TARTRATE 50 MG: 50 TABLET, FILM COATED ORAL at 08:09

## 2019-03-20 LAB
GLUCOSE SERPL-MCNC: 114 MG/DL (ref 65–140)
GLUCOSE SERPL-MCNC: 141 MG/DL (ref 65–140)
GLUCOSE SERPL-MCNC: 153 MG/DL (ref 65–140)
GLUCOSE SERPL-MCNC: 175 MG/DL (ref 65–140)

## 2019-03-20 PROCEDURE — 97110 THERAPEUTIC EXERCISES: CPT

## 2019-03-20 PROCEDURE — 99232 SBSQ HOSP IP/OBS MODERATE 35: CPT | Performed by: PHYSICAL MEDICINE & REHABILITATION

## 2019-03-20 PROCEDURE — G0515 COGNITIVE SKILLS DEVELOPMENT: HCPCS

## 2019-03-20 PROCEDURE — 97537 COMMUNITY/WORK REINTEGRATION: CPT

## 2019-03-20 PROCEDURE — 97530 THERAPEUTIC ACTIVITIES: CPT

## 2019-03-20 PROCEDURE — 82948 REAGENT STRIP/BLOOD GLUCOSE: CPT

## 2019-03-20 PROCEDURE — 97116 GAIT TRAINING THERAPY: CPT

## 2019-03-20 PROCEDURE — 96125 COGNITIVE TEST BY HC PRO: CPT

## 2019-03-20 RX ADMIN — ATORVASTATIN CALCIUM 10 MG: 10 TABLET, FILM COATED ORAL at 16:56

## 2019-03-20 RX ADMIN — TRIAMCINOLONE ACETONIDE: 1 CREAM TOPICAL at 08:57

## 2019-03-20 RX ADMIN — LEVOTHYROXINE SODIUM 100 MCG: 100 TABLET ORAL at 05:37

## 2019-03-20 RX ADMIN — PANTOPRAZOLE SODIUM 40 MG: 40 TABLET, DELAYED RELEASE ORAL at 05:37

## 2019-03-20 RX ADMIN — LISINOPRIL 40 MG: 20 TABLET ORAL at 08:51

## 2019-03-20 RX ADMIN — CLOBETASOL PROPIONATE: 0.5 CREAM TOPICAL at 08:57

## 2019-03-20 RX ADMIN — METOPROLOL TARTRATE 50 MG: 50 TABLET, FILM COATED ORAL at 08:51

## 2019-03-20 RX ADMIN — POTASSIUM CHLORIDE 20 MEQ: 1500 TABLET, EXTENDED RELEASE ORAL at 17:03

## 2019-03-20 RX ADMIN — INSULIN LISPRO 1 UNITS: 100 INJECTION, SOLUTION INTRAVENOUS; SUBCUTANEOUS at 12:29

## 2019-03-20 RX ADMIN — COLCHICINE 0.6 MG: 0.6 TABLET, FILM COATED ORAL at 08:52

## 2019-03-20 RX ADMIN — INSULIN LISPRO 1 UNITS: 100 INJECTION, SOLUTION INTRAVENOUS; SUBCUTANEOUS at 21:37

## 2019-03-20 RX ADMIN — FUROSEMIDE 40 MG: 40 TABLET ORAL at 08:51

## 2019-03-20 RX ADMIN — TRIAMCINOLONE ACETONIDE: 1 CREAM TOPICAL at 17:03

## 2019-03-20 RX ADMIN — METOPROLOL TARTRATE 50 MG: 50 TABLET, FILM COATED ORAL at 21:38

## 2019-03-20 RX ADMIN — POTASSIUM CHLORIDE 20 MEQ: 1500 TABLET, EXTENDED RELEASE ORAL at 08:52

## 2019-03-20 RX ADMIN — RIVAROXABAN 15 MG: 15 TABLET, FILM COATED ORAL at 08:53

## 2019-03-20 RX ADMIN — CLOBETASOL PROPIONATE: 0.5 CREAM TOPICAL at 17:03

## 2019-03-20 RX ADMIN — FUROSEMIDE 40 MG: 40 TABLET ORAL at 16:56

## 2019-03-21 LAB
GLUCOSE SERPL-MCNC: 137 MG/DL (ref 65–140)
GLUCOSE SERPL-MCNC: 154 MG/DL (ref 65–140)
GLUCOSE SERPL-MCNC: 170 MG/DL (ref 65–140)
GLUCOSE SERPL-MCNC: 199 MG/DL (ref 65–140)

## 2019-03-21 PROCEDURE — 82948 REAGENT STRIP/BLOOD GLUCOSE: CPT

## 2019-03-21 PROCEDURE — 97530 THERAPEUTIC ACTIVITIES: CPT

## 2019-03-21 PROCEDURE — G0515 COGNITIVE SKILLS DEVELOPMENT: HCPCS

## 2019-03-21 PROCEDURE — 99232 SBSQ HOSP IP/OBS MODERATE 35: CPT | Performed by: PHYSICAL MEDICINE & REHABILITATION

## 2019-03-21 PROCEDURE — 97535 SELF CARE MNGMENT TRAINING: CPT

## 2019-03-21 PROCEDURE — 92507 TX SP LANG VOICE COMM INDIV: CPT

## 2019-03-21 PROCEDURE — 97110 THERAPEUTIC EXERCISES: CPT

## 2019-03-21 PROCEDURE — 97116 GAIT TRAINING THERAPY: CPT

## 2019-03-21 PROCEDURE — 97537 COMMUNITY/WORK REINTEGRATION: CPT

## 2019-03-21 RX ADMIN — LISINOPRIL 40 MG: 20 TABLET ORAL at 09:19

## 2019-03-21 RX ADMIN — PANTOPRAZOLE SODIUM 40 MG: 40 TABLET, DELAYED RELEASE ORAL at 05:28

## 2019-03-21 RX ADMIN — FUROSEMIDE 40 MG: 40 TABLET ORAL at 07:55

## 2019-03-21 RX ADMIN — RIVAROXABAN 15 MG: 15 TABLET, FILM COATED ORAL at 07:55

## 2019-03-21 RX ADMIN — FUROSEMIDE 40 MG: 40 TABLET ORAL at 17:22

## 2019-03-21 RX ADMIN — TRIAMCINOLONE ACETONIDE: 1 CREAM TOPICAL at 09:19

## 2019-03-21 RX ADMIN — POTASSIUM CHLORIDE 20 MEQ: 1500 TABLET, EXTENDED RELEASE ORAL at 17:22

## 2019-03-21 RX ADMIN — INSULIN LISPRO 1 UNITS: 100 INJECTION, SOLUTION INTRAVENOUS; SUBCUTANEOUS at 12:13

## 2019-03-21 RX ADMIN — ATORVASTATIN CALCIUM 10 MG: 10 TABLET, FILM COATED ORAL at 17:22

## 2019-03-21 RX ADMIN — INSULIN LISPRO 1 UNITS: 100 INJECTION, SOLUTION INTRAVENOUS; SUBCUTANEOUS at 21:32

## 2019-03-21 RX ADMIN — INSULIN LISPRO 1 UNITS: 100 INJECTION, SOLUTION INTRAVENOUS; SUBCUTANEOUS at 07:57

## 2019-03-21 RX ADMIN — LEVOTHYROXINE SODIUM 100 MCG: 100 TABLET ORAL at 05:28

## 2019-03-21 RX ADMIN — CLOBETASOL PROPIONATE: 0.5 CREAM TOPICAL at 17:23

## 2019-03-21 RX ADMIN — METOPROLOL TARTRATE 50 MG: 50 TABLET, FILM COATED ORAL at 09:18

## 2019-03-21 RX ADMIN — CLOBETASOL PROPIONATE: 0.5 CREAM TOPICAL at 09:19

## 2019-03-21 RX ADMIN — METOPROLOL TARTRATE 50 MG: 50 TABLET, FILM COATED ORAL at 21:31

## 2019-03-21 RX ADMIN — COLCHICINE 0.6 MG: 0.6 TABLET, FILM COATED ORAL at 09:18

## 2019-03-21 RX ADMIN — POTASSIUM CHLORIDE 20 MEQ: 1500 TABLET, EXTENDED RELEASE ORAL at 09:18

## 2019-03-21 RX ADMIN — TRIAMCINOLONE ACETONIDE: 1 CREAM TOPICAL at 17:23

## 2019-03-22 LAB
GLUCOSE SERPL-MCNC: 114 MG/DL (ref 65–140)
GLUCOSE SERPL-MCNC: 153 MG/DL (ref 65–140)
GLUCOSE SERPL-MCNC: 181 MG/DL (ref 65–140)
GLUCOSE SERPL-MCNC: 189 MG/DL (ref 65–140)

## 2019-03-22 PROCEDURE — G0515 COGNITIVE SKILLS DEVELOPMENT: HCPCS

## 2019-03-22 PROCEDURE — 99232 SBSQ HOSP IP/OBS MODERATE 35: CPT | Performed by: PHYSICAL MEDICINE & REHABILITATION

## 2019-03-22 PROCEDURE — 97530 THERAPEUTIC ACTIVITIES: CPT

## 2019-03-22 PROCEDURE — 97116 GAIT TRAINING THERAPY: CPT

## 2019-03-22 PROCEDURE — 97110 THERAPEUTIC EXERCISES: CPT

## 2019-03-22 PROCEDURE — 97535 SELF CARE MNGMENT TRAINING: CPT

## 2019-03-22 PROCEDURE — 92507 TX SP LANG VOICE COMM INDIV: CPT

## 2019-03-22 PROCEDURE — 97537 COMMUNITY/WORK REINTEGRATION: CPT

## 2019-03-22 PROCEDURE — 82948 REAGENT STRIP/BLOOD GLUCOSE: CPT

## 2019-03-22 RX ADMIN — CLOBETASOL PROPIONATE: 0.5 CREAM TOPICAL at 17:48

## 2019-03-22 RX ADMIN — METOPROLOL TARTRATE 50 MG: 50 TABLET, FILM COATED ORAL at 21:47

## 2019-03-22 RX ADMIN — METOPROLOL TARTRATE 50 MG: 50 TABLET, FILM COATED ORAL at 08:44

## 2019-03-22 RX ADMIN — RIVAROXABAN 15 MG: 15 TABLET, FILM COATED ORAL at 08:42

## 2019-03-22 RX ADMIN — COLCHICINE 0.6 MG: 0.6 TABLET, FILM COATED ORAL at 08:43

## 2019-03-22 RX ADMIN — PANTOPRAZOLE SODIUM 40 MG: 40 TABLET, DELAYED RELEASE ORAL at 04:53

## 2019-03-22 RX ADMIN — FUROSEMIDE 40 MG: 40 TABLET ORAL at 08:42

## 2019-03-22 RX ADMIN — INSULIN LISPRO 1 UNITS: 100 INJECTION, SOLUTION INTRAVENOUS; SUBCUTANEOUS at 12:01

## 2019-03-22 RX ADMIN — LEVOTHYROXINE SODIUM 100 MCG: 100 TABLET ORAL at 04:53

## 2019-03-22 RX ADMIN — INSULIN LISPRO 1 UNITS: 100 INJECTION, SOLUTION INTRAVENOUS; SUBCUTANEOUS at 07:35

## 2019-03-22 RX ADMIN — FUROSEMIDE 40 MG: 40 TABLET ORAL at 16:16

## 2019-03-22 RX ADMIN — ATORVASTATIN CALCIUM 10 MG: 10 TABLET, FILM COATED ORAL at 16:17

## 2019-03-22 RX ADMIN — LISINOPRIL 40 MG: 20 TABLET ORAL at 08:42

## 2019-03-22 RX ADMIN — CLOBETASOL PROPIONATE: 0.5 CREAM TOPICAL at 08:52

## 2019-03-22 RX ADMIN — TRIAMCINOLONE ACETONIDE: 1 CREAM TOPICAL at 08:52

## 2019-03-22 RX ADMIN — POTASSIUM CHLORIDE 20 MEQ: 1500 TABLET, EXTENDED RELEASE ORAL at 08:42

## 2019-03-22 RX ADMIN — TRIAMCINOLONE ACETONIDE: 1 CREAM TOPICAL at 17:49

## 2019-03-22 RX ADMIN — POTASSIUM CHLORIDE 20 MEQ: 1500 TABLET, EXTENDED RELEASE ORAL at 17:39

## 2019-03-22 RX ADMIN — INSULIN LISPRO 1 UNITS: 100 INJECTION, SOLUTION INTRAVENOUS; SUBCUTANEOUS at 21:47

## 2019-03-23 LAB
GLUCOSE SERPL-MCNC: 128 MG/DL (ref 65–140)
GLUCOSE SERPL-MCNC: 201 MG/DL (ref 65–140)
GLUCOSE SERPL-MCNC: 221 MG/DL (ref 65–140)
GLUCOSE SERPL-MCNC: 224 MG/DL (ref 65–140)

## 2019-03-23 PROCEDURE — 82948 REAGENT STRIP/BLOOD GLUCOSE: CPT

## 2019-03-23 RX ADMIN — INSULIN LISPRO 1 UNITS: 100 INJECTION, SOLUTION INTRAVENOUS; SUBCUTANEOUS at 21:12

## 2019-03-23 RX ADMIN — TRIAMCINOLONE ACETONIDE: 1 CREAM TOPICAL at 09:02

## 2019-03-23 RX ADMIN — RIVAROXABAN 15 MG: 15 TABLET, FILM COATED ORAL at 07:27

## 2019-03-23 RX ADMIN — LEVOTHYROXINE SODIUM 100 MCG: 100 TABLET ORAL at 05:34

## 2019-03-23 RX ADMIN — CLOBETASOL PROPIONATE: 0.5 CREAM TOPICAL at 09:02

## 2019-03-23 RX ADMIN — METOPROLOL TARTRATE 50 MG: 50 TABLET, FILM COATED ORAL at 09:00

## 2019-03-23 RX ADMIN — COLCHICINE 0.6 MG: 0.6 TABLET, FILM COATED ORAL at 09:00

## 2019-03-23 RX ADMIN — METOPROLOL TARTRATE 50 MG: 50 TABLET, FILM COATED ORAL at 21:12

## 2019-03-23 RX ADMIN — INSULIN LISPRO 1 UNITS: 100 INJECTION, SOLUTION INTRAVENOUS; SUBCUTANEOUS at 12:00

## 2019-03-23 RX ADMIN — LISINOPRIL 40 MG: 20 TABLET ORAL at 09:00

## 2019-03-23 RX ADMIN — FUROSEMIDE 40 MG: 40 TABLET ORAL at 07:27

## 2019-03-23 RX ADMIN — PANTOPRAZOLE SODIUM 40 MG: 40 TABLET, DELAYED RELEASE ORAL at 05:34

## 2019-03-23 RX ADMIN — POTASSIUM CHLORIDE 20 MEQ: 1500 TABLET, EXTENDED RELEASE ORAL at 09:01

## 2019-03-23 RX ADMIN — POTASSIUM CHLORIDE 20 MEQ: 1500 TABLET, EXTENDED RELEASE ORAL at 17:19

## 2019-03-23 RX ADMIN — FUROSEMIDE 40 MG: 40 TABLET ORAL at 16:25

## 2019-03-23 RX ADMIN — ATORVASTATIN CALCIUM 10 MG: 10 TABLET, FILM COATED ORAL at 16:25

## 2019-03-23 RX ADMIN — CLOBETASOL PROPIONATE: 0.5 CREAM TOPICAL at 17:20

## 2019-03-23 RX ADMIN — TRIAMCINOLONE ACETONIDE: 1 CREAM TOPICAL at 17:20

## 2019-03-23 RX ADMIN — INSULIN LISPRO 1 UNITS: 100 INJECTION, SOLUTION INTRAVENOUS; SUBCUTANEOUS at 07:26

## 2019-03-24 LAB
GLUCOSE SERPL-MCNC: 155 MG/DL (ref 65–140)
GLUCOSE SERPL-MCNC: 177 MG/DL (ref 65–140)
GLUCOSE SERPL-MCNC: 185 MG/DL (ref 65–140)

## 2019-03-24 PROCEDURE — 82948 REAGENT STRIP/BLOOD GLUCOSE: CPT

## 2019-03-24 RX ADMIN — PANTOPRAZOLE SODIUM 40 MG: 40 TABLET, DELAYED RELEASE ORAL at 05:01

## 2019-03-24 RX ADMIN — CLOBETASOL PROPIONATE: 0.5 CREAM TOPICAL at 17:00

## 2019-03-24 RX ADMIN — INSULIN LISPRO 1 UNITS: 100 INJECTION, SOLUTION INTRAVENOUS; SUBCUTANEOUS at 11:25

## 2019-03-24 RX ADMIN — METOPROLOL TARTRATE 50 MG: 50 TABLET, FILM COATED ORAL at 08:57

## 2019-03-24 RX ADMIN — INSULIN LISPRO 1 UNITS: 100 INJECTION, SOLUTION INTRAVENOUS; SUBCUTANEOUS at 07:24

## 2019-03-24 RX ADMIN — INSULIN LISPRO 1 UNITS: 100 INJECTION, SOLUTION INTRAVENOUS; SUBCUTANEOUS at 21:51

## 2019-03-24 RX ADMIN — RIVAROXABAN 15 MG: 15 TABLET, FILM COATED ORAL at 07:16

## 2019-03-24 RX ADMIN — POTASSIUM CHLORIDE 20 MEQ: 1500 TABLET, EXTENDED RELEASE ORAL at 17:00

## 2019-03-24 RX ADMIN — LISINOPRIL 40 MG: 20 TABLET ORAL at 08:57

## 2019-03-24 RX ADMIN — COLCHICINE 0.6 MG: 0.6 TABLET, FILM COATED ORAL at 08:59

## 2019-03-24 RX ADMIN — TRIAMCINOLONE ACETONIDE: 1 CREAM TOPICAL at 08:59

## 2019-03-24 RX ADMIN — CLOBETASOL PROPIONATE: 0.5 CREAM TOPICAL at 08:59

## 2019-03-24 RX ADMIN — ATORVASTATIN CALCIUM 10 MG: 10 TABLET, FILM COATED ORAL at 16:57

## 2019-03-24 RX ADMIN — LEVOTHYROXINE SODIUM 100 MCG: 100 TABLET ORAL at 05:01

## 2019-03-24 RX ADMIN — METOPROLOL TARTRATE 50 MG: 50 TABLET, FILM COATED ORAL at 21:51

## 2019-03-24 RX ADMIN — POTASSIUM CHLORIDE 20 MEQ: 1500 TABLET, EXTENDED RELEASE ORAL at 08:59

## 2019-03-24 RX ADMIN — FUROSEMIDE 40 MG: 40 TABLET ORAL at 07:16

## 2019-03-24 RX ADMIN — FUROSEMIDE 40 MG: 40 TABLET ORAL at 16:57

## 2019-03-24 RX ADMIN — TRIAMCINOLONE ACETONIDE: 1 CREAM TOPICAL at 17:00

## 2019-03-25 LAB
GLUCOSE SERPL-MCNC: 127 MG/DL (ref 65–140)
GLUCOSE SERPL-MCNC: 143 MG/DL (ref 65–140)
GLUCOSE SERPL-MCNC: 182 MG/DL (ref 65–140)
GLUCOSE SERPL-MCNC: 208 MG/DL (ref 65–140)

## 2019-03-25 PROCEDURE — 99232 SBSQ HOSP IP/OBS MODERATE 35: CPT | Performed by: PHYSICAL MEDICINE & REHABILITATION

## 2019-03-25 PROCEDURE — 97110 THERAPEUTIC EXERCISES: CPT

## 2019-03-25 PROCEDURE — 82948 REAGENT STRIP/BLOOD GLUCOSE: CPT

## 2019-03-25 PROCEDURE — 97537 COMMUNITY/WORK REINTEGRATION: CPT

## 2019-03-25 PROCEDURE — 97116 GAIT TRAINING THERAPY: CPT

## 2019-03-25 PROCEDURE — 92507 TX SP LANG VOICE COMM INDIV: CPT

## 2019-03-25 PROCEDURE — G0515 COGNITIVE SKILLS DEVELOPMENT: HCPCS

## 2019-03-25 PROCEDURE — 97530 THERAPEUTIC ACTIVITIES: CPT

## 2019-03-25 PROCEDURE — 97535 SELF CARE MNGMENT TRAINING: CPT

## 2019-03-25 RX ORDER — ATORVASTATIN CALCIUM 10 MG/1
10 TABLET, FILM COATED ORAL
COMMUNITY
End: 2019-12-18 | Stop reason: SDUPTHER

## 2019-03-25 RX ORDER — ESOMEPRAZOLE MAGNESIUM 40 MG/1
40 CAPSULE, DELAYED RELEASE ORAL DAILY
COMMUNITY
End: 2019-04-25

## 2019-03-25 RX ORDER — METOPROLOL TARTRATE 50 MG/1
50 TABLET, FILM COATED ORAL EVERY 12 HOURS SCHEDULED
COMMUNITY
End: 2019-04-26 | Stop reason: CLARIF

## 2019-03-25 RX ORDER — BETAMETHASONE DIPROPIONATE 0.5 MG/G
1 CREAM TOPICAL 2 TIMES DAILY
COMMUNITY
End: 2019-05-02 | Stop reason: SDUPTHER

## 2019-03-25 RX ORDER — LISINOPRIL 40 MG/1
40 TABLET ORAL DAILY
COMMUNITY
End: 2019-05-15 | Stop reason: HOSPADM

## 2019-03-25 RX ORDER — POTASSIUM CHLORIDE 750 MG/1
20 CAPSULE, EXTENDED RELEASE ORAL 2 TIMES DAILY
Qty: 120 CAPSULE | Refills: 0 | Status: CANCELLED | OUTPATIENT
Start: 2019-03-26

## 2019-03-25 RX ORDER — TRIAMCINOLONE ACETONIDE 1 MG/G
1 CREAM TOPICAL 2 TIMES DAILY
COMMUNITY
End: 2019-04-25 | Stop reason: CLARIF

## 2019-03-25 RX ORDER — FUROSEMIDE 40 MG/1
40 TABLET ORAL 2 TIMES DAILY
Qty: 60 TABLET | Refills: 0 | Status: CANCELLED | OUTPATIENT
Start: 2019-03-26

## 2019-03-25 RX ADMIN — CLOBETASOL PROPIONATE: 0.5 CREAM TOPICAL at 08:40

## 2019-03-25 RX ADMIN — COLCHICINE 0.6 MG: 0.6 TABLET, FILM COATED ORAL at 08:39

## 2019-03-25 RX ADMIN — CLOBETASOL PROPIONATE: 0.5 CREAM TOPICAL at 17:10

## 2019-03-25 RX ADMIN — FUROSEMIDE 40 MG: 40 TABLET ORAL at 08:39

## 2019-03-25 RX ADMIN — INSULIN LISPRO 1 UNITS: 100 INJECTION, SOLUTION INTRAVENOUS; SUBCUTANEOUS at 21:48

## 2019-03-25 RX ADMIN — ATORVASTATIN CALCIUM 10 MG: 10 TABLET, FILM COATED ORAL at 17:09

## 2019-03-25 RX ADMIN — TRIAMCINOLONE ACETONIDE: 1 CREAM TOPICAL at 17:10

## 2019-03-25 RX ADMIN — PANTOPRAZOLE SODIUM 40 MG: 40 TABLET, DELAYED RELEASE ORAL at 05:26

## 2019-03-25 RX ADMIN — METOPROLOL TARTRATE 50 MG: 50 TABLET, FILM COATED ORAL at 08:39

## 2019-03-25 RX ADMIN — TRIAMCINOLONE ACETONIDE: 1 CREAM TOPICAL at 08:40

## 2019-03-25 RX ADMIN — POTASSIUM CHLORIDE 20 MEQ: 1500 TABLET, EXTENDED RELEASE ORAL at 17:09

## 2019-03-25 RX ADMIN — RIVAROXABAN 15 MG: 15 TABLET, FILM COATED ORAL at 08:30

## 2019-03-25 RX ADMIN — POTASSIUM CHLORIDE 20 MEQ: 1500 TABLET, EXTENDED RELEASE ORAL at 08:39

## 2019-03-25 RX ADMIN — FUROSEMIDE 40 MG: 40 TABLET ORAL at 17:00

## 2019-03-25 RX ADMIN — INSULIN LISPRO 1 UNITS: 100 INJECTION, SOLUTION INTRAVENOUS; SUBCUTANEOUS at 11:37

## 2019-03-25 RX ADMIN — LISINOPRIL 40 MG: 20 TABLET ORAL at 08:39

## 2019-03-25 RX ADMIN — LEVOTHYROXINE SODIUM 100 MCG: 100 TABLET ORAL at 05:26

## 2019-03-26 ENCOUNTER — DOCUMENTATION (OUTPATIENT)
Dept: PHYSICAL MEDICINE AND REHAB | Facility: HOSPITAL | Age: 84
End: 2019-03-26

## 2019-03-26 ENCOUNTER — TRANSITIONAL CARE MANAGEMENT (OUTPATIENT)
Dept: INTERNAL MEDICINE CLINIC | Facility: CLINIC | Age: 84
End: 2019-03-26

## 2019-03-26 VITALS
RESPIRATION RATE: 16 BRPM | OXYGEN SATURATION: 97 % | DIASTOLIC BLOOD PRESSURE: 80 MMHG | SYSTOLIC BLOOD PRESSURE: 134 MMHG | BODY MASS INDEX: 26.14 KG/M2 | HEART RATE: 82 BPM | HEIGHT: 68 IN | WEIGHT: 172.44 LBS | TEMPERATURE: 97.6 F

## 2019-03-26 PROBLEM — E83.42 HYPOMAGNESEMIA: Status: ACTIVE | Noted: 2019-03-26

## 2019-03-26 PROBLEM — N17.9 ACUTE ON CHRONIC RENAL FAILURE (HCC): Status: ACTIVE | Noted: 2018-09-27

## 2019-03-26 LAB
ANION GAP SERPL CALCULATED.3IONS-SCNC: 7 MMOL/L (ref 4–13)
BUN SERPL-MCNC: 37 MG/DL (ref 7–25)
CALCIUM SERPL-MCNC: 9.4 MG/DL (ref 8.6–10.5)
CHLORIDE SERPL-SCNC: 103 MMOL/L (ref 98–107)
CO2 SERPL-SCNC: 31 MMOL/L (ref 21–31)
CREAT SERPL-MCNC: 1.71 MG/DL (ref 0.7–1.3)
GFR SERPL CREATININE-BSD FRML MDRD: 35 ML/MIN/1.73SQ M
GLUCOSE SERPL-MCNC: 151 MG/DL (ref 65–140)
GLUCOSE SERPL-MCNC: 163 MG/DL (ref 65–99)
GLUCOSE SERPL-MCNC: 166 MG/DL (ref 65–140)
MAGNESIUM SERPL-MCNC: 1.8 MG/DL (ref 1.9–2.7)
POTASSIUM SERPL-SCNC: 4.7 MMOL/L (ref 3.5–5.5)
SODIUM SERPL-SCNC: 141 MMOL/L (ref 134–143)

## 2019-03-26 PROCEDURE — 83735 ASSAY OF MAGNESIUM: CPT | Performed by: PHYSICIAN ASSISTANT

## 2019-03-26 PROCEDURE — 99239 HOSP IP/OBS DSCHRG MGMT >30: CPT | Performed by: PHYSICAL MEDICINE & REHABILITATION

## 2019-03-26 PROCEDURE — 82948 REAGENT STRIP/BLOOD GLUCOSE: CPT

## 2019-03-26 PROCEDURE — 80048 BASIC METABOLIC PNL TOTAL CA: CPT | Performed by: PHYSICIAN ASSISTANT

## 2019-03-26 RX ORDER — POTASSIUM CHLORIDE 20 MEQ/1
20 TABLET, EXTENDED RELEASE ORAL DAILY
Status: DISCONTINUED | OUTPATIENT
Start: 2019-03-27 | End: 2019-03-26 | Stop reason: HOSPADM

## 2019-03-26 RX ORDER — FUROSEMIDE 20 MG/1
60 TABLET ORAL DAILY
Qty: 90 TABLET | Refills: 0 | Status: SHIPPED | OUTPATIENT
Start: 2019-03-27 | End: 2019-04-18 | Stop reason: SDUPTHER

## 2019-03-26 RX ORDER — LEVOTHYROXINE SODIUM 0.1 MG/1
100 TABLET ORAL DAILY
Qty: 30 TABLET | Refills: 0 | Status: SHIPPED | OUTPATIENT
Start: 2019-03-27 | End: 2019-04-23 | Stop reason: SDUPTHER

## 2019-03-26 RX ORDER — POTASSIUM CHLORIDE 750 MG/1
20 CAPSULE, EXTENDED RELEASE ORAL DAILY
Qty: 60 CAPSULE | Refills: 0 | Status: SHIPPED | OUTPATIENT
Start: 2019-03-27 | End: 2019-07-19 | Stop reason: SDUPTHER

## 2019-03-26 RX ADMIN — LEVOTHYROXINE SODIUM 100 MCG: 100 TABLET ORAL at 05:02

## 2019-03-26 RX ADMIN — INSULIN LISPRO 1 UNITS: 100 INJECTION, SOLUTION INTRAVENOUS; SUBCUTANEOUS at 12:17

## 2019-03-26 RX ADMIN — COLCHICINE 0.6 MG: 0.6 TABLET, FILM COATED ORAL at 08:11

## 2019-03-26 RX ADMIN — CLOBETASOL PROPIONATE: 0.5 CREAM TOPICAL at 08:11

## 2019-03-26 RX ADMIN — FUROSEMIDE 40 MG: 40 TABLET ORAL at 08:11

## 2019-03-26 RX ADMIN — TRIAMCINOLONE ACETONIDE: 1 CREAM TOPICAL at 08:11

## 2019-03-26 RX ADMIN — RIVAROXABAN 15 MG: 15 TABLET, FILM COATED ORAL at 08:11

## 2019-03-26 RX ADMIN — PANTOPRAZOLE SODIUM 40 MG: 40 TABLET, DELAYED RELEASE ORAL at 05:02

## 2019-03-26 RX ADMIN — MAGNESIUM GLUCONATE 500 MG ORAL TABLET 400 MG: 500 TABLET ORAL at 14:23

## 2019-03-26 RX ADMIN — LISINOPRIL 40 MG: 20 TABLET ORAL at 08:13

## 2019-03-26 RX ADMIN — INSULIN LISPRO 1 UNITS: 100 INJECTION, SOLUTION INTRAVENOUS; SUBCUTANEOUS at 08:09

## 2019-03-26 RX ADMIN — METOPROLOL TARTRATE 50 MG: 50 TABLET, FILM COATED ORAL at 08:11

## 2019-03-26 RX ADMIN — POTASSIUM CHLORIDE 20 MEQ: 1500 TABLET, EXTENDED RELEASE ORAL at 08:13

## 2019-03-28 DIAGNOSIS — E11.8 TYPE 2 DIABETES MELLITUS WITH COMPLICATION, UNSPECIFIED WHETHER LONG TERM INSULIN USE: Primary | ICD-10-CM

## 2019-03-31 DIAGNOSIS — K21.9 GASTROESOPHAGEAL REFLUX DISEASE, ESOPHAGITIS PRESENCE NOT SPECIFIED: ICD-10-CM

## 2019-04-02 ENCOUNTER — OFFICE VISIT (OUTPATIENT)
Dept: INTERNAL MEDICINE CLINIC | Facility: CLINIC | Age: 84
End: 2019-04-02
Payer: MEDICARE

## 2019-04-02 ENCOUNTER — LAB (OUTPATIENT)
Dept: LAB | Facility: CLINIC | Age: 84
End: 2019-04-02
Payer: MEDICARE

## 2019-04-02 ENCOUNTER — TRANSCRIBE ORDERS (OUTPATIENT)
Dept: LAB | Facility: CLINIC | Age: 84
End: 2019-04-02

## 2019-04-02 VITALS
OXYGEN SATURATION: 99 % | BODY MASS INDEX: 27.28 KG/M2 | WEIGHT: 180 LBS | SYSTOLIC BLOOD PRESSURE: 114 MMHG | HEART RATE: 96 BPM | HEIGHT: 68 IN | DIASTOLIC BLOOD PRESSURE: 42 MMHG

## 2019-04-02 DIAGNOSIS — I10 ESSENTIAL HYPERTENSION: Chronic | ICD-10-CM

## 2019-04-02 DIAGNOSIS — M10.9 GOUT, UNSPECIFIED CAUSE, UNSPECIFIED CHRONICITY, UNSPECIFIED SITE: Chronic | ICD-10-CM

## 2019-04-02 DIAGNOSIS — D72.829 LEUKOCYTOSIS, UNSPECIFIED TYPE: ICD-10-CM

## 2019-04-02 DIAGNOSIS — E03.9 ADULT HYPOTHYROIDISM: Chronic | ICD-10-CM

## 2019-04-02 DIAGNOSIS — N18.9 ACUTE RENAL FAILURE SUPERIMPOSED ON CHRONIC KIDNEY DISEASE, UNSPECIFIED CKD STAGE, UNSPECIFIED ACUTE RENAL FAILURE TYPE (HCC): ICD-10-CM

## 2019-04-02 DIAGNOSIS — I48.20 CHRONIC A-FIB (HCC): Chronic | ICD-10-CM

## 2019-04-02 DIAGNOSIS — N17.9 ACUTE ON CHRONIC RENAL FAILURE (HCC): ICD-10-CM

## 2019-04-02 DIAGNOSIS — E11.8 TYPE 2 DIABETES MELLITUS WITH COMPLICATION, WITHOUT LONG-TERM CURRENT USE OF INSULIN (HCC): Chronic | ICD-10-CM

## 2019-04-02 DIAGNOSIS — N18.9 ACUTE ON CHRONIC RENAL FAILURE (HCC): ICD-10-CM

## 2019-04-02 DIAGNOSIS — I50.21 ACUTE SYSTOLIC CONGESTIVE HEART FAILURE (HCC): Chronic | ICD-10-CM

## 2019-04-02 DIAGNOSIS — N17.9 ACUTE RENAL FAILURE SUPERIMPOSED ON CHRONIC KIDNEY DISEASE, UNSPECIFIED CKD STAGE, UNSPECIFIED ACUTE RENAL FAILURE TYPE (HCC): ICD-10-CM

## 2019-04-02 DIAGNOSIS — E83.42 HYPOMAGNESEMIA: ICD-10-CM

## 2019-04-02 DIAGNOSIS — E11.8 TYPE 2 DIABETES MELLITUS WITH COMPLICATION, WITHOUT LONG-TERM CURRENT USE OF INSULIN (HCC): Primary | Chronic | ICD-10-CM

## 2019-04-02 LAB
ANION GAP SERPL CALCULATED.3IONS-SCNC: 6 MMOL/L (ref 4–13)
BUN SERPL-MCNC: 23 MG/DL (ref 5–25)
CALCIUM SERPL-MCNC: 8.7 MG/DL (ref 8.3–10.1)
CHLORIDE SERPL-SCNC: 106 MMOL/L (ref 100–108)
CO2 SERPL-SCNC: 29 MMOL/L (ref 21–32)
CREAT SERPL-MCNC: 1.3 MG/DL (ref 0.6–1.3)
EST. AVERAGE GLUCOSE BLD GHB EST-MCNC: 146 MG/DL
GFR SERPL CREATININE-BSD FRML MDRD: 49 ML/MIN/1.73SQ M
GLUCOSE P FAST SERPL-MCNC: 97 MG/DL (ref 65–99)
HBA1C MFR BLD: 6.7 % (ref 4.2–6.3)
MAGNESIUM SERPL-MCNC: 1.6 MG/DL (ref 1.6–2.6)
POTASSIUM SERPL-SCNC: 3.8 MMOL/L (ref 3.5–5.3)
SODIUM SERPL-SCNC: 141 MMOL/L (ref 136–145)

## 2019-04-02 PROCEDURE — 36415 COLL VENOUS BLD VENIPUNCTURE: CPT

## 2019-04-02 PROCEDURE — 83036 HEMOGLOBIN GLYCOSYLATED A1C: CPT

## 2019-04-02 PROCEDURE — 99214 OFFICE O/P EST MOD 30 MIN: CPT | Performed by: PHYSICIAN ASSISTANT

## 2019-04-02 PROCEDURE — 80048 BASIC METABOLIC PNL TOTAL CA: CPT

## 2019-04-02 PROCEDURE — 83735 ASSAY OF MAGNESIUM: CPT

## 2019-04-02 RX ORDER — ESOMEPRAZOLE MAGNESIUM 40 MG/1
CAPSULE, DELAYED RELEASE ORAL
Qty: 90 CAPSULE | Refills: 0 | Status: SHIPPED | OUTPATIENT
Start: 2019-04-02 | End: 2019-04-25 | Stop reason: CLARIF

## 2019-04-04 ENCOUNTER — CONSULT (OUTPATIENT)
Dept: NEUROLOGY | Facility: CLINIC | Age: 84
End: 2019-04-04
Payer: MEDICARE

## 2019-04-04 ENCOUNTER — LAB (OUTPATIENT)
Dept: LAB | Facility: CLINIC | Age: 84
End: 2019-04-04
Payer: MEDICARE

## 2019-04-04 VITALS
HEIGHT: 68 IN | WEIGHT: 180 LBS | SYSTOLIC BLOOD PRESSURE: 158 MMHG | DIASTOLIC BLOOD PRESSURE: 88 MMHG | BODY MASS INDEX: 27.28 KG/M2 | HEART RATE: 78 BPM

## 2019-04-04 DIAGNOSIS — G31.84 MILD COGNITIVE IMPAIRMENT: ICD-10-CM

## 2019-04-04 DIAGNOSIS — I63.40 CEREBROVASCULAR ACCIDENT (CVA) DUE TO EMBOLISM OF CEREBRAL ARTERY (HCC): ICD-10-CM

## 2019-04-04 DIAGNOSIS — G62.9 POLYNEUROPATHY: ICD-10-CM

## 2019-04-04 DIAGNOSIS — G31.84 MILD COGNITIVE IMPAIRMENT: Primary | ICD-10-CM

## 2019-04-04 DIAGNOSIS — R26.9 NEUROLOGIC GAIT DYSFUNCTION: ICD-10-CM

## 2019-04-04 LAB
FOLATE SERPL-MCNC: >20 NG/ML (ref 3.1–17.5)
VIT B12 SERPL-MCNC: 815 PG/ML (ref 100–900)

## 2019-04-04 PROCEDURE — 86617 LYME DISEASE ANTIBODY: CPT

## 2019-04-04 PROCEDURE — 99214 OFFICE O/P EST MOD 30 MIN: CPT | Performed by: PSYCHIATRY & NEUROLOGY

## 2019-04-04 PROCEDURE — 82746 ASSAY OF FOLIC ACID SERUM: CPT

## 2019-04-04 PROCEDURE — 36415 COLL VENOUS BLD VENIPUNCTURE: CPT

## 2019-04-04 PROCEDURE — 82607 VITAMIN B-12: CPT

## 2019-04-04 RX ORDER — DONEPEZIL HYDROCHLORIDE 5 MG/1
5 TABLET, FILM COATED ORAL
Qty: 30 TABLET | Refills: 1 | Status: SHIPPED | OUTPATIENT
Start: 2019-04-04 | End: 2019-04-22 | Stop reason: SDUPTHER

## 2019-04-05 ENCOUNTER — OFFICE VISIT (OUTPATIENT)
Dept: CARDIOLOGY CLINIC | Facility: CLINIC | Age: 84
End: 2019-04-05
Payer: MEDICARE

## 2019-04-05 VITALS
BODY MASS INDEX: 27.58 KG/M2 | WEIGHT: 182 LBS | DIASTOLIC BLOOD PRESSURE: 70 MMHG | HEIGHT: 68 IN | HEART RATE: 80 BPM | SYSTOLIC BLOOD PRESSURE: 136 MMHG | OXYGEN SATURATION: 99 %

## 2019-04-05 DIAGNOSIS — E78.2 MIXED HYPERLIPIDEMIA: Chronic | ICD-10-CM

## 2019-04-05 DIAGNOSIS — I50.22 CHRONIC SYSTOLIC CONGESTIVE HEART FAILURE (HCC): Primary | ICD-10-CM

## 2019-04-05 DIAGNOSIS — I48.20 CHRONIC A-FIB (HCC): Chronic | ICD-10-CM

## 2019-04-05 DIAGNOSIS — I10 ESSENTIAL HYPERTENSION: Chronic | ICD-10-CM

## 2019-04-05 PROBLEM — I50.21 ACUTE SYSTOLIC CONGESTIVE HEART FAILURE (HCC): Chronic | Status: RESOLVED | Noted: 2018-07-26 | Resolved: 2019-04-05

## 2019-04-05 LAB
B BURGDOR IGG PATRN SER IB-IMP: NEGATIVE
B BURGDOR IGM PATRN SER IB-IMP: NEGATIVE
B BURGDOR18KD IGG SER QL IB: ABNORMAL
B BURGDOR23KD IGG SER QL IB: ABNORMAL
B BURGDOR23KD IGM SER QL IB: ABNORMAL
B BURGDOR28KD IGG SER QL IB: ABNORMAL
B BURGDOR30KD IGG SER QL IB: ABNORMAL
B BURGDOR39KD IGG SER QL IB: ABNORMAL
B BURGDOR39KD IGM SER QL IB: ABNORMAL
B BURGDOR41KD IGG SER QL IB: PRESENT
B BURGDOR41KD IGM SER QL IB: ABNORMAL
B BURGDOR45KD IGG SER QL IB: ABNORMAL
B BURGDOR58KD IGG SER QL IB: ABNORMAL
B BURGDOR66KD IGG SER QL IB: ABNORMAL
B BURGDOR93KD IGG SER QL IB: ABNORMAL

## 2019-04-05 PROCEDURE — 99214 OFFICE O/P EST MOD 30 MIN: CPT | Performed by: INTERNAL MEDICINE

## 2019-04-08 DIAGNOSIS — E11.8 TYPE 2 DIABETES MELLITUS WITH COMPLICATION, UNSPECIFIED WHETHER LONG TERM INSULIN USE: ICD-10-CM

## 2019-04-09 ENCOUNTER — TELEPHONE (OUTPATIENT)
Dept: INTERNAL MEDICINE CLINIC | Facility: CLINIC | Age: 84
End: 2019-04-09

## 2019-04-11 DIAGNOSIS — I82.403 RECURRENT ACUTE DEEP VEIN THROMBOSIS (DVT) OF BOTH LOWER EXTREMITIES (HCC): ICD-10-CM

## 2019-04-12 RX ORDER — RIVAROXABAN 15 MG/1
TABLET, FILM COATED ORAL
Qty: 30 TABLET | Refills: 0 | Status: SHIPPED | OUTPATIENT
Start: 2019-04-12 | End: 2019-04-26 | Stop reason: SDUPTHER

## 2019-04-18 DIAGNOSIS — I48.20 CHRONIC A-FIB (HCC): ICD-10-CM

## 2019-04-18 DIAGNOSIS — R60.0 BILATERAL LOWER EXTREMITY EDEMA: ICD-10-CM

## 2019-04-18 DIAGNOSIS — I10 HYPERTENSION, UNCONTROLLED: ICD-10-CM

## 2019-04-18 DIAGNOSIS — I50.22 CHRONIC SYSTOLIC CHF (CONGESTIVE HEART FAILURE) (HCC): ICD-10-CM

## 2019-04-18 RX ORDER — METOPROLOL TARTRATE 50 MG/1
TABLET, FILM COATED ORAL
Qty: 60 TABLET | Refills: 8 | Status: SHIPPED | OUTPATIENT
Start: 2019-04-18 | End: 2019-08-29 | Stop reason: SDUPTHER

## 2019-04-18 RX ORDER — FUROSEMIDE 20 MG/1
TABLET ORAL
Qty: 90 TABLET | Refills: 0 | Status: SHIPPED | OUTPATIENT
Start: 2019-04-18 | End: 2019-04-26 | Stop reason: SDUPTHER

## 2019-04-22 DIAGNOSIS — E11.8 TYPE 2 DIABETES MELLITUS WITH COMPLICATION, UNSPECIFIED WHETHER LONG TERM INSULIN USE: ICD-10-CM

## 2019-04-22 DIAGNOSIS — G31.84 MILD COGNITIVE IMPAIRMENT: ICD-10-CM

## 2019-04-22 RX ORDER — DONEPEZIL HYDROCHLORIDE 5 MG/1
TABLET, FILM COATED ORAL
Qty: 11 TABLET | Refills: 0 | Status: SHIPPED | OUTPATIENT
Start: 2019-04-22 | End: 2019-05-07 | Stop reason: SDUPTHER

## 2019-04-23 ENCOUNTER — TELEPHONE (OUTPATIENT)
Dept: DERMATOLOGY | Facility: CLINIC | Age: 84
End: 2019-04-23

## 2019-04-23 DIAGNOSIS — E03.9 HYPOTHYROIDISM, UNSPECIFIED TYPE: ICD-10-CM

## 2019-04-23 RX ORDER — LEVOTHYROXINE SODIUM 0.1 MG/1
100 TABLET ORAL DAILY
Qty: 90 TABLET | Refills: 1 | Status: SHIPPED | OUTPATIENT
Start: 2019-04-23 | End: 2019-04-26 | Stop reason: SDUPTHER

## 2019-04-25 ENCOUNTER — OFFICE VISIT (OUTPATIENT)
Dept: INTERNAL MEDICINE CLINIC | Facility: CLINIC | Age: 84
End: 2019-04-25
Payer: MEDICARE

## 2019-04-25 ENCOUNTER — TELEPHONE (OUTPATIENT)
Dept: INTERNAL MEDICINE CLINIC | Facility: CLINIC | Age: 84
End: 2019-04-25

## 2019-04-25 VITALS
BODY MASS INDEX: 29.03 KG/M2 | TEMPERATURE: 97.6 F | HEIGHT: 67 IN | SYSTOLIC BLOOD PRESSURE: 130 MMHG | DIASTOLIC BLOOD PRESSURE: 90 MMHG | OXYGEN SATURATION: 97 % | WEIGHT: 185 LBS | HEART RATE: 58 BPM

## 2019-04-25 DIAGNOSIS — I50.22 CHRONIC SYSTOLIC CONGESTIVE HEART FAILURE (HCC): Chronic | ICD-10-CM

## 2019-04-25 DIAGNOSIS — I48.20 CHRONIC A-FIB (HCC): Chronic | ICD-10-CM

## 2019-04-25 DIAGNOSIS — E03.9 ADULT HYPOTHYROIDISM: Chronic | ICD-10-CM

## 2019-04-25 DIAGNOSIS — E11.42 TYPE 2 DIABETES MELLITUS WITH DIABETIC POLYNEUROPATHY, WITHOUT LONG-TERM CURRENT USE OF INSULIN (HCC): Primary | Chronic | ICD-10-CM

## 2019-04-25 DIAGNOSIS — I10 ESSENTIAL HYPERTENSION: Chronic | ICD-10-CM

## 2019-04-25 DIAGNOSIS — N18.30 CKD (CHRONIC KIDNEY DISEASE), STAGE III (HCC): ICD-10-CM

## 2019-04-25 PROBLEM — N17.9 ACUTE ON CHRONIC RENAL FAILURE (HCC): Status: RESOLVED | Noted: 2018-09-27 | Resolved: 2019-04-25

## 2019-04-25 PROBLEM — E87.6 HYPOKALEMIA: Status: RESOLVED | Noted: 2019-03-14 | Resolved: 2019-04-25

## 2019-04-25 PROBLEM — N18.9 ACUTE ON CHRONIC RENAL FAILURE (HCC): Status: RESOLVED | Noted: 2018-09-27 | Resolved: 2019-04-25

## 2019-04-25 PROBLEM — D72.829 LEUCOCYTOSIS: Status: RESOLVED | Noted: 2018-11-27 | Resolved: 2019-04-25

## 2019-04-25 PROBLEM — E83.42 HYPOMAGNESEMIA: Status: RESOLVED | Noted: 2019-03-26 | Resolved: 2019-04-25

## 2019-04-25 LAB
LEFT EYE DIABETIC RETINOPATHY: NORMAL
LEFT EYE IMAGE QUALITY: NORMAL
LEFT EYE MACULAR EDEMA: NORMAL
LEFT EYE OTHER RETINOPATHY: NORMAL
RIGHT EYE DIABETIC RETINOPATHY: NORMAL
RIGHT EYE IMAGE QUALITY: NORMAL
RIGHT EYE MACULAR EDEMA: NORMAL
RIGHT EYE OTHER RETINOPATHY: NORMAL
SEVERITY (EYE EXAM): NORMAL

## 2019-04-25 PROCEDURE — 99214 OFFICE O/P EST MOD 30 MIN: CPT | Performed by: INTERNAL MEDICINE

## 2019-04-25 PROCEDURE — 92250 FUNDUS PHOTOGRAPHY W/I&R: CPT | Performed by: INTERNAL MEDICINE

## 2019-04-26 DIAGNOSIS — M1A.0790 CHRONIC IDIOPATHIC GOUT INVOLVING TOE WITHOUT TOPHUS, UNSPECIFIED LATERALITY: ICD-10-CM

## 2019-04-26 DIAGNOSIS — I82.403 RECURRENT ACUTE DEEP VEIN THROMBOSIS (DVT) OF BOTH LOWER EXTREMITIES (HCC): ICD-10-CM

## 2019-04-26 DIAGNOSIS — R60.0 BILATERAL LOWER EXTREMITY EDEMA: ICD-10-CM

## 2019-04-26 DIAGNOSIS — E03.9 HYPOTHYROIDISM, UNSPECIFIED TYPE: ICD-10-CM

## 2019-04-26 RX ORDER — LEVOTHYROXINE SODIUM 0.1 MG/1
100 TABLET ORAL DAILY
Qty: 90 TABLET | Refills: 1 | Status: SHIPPED | OUTPATIENT
Start: 2019-04-26 | End: 2019-08-29 | Stop reason: SDUPTHER

## 2019-04-26 RX ORDER — FUROSEMIDE 40 MG/1
60 TABLET ORAL DAILY
Qty: 135 TABLET | Refills: 1 | Status: SHIPPED | OUTPATIENT
Start: 2019-04-26 | End: 2019-09-27 | Stop reason: SDUPTHER

## 2019-04-26 RX ORDER — COLCHICINE 0.6 MG/1
0.6 TABLET ORAL DAILY
Qty: 90 TABLET | Refills: 1 | Status: SHIPPED | OUTPATIENT
Start: 2019-04-26 | End: 2019-07-16

## 2019-04-30 ENCOUNTER — HOSPITAL ENCOUNTER (OUTPATIENT)
Dept: MRI IMAGING | Facility: CLINIC | Age: 84
Discharge: HOME | End: 2019-04-30
Payer: MEDICARE

## 2019-04-30 DIAGNOSIS — G31.84 MILD COGNITIVE IMPAIRMENT: ICD-10-CM

## 2019-04-30 DIAGNOSIS — I63.40 CEREBROVASCULAR ACCIDENT (CVA) DUE TO EMBOLISM OF CEREBRAL ARTERY (HCC): ICD-10-CM

## 2019-04-30 PROCEDURE — 70551 MRI BRAIN STEM W/O DYE: CPT

## 2019-05-02 ENCOUNTER — OFFICE VISIT (OUTPATIENT)
Dept: DERMATOLOGY | Facility: CLINIC | Age: 84
End: 2019-05-02
Payer: MEDICARE

## 2019-05-02 DIAGNOSIS — L28.1 PRURIGO NODULARIS: Primary | ICD-10-CM

## 2019-05-02 DIAGNOSIS — Z13.89 SCREENING FOR SKIN CONDITION: ICD-10-CM

## 2019-05-02 DIAGNOSIS — L82.1 SEBORRHEIC KERATOSIS: ICD-10-CM

## 2019-05-02 DIAGNOSIS — Z85.828 HISTORY OF SKIN CANCER: ICD-10-CM

## 2019-05-02 PROCEDURE — 99213 OFFICE O/P EST LOW 20 MIN: CPT | Performed by: DERMATOLOGY

## 2019-05-02 RX ORDER — BETAMETHASONE DIPROPIONATE 0.5 MG/G
1 CREAM TOPICAL 2 TIMES DAILY
Qty: 45 G | Refills: 2 | Status: SHIPPED | OUTPATIENT
Start: 2019-05-02 | End: 2019-07-26 | Stop reason: SDUPTHER

## 2019-05-07 DIAGNOSIS — G31.84 MILD COGNITIVE IMPAIRMENT: ICD-10-CM

## 2019-05-07 RX ORDER — DONEPEZIL HYDROCHLORIDE 5 MG/1
TABLET, FILM COATED ORAL
Qty: 28 TABLET | Refills: 0 | Status: SHIPPED | OUTPATIENT
Start: 2019-05-07 | End: 2019-06-03 | Stop reason: SDUPTHER

## 2019-05-10 ENCOUNTER — TELEPHONE (OUTPATIENT)
Dept: INTERNAL MEDICINE CLINIC | Facility: CLINIC | Age: 84
End: 2019-05-10

## 2019-05-13 ENCOUNTER — APPOINTMENT (OUTPATIENT)
Dept: RADIOLOGY | Facility: HOSPITAL | Age: 84
End: 2019-05-13
Payer: MEDICARE

## 2019-05-13 ENCOUNTER — HOSPITAL ENCOUNTER (OUTPATIENT)
Facility: HOSPITAL | Age: 84
Setting detail: OBSERVATION
Discharge: HOME HEALTH | End: 2019-05-15
Attending: SURGERY | Admitting: SURGERY
Payer: MEDICARE

## 2019-05-13 DIAGNOSIS — S12.500A C6 CERVICAL FRACTURE (HCC): Primary | ICD-10-CM

## 2019-05-13 DIAGNOSIS — S01.81XA FACIAL LACERATION: ICD-10-CM

## 2019-05-13 DIAGNOSIS — S09.90XA CLOSED HEAD INJURY: ICD-10-CM
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
## 2019-05-13 PROBLEM — S01.91XA LACERATION OF HEAD: Status: ACTIVE | Noted: 2019-05-13

## 2019-05-13 LAB
ALBUMIN SERPL BCP-MCNC: 3.1 G/DL (ref 3.5–5)
ALP SERPL-CCNC: 108 U/L (ref 46–116)
ALT SERPL W P-5'-P-CCNC: 62 U/L (ref 12–78)
ANION GAP SERPL CALCULATED.3IONS-SCNC: 4 MMOL/L (ref 4–13)
APTT PPP: 31 SECONDS (ref 26–38)
AST SERPL W P-5'-P-CCNC: 29 U/L (ref 5–45)
BASOPHILS # BLD AUTO: 0.04 THOUSANDS/ΜL (ref 0–0.1)
BASOPHILS NFR BLD AUTO: 0 % (ref 0–1)
BILIRUB SERPL-MCNC: 1.71 MG/DL (ref 0.2–1)
BUN SERPL-MCNC: 14 MG/DL (ref 5–25)
CALCIUM SERPL-MCNC: 7.8 MG/DL (ref 8.3–10.1)
CHLORIDE SERPL-SCNC: 106 MMOL/L (ref 100–108)
CO2 SERPL-SCNC: 32 MMOL/L (ref 21–32)
CREAT SERPL-MCNC: 1.11 MG/DL (ref 0.6–1.3)
EOSINOPHIL # BLD AUTO: 0.07 THOUSAND/ΜL (ref 0–0.61)
EOSINOPHIL NFR BLD AUTO: 1 % (ref 0–6)
ERYTHROCYTE [DISTWIDTH] IN BLOOD BY AUTOMATED COUNT: 13.4 % (ref 11.6–15.1)
GFR SERPL CREATININE-BSD FRML MDRD: 60 ML/MIN/1.73SQ M
GLUCOSE P FAST SERPL-MCNC: 103 MG/DL (ref 65–99)
GLUCOSE SERPL-MCNC: 103 MG/DL (ref 65–140)
HCT VFR BLD AUTO: 36.9 % (ref 36.5–49.3)
HGB BLD-MCNC: 12.3 G/DL (ref 12–17)
IMM GRANULOCYTES # BLD AUTO: 0.05 THOUSAND/UL (ref 0–0.2)
IMM GRANULOCYTES NFR BLD AUTO: 0 % (ref 0–2)
INR PPP: 1.65 (ref 0.86–1.17)
LYMPHOCYTES # BLD AUTO: 1.47 THOUSANDS/ΜL (ref 0.6–4.47)
LYMPHOCYTES NFR BLD AUTO: 10 % (ref 14–44)
MCH RBC QN AUTO: 31.2 PG (ref 26.8–34.3)
MCHC RBC AUTO-ENTMCNC: 33.3 G/DL (ref 31.4–37.4)
MCV RBC AUTO: 94 FL (ref 82–98)
MONOCYTES # BLD AUTO: 1.02 THOUSAND/ΜL (ref 0.17–1.22)
MONOCYTES NFR BLD AUTO: 7 % (ref 4–12)
NEUTROPHILS # BLD AUTO: 11.87 THOUSANDS/ΜL (ref 1.85–7.62)
NEUTS SEG NFR BLD AUTO: 82 % (ref 43–75)
NRBC BLD AUTO-RTO: 0 /100 WBCS
PLATELET # BLD AUTO: 233 THOUSANDS/UL (ref 149–390)
PMV BLD AUTO: 10.3 FL (ref 8.9–12.7)
POTASSIUM SERPL-SCNC: 2.9 MMOL/L (ref 3.5–5.3)
PROT SERPL-MCNC: 6.8 G/DL (ref 6.4–8.2)
PROTHROMBIN TIME: 19.6 SECONDS (ref 11.8–14.2)
RBC # BLD AUTO: 3.94 MILLION/UL (ref 3.88–5.62)
SODIUM SERPL-SCNC: 142 MMOL/L (ref 136–145)
WBC # BLD AUTO: 14.52 THOUSAND/UL (ref 4.31–10.16)

## 2019-05-13 PROCEDURE — 80053 COMPREHEN METABOLIC PANEL: CPT | Performed by: EMERGENCY MEDICINE

## 2019-05-13 PROCEDURE — 85610 PROTHROMBIN TIME: CPT | Performed by: EMERGENCY MEDICINE

## 2019-05-13 PROCEDURE — 85025 COMPLETE CBC W/AUTO DIFF WBC: CPT | Performed by: EMERGENCY MEDICINE

## 2019-05-13 PROCEDURE — 99285 EMERGENCY DEPT VISIT HI MDM: CPT

## 2019-05-13 PROCEDURE — 36415 COLL VENOUS BLD VENIPUNCTURE: CPT | Performed by: EMERGENCY MEDICINE

## 2019-05-13 PROCEDURE — 72125 CT NECK SPINE W/O DYE: CPT

## 2019-05-13 PROCEDURE — 99219 PR INITIAL OBSERVATION CARE/DAY 50 MINUTES: CPT | Performed by: SURGERY

## 2019-05-13 PROCEDURE — 85730 THROMBOPLASTIN TIME PARTIAL: CPT | Performed by: EMERGENCY MEDICINE

## 2019-05-13 RX ORDER — LIDOCAINE HYDROCHLORIDE 10 MG/ML
5 INJECTION, SOLUTION EPIDURAL; INFILTRATION; INTRACAUDAL; PERINEURAL ONCE
Status: COMPLETED | OUTPATIENT
Start: 2019-05-14 | End: 2019-05-14

## 2019-05-13 RX ORDER — GINSENG 100 MG
1 CAPSULE ORAL ONCE
Status: COMPLETED | OUTPATIENT
Start: 2019-05-14 | End: 2019-05-14

## 2019-05-13 RX ORDER — LIDOCAINE HYDROCHLORIDE 10 MG/ML
5 INJECTION, SOLUTION EPIDURAL; INFILTRATION; INTRACAUDAL; PERINEURAL ONCE
Status: COMPLETED | OUTPATIENT
Start: 2019-05-13 | End: 2019-05-13

## 2019-05-13 RX ORDER — GINSENG 100 MG
1 CAPSULE ORAL 2 TIMES DAILY
Status: DISCONTINUED | OUTPATIENT
Start: 2019-05-13 | End: 2019-05-15 | Stop reason: HOSPADM

## 2019-05-13 RX ORDER — SENNOSIDES 8.6 MG
2 TABLET ORAL DAILY
Status: DISCONTINUED | OUTPATIENT
Start: 2019-05-14 | End: 2019-05-15 | Stop reason: HOSPADM

## 2019-05-13 RX ORDER — ONDANSETRON 2 MG/ML
4 INJECTION INTRAMUSCULAR; INTRAVENOUS EVERY 6 HOURS PRN
Status: DISCONTINUED | OUTPATIENT
Start: 2019-05-13 | End: 2019-05-15 | Stop reason: HOSPADM

## 2019-05-13 RX ORDER — HEPARIN SODIUM 5000 [USP'U]/ML
5000 INJECTION, SOLUTION INTRAVENOUS; SUBCUTANEOUS EVERY 8 HOURS SCHEDULED
Status: DISCONTINUED | OUTPATIENT
Start: 2019-05-13 | End: 2019-05-14

## 2019-05-13 RX ORDER — DOCUSATE SODIUM 100 MG/1
100 CAPSULE, LIQUID FILLED ORAL 2 TIMES DAILY
Status: DISCONTINUED | OUTPATIENT
Start: 2019-05-13 | End: 2019-05-15 | Stop reason: HOSPADM

## 2019-05-13 RX ADMIN — HEPARIN SODIUM 5000 UNITS: 5000 INJECTION, SOLUTION INTRAVENOUS; SUBCUTANEOUS at 22:59

## 2019-05-13 RX ADMIN — LIDOCAINE HYDROCHLORIDE 5 ML: 10 INJECTION, SOLUTION EPIDURAL; INFILTRATION; INTRACAUDAL; PERINEURAL at 19:58

## 2019-05-14 ENCOUNTER — APPOINTMENT (OUTPATIENT)
Dept: RADIOLOGY | Facility: HOSPITAL | Age: 84
End: 2019-05-14
Payer: MEDICARE

## 2019-05-14 PROBLEM — N18.9 CHRONIC KIDNEY DISEASE: Chronic | Status: ACTIVE | Noted: 2019-04-25

## 2019-05-14 LAB
ANION GAP SERPL CALCULATED.3IONS-SCNC: 3 MMOL/L (ref 4–13)
ANION GAP SERPL CALCULATED.3IONS-SCNC: 7 MMOL/L (ref 4–13)
BUN SERPL-MCNC: 18 MG/DL (ref 5–25)
BUN SERPL-MCNC: 18 MG/DL (ref 5–25)
CALCIUM SERPL-MCNC: 7.7 MG/DL (ref 8.3–10.1)
CALCIUM SERPL-MCNC: 7.8 MG/DL (ref 8.3–10.1)
CHLORIDE SERPL-SCNC: 105 MMOL/L (ref 100–108)
CHLORIDE SERPL-SCNC: 106 MMOL/L (ref 100–108)
CO2 SERPL-SCNC: 30 MMOL/L (ref 21–32)
CO2 SERPL-SCNC: 31 MMOL/L (ref 21–32)
CREAT SERPL-MCNC: 1.35 MG/DL (ref 0.6–1.3)
CREAT SERPL-MCNC: 1.37 MG/DL (ref 0.6–1.3)
ERYTHROCYTE [DISTWIDTH] IN BLOOD BY AUTOMATED COUNT: 13.7 % (ref 11.6–15.1)
GFR SERPL CREATININE-BSD FRML MDRD: 46 ML/MIN/1.73SQ M
GFR SERPL CREATININE-BSD FRML MDRD: 47 ML/MIN/1.73SQ M
GLUCOSE SERPL-MCNC: 140 MG/DL (ref 65–140)
GLUCOSE SERPL-MCNC: 59 MG/DL (ref 65–140)
HCT VFR BLD AUTO: 34 % (ref 36.5–49.3)
HGB BLD-MCNC: 10.9 G/DL (ref 12–17)
MAGNESIUM SERPL-MCNC: 1.5 MG/DL (ref 1.6–2.6)
MAGNESIUM SERPL-MCNC: 1.8 MG/DL (ref 1.6–2.6)
MCH RBC QN AUTO: 30.7 PG (ref 26.8–34.3)
MCHC RBC AUTO-ENTMCNC: 32.1 G/DL (ref 31.4–37.4)
MCV RBC AUTO: 96 FL (ref 82–98)
PLATELET # BLD AUTO: 215 THOUSANDS/UL (ref 149–390)
PMV BLD AUTO: 11.2 FL (ref 8.9–12.7)
POTASSIUM SERPL-SCNC: 2.8 MMOL/L (ref 3.5–5.3)
POTASSIUM SERPL-SCNC: 3.5 MMOL/L (ref 3.5–5.3)
RBC # BLD AUTO: 3.55 MILLION/UL (ref 3.88–5.62)
SODIUM SERPL-SCNC: 139 MMOL/L (ref 136–145)
SODIUM SERPL-SCNC: 143 MMOL/L (ref 136–145)
WBC # BLD AUTO: 12.43 THOUSAND/UL (ref 4.31–10.16)

## 2019-05-14 PROCEDURE — G8987 SELF CARE CURRENT STATUS: HCPCS

## 2019-05-14 PROCEDURE — 99225 PR SBSQ OBSERVATION CARE/DAY 25 MINUTES: CPT | Performed by: PHYSICIAN ASSISTANT

## 2019-05-14 PROCEDURE — 12011 RPR F/E/E/N/L/M 2.5 CM/<: CPT | Performed by: SURGERY

## 2019-05-14 PROCEDURE — 70486 CT MAXILLOFACIAL W/O DYE: CPT

## 2019-05-14 PROCEDURE — 99214 OFFICE O/P EST MOD 30 MIN: CPT | Performed by: PHYSICIAN ASSISTANT

## 2019-05-14 PROCEDURE — G8988 SELF CARE GOAL STATUS: HCPCS

## 2019-05-14 PROCEDURE — G8978 MOBILITY CURRENT STATUS: HCPCS

## 2019-05-14 PROCEDURE — NC001 PR NO CHARGE: Performed by: SURGERY

## 2019-05-14 PROCEDURE — G8979 MOBILITY GOAL STATUS: HCPCS

## 2019-05-14 PROCEDURE — 80048 BASIC METABOLIC PNL TOTAL CA: CPT | Performed by: PHYSICIAN ASSISTANT

## 2019-05-14 PROCEDURE — 83735 ASSAY OF MAGNESIUM: CPT | Performed by: PHYSICIAN ASSISTANT

## 2019-05-14 PROCEDURE — 97163 PT EVAL HIGH COMPLEX 45 MIN: CPT

## 2019-05-14 PROCEDURE — 85027 COMPLETE CBC AUTOMATED: CPT | Performed by: EMERGENCY MEDICINE

## 2019-05-14 PROCEDURE — 80048 BASIC METABOLIC PNL TOTAL CA: CPT | Performed by: EMERGENCY MEDICINE

## 2019-05-14 PROCEDURE — 97166 OT EVAL MOD COMPLEX 45 MIN: CPT

## 2019-05-14 RX ORDER — DONEPEZIL HYDROCHLORIDE 5 MG/1
5 TABLET, FILM COATED ORAL
Status: DISCONTINUED | OUTPATIENT
Start: 2019-05-14 | End: 2019-05-15 | Stop reason: HOSPADM

## 2019-05-14 RX ORDER — LEVOTHYROXINE SODIUM 0.1 MG/1
100 TABLET ORAL
Status: DISCONTINUED | OUTPATIENT
Start: 2019-05-14 | End: 2019-05-15 | Stop reason: HOSPADM

## 2019-05-14 RX ORDER — POTASSIUM CHLORIDE 14.9 MG/ML
20 INJECTION INTRAVENOUS
Status: COMPLETED | OUTPATIENT
Start: 2019-05-14 | End: 2019-05-14

## 2019-05-14 RX ORDER — MAGNESIUM SULFATE HEPTAHYDRATE 40 MG/ML
2 INJECTION, SOLUTION INTRAVENOUS ONCE
Status: DISCONTINUED | OUTPATIENT
Start: 2019-05-14 | End: 2019-05-14

## 2019-05-14 RX ORDER — SODIUM CHLORIDE, SODIUM GLUCONATE, SODIUM ACETATE, POTASSIUM CHLORIDE, MAGNESIUM CHLORIDE, SODIUM PHOSPHATE, DIBASIC, AND POTASSIUM PHOSPHATE .53; .5; .37; .037; .03; .012; .00082 G/100ML; G/100ML; G/100ML; G/100ML; G/100ML; G/100ML; G/100ML
1000 INJECTION, SOLUTION INTRAVENOUS ONCE
Status: COMPLETED | OUTPATIENT
Start: 2019-05-14 | End: 2019-05-15

## 2019-05-14 RX ORDER — MAGNESIUM SULFATE HEPTAHYDRATE 40 MG/ML
2 INJECTION, SOLUTION INTRAVENOUS ONCE
Status: COMPLETED | OUTPATIENT
Start: 2019-05-14 | End: 2019-05-15

## 2019-05-14 RX ORDER — ATORVASTATIN CALCIUM 10 MG/1
10 TABLET, FILM COATED ORAL
Status: DISCONTINUED | OUTPATIENT
Start: 2019-05-14 | End: 2019-05-15 | Stop reason: HOSPADM

## 2019-05-14 RX ORDER — COLCHICINE 0.6 MG/1
0.6 TABLET ORAL DAILY
Status: DISCONTINUED | OUTPATIENT
Start: 2019-05-14 | End: 2019-05-15 | Stop reason: HOSPADM

## 2019-05-14 RX ORDER — METOPROLOL TARTRATE 50 MG/1
50 TABLET, FILM COATED ORAL EVERY 12 HOURS SCHEDULED
Status: DISCONTINUED | OUTPATIENT
Start: 2019-05-14 | End: 2019-05-15 | Stop reason: HOSPADM

## 2019-05-14 RX ORDER — ACETAMINOPHEN 325 MG/1
650 TABLET ORAL EVERY 8 HOURS SCHEDULED
Status: DISCONTINUED | OUTPATIENT
Start: 2019-05-14 | End: 2019-05-15 | Stop reason: HOSPADM

## 2019-05-14 RX ORDER — POTASSIUM CHLORIDE 20 MEQ/1
40 TABLET, EXTENDED RELEASE ORAL ONCE
Status: COMPLETED | OUTPATIENT
Start: 2019-05-14 | End: 2019-05-14

## 2019-05-14 RX ADMIN — COLCHICINE 0.6 MG: 0.6 TABLET, FILM COATED ORAL at 08:18

## 2019-05-14 RX ADMIN — ATORVASTATIN CALCIUM 10 MG: 10 TABLET, FILM COATED ORAL at 18:05

## 2019-05-14 RX ADMIN — SODIUM CHLORIDE 1000 ML: 0.9 INJECTION, SOLUTION INTRAVENOUS at 10:32

## 2019-05-14 RX ADMIN — SENNOSIDES 17.2 MG: 8.6 TABLET, FILM COATED ORAL at 08:18

## 2019-05-14 RX ADMIN — BACITRACIN ZINC 1 LARGE APPLICATION: 500 OINTMENT TOPICAL at 08:18

## 2019-05-14 RX ADMIN — DOCUSATE SODIUM 100 MG: 100 CAPSULE, LIQUID FILLED ORAL at 18:05

## 2019-05-14 RX ADMIN — BACITRACIN ZINC 1 SMALL APPLICATION: 500 OINTMENT TOPICAL at 00:48

## 2019-05-14 RX ADMIN — POTASSIUM CHLORIDE 20 MEQ: 200 INJECTION, SOLUTION INTRAVENOUS at 10:31

## 2019-05-14 RX ADMIN — DONEPEZIL HYDROCHLORIDE 5 MG: 5 TABLET ORAL at 22:50

## 2019-05-14 RX ADMIN — RIVAROXABAN 15 MG: 15 TABLET, FILM COATED ORAL at 12:37

## 2019-05-14 RX ADMIN — METOPROLOL TARTRATE 50 MG: 50 TABLET, FILM COATED ORAL at 22:51

## 2019-05-14 RX ADMIN — POTASSIUM CHLORIDE 20 MEQ: 200 INJECTION, SOLUTION INTRAVENOUS at 08:18

## 2019-05-14 RX ADMIN — MAGNESIUM SULFATE HEPTAHYDRATE 2 G: 40 INJECTION, SOLUTION INTRAVENOUS at 13:14

## 2019-05-14 RX ADMIN — ACETAMINOPHEN 650 MG: 325 TABLET, FILM COATED ORAL at 13:13

## 2019-05-14 RX ADMIN — HEPARIN SODIUM 5000 UNITS: 5000 INJECTION, SOLUTION INTRAVENOUS; SUBCUTANEOUS at 05:45

## 2019-05-14 RX ADMIN — BACITRACIN ZINC 1 LARGE APPLICATION: 500 OINTMENT TOPICAL at 18:05

## 2019-05-14 RX ADMIN — MAGNESIUM OXIDE TAB 400 MG (241.3 MG ELEMENTAL MG) 800 MG: 400 (241.3 MG) TAB at 13:13

## 2019-05-14 RX ADMIN — LIDOCAINE HYDROCHLORIDE 5 ML: 10 INJECTION, SOLUTION EPIDURAL; INFILTRATION; INTRACAUDAL; PERINEURAL at 00:00

## 2019-05-14 RX ADMIN — DOCUSATE SODIUM 100 MG: 100 CAPSULE, LIQUID FILLED ORAL at 08:18

## 2019-05-14 RX ADMIN — POTASSIUM CHLORIDE 40 MEQ: 1500 TABLET, EXTENDED RELEASE ORAL at 08:18

## 2019-05-14 RX ADMIN — ACETAMINOPHEN 650 MG: 325 TABLET, FILM COATED ORAL at 22:49

## 2019-05-14 RX ADMIN — SODIUM CHLORIDE, SODIUM GLUCONATE, SODIUM ACETATE, POTASSIUM CHLORIDE, MAGNESIUM CHLORIDE, SODIUM PHOSPHATE, DIBASIC, AND POTASSIUM PHOSPHATE 1000 ML: .53; .5; .37; .037; .03; .012; .00082 INJECTION, SOLUTION INTRAVENOUS at 18:13

## 2019-05-15 ENCOUNTER — TELEPHONE (OUTPATIENT)
Dept: INTERNAL MEDICINE CLINIC | Facility: CLINIC | Age: 84
End: 2019-05-15

## 2019-05-15 ENCOUNTER — TRANSITIONAL CARE MANAGEMENT (OUTPATIENT)
Dept: INTERNAL MEDICINE CLINIC | Facility: CLINIC | Age: 84
End: 2019-05-15

## 2019-05-15 VITALS
WEIGHT: 180 LBS | HEART RATE: 75 BPM | DIASTOLIC BLOOD PRESSURE: 103 MMHG | HEIGHT: 68 IN | BODY MASS INDEX: 27.28 KG/M2 | TEMPERATURE: 97.5 F | RESPIRATION RATE: 16 BRPM | OXYGEN SATURATION: 99 % | SYSTOLIC BLOOD PRESSURE: 167 MMHG

## 2019-05-15 LAB
ANION GAP SERPL CALCULATED.3IONS-SCNC: 7 MMOL/L (ref 4–13)
BUN SERPL-MCNC: 14 MG/DL (ref 5–25)
CALCIUM SERPL-MCNC: 8.1 MG/DL (ref 8.3–10.1)
CHLORIDE SERPL-SCNC: 107 MMOL/L (ref 100–108)
CO2 SERPL-SCNC: 28 MMOL/L (ref 21–32)
CREAT SERPL-MCNC: 1.1 MG/DL (ref 0.6–1.3)
GFR SERPL CREATININE-BSD FRML MDRD: 60 ML/MIN/1.73SQ M
GLUCOSE SERPL-MCNC: 143 MG/DL (ref 65–140)
POTASSIUM SERPL-SCNC: 4.3 MMOL/L (ref 3.5–5.3)
SODIUM SERPL-SCNC: 142 MMOL/L (ref 136–145)

## 2019-05-15 PROCEDURE — 80048 BASIC METABOLIC PNL TOTAL CA: CPT | Performed by: EMERGENCY MEDICINE

## 2019-05-15 PROCEDURE — 99217 PR OBSERVATION CARE DISCHARGE MANAGEMENT: CPT | Performed by: SURGERY

## 2019-05-15 RX ORDER — ACETAMINOPHEN 325 MG/1
650 TABLET ORAL EVERY 8 HOURS SCHEDULED
Qty: 30 TABLET | Refills: 0 | Status: SHIPPED | OUTPATIENT
Start: 2019-05-15 | End: 2020-02-18 | Stop reason: SDUPTHER

## 2019-05-15 RX ADMIN — LEVOTHYROXINE SODIUM 100 MCG: 100 TABLET ORAL at 05:45

## 2019-05-15 RX ADMIN — METOPROLOL TARTRATE 50 MG: 50 TABLET, FILM COATED ORAL at 08:17

## 2019-05-15 RX ADMIN — BACITRACIN ZINC 1 LARGE APPLICATION: 500 OINTMENT TOPICAL at 08:16

## 2019-05-15 RX ADMIN — ACETAMINOPHEN 650 MG: 325 TABLET, FILM COATED ORAL at 05:45

## 2019-05-15 RX ADMIN — RIVAROXABAN 15 MG: 15 TABLET, FILM COATED ORAL at 08:16

## 2019-05-15 RX ADMIN — COLCHICINE 0.6 MG: 0.6 TABLET, FILM COATED ORAL at 08:16

## 2019-05-17 ENCOUNTER — TELEPHONE (OUTPATIENT)
Dept: INTERNAL MEDICINE CLINIC | Facility: CLINIC | Age: 84
End: 2019-05-17

## 2019-05-21 ENCOUNTER — OFFICE VISIT (OUTPATIENT)
Dept: INTERNAL MEDICINE CLINIC | Facility: CLINIC | Age: 84
End: 2019-05-21
Payer: MEDICARE

## 2019-05-21 VITALS
OXYGEN SATURATION: 98 % | BODY MASS INDEX: 29.38 KG/M2 | WEIGHT: 193.2 LBS | TEMPERATURE: 98.8 F | SYSTOLIC BLOOD PRESSURE: 130 MMHG | HEART RATE: 76 BPM | DIASTOLIC BLOOD PRESSURE: 78 MMHG

## 2019-05-21 DIAGNOSIS — S09.90XD CLOSED HEAD INJURY, SUBSEQUENT ENCOUNTER: ICD-10-CM

## 2019-05-21 DIAGNOSIS — I10 ESSENTIAL HYPERTENSION: Chronic | ICD-10-CM
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
## 2019-05-21 DIAGNOSIS — S01.21XD: ICD-10-CM

## 2019-05-21 DIAGNOSIS — J30.9 ALLERGIC RHINITIS, UNSPECIFIED SEASONALITY, UNSPECIFIED TRIGGER: ICD-10-CM

## 2019-05-21 PROCEDURE — 99496 TRANSJ CARE MGMT HIGH F2F 7D: CPT | Performed by: PHYSICIAN ASSISTANT

## 2019-05-21 RX ORDER — FLUTICASONE PROPIONATE 50 MCG
2 SPRAY, SUSPENSION (ML) NASAL DAILY
Qty: 16 G | Refills: 3 | Status: SHIPPED | OUTPATIENT
Start: 2019-05-21 | End: 2020-10-02 | Stop reason: SDUPTHER

## 2019-05-21 RX ORDER — LORATADINE 10 MG/1
10 TABLET ORAL DAILY
Qty: 30 TABLET | Refills: 5 | Status: SHIPPED | OUTPATIENT
Start: 2019-05-21 | End: 2020-07-22 | Stop reason: SDUPTHER

## 2019-05-22 ENCOUNTER — TELEPHONE (OUTPATIENT)
Dept: INTERNAL MEDICINE CLINIC | Facility: CLINIC | Age: 84
End: 2019-05-22

## 2019-05-28 ENCOUNTER — TELEPHONE (OUTPATIENT)
Dept: INTERNAL MEDICINE CLINIC | Facility: CLINIC | Age: 84
End: 2019-05-28

## 2019-06-03 DIAGNOSIS — G31.84 MILD COGNITIVE IMPAIRMENT: ICD-10-CM

## 2019-06-04 RX ORDER — DONEPEZIL HYDROCHLORIDE 5 MG/1
TABLET, FILM COATED ORAL
Qty: 28 TABLET | Refills: 0 | Status: SHIPPED | OUTPATIENT
Start: 2019-06-04 | End: 2019-07-03 | Stop reason: SDUPTHER

## 2019-06-13 ENCOUNTER — TELEPHONE (OUTPATIENT)
Dept: INTERNAL MEDICINE CLINIC | Facility: CLINIC | Age: 84
End: 2019-06-13

## 2019-06-13 ENCOUNTER — OFFICE VISIT (OUTPATIENT)
Dept: INTERNAL MEDICINE CLINIC | Facility: CLINIC | Age: 84
End: 2019-06-13
Payer: MEDICARE

## 2019-06-13 VITALS
HEART RATE: 85 BPM | HEIGHT: 68 IN | OXYGEN SATURATION: 98 % | WEIGHT: 193.2 LBS | SYSTOLIC BLOOD PRESSURE: 140 MMHG | TEMPERATURE: 98.5 F | DIASTOLIC BLOOD PRESSURE: 84 MMHG | BODY MASS INDEX: 29.28 KG/M2

## 2019-06-13 DIAGNOSIS — L28.1 PRURIGO NODULARIS: ICD-10-CM

## 2019-06-13 DIAGNOSIS — L03.115 CELLULITIS OF RIGHT LOWER EXTREMITY: Primary | ICD-10-CM

## 2019-06-13 PROCEDURE — 99214 OFFICE O/P EST MOD 30 MIN: CPT | Performed by: PHYSICIAN ASSISTANT

## 2019-06-13 RX ORDER — CEPHALEXIN 500 MG/1
500 CAPSULE ORAL EVERY 12 HOURS SCHEDULED
Qty: 20 CAPSULE | Refills: 0 | Status: SHIPPED | OUTPATIENT
Start: 2019-06-13 | End: 2019-06-23

## 2019-06-13 RX ORDER — TRIAMCINOLONE ACETONIDE 1 MG/ML
LOTION TOPICAL 3 TIMES DAILY
Qty: 60 ML | Refills: 0 | Status: SHIPPED | OUTPATIENT
Start: 2019-06-13 | End: 2019-07-16 | Stop reason: CLARIF

## 2019-06-20 DIAGNOSIS — L28.1 PRURIGO NODULARIS: Primary | ICD-10-CM

## 2019-06-20 RX ORDER — TRIAMCINOLONE ACETONIDE 1 MG/ML
LOTION TOPICAL
Qty: 60 ML | Refills: 0 | Status: SHIPPED | OUTPATIENT
Start: 2019-06-20 | End: 2019-07-16 | Stop reason: CLARIF

## 2019-07-03 DIAGNOSIS — G31.84 MILD COGNITIVE IMPAIRMENT: ICD-10-CM

## 2019-07-05 RX ORDER — DONEPEZIL HYDROCHLORIDE 5 MG/1
TABLET, FILM COATED ORAL
Qty: 28 TABLET | Refills: 0 | Status: SHIPPED | OUTPATIENT
Start: 2019-07-05 | End: 2019-07-18 | Stop reason: SDUPTHER

## 2019-07-13 DIAGNOSIS — L28.1 PRURIGO NODULARIS: Primary | ICD-10-CM

## 2019-07-15 RX ORDER — TRIAMCINOLONE ACETONIDE 1 MG/ML
LOTION TOPICAL
Qty: 60 ML | Refills: 5 | Status: SHIPPED | OUTPATIENT
Start: 2019-07-15 | End: 2019-07-16 | Stop reason: CLARIF

## 2019-07-16 ENCOUNTER — OFFICE VISIT (OUTPATIENT)
Dept: INTERNAL MEDICINE CLINIC | Facility: CLINIC | Age: 84
End: 2019-07-16
Payer: MEDICARE

## 2019-07-16 VITALS
HEART RATE: 87 BPM | BODY MASS INDEX: 29.47 KG/M2 | SYSTOLIC BLOOD PRESSURE: 118 MMHG | DIASTOLIC BLOOD PRESSURE: 80 MMHG | WEIGHT: 193.8 LBS | OXYGEN SATURATION: 97 %

## 2019-07-16 DIAGNOSIS — E11.42 TYPE 2 DIABETES MELLITUS WITH DIABETIC POLYNEUROPATHY, WITHOUT LONG-TERM CURRENT USE OF INSULIN (HCC): Primary | Chronic | ICD-10-CM

## 2019-07-16 DIAGNOSIS — Z85.828 HISTORY OF SKIN CANCER: ICD-10-CM

## 2019-07-16 DIAGNOSIS — L82.1 SEBORRHEIC KERATOSES: ICD-10-CM

## 2019-07-16 PROBLEM — S01.91XA LACERATION OF HEAD: Status: RESOLVED | Noted: 2019-05-13 | Resolved: 2019-07-16
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
## 2019-07-16 PROBLEM — S09.90XA CLOSED HEAD INJURY: Status: RESOLVED | Noted: 2019-05-13 | Resolved: 2019-07-16

## 2019-07-16 PROBLEM — E83.42 HYPOMAGNESEMIA: Status: RESOLVED | Noted: 2019-03-26 | Resolved: 2019-07-16

## 2019-07-16 PROBLEM — E87.6 HYPOKALEMIA: Status: RESOLVED | Noted: 2019-03-14 | Resolved: 2019-07-16

## 2019-07-16 PROCEDURE — 99214 OFFICE O/P EST MOD 30 MIN: CPT | Performed by: INTERNAL MEDICINE

## 2019-07-17 NOTE — PROGRESS NOTES
INTERNAL MEDICINE FOLLOW-UP OFFICE VISIT  [facility]    NAME: Edd Lizama  AGE: 80 y o  SEX: male  : 1932     DATE: 2019     Assessment and Plan:     1  Type 2 diabetes mellitus with diabetic polyneuropathy, without long-term current use of insulin (HCC)    Lab Results   Component Value Date    HGBA1C 6 7 (H) 2019     Patient's diabetes has been controlled  He has stable neuropathy  Discussed with him what this meant as he had some questions  2  Seborrheic keratosis  3  History of skin cancer    Reviewed recent office visits with Dr Ramírez Valentine  No new skin changes on his exam  Advised him not to scratch his lower legs  Follow-up with dermatology  Chief Complaint:     Chief Complaint   Patient presents with    Follow-up     marks all over body        History of Present Illness:     Not really sure why patient is here  He has no new skin lesions  He wanted to ask if his skin has different 'spots' on it due to aging  He has no new skin lesions  He follows with dermatology and has had history of skin cancer before  He mostly spends the rest of the visit telling various stories of his life  The following portions of the patient's history were reviewed and updated as appropriate: allergies, current medications, past family history, past medical history, past social history, past surgical history and problem list      Review of Systems:     Review of Systems   Constitutional: Negative for chills and fatigue  Respiratory: Negative  Cardiovascular: Negative  Skin: Positive for rash  Negative for wound        Problem List:     Patient Active Problem List   Diagnosis    Adult hypothyroidism    Chronic a-fib ([address] )    Essential hypertension    Type 2 diabetes mellitus with diabetic polyneuropathy, without long-term current use of insulin (HCC)    Hyperlipidemia    Skin abnormalities    Gout    GERD (gastroesophageal reflux disease)    Chronic systolic congestive heart failure (HCC)    Chronic kidney disease      Objective:     /80 (BP Location: Left arm, Patient Position: Sitting, Cuff Size: Standard)   Pulse 87   Wt 87 9 kg (193 lb 12 8 oz) Comment: w/ shoes denied off  SpO2 97%   BMI 29 47 kg/m²     Physical Exam   Constitutional: He appears well-developed and well-nourished  No distress  Cardiovascular: Normal rate and regular rhythm  No murmur heard  Pulmonary/Chest: Effort normal and breath sounds normal  No respiratory distress  Abdominal: Soft  Bowel sounds are normal  He exhibits no distension  There is no tenderness  Musculoskeletal: He exhibits edema (trace edema)  Neurological: He is alert  Skin: Skin is warm and dry  Rash noted  He is not diaphoretic     Seborrheic keratosis     Joe Cantu, DO  MEDICAL [id number]

## 2019-07-18 ENCOUNTER — OFFICE VISIT (OUTPATIENT)
Dept: NEUROLOGY | Facility: CLINIC | Age: 84
End: 2019-07-18
Payer: MEDICARE

## 2019-07-18 VITALS
HEART RATE: 88 BPM | SYSTOLIC BLOOD PRESSURE: 142 MMHG | HEIGHT: 68 IN | WEIGHT: 194 LBS | DIASTOLIC BLOOD PRESSURE: 80 MMHG | BODY MASS INDEX: 29.4 KG/M2

## 2019-07-18 DIAGNOSIS — G31.84 MILD COGNITIVE IMPAIRMENT: ICD-10-CM

## 2019-07-18 DIAGNOSIS — I63.321 CEREBROVASCULAR ACCIDENT (CVA) DUE TO THROMBOSIS OF RIGHT ANTERIOR CEREBRAL ARTERY (HCC): Primary | ICD-10-CM

## 2019-07-18 PROCEDURE — 99214 OFFICE O/P EST MOD 30 MIN: CPT | Performed by: PSYCHIATRY & NEUROLOGY

## 2019-07-18 RX ORDER — DONEPEZIL HYDROCHLORIDE 5 MG/1
5 TABLET, FILM COATED ORAL
Qty: 30 TABLET | Refills: 6 | Status: SHIPPED | OUTPATIENT
Start: 2019-07-18 | End: 2019-07-24 | Stop reason: SDUPTHER

## 2019-07-18 NOTE — PROGRESS NOTES
Progress Note - Neurology   Meri Alvarez 80 y o  male MRN: [id number]  Unit/Bed#:  Encounter: [id number]      Subjective:   Patient is here for a follow-up visit with a history of mild cognitive impairment, gait dysfunction, and since his last visit had an MRI of the brain which confirmed the presence of a right frontoparietal HENRY CVA, which appeared old, B12 folate and Lyme titers were unremarkable  Patient was started on Aricept 5 mg daily and from the cognitive standpoint has been feeling better  Patient unfortunately suffered a fall while getting off a bus with lacerations on the face and was hospitalized, and subsequently went into rehab  Patient denies any new neurological symptoms and his leg weakness has significantly improved  ROS:   Review of Systems   Constitutional: Negative  Negative for appetite change, fatigue and fever  HENT: Negative  Negative for hearing loss, tinnitus, trouble swallowing and voice change  Eyes: Negative  Negative for photophobia and pain  Respiratory: Negative  Negative for shortness of breath  Cardiovascular: Negative  Negative for palpitations  Gastrointestinal: Negative  Negative for nausea and vomiting  Endocrine: Negative  Negative for cold intolerance and heat intolerance  Genitourinary: Negative  Negative for dysuria, frequency and urgency  Musculoskeletal: Negative  Negative for back pain, gait problem, myalgias and neck pain  Skin: Negative  Negative for rash  Neurological: Negative  Negative for dizziness, tremors, seizures, syncope, facial asymmetry, speech difficulty, weakness, light-headedness, numbness and headaches  Hematological: Negative  Does not bruise/bleed easily  Psychiatric/Behavioral: Negative for confusion, decreased concentration, hallucinations and sleep disturbance  The patient is not nervous/anxious          Vitals:   Vitals:    07/18/19 1306   BP: 142/80   BP Location: Left arm   Patient Position: Sitting Cuff Size: Adult   Pulse: 88   Weight: 88 kg (194 lb)   Height: 5' 8" (1 727 m)   ,Body mass index is 29 5 kg/m²      MEDS:      Current Outpatient Medications:     acetaminophen (TYLENOL) 325 mg tablet, Take 2 tablets (650 mg total) by mouth every 8 (eight) hours (Patient taking differently: Take 650 mg by mouth as needed ), Disp: 30 tablet, Rfl: 0    atorvastatin (LIPITOR) 10 mg tablet, Take 10 mg by mouth daily with dinner, Disp: , Rfl:     betamethasone dipropionate (DIPROSONE) 0 05 % cream, Apply 1 application topically 2 (two) times a day To itching areas as needed, Disp: 45 g, Rfl: 2    donepezil (ARICEPT) 5 mg tablet, 1 TAB ORALLY AT BEDTIME DX: DEMENTIA, Disp: 28 tablet, Rfl: 0    fluticasone (FLONASE) 50 mcg/act nasal spray, 2 sprays into each nostril daily For allergies, Disp: 16 g, Rfl: 3    Folic Acid-Vit L4-KET U02 2 5-25-1 MG TABS, Take 1 tablet by mouth daily, Disp: , Rfl:     furosemide (LASIX) 40 mg tablet, Take 1 5 tablets (60 mg total) by mouth daily, Disp: 135 tablet, Rfl: 1    glipiZIDE (GLUCOTROL XL) 5 mg 24 hr tablet, Take 1 tablet (5 mg total) by mouth daily, Disp: 30 tablet, Rfl: 11    glucose blood (TRUE METRIX BLOOD GLUCOSE TEST) test strip, Patient to test once daily DX code E11 8, Disp: 100 each, Rfl: 5    levothyroxine 100 mcg tablet, Take 1 tablet (100 mcg total) by mouth daily, Disp: 90 tablet, Rfl: 1    loratadine (CLARITIN) 10 mg tablet, Take 1 tablet (10 mg total) by mouth daily For allergies, Disp: 30 tablet, Rfl: 5    metoprolol tartrate (LOPRESSOR) 50 mg tablet, TAKE 1 TABLET BY MOUTH TWICE A DAY, Disp: 60 tablet, Rfl: 8    potassium chloride (MICRO-K) 10 MEQ CR capsule, Take 2 capsules (20 mEq total) by mouth daily (Patient taking differently: Take 20 mEq by mouth as needed ), Disp: 60 capsule, Rfl: 0    rivaroxaban (XARELTO) 15 mg tablet, Take 1 tablet (15 mg total) by mouth daily with breakfast, Disp: 90 tablet, Rfl: 1  :    Physical Exam:  General appearance: alert, appears stated age and cooperative  Head: Normocephalic, without obvious abnormality, atraumatic    [place] cognitive assessment score is 27/30 with no evidence of any new cranial nerve, motor or sensory deficits  No evidence of any dysmetria was noted his gait is normal based and negative Romberg sign noted  No bruits were appreciable in the neck and there is no evidence of any spine tenderness  Lab Results: I have personally reviewed pertinent reports  Imaging Studies: I have personally reviewed pertinent reports  Assessment:  1  Mild cognitive impairment  2  Right frontoparietal CVA  Plan:  Patient was explained his MRI results, also explain the [place] results at this time, reassured that he was relatively stable, patient feels well, is on a home exercise program, and remains on Xarelto for chronic atrial fibrillation  His blood sugars also under good control  He will now return back to see me in 6 months and will continue with Aricept 5 mg daily  7/18/2019,1:22 PM    Dictation voice to text software has been used in the creation of this document  Please consider this in light of any contextual or grammatical errors

## 2019-07-19 DIAGNOSIS — I82.403 RECURRENT ACUTE DEEP VEIN THROMBOSIS (DVT) OF BOTH LOWER EXTREMITIES (HCC): ICD-10-CM

## 2019-07-19 RX ORDER — POTASSIUM CHLORIDE 750 MG/1
20 CAPSULE, EXTENDED RELEASE ORAL DAILY
Qty: 180 CAPSULE | Refills: 1 | Status: SHIPPED | OUTPATIENT
Start: 2019-07-19 | End: 2019-08-16 | Stop reason: SDUPTHER

## 2019-07-23 ENCOUNTER — TELEPHONE (OUTPATIENT)
Dept: INTERNAL MEDICINE CLINIC | Facility: CLINIC | Age: 84
End: 2019-07-23

## 2019-07-23 NOTE — TELEPHONE ENCOUNTER
Patient is not sure if he should still be using Triamcinolone 0 1%    And cant remember why he was using it      If so please refill  sent to [phone]

## 2019-07-24 DIAGNOSIS — G31.84 MILD COGNITIVE IMPAIRMENT: ICD-10-CM

## 2019-07-24 RX ORDER — DONEPEZIL HYDROCHLORIDE 5 MG/1
5 TABLET, FILM COATED ORAL
Qty: 90 TABLET | Refills: 2 | Status: SHIPPED | OUTPATIENT
Start: 2019-07-24 | End: 2019-08-16 | Stop reason: SDUPTHER

## 2019-07-24 NOTE — TELEPHONE ENCOUNTER
[facility], 30 day supply was sent

## 2019-07-26 DIAGNOSIS — L28.1 PRURIGO NODULARIS: ICD-10-CM

## 2019-07-26 RX ORDER — BETAMETHASONE DIPROPIONATE 0.5 MG/G
1 CREAM TOPICAL 2 TIMES DAILY
Qty: 45 G | Refills: 2 | Status: SHIPPED | OUTPATIENT
Start: 2019-07-26 | End: 2019-09-23 | Stop reason: SDUPTHER

## 2019-08-06 ENCOUNTER — TELEPHONE (OUTPATIENT)
Dept: INTERNAL MEDICINE CLINIC | Facility: CLINIC | Age: 84
End: 2019-08-06

## 2019-08-06 DIAGNOSIS — I50.22 CHRONIC SYSTOLIC CONGESTIVE HEART FAILURE (HCC): Primary | Chronic | ICD-10-CM

## 2019-08-12 ENCOUNTER — TELEPHONE (OUTPATIENT)
Dept: INTERNAL MEDICINE CLINIC | Facility: CLINIC | Age: 84
End: 2019-08-12

## 2019-08-12 NOTE — TELEPHONE ENCOUNTER
If we have the flu shot in, we can give it to him   I don't know when we will start giving the flu vaccine at this time

## 2019-08-12 NOTE — TELEPHONE ENCOUNTER
Message for Dr Thompson Sensor or maybe Kaylie Westfall:    Ulises Pedroza has an appt w/the  on 9/5  Will he get the flu shot the same day? He lives at [facility] is giving flu shots on 9/6  I don't know if our office will availability that day for the shot      Please call him at this p#: [phone]

## 2019-08-16 DIAGNOSIS — I82.403 RECURRENT ACUTE DEEP VEIN THROMBOSIS (DVT) OF BOTH LOWER EXTREMITIES (HCC): ICD-10-CM

## 2019-08-16 DIAGNOSIS — G31.84 MILD COGNITIVE IMPAIRMENT: ICD-10-CM

## 2019-08-16 RX ORDER — DONEPEZIL HYDROCHLORIDE 5 MG/1
TABLET, FILM COATED ORAL
Qty: 28 TABLET | Refills: 0 | Status: SHIPPED | OUTPATIENT
Start: 2019-08-16 | End: 2019-08-29 | Stop reason: SDUPTHER

## 2019-08-16 RX ORDER — POTASSIUM CHLORIDE 750 MG/1
20 CAPSULE, EXTENDED RELEASE ORAL DAILY
Qty: 180 CAPSULE | Refills: 1 | Status: SHIPPED | OUTPATIENT
Start: 2019-08-16 | End: 2020-01-06 | Stop reason: HOSPADM

## 2019-08-16 NOTE — TELEPHONE ENCOUNTER
NEEDS  POTASSIUM CHLORIDE 10MEQ  BID  [facility]   [phone]

## 2019-08-22 ENCOUNTER — OFFICE VISIT (OUTPATIENT)
Dept: DERMATOLOGY | Facility: CLINIC | Age: 84
End: 2019-08-22
Payer: MEDICARE

## 2019-08-22 DIAGNOSIS — Z13.89 SCREENING FOR SKIN CONDITION: ICD-10-CM

## 2019-08-22 DIAGNOSIS — L28.1 PRURIGO NODULARIS: Primary | ICD-10-CM

## 2019-08-22 DIAGNOSIS — L82.1 SEBORRHEIC KERATOSIS: ICD-10-CM

## 2019-08-22 DIAGNOSIS — Z85.828 HISTORY OF SKIN CANCER: ICD-10-CM

## 2019-08-22 LAB
CREAT ?TM UR-SCNC: 89.1 UMOL/L
EXT MICROALBUMIN URINE RANDOM: 165
HBA1C MFR BLD HPLC: 6 %
MICROALBUMIN/CREAT UR: 1851.9 MG/G{CREAT}

## 2019-08-22 PROCEDURE — 1123F ACP DISCUSS/DSCN MKR DOCD: CPT | Performed by: DERMATOLOGY

## 2019-08-22 PROCEDURE — 99213 OFFICE O/P EST LOW 20 MIN: CPT | Performed by: DERMATOLOGY

## 2019-08-22 RX ORDER — TRIAMCINOLONE ACETONIDE 1 MG/ML
LOTION TOPICAL 3 TIMES DAILY
COMMUNITY
End: 2019-10-15 | Stop reason: CLARIF

## 2019-08-22 NOTE — PATIENT INSTRUCTIONS
Prurigo nodularis question of psoriasiform process question atypical psoriasis again discussed that this could potentially be related to beta-blockers though not definite patient just does not realize how much he is rubbing and scratching at these areas which is causing these irritations advised him to use the betamethasone dipropionate only on the areas on his thighs that are active at this point and not to use anything else except moisturizing creams such as CeraVe on the rest of his body also suggested use of Dove soap as a cleanser but not to scrub the skin  Seborrheic keratosis patient reassured these are normal growths we acquire with age no treatment needed  History of skin cancer in no recurrence nothing else atypical sunblock recommended follow-up in 6 months  Screening for dermatologic disorders nothing else of concern noted on complete exam follow-up in 6 months

## 2019-08-22 NOTE — PROGRESS NOTES
[facility] DERMATOLOGY  [address] [place] [id number]  [phone]  [phone]     MRN: [id number] : 1932  Encounter: [id number]  Patient Information: Maria Luz Bo  Chief complaint:  Continued complaints of itching    History of present illness:  14-year-old male with known history of prurigo nodularis presents for follow-up patient was in the hospital for congestive failure patient notably this continued problems with itching on a on extensive areas of his body but mainly concerned regarding lesions on his legs patient was given some triamcinolone lotion also with a Betamethasone given to him by me patient is not really sure how to use the medication  Past Medical History:   Diagnosis Date    Acute systolic congestive heart failure ([facility] ) 2018    Allergic contact dermatitis due to plants, except food     Atrial fibrillation ([facility] )     Cancer ([facility] )     skin cancer    Cataract     Chronic kidney disease     Stage 3    Coagulation test abnormality     Diabetes mellitus ([facility] )     Disease of thyroid gland     hypothyroidism    DVT (deep venous thrombosis) (HCC)     Eczema     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[facility] )     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[facility] )     GERD (gastroesophageal reflux disease)     Gout     Hyperlipidemia     Hypertension     Hypokalemia     Leukocytosis     Lichen planus     Nonmelanoma skin cancer     Phlebitis or thrombophlebitis of deep vessel of lower extremity (HCC)     Proteinuria      Past Surgical History:   Procedure Laterality Date    CATARACT EXTRACTION      CHOLECYSTECTOMY      MN ESOPHAGOGASTRODUODENOSCOPY TRANSORAL DIAGNOSTIC N/A 2017    Procedure: EGD AND COLONOSCOPY;  Surgeon: Matilda Negron MD;  Location: [facility];   Service: Gastroenterology    TONSILLECTOMY       Social History   Social History     Substance and Sexual Activity   Alcohol Use Never    Frequency: Never    Binge frequency: Never Social History     Substance and Sexual Activity   Drug Use No     Social History     Tobacco Use   Smoking Status Never Smoker   Smokeless Tobacco Never Used     Family History   Problem Relation Age of Onset    Alcohol abuse Mother     Heart attack Father     Dementia Brother     Other Son         AUTO ACCIDENT     Meds/Allergies   No Known Allergies    Meds:  Prior to Admission medications    Medication Sig Start Date End Date Taking?  Authorizing Provider   acetaminophen (TYLENOL) 325 mg tablet Take 2 tablets (650 mg total) by mouth every 8 (eight) hours  Patient taking differently: Take 650 mg by mouth as needed  5/15/19  Yes Erika Patel PA-C   atorvastatin (LIPITOR) 10 mg tablet Take 10 mg by mouth daily with dinner   Yes Historical Provider, MD   betamethasone dipropionate (DIPROSONE) 0 05 % cream Apply 1 application topically 2 (two) times a day To itching areas as needed 7/26/19  Yes Margaux Mcgarry MD   donepezil (ARICEPT) 5 mg tablet 1 TAB ORALLY AT BEDTIME DX: DEMENTIA 8/16/19  Yes Sabrina Gay MD   fluticasone (FLONASE) 50 mcg/act nasal spray 2 sprays into each nostril daily For allergies 5/21/19  Yes Elaine Zuniga PA-C   Folic Acid-Vit B5-IDC T21 2 5-25-1 MG TABS Take 1 tablet by mouth daily   Yes Historical Provider, MD   furosemide (LASIX) 40 mg tablet Take 1 5 tablets (60 mg total) by mouth daily 4/26/19  Yes Rubén Coleman,    glipiZIDE (GLUCOTROL XL) 5 mg 24 hr tablet Take 1 tablet (5 mg total) by mouth daily 7/30/18  Yes Madie Mohs, MD   glucose blood (TRUE METRIX BLOOD GLUCOSE TEST) test strip Patient to test once daily DX code E11 8 4/22/19  Yes Y[facility], DO   levothyroxine 100 mcg tablet Take 1 tablet (100 mcg total) by mouth daily 4/26/19  Yes Rubén Coleman,    loratadine (CLARITIN) 10 mg tablet Take 1 tablet (10 mg total) by mouth daily For allergies 5/21/19  Yes Elaine Zuniga PA-C   metoprolol tartrate (LOPRESSOR) 50 mg tablet TAKE 1 TABLET BY MOUTH TWICE A DAY 4/18/19  Yes Oscar Lassiter MD   Misc   Devices (BED WEDGE) MISC by Does not apply route daily 8/6/19  Yes Rubén Coleman DO   potassium chloride (MICRO-K) 10 MEQ CR capsule Take 2 capsules (20 mEq total) by mouth daily 8/16/19  Yes Rubén Coleman DO   rivaroxaban (XARELTO) 15 mg tablet Take 1 tablet (15 mg total) by mouth daily with breakfast 4/26/19  Yes Rubén Coleman DO   triamcinolone (KENALOG) 0 1 % lotion Apply topically 3 (three) times a day   Yes Historical Provider, MD       Subjective:     Review of Systems:    General: negative for - chills, fatigue, fever,  weight gain or weight loss  Psychological: negative for - anxiety, behavioral disorder, concentration difficulties, decreased libido, depression, irritability, memory difficulties, mood swings, sleep disturbances or suicidal ideation  ENT: negative for - hearing difficulties , nasal congestion, nasal discharge, oral lesions, sinus pain, sneezing, sore throat  Allergy and Immunology: negative for - hives, insect bite sensitivity,  Hematological and Lymphatic: negative for - bleeding problems, blood clots,bruising, swollen lymph nodes  Endocrine: negative for - hair pattern changes, hot flashes, malaise/lethargy, mood swings, palpitations, polydipsia/polyuria, skin changes, temperature intolerance or unexpected weight change  Respiratory: negative for - cough, hemoptysis, orthopnea, shortness of breath, or wheezing  Cardiovascular: negative for - chest pain, dyspnea on exertion, edema,  Gastrointestinal: negative for - abdominal pain, nausea/vomiting  Genito-Urinary: negative for - dysuria, incontinence, irregular/heavy menses or urinary frequency/urgency  Musculoskeletal: negative for - gait disturbance, joint pain, joint stiffness, joint swelling, muscle pain, muscular weakness  Dermatological:  As in HPI  Neurological: negative for confusion, dizziness, headaches, impaired coordination/balance, memory loss, numbness/tingling, seizures, speech problems, tremors or weakness       Objective: There were no vitals taken for this visit  Physical Exam:    General Appearance:    Alert, cooperative, no distress   Head:    Normocephalic, without obvious abnormality, atraumatic           Skin:   A full skin exam was performed including scalp, head scalp, eyes, ears, nose, lips, neck, chest, axilla, abdomen, back, buttocks, bilateral upper extremities, bilateral lower extremities, hands, feet, fingers, toes, fingernails, and toenails  keratotic nodules on the back several on the thighs and no other rash noted a lot of purpuric area especially on the arms     Assessment:     1  Prurigo nodularis     2  History of skin cancer     3  Screening for skin condition     4   Seborrheic keratosis           Plan:   Prurigo nodularis question of psoriasiform process question atypical psoriasis again discussed that this could potentially be related to beta-blockers though not definite patient just does not realize how much he is rubbing and scratching at these areas which is causing these irritations advised him to use the betamethasone dipropionate only on the areas on his thighs that are active at this point and not to use anything else except moisturizing creams such as CeraVe on the rest of his body also suggested use of Dove soap as a cleanser but not to scrub the skin  Seborrheic keratosis patient reassured these are normal growths we acquire with age no treatment needed  History of skin cancer in no recurrence nothing else atypical sunblock recommended follow-up in 6 months  Screening for dermatologic disorders nothing else of concern noted on complete exam follow-up in 6 months    Lorraine Martel MD  8/22/2019,10:03 AM    Portions of the record may have been created with voice recognition software   Occasional wrong word or "sound a like" substitutions may have occurred due to the inherent limitations of voice recognition software   Read the chart carefully and recognize, using context, where substitutions have occurred

## 2019-08-26 ENCOUNTER — TELEPHONE (OUTPATIENT)
Dept: INTERNAL MEDICINE CLINIC | Facility: CLINIC | Age: 84
End: 2019-08-26

## 2019-08-26 NOTE — TELEPHONE ENCOUNTER
----- Message from Alina Osborne DO sent at 8/26/2019  9:49 AM EDT -----  Labs are very well controlled  No concerns noted   Keep up the good work

## 2019-08-29 DIAGNOSIS — I50.22 CHRONIC SYSTOLIC CHF (CONGESTIVE HEART FAILURE) (HCC): ICD-10-CM

## 2019-08-29 DIAGNOSIS — G31.84 MILD COGNITIVE IMPAIRMENT: ICD-10-CM

## 2019-08-29 DIAGNOSIS — I10 HYPERTENSION, UNCONTROLLED: ICD-10-CM

## 2019-08-29 DIAGNOSIS — I82.403 RECURRENT ACUTE DEEP VEIN THROMBOSIS (DVT) OF BOTH LOWER EXTREMITIES (HCC): ICD-10-CM

## 2019-08-29 DIAGNOSIS — I48.20 CHRONIC A-FIB (HCC): ICD-10-CM

## 2019-08-29 DIAGNOSIS — E03.9 HYPOTHYROIDISM, UNSPECIFIED TYPE: ICD-10-CM

## 2019-08-29 RX ORDER — LEVOTHYROXINE SODIUM 0.1 MG/1
TABLET ORAL
Qty: 28 TABLET | Refills: 5 | Status: SHIPPED | OUTPATIENT
Start: 2019-08-29 | End: 2020-02-12

## 2019-08-29 RX ORDER — METOPROLOL TARTRATE 50 MG/1
TABLET, FILM COATED ORAL
Qty: 56 TABLET | Refills: 5 | Status: SHIPPED | OUTPATIENT
Start: 2019-08-29 | End: 2020-02-12

## 2019-08-29 RX ORDER — DONEPEZIL HYDROCHLORIDE 5 MG/1
TABLET, FILM COATED ORAL
Qty: 28 TABLET | Refills: 5 | Status: SHIPPED | OUTPATIENT
Start: 2019-08-29 | End: 2020-01-28 | Stop reason: SDUPTHER

## 2019-08-29 RX ORDER — RIVAROXABAN 15 MG/1
TABLET, FILM COATED ORAL
Qty: 28 TABLET | Refills: 5 | Status: SHIPPED | OUTPATIENT
Start: 2019-08-29 | End: 2019-10-18 | Stop reason: CLARIF

## 2019-09-23 DIAGNOSIS — L28.1 PRURIGO NODULARIS: ICD-10-CM

## 2019-09-23 RX ORDER — BETAMETHASONE DIPROPIONATE 0.5 MG/G
CREAM TOPICAL
Qty: 45 G | Refills: 0 | Status: SHIPPED | OUTPATIENT
Start: 2019-09-23 | End: 2020-01-06 | Stop reason: HOSPADM

## 2019-09-27 DIAGNOSIS — R60.0 BILATERAL LOWER EXTREMITY EDEMA: ICD-10-CM

## 2019-09-27 RX ORDER — FUROSEMIDE 40 MG/1
TABLET ORAL
Qty: 42 TABLET | Refills: 4 | Status: SHIPPED | OUTPATIENT
Start: 2019-09-27 | End: 2019-10-24 | Stop reason: SDUPTHER

## 2019-09-30 ENCOUNTER — TELEPHONE (OUTPATIENT)
Dept: CARDIOLOGY CLINIC | Facility: CLINIC | Age: 84
End: 2019-09-30

## 2019-09-30 DIAGNOSIS — Z88.9 H/O SEASONAL ALLERGIES: Primary | ICD-10-CM

## 2019-09-30 NOTE — TELEPHONE ENCOUNTER
Rec'd a call from Susy Mac at [place]    She states that the pt is on Warfarin and gets INR checked weekly  Pt has an appt to get established with Dr Sylwia Galeas on 10/18  She wanted to know if Dr Sylwia Galeas would manage and sign off orders prior to him being seen  I told her that the pt would need to establish care 1st  Try pcp in the interim

## 2019-10-01 RX ORDER — LORATADINE 10 MG/1
TABLET ORAL
Qty: 28 TABLET | Refills: 5 | Status: SHIPPED | OUTPATIENT
Start: 2019-10-01 | End: 2019-10-15 | Stop reason: CLARIF

## 2019-10-03 ENCOUNTER — TELEPHONE (OUTPATIENT)
Dept: INTERNAL MEDICINE CLINIC | Facility: CLINIC | Age: 84
End: 2019-10-03

## 2019-10-03 DIAGNOSIS — R41.0 CONFUSION: Primary | ICD-10-CM

## 2019-10-03 NOTE — TELEPHONE ENCOUNTER
Scooter Gill called to request UA order-stated that patient is experiencing a lot of confusion    Please fax # [phone]

## 2019-10-07 ENCOUNTER — TELEPHONE (OUTPATIENT)
Dept: INTERNAL MEDICINE CLINIC | Facility: CLINIC | Age: 84
End: 2019-10-07

## 2019-10-07 NOTE — TELEPHONE ENCOUNTER
----- Message from Char Spangler DO sent at 10/6/2019 11:37 PM EDT -----  No evidence of UTI on urinalysis

## 2019-10-10 DIAGNOSIS — L30.9 DERMATITIS: ICD-10-CM

## 2019-10-10 DIAGNOSIS — Z00.00 HEALTHCARE MAINTENANCE: Primary | ICD-10-CM

## 2019-10-10 RX ORDER — TRIAMCINOLONE ACETONIDE 1 MG/ML
LOTION TOPICAL
Qty: 60 ML | Refills: 4 | Status: SHIPPED | OUTPATIENT
Start: 2019-10-10 | End: 2019-10-15 | Stop reason: CLARIF

## 2019-10-15 ENCOUNTER — OFFICE VISIT (OUTPATIENT)
Dept: INTERNAL MEDICINE CLINIC | Facility: CLINIC | Age: 84
End: 2019-10-15
Payer: MEDICARE

## 2019-10-15 VITALS
WEIGHT: 189.4 LBS | BODY MASS INDEX: 28.8 KG/M2 | DIASTOLIC BLOOD PRESSURE: 84 MMHG | HEART RATE: 56 BPM | SYSTOLIC BLOOD PRESSURE: 128 MMHG

## 2019-10-15 DIAGNOSIS — I48.20 CHRONIC A-FIB (HCC): Chronic | ICD-10-CM

## 2019-10-15 DIAGNOSIS — E11.42 TYPE 2 DIABETES MELLITUS WITH DIABETIC POLYNEUROPATHY, WITHOUT LONG-TERM CURRENT USE OF INSULIN (HCC): Chronic | ICD-10-CM

## 2019-10-15 DIAGNOSIS — I50.22 CHRONIC SYSTOLIC CONGESTIVE HEART FAILURE (HCC): Chronic | ICD-10-CM

## 2019-10-15 DIAGNOSIS — Z00.00 MEDICARE ANNUAL WELLNESS VISIT, SUBSEQUENT: Primary | ICD-10-CM

## 2019-10-15 DIAGNOSIS — I10 ESSENTIAL HYPERTENSION: Chronic | ICD-10-CM

## 2019-10-15 PROCEDURE — 99214 OFFICE O/P EST MOD 30 MIN: CPT | Performed by: INTERNAL MEDICINE

## 2019-10-15 PROCEDURE — G0439 PPPS, SUBSEQ VISIT: HCPCS | Performed by: INTERNAL MEDICINE

## 2019-10-15 NOTE — PROGRESS NOTES
INTERNAL MEDICINE FOLLOW-UP OFFICE VISIT  [facility] [address] [place] [address]    NAME: Jeronimo Duncan  AGE: 80 y o  SEX: male  : 1932     DATE: 10/15/2019     Assessment and Plan:     1  Type 2 diabetes mellitus with diabetic polyneuropathy, without long-term current use of insulin ([address] )    Most recent A1c was 6 0 on 2019  This is well controlled  Continue to remain active  Monitor for hypoglycemia  No changes are required to med regimen  - Hemoglobin A1C; Future  - Lipid panel; Future  - Microalbumin / creatinine urine ratio; Future  - Comprehensive metabolic panel; Future    2  Chronic a-fib    This is stable  Continue xarelto and lopressor  3  Chronic systolic congestive heart failure (HCC)    Currently appears euvolemic  Continue current cardiac medications  Monitor weight  4  Essential hypertension    BP is well controlled  Continue current anti-hypertensives  Return in about 4 months (around 2/15/2020) for Follow-up  Chief Complaint:     Chief Complaint   Patient presents with    Medicare Wellness Visit    Follow-up     6 month        History of Present Illness:     Patient presents for routine follow-up  No changes to his health  Taking medication as prescribed  Diabetes has been well controlled with A1C of 6 0%  No recent falls  Very active at [employer]  Has a rolling walker that he uses  No recent bleeding episodes  No increased swelling of legs  No cardiac symptoms  Review of Systems:     Review of Systems   Constitutional: Negative for activity change, appetite change and fatigue  Respiratory: Negative for apnea, cough, chest tightness, shortness of breath and wheezing  Cardiovascular: Negative for chest pain, palpitations and leg swelling  Gastrointestinal: Negative for abdominal distention, abdominal pain, blood in stool, constipation, diarrhea, nausea and vomiting     Musculoskeletal: Negative for arthralgias, back pain, gait problem, joint swelling and myalgias  Skin: Negative for rash and wound  Neurological: Negative for dizziness, weakness, light-headedness, numbness and headaches  Psychiatric/Behavioral: Negative for behavioral problems, confusion, hallucinations, sleep disturbance and suicidal ideas  The patient is not nervous/anxious  Problem List:     Patient Active Problem List   Diagnosis    Adult hypothyroidism    Chronic a-fib    Essential hypertension    Type 2 diabetes mellitus with diabetic polyneuropathy, without long-term current use of insulin (HCC)    Hyperlipidemia    Skin abnormalities    Gout    GERD (gastroesophageal reflux disease)    Chronic systolic congestive heart failure (HCC)    Chronic kidney disease      Objective:     /84 (BP Location: Left arm, Patient Position: Sitting, Cuff Size: Standard)   Pulse 56   Wt 85 9 kg (189 lb 6 4 oz) Comment: w/ shoes denied off  BMI 28 80 kg/m²     Physical Exam   Constitutional: He is oriented to person, place, and time  He appears well-developed and well-nourished  No distress  Eyes: Conjunctivae are normal  Right eye exhibits no discharge  Left eye exhibits no discharge  No scleral icterus  Neck: Neck supple  No JVD present  No thyromegaly present  Cardiovascular: Normal rate and normal heart sounds  An irregularly irregular rhythm present  No murmur heard  Pulmonary/Chest: Effort normal and breath sounds normal  No respiratory distress  He has no wheezes  He has no rales  He exhibits no tenderness  Abdominal: Soft  Bowel sounds are normal  He exhibits no distension  There is no tenderness  Musculoskeletal: He exhibits no edema  Lymphadenopathy:     He has no cervical adenopathy  Neurological: He is alert and oriented to person, place, and time  Skin: Skin is warm and dry  He is not diaphoretic  Psychiatric: He has a normal mood and affect  His behavior is normal    Vitals reviewed      Skylar Jones DO  MEDICAL [address]

## 2019-10-15 NOTE — PATIENT INSTRUCTIONS
Medicare Preventive Visit Patient Instructions  Thank you for completing your Welcome to Medicare Visit or Medicare Annual Wellness Visit today  Your next wellness visit will be due in one year (10/15/2020)  The screening/preventive services that you may require over the next 5-10 years are detailed below  Some tests may not apply to you based off risk factors and/or age  Screening tests ordered at today's visit but not completed yet may show as past due  Also, please note that scanned in results may not display below  Preventive Screenings:  Service Recommendations Previous Testing/Comments   Colorectal Cancer Screening  · Colonoscopy    · Fecal Occult Blood Test (FOBT)/Fecal Immunochemical Test (FIT)  · Fecal DNA/Cologuard Test  · Flexible Sigmoidoscopy Age: 54-65 years old   Colonoscopy: every 10 years (May be performed more frequently if at higher risk)  OR  FOBT/FIT: every 1 year  OR  Cologuard: every 3 years  OR  Sigmoidoscopy: every 5 years  Screening may be recommended earlier than age 48 if at higher risk for colorectal cancer  Also, an individualized decision between you and your healthcare provider will decide whether screening between the ages of 74-80 would be appropriate   Colonoscopy: 06/14/2017  FOBT/FIT: 03/31/2017  Cologuard: Not on file  Sigmoidoscopy: Not on file    Screening Not Indicated     Prostate Cancer Screening Individualized decision between patient and health care provider in men between ages of 53-78   Medicare will cover every 12 months beginning on the day after your 50th birthday PSA: 5 0 ng/mL     Screening Not Indicated     Hepatitis C Screening Once for adults born between 1945 and 1965  More frequently in patients at high risk for Hepatitis C Hep C Antibody: Not on file    Screening Not Indicated   Diabetes Screening 1-2 times per year if you're at risk for diabetes or have pre-diabetes Fasting glucose: 103 mg/dL   A1C: 6 0    Screening Not Indicated  History Diabetes Cholesterol Screening Once every 5 years if you don't have a lipid disorder  May order more often based on risk factors  Lipid panel: 08/22/2019    Screening Not Indicated  History Lipid Disorder      Other Preventive Screenings Covered by Medicare:  1  Abdominal Aortic Aneurysm (AAA) Screening: covered once if your at risk  You're considered to be at risk if you have a family history of AAA or a male between the age of 73-68 who smoking at least 100 cigarettes in your lifetime  2  Lung Cancer Screening: covers low dose CT scan once per year if you meet all of the following conditions: (1) Age 50-69; (2) No signs or symptoms of lung cancer; (3) Current smoker or have quit smoking within the last 15 years; (4) You have a tobacco smoking history of at least 30 pack years (packs per day x number of years you smoked); (5) You get a written order from a healthcare provider  3  Glaucoma Screening: covered annually if you're considered high risk: (1) You have diabetes OR (2) Family history of glaucoma OR (3)  aged 48 and older OR (3)  American aged 72 and older  3  Osteoporosis Screening: covered every 2 years if you meet one of the following conditions: (1) Have a vertebral abnormality; (2) On glucocorticoid therapy for more than 3 months; (3) Have primary hyperparathyroidism; (4) On osteoporosis medications and need to assess response to drug therapy  5  HIV Screening: covered annually if you're between the age of 12-76  Also covered annually if you are younger than 13 and older than 72 with risk factors for HIV infection  For pregnant patients, it is covered up to 3 times per pregnancy      Immunizations:  Immunization Recommendations   Influenza Vaccine Annual influenza vaccination during flu season is recommended for all persons aged >= 6 months who do not have contraindications   Pneumococcal Vaccine (Prevnar and Pneumovax)  * Prevnar = PCV13  * Pneumovax = PPSV23 Adults 25-60 years old: 1-3 doses may be recommended based on certain risk factors  Adults 72 years old: Prevnar (PCV13) vaccine recommended followed by Pneumovax (PPSV23) vaccine  If already received PPSV23 since turning 65, then PCV13 recommended at least one year after PPSV23 dose  Hepatitis B Vaccine 3 dose series if at intermediate or high risk (ex: diabetes, end stage renal disease, liver disease)   Tetanus (Td) Vaccine - COST NOT COVERED BY MEDICARE PART B Following completion of primary series, a booster dose should be given every 10 years to maintain immunity against tetanus  Td may also be given as tetanus wound prophylaxis  Tdap Vaccine - COST NOT COVERED BY MEDICARE PART B Recommended at least once for all adults  For pregnant patients, recommended with each pregnancy  Shingles Vaccine (Shingrix) - COST NOT COVERED BY MEDICARE PART B  2 shot series recommended in those aged 48 and above     Health Maintenance Due:      Topic Date Due    CRC Screening: Colonoscopy  06/14/2022     Immunizations Due:      Topic Date Due    HEPATITIS B VACCINES (1 of 3 - Risk 3-dose series) 11/21/1951    DTaP,Tdap,and Td Vaccines (1 - Tdap) 11/21/1953     Advance Directives   What are advance directives? Advance directives are legal documents that state your wishes and plans for medical care  These plans are made ahead of time in case you lose your ability to make decisions for yourself  Advance directives can apply to any medical decision, such as the treatments you want, and if you want to donate organs  What are the types of advance directives? There are many types of advance directives, and each state has rules about how to use them  You may choose a combination of any of the following:  · Living will: This is a written record of the treatment you want  You can also choose which treatments you do not want, which to limit, and which to stop at a certain time  This includes surgery, medicine, IV fluid, and tube feedings  · Durable power of  for healthcare [facility]): This is a written record that states who you want to make healthcare choices for you when you are unable to make them for yourself  This person, called a proxy, is usually a family member or a friend  You may choose more than 1 proxy  · Do not resuscitate (DNR) order:  A DNR order is used in case your heart stops beating or you stop breathing  It is a request not to have certain forms of treatment, such as CPR  A DNR order may be included in other types of advance directives  · Medical directive: This covers the care that you want if you are in a coma, near death, or unable to make decisions for yourself  You can list the treatments you want for each condition  Treatment may include pain medicine, surgery, blood transfusions, dialysis, IV or tube feedings, and a ventilator (breathing machine)  · Values history: This document has questions about your views, beliefs, and how you feel and think about life  This information can help others choose the care that you would choose  Why are advance directives important? An advance directive helps you control your care  Although spoken wishes may be used, it is better to have your wishes written down  Spoken wishes can be misunderstood, or not followed  Treatments may be given even if you do not want them  An advance directive may make it easier for your family to make difficult choices about your care  Fall Prevention    Fall prevention  includes ways to make your home and other areas safer  It also includes ways you can move more carefully to prevent a fall  Health conditions that cause changes in your blood pressure, vision, or muscle strength and coordination may increase your risk for falls  Medicines may also increase your risk for falls if they make you dizzy, weak, or sleepy  Fall prevention tips:   · Stand or sit up slowly  · Use assistive devices as directed      · Wear shoes that fit well and have soles that   · Wear a personal alarm  · Stay active  · Manage your medical conditions  Home Safety Tips:  · Add items to prevent falls in the bathroom  · Keep paths clear  · Install bright lights in your home  · Keep items you use often on shelves within reach  · Paint or place reflective tape on the edges of your stairs  Weight Management   Why it is important to manage your weight:  Being overweight increases your risk of health conditions such as heart disease, high blood pressure, type 2 diabetes, and certain types of cancer  It can also increase your risk for osteoarthritis, sleep apnea, and other respiratory problems  Aim for a slow, steady weight loss  Even a small amount of weight loss can lower your risk of health problems  How to lose weight safely:  A safe and healthy way to lose weight is to eat fewer calories and get regular exercise  You can lose up about 1 pound a week by decreasing the number of calories you eat by 500 calories each day  Healthy meal plan for weight management:  A healthy meal plan includes a variety of foods, contains fewer calories, and helps you stay healthy  A healthy meal plan includes the following:  · Eat whole-grain foods more often  A healthy meal plan should contain fiber  Fiber is the part of grains, fruits, and vegetables that is not broken down by your body  Whole-grain foods are healthy and provide extra fiber in your diet  Some examples of whole-grain foods are whole-wheat breads and pastas, oatmeal, brown rice, and bulgur  · Eat a variety of vegetables every day  Include dark, leafy greens such as spinach, kale, magda greens, and mustard greens  Eat yellow and orange vegetables such as carrots, sweet potatoes, and winter squash  · Eat a variety of fruits every day  Choose fresh or canned fruit (canned in its own juice or light syrup) instead of juice  Fruit juice has very little or no fiber  · Eat low-fat dairy foods    Drink fat-free (skim) milk or 1% milk  Eat fat-free yogurt and low-fat cottage cheese  Try low-fat cheeses such as mozzarella and other reduced-fat cheeses  · Choose meat and other protein foods that are low in fat  Choose beans or other legumes such as split peas or lentils  Choose fish, skinless poultry (chicken or turkey), or lean cuts of red meat (beef or pork)  Before you cook meat or poultry, cut off any visible fat  · Use less fat and oil  Try baking foods instead of frying them  Add less fat, such as margarine, sour cream, regular salad dressing and mayonnaise to foods  Eat fewer high-fat foods  Some examples of high-fat foods include french fries, doughnuts, ice cream, and cakes  · Eat fewer sweets  Limit foods and drinks that are high in sugar  This includes candy, cookies, regular soda, and sweetened drinks  Exercise:  Exercise at least 30 minutes per day on most days of the week  Some examples of exercise include walking, biking, dancing, and swimming  You can also fit in more physical activity by taking the stairs instead of the elevator or parking farther away from stores  Ask your healthcare provider about the best exercise plan for you  © Copyright [employer] 2018 Information is for End User's use only and may not be sold, redistributed or otherwise used for commercial purposes   All illustrations and images included in CareNotes® are the copyrighted property of [employer]  or [address] [place] [address]

## 2019-10-15 NOTE — PROGRESS NOTES
Assessment and Plan:     1  Medicare annual wellness visit, subsequent    BMI Counseling: Body mass index is 28 8 kg/m²  The BMI is above normal  Nutrition recommendations include limiting drinks that contain sugar, moderation in carbohydrate intake and increasing intake of lean protein  Exercise recommendations include exercising 3-5 times per week  Falls Plan of Care: balance, strength, and gait training instructions were provided  Recommended assistive device to help with gait and balance  Continue regular physical activity  Falls prevention instructions were printed on patients AVS     Preventive health issues were discussed with patient, and age appropriate screening tests were ordered as noted in patient's After Visit Summary  Personalized health advice and appropriate referrals for health education or preventive services given if needed, as noted in patient's After Visit Summary       History of Present Illness:     Patient presents for Medicare Annual Wellness visit    Patient Care Team:  Burgess Alvina DO as PCP - General (Internal Medicine)  MD Glendy Duran MD as Endoscopist     Problem List:     Patient Active Problem List   Diagnosis    Adult hypothyroidism    Chronic a-fib    Essential hypertension    Type 2 diabetes mellitus with diabetic polyneuropathy, without long-term current use of insulin ([facility] [address] )    Hyperlipidemia    Skin abnormalities    Gout    GERD (gastroesophageal reflux disease)    Chronic systolic congestive heart failure ([address] )    Chronic kidney disease      Past Medical and Surgical History:     Past Medical History:   Diagnosis Date    Acute systolic congestive heart failure ([facility] [address] ) 7/26/2018    Allergic contact dermatitis due to plants, except food     Atrial fibrillation ([address] )     Cancer ([address] )     skin cancer    Cataract     Chronic kidney disease     Stage 3    Coagulation test abnormality     Diabetes mellitus ([address] )     Disease of thyroid gland     hypothyroidism    DVT (deep venous thrombosis) (HCC)     Eczema     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HCC)     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HCC)     GERD (gastroesophageal reflux disease)     Gout     Hyperlipidemia     Hypertension     Hypokalemia     Leukocytosis     Lichen planus     Nonmelanoma skin cancer     Phlebitis or thrombophlebitis of deep vessel of lower extremity (HCC)     Proteinuria      Past Surgical History:   Procedure Laterality Date    CATARACT EXTRACTION      CHOLECYSTECTOMY      RI ESOPHAGOGASTRODUODENOSCOPY TRANSORAL DIAGNOSTIC N/A 6/14/2017    Procedure: EGD AND COLONOSCOPY;  Surgeon: Norma Young MD;  Location: [facility];   Service: Gastroenterology    TONSILLECTOMY        Family History:     Family History   Problem Relation Age of Onset    Alcohol abuse Mother     Heart attack Father     Dementia Brother     Other Son         AUTO ACCIDENT      Social History:     Social History     Socioeconomic History    Marital status: /Civil Union     Spouse name: None    Number of children: None    Years of education: None    Highest education level: None   Occupational History    Occupation: retired   Social Needs    Financial resource strain: None    Food insecurity:     Worry: None     Inability: None    Transportation needs:     Medical: None     Non-medical: None   Tobacco Use    Smoking status: Never Smoker    Smokeless tobacco: Never Used   Substance and Sexual Activity    Alcohol use: Never     Frequency: Never     Binge frequency: Never    Drug use: No    Sexual activity: Not Currently   Lifestyle    Physical activity:     Days per week: 7 days     Minutes per session: 150+ min    Stress: Not at all   Relationships    Social connections:     Talks on phone: None     Gets together: None     Attends Orthodox service: None     Active member of club or organization: None     Attends meetings of clubs or organizations: None     Relationship status: None    Intimate partner violence:     Fear of current or ex partner: None     Emotionally abused: None     Physically abused: None     Forced sexual activity: None   Other Topics Concern    None   Social History Narrative    Sedentary lifestyle       Medications and Allergies:     Current Outpatient Medications   Medication Sig Dispense Refill    acetaminophen (TYLENOL) 325 mg tablet Take 2 tablets (650 mg total) by mouth every 8 (eight) hours (Patient taking differently: Take 650 mg by mouth as needed ) 30 tablet 0    atorvastatin (LIPITOR) 10 mg tablet Take 10 mg by mouth daily with dinner      betamethasone dipropionate (DIPROSONE) 0 05 % cream APPLY TOPICALLY TO ITCHING AREAS TWICE DAILY AS NEEDED FOR IRRITATION 45 g 0    donepezil (ARICEPT) 5 mg tablet 1 TAB ORALLY AT BEDTIME DX: DEMENTIA 28 tablet 5    fluticasone (FLONASE) 50 mcg/act nasal spray 2 sprays into each nostril daily For allergies (Patient taking differently: 2 sprays into each nostril as needed For allergies) 16 g 3    furosemide (LASIX) 40 mg tablet 1 5 TAB (60MG) ORALLY EVERY MORNING DX: EDEMA 42 tablet 4    glipiZIDE (GLUCOTROL XL) 5 mg 24 hr tablet Take 1 tablet (5 mg total) by mouth daily 30 tablet 11    glucose blood (TRUE METRIX BLOOD GLUCOSE TEST) test strip Patient to test once daily DX code E11 8 100 each 5    levothyroxine 100 mcg tablet 1 TAB ORALLY EVERY MORNING DX: HYPOTHYROIDISM 28 tablet 5    loratadine (CLARITIN) 10 mg tablet Take 1 tablet (10 mg total) by mouth daily For allergies 30 tablet 5    metoprolol tartrate (LOPRESSOR) 50 mg tablet 1 TAB ORALLY TWICE DAILY DX: HYPERTENSION 56 tablet 5    Misc   Devices (BED WEDGE) MISC by Does not apply route daily 2 each 0    potassium chloride (MICRO-K) 10 MEQ CR capsule Take 2 capsules (20 mEq total) by mouth daily 180 capsule 1    triamcinolone (KENALOG) 0 1 % lotion Apply topically 3 (three) times a day      XARELTO 15 MG tablet 1 TAB ORALLY EVERY MORNING W/BREAKFAST DX: BLOOD CLOT PREVENTION **COLD SEAL** 28 tablet 5    Folic Acid-Vit E5-QOU B33 2 5-25-1 MG TABS Take 1 tablet by mouth daily       No current facility-administered medications for this visit  No Known Allergies   Immunizations:     Immunization History   Administered Date(s) Administered    H1N1, All Formulations 01/12/2010    INFLUENZA 10/22/2015, 10/25/2016, 10/26/2017    Influenza Split High Dose Preservative Free IM 10/30/2014, 10/22/2015, 10/26/2017    Influenza TIV (IM) 10/17/2013, 10/25/2016    Pneumococcal Conjugate 13-Valent 12/16/2015    Pneumococcal Polysaccharide PPV23 07/30/2018    Tdap 11/21/1932      Health Maintenance:         Topic Date Due    CRC Screening: Colonoscopy  06/14/2022         Topic Date Due    HEPATITIS B VACCINES (1 of 3 - Risk 3-dose series) 11/21/1951    DTaP,Tdap,and Td Vaccines (1 - Tdap) 11/21/1953    INFLUENZA VACCINE  07/01/2019      Medicare Health Risk Assessment:     /84 (BP Location: Left arm, Patient Position: Sitting, Cuff Size: Standard)   Pulse 56   Wt 85 9 kg (189 lb 6 4 oz) Comment: w/ shoes denied off  BMI 28 80 kg/m²      Alonso Lopes is here for his Subsequent Wellness visit  Health Risk Assessment:   Patient rates overall health as good  Patient feels that their physical health rating is same  Eyesight was rated as same  Hearing was rated as same  Patient feels that their emotional and mental health rating is slightly better  Pain experienced in the last 7 days has been none  Patient states that he has experienced no weight loss or gain in last 6 months  Depression Screening:   PHQ-2 Score: 0      Fall Risk Screening: In the past year, patient has experienced: history of falling in past year    Number of falls: 1  Injured during fall?: Yes    Feels unsteady when standing or walking?: No    Worried about falling?: Yes      Home Safety:  Patient does not have trouble with stairs inside or outside of their home   Patient has working smoke alarms and has working carbon monoxide detector  Home safety hazards include: none  Nutrition:   Current diet is Regular  Medications:   Patient is currently taking over-the-counter supplements  OTC medications include: see medication list  Patient is able to manage medications  Marycarmen liu helps    Activities of Daily Living (ADLs)/Instrumental Activities of Daily Living (IADLs):   Walk and transfer into and out of bed and chair?: Yes  Dress and groom yourself?: Yes    Bathe or shower yourself?: Yes    Feed yourself? Yes  Do your laundry/housekeeping?: Yes  Manage your money, pay your bills and track your expenses?: Yes  Make your own meals?: Yes    Do your own shopping?: No    Durable Medical Equipment Suppliers  none    Previous Hospitalizations:   Any hospitalizations or ED visits within the last 12 months?: Yes    How many hospitalizations have you had in the last year?: 1-2    Advance Care Planning:   Living will: Yes    Durable POA for healthcare: Yes    Advanced directive: Yes    Five wishes given: Yes      Cognitive Screening:   Provider or family/friend/caregiver concerned regarding cognition?: Yes    Cognition Comments: Patient has underlying dementia and is stable on medications      PREVENTIVE SCREENINGS      Cardiovascular Screening:    General: Screening Not Indicated and History Lipid Disorder      Diabetes Screening:     General: Screening Not Indicated and History Diabetes      Colorectal Cancer Screening:     General: Screening Not Indicated      Prostate Cancer Screening:    General: Screening Not Indicated      Osteoporosis Screening:    General: Screening Not Indicated      Abdominal Aortic Aneurysm (AAA) Screening:        General: Screening Not Indicated      Lung Cancer Screening:     General: Screening Not Indicated      Hepatitis C Screening:    General: Screening Not Indicated    Other Counseling Topics:   Skin self-exam, sunscreen and calcium and vitamin D intake and regular weightbearing exercise       [place]oxahala Ros, DO

## 2019-10-16 ENCOUNTER — TELEPHONE (OUTPATIENT)
Dept: INTERNAL MEDICINE CLINIC | Facility: CLINIC | Age: 84
End: 2019-10-16

## 2019-10-16 NOTE — TELEPHONE ENCOUNTER
Vaishnavi Littlejohn from [facility] called in regards to pt's visit from yesterday  Pt was told that he does not need to use lidocaine or icy/hot cream  If so, they need an order to discontinue medication   Fax upon completion     [phone]

## 2019-10-18 ENCOUNTER — OFFICE VISIT (OUTPATIENT)
Dept: CARDIOLOGY CLINIC | Facility: CLINIC | Age: 84
End: 2019-10-18
Payer: MEDICARE

## 2019-10-18 VITALS
BODY MASS INDEX: 28.79 KG/M2 | DIASTOLIC BLOOD PRESSURE: 80 MMHG | OXYGEN SATURATION: 96 % | HEART RATE: 69 BPM | WEIGHT: 190 LBS | SYSTOLIC BLOOD PRESSURE: 162 MMHG | HEIGHT: 68 IN

## 2019-10-18 DIAGNOSIS — Z79.01 ANTICOAGULANT LONG-TERM USE: ICD-10-CM

## 2019-10-18 DIAGNOSIS — I26.94 MULTIPLE SUBSEGMENTAL PULMONARY EMBOLI WITHOUT ACUTE COR PULMONALE (HCC): ICD-10-CM

## 2019-10-18 DIAGNOSIS — I71.2 THORACIC AORTIC ANEURYSM WITHOUT RUPTURE (HCC): ICD-10-CM

## 2019-10-18 DIAGNOSIS — I48.0 PAROXYSMAL ATRIAL FIBRILLATION (HCC): Primary | ICD-10-CM

## 2019-10-18 DIAGNOSIS — I50.22 CHRONIC SYSTOLIC CONGESTIVE HEART FAILURE (HCC): Primary | ICD-10-CM

## 2019-10-18 DIAGNOSIS — I51.7 LVH (LEFT VENTRICULAR HYPERTROPHY): ICD-10-CM

## 2019-10-18 DIAGNOSIS — I34.0 NONRHEUMATIC MITRAL VALVE REGURGITATION: ICD-10-CM

## 2019-10-18 DIAGNOSIS — I10 ESSENTIAL HYPERTENSION: ICD-10-CM

## 2019-10-18 DIAGNOSIS — I36.1 NONRHEUMATIC TRICUSPID VALVE REGURGITATION: ICD-10-CM

## 2019-10-18 DIAGNOSIS — I27.20 PULMONARY HYPERTENSION (HCC): ICD-10-CM

## 2019-10-18 DIAGNOSIS — E78.2 MIXED HYPERLIPIDEMIA: Chronic | ICD-10-CM

## 2019-10-18 DIAGNOSIS — I42.9 CARDIOMYOPATHY, UNSPECIFIED TYPE (HCC): ICD-10-CM

## 2019-10-18 DIAGNOSIS — I50.22 CHRONIC SYSTOLIC CONGESTIVE HEART FAILURE (HCC): ICD-10-CM

## 2019-10-18 PROCEDURE — 99215 OFFICE O/P EST HI 40 MIN: CPT | Performed by: INTERNAL MEDICINE

## 2019-10-18 RX ORDER — GABAPENTIN 100 MG/1
100 CAPSULE ORAL 3 TIMES DAILY
COMMUNITY
End: 2019-10-24 | Stop reason: SDUPTHER

## 2019-10-18 RX ORDER — TRIAMCINOLONE ACETONIDE 1 MG/ML
LOTION TOPICAL 3 TIMES DAILY
COMMUNITY
End: 2019-10-29 | Stop reason: SDUPTHER

## 2019-10-18 RX ORDER — LISINOPRIL 2.5 MG/1
2.5 TABLET ORAL DAILY
Qty: 90 TABLET | Refills: 3 | Status: SHIPPED | OUTPATIENT
Start: 2019-10-18 | End: 2020-03-31

## 2019-10-18 RX ORDER — WARFARIN SODIUM 5 MG/1
5 TABLET ORAL
COMMUNITY
End: 2019-10-24 | Stop reason: SDUPTHER

## 2019-10-18 NOTE — PROGRESS NOTES
CARDIOLOGY OFFICE VISIT  [facility]  [address], [facility] [place]  Tel: [phone]      NAME: Alexey Lira  AGE: 80 y o  SEX: male  : 1932   MRN: [id number]      Chief Complaint:  Chief Complaint   Patient presents with    Establish Care         History of Present Illness:   Patient of Dr Manson Mortimer who comes for follow up s/p recent hospitalization at [facility]  51-year-old male with known atrial fibrillation who was on Xarelto but recently changed to Coumadin due to pulmonary embolism while on Xarelto  Patient also has history of chronic systolic CHF cardiomyopathy (EF 45%), hypertension, hyperlipidemia, thoracic aortic aneurysm, mitral regurgitation, tricuspid regurgitation, pulmonary hypertension  He was recently admitted at [facility] and diagnosed with bilateral PE for which Coumadin was started  Patient denies denies chest pain, palpitations, lightheadedness, syncope, swelling feet, orthopnea  SOB is at baseline      Past Medical History:  Past Medical History:   Diagnosis Date    Acute systolic congestive heart failure ([facility] [address] ) 2018    Allergic contact dermatitis due to plants, except food     Atrial fibrillation (HCC)     Cancer (HCC)     skin cancer    Cataract     Chronic kidney disease     Stage 3    Coagulation test abnormality     Diabetes mellitus ([facility] [address] )     Disease of thyroid gland     hypothyroidism    DVT (deep venous thrombosis) (HCC)     Eczema     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HCC)     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HCC)     GERD (gastroesophageal reflux disease)     Gout     Hyperlipidemia     Hypertension     Hypokalemia     Leukocytosis     Lichen planus     Nonmelanoma skin cancer     Phlebitis or thrombophlebitis of deep vessel of lower extremity (HCC)     Proteinuria          Past Surgical History:  Past Surgical History:   Procedure Laterality Date    CATARACT EXTRACTION      CHOLECYSTECTOMY      NE ESOPHAGOGASTRODUODENOSCOPY TRANSORAL DIAGNOSTIC N/A 6/14/2017    Procedure: EGD AND COLONOSCOPY;  Surgeon: Glendy Lopez MD;  Location: [facility];   Service: Gastroenterology    TONSILLECTOMY           Family History:  Family History   Problem Relation Age of Onset    Alcohol abuse Mother     Heart attack Father     Dementia Brother     Other Son         AUTO ACCIDENT         Social History:  Social History     Socioeconomic History    Marital status: /Civil Union     Spouse name: None    Number of children: None    Years of education: None    Highest education level: None   Occupational History    Occupation: retired   Social Needs    Financial resource strain: None    Food insecurity:     Worry: None     Inability: None    Transportation needs:     Medical: None     Non-medical: None   Tobacco Use    Smoking status: Never Smoker    Smokeless tobacco: Never Used   Substance and Sexual Activity    Alcohol use: Never     Frequency: Never     Binge frequency: Never    Drug use: No    Sexual activity: Not Currently   Lifestyle    Physical activity:     Days per week: 7 days     Minutes per session: 150+ min    Stress: Not at all   Relationships    Social connections:     Talks on phone: None     Gets together: None     Attends Anabaptism service: None     Active member of club or organization: None     Attends meetings of clubs or organizations: None     Relationship status: None    Intimate partner violence:     Fear of current or ex partner: None     Emotionally abused: None     Physically abused: None     Forced sexual activity: None   Other Topics Concern    None   Social History Narrative    Sedentary lifestyle         Active Problems:  Patient Active Problem List   Diagnosis    Adult hypothyroidism    Chronic a-fib    Essential hypertension    Type 2 diabetes mellitus with diabetic polyneuropathy, without long-term current use of insulin (HCC)    Hyperlipidemia    Skin abnormalities    Gout    GERD (gastroesophageal reflux disease)    Chronic systolic congestive heart failure (HCC)    Chronic kidney disease         The following portions of the patient's history were reviewed and updated as appropriate: past medical history, past surgical history, past family history,  past social history, current medications, allergies and problem list       Review of Systems:  Constitutional: Denies fever, chills  Eyes: Denies eye redness, eye discharge, double vision  ENT: Denies sneezing, nasal discharge, sore throat   Respiratory: Denies cough, expectoration, hemoptysis  Cardiovascular: Denies palpitations, orthopnea, PND, lower extremity swelling  Gastrointestinal: Denies abdominal pain, nausea, vomiting  Genito-Urinary: Denies dysuria  Musculoskeletal: Denies back pain  Neurologic: Denies syncope, headache, seizures  Endocrine: Denies polyuria, polydipsia, temperature intolerance  Allergy and Immunology: Denies hives, insect bite sensitivity  Hematological and Lymphatic: Denies bleeding problems, swollen glands   Psychological: Denies depression, suicidal ideation, panic  Dermatological: Denies pruritus, rash      Vitals:  Vitals:    10/18/19 0926   BP: 162/80   Pulse: 69   SpO2: 96%       Body mass index is 28 89 kg/m²  Weight (last 2 days)     Date/Time   Weight    10/18/19 0926   86 2 (190)    10/18/19 0920   86 2 (190)                Physical Examination:  General: Patient is not in acute distress  Awake, alert  Responding to commands  Head: Normocephalic  Atraumatic  Eyes: Nonicteric  ENT: Normal external ear canals  Nares normal, no drainage  Lips and oral mucosa normal  Neck: Supple  JVP not raised  Trachea central  No thyromegaly  Lungs: Bilateral bronchovascular breath sounds with no crackles or rhonchi  Chest wall: No tenderness  Cardiovascular: RRR   S1 and S2 normal  No murmur, rub or gallop  Gastrointestinal: Abdomen soft, nontender  No guarding or rigidity  Liver and spleen not palpable  Bowel sounds present  Neurologic: Patient is awake, alert  Responding to command  Moving all extremities  Integumentary:  No skin rash  Lymphatic: No cervical lymphadenopathy  Back: Symmetric   No CVA tenderness  Extremities: No clubbing, cyanosis or edema      Laboratory Results:  CBC with diff:   Lab Results   Component Value Date    WBC 12 43 (H) 05/14/2019    WBC 9 6 07/24/2015    RBC 3 55 (L) 05/14/2019    RBC 4 47 07/24/2015    HGB 10 9 (L) 05/14/2019    HGB 14 0 07/24/2015    HCT 34 0 (L) 05/14/2019    HCT 40 7 07/24/2015    MCV 96 05/14/2019    MCV 91 07/24/2015    MCH 30 7 05/14/2019    MCH 31 2 07/24/2015    RDW 13 7 05/14/2019    RDW 11 8 07/24/2015     05/14/2019     07/24/2015       CMP:  Lab Results   Component Value Date    CREATININE 1 10 05/15/2019    CREATININE 1 29 12/16/2015    BUN 14 05/15/2019    BUN 22 12/16/2015     12/16/2015    K 4 3 05/15/2019    K 3 7 12/16/2015     05/15/2019     12/16/2015    CO2 28 05/15/2019    CO2 31 12/16/2015    GLUCOSE 117 12/16/2015    PROT 7 8 11/27/2015    ALKPHOS 108 05/13/2019    ALKPHOS 86 11/27/2015    ALT 62 05/13/2019    ALT 44 11/27/2015    AST 29 05/13/2019    AST 28 11/27/2015       Lab Results   Component Value Date    HGBA1C 6 0 08/22/2019    HGBA1C 6 7 (H) 04/02/2019    HGBA1C 6 4 (H) 11/27/2015    MG 1 8 05/14/2019       Lab Results   Component Value Date    TROPONINI <0 02 03/11/2019    TROPONINI <0 02 03/11/2019    TROPONINI <0 02 03/11/2019    CKTOTAL 79 01/30/2019    CKTOTAL 57 03/31/2017    CKTOTAL 70 10/11/2016    CKTOTAL 76 11/27/2015    CKTOTAL 61 07/24/2015    CKTOTAL 80 01/28/2015       Lipid Profile:   Lab Results   Component Value Date    CHOL 108 11/27/2015    CHOL 166 07/24/2015    CHOL 129 01/28/2015     Lab Results   Component Value Date    HDL 50 01/30/2019    HDL 43 07/20/2018    HDL 46 10/28/2017     Lab Results   Component Value Date    LDLCALC 117 (H) 2019    LDLCALC 70 2018    LDLCALC 53 10/28/2017     Lab Results   Component Value Date    TRIG 128 2019    TRIG 97 2018    TRIG 98 10/28/2017       Cardiac testing:   Results for orders placed during the hospital encounter of 18   Echo complete with contrast if indicated    Narrative [address] [facility]  [phone]    Transthoracic Echocardiogram  2D, M-mode, Doppler, and Color Doppler    Study date:  2018    Patient: Kimberlee James  MR number: [id number]  Account number: [de-identified]  : 1932  Age: 80 years  Gender: Male  Status: Outpatient  [facility]: [facility]  Height: 67 in  Weight: 204 lb  BP: 148/ 90 mmHg    Indications: Atrial fibrillation  Diagnoses: I48 0 - Atrial fibrillation    Sonographer:  Marie RCS  Interpreting Physician:  Singh Ackerman MD  Primary Physician:  Mason Lemus MD  Referring Physician:  Singh Ackerman MD  Group:  [facility]    SUMMARY    LEFT VENTRICLE:  Systolic function was moderately reduced  Ejection fraction was estimated to be 45 %  This study was inadequate for the evaluation of regional wall motion  There was mild diffuse hypokinesis  There was mild concentric hypertrophy  MITRAL VALVE:  There was mild to moderate regurgitation  TRICUSPID VALVE:  There was mild regurgitation  Estimated peak PA pressure was 45 mmHg  HISTORY: PRIOR HISTORY: Chronic Kidney Disease Risk factors: hypertension, oral hypoglycemic-treated diabetes, and medication-treated hypercholesterolemia  PROCEDURE: The study was performed in the [address] [place] [address]  This was a routine study  The transthoracic approach was used  The study included complete 2D imaging, M-mode, complete spectral Doppler, and color Doppler  The  heart rate was 77 bpm, at the start of the study  Image quality was adequate      LEFT VENTRICLE: Size was normal  Systolic function was moderately reduced  Ejection fraction was estimated to be 45 %  This study was inadequate for the evaluation of regional wall motion  There was mild diffuse hypokinesis  There was mild  concentric hypertrophy  DOPPLER: The study was not technically sufficient to allow evaluation of LV diastolic function  RIGHT VENTRICLE: The size was normal  Systolic function was normal  Wall thickness was normal     LEFT ATRIUM: Size was normal     RIGHT ATRIUM: Size was normal     MITRAL VALVE: Valve structure was normal  There was normal leaflet separation  DOPPLER: The transmitral velocity was within the normal range  There was no evidence for stenosis  There was mild to moderate regurgitation  AORTIC VALVE: The valve was trileaflet  Leaflets exhibited normal thickness and normal cuspal separation  DOPPLER: Transaortic velocity was within the normal range  There was no evidence for stenosis  There was no regurgitation  TRICUSPID VALVE: The valve structure was normal  There was normal leaflet separation  DOPPLER: The transtricuspid velocity was within the normal range  There was no evidence for stenosis  There was mild regurgitation  Estimated peak PA  pressure was 45 mmHg  PULMONIC VALVE: Leaflets exhibited normal thickness, no calcification, and normal cuspal separation  DOPPLER: The transpulmonic velocity was within the normal range  There was no regurgitation  PERICARDIUM: There was no pericardial effusion  The pericardium was normal in appearance  AORTA: The root exhibited normal size  SYSTEMIC VEINS: IVC: The inferior vena cava was normal in size and course  Respirophasic changes were normal     SYSTEM MEASUREMENT TABLES    Apical four chamber  4 chamber Left Atrium Volume Index; Planimetry; End Systole; Apical four chamber;: 26 09 cm2  Left Ventricular End Diastolic Volume; Method of Disks, Single Plane;  Apical four chamber;: 89 5 ml  Left Ventricular End Systolic Volume; Method of Disks, Single Plane; Apical four chamber;: 55 5 ml  Right Atrium Systolic Area; Planimetry; End Systole; Apical four chamber;: 22 73 cm2  Right Ventricular Internal Diastolic Dimension; End Diastole; Apical four chamber;: 35 6 mm    Unspecified Scan Mode  Aortic Root Diameter; End Systole;: 32 8 mm  Left atrial diameter; End Diastole;: 51 3 mm  Cardiac Output; Teichholz; Systole;: 7 53 L/min  Interventricular Septum Diastolic Thickness; Teichholz; End Diastole;: 11 4 mm  Left Ventricle Internal End Diastolic Dimension; Teichholz;: 51 9 mm  Left Ventricle Internal Systolic Dimension; Teichholz; End Systole;: 34 9 mm  Left Ventricle Posterior Wall Diastolic Thickness; Teichholz; End Diastole;: 10 2 mm  Left Ventricular Ejection Fraction; Teichholz;: 60 8 %  Left Ventricular End Diastolic Volume; Teichholz;: 128 9 ml  Left Ventricular End Systolic Volume; Teichholz;: 50 5 ml  Stroke volume;  Glee Rank;: 78 4 ml  Gradient Pressure, Peak; Simplified Bernoulli; Regurgitant Flow; Systole;: 42 8 mm[Hg]    [facility]    Prepared and electronically signed by    Allyssa Hughes MD  Signed 24-Jul-2018 09:07:48         Medications:    Current Outpatient Medications:     acetaminophen (TYLENOL) 325 mg tablet, Take 2 tablets (650 mg total) by mouth every 8 (eight) hours (Patient taking differently: Take 650 mg by mouth as needed ), Disp: 30 tablet, Rfl: 0    atorvastatin (LIPITOR) 10 mg tablet, Take 10 mg by mouth daily with dinner, Disp: , Rfl:     betamethasone dipropionate (DIPROSONE) 0 05 % cream, APPLY TOPICALLY TO ITCHING AREAS TWICE DAILY AS NEEDED FOR IRRITATION, Disp: 45 g, Rfl: 0    donepezil (ARICEPT) 5 mg tablet, 1 TAB ORALLY AT BEDTIME DX: DEMENTIA, Disp: 28 tablet, Rfl: 5    fluticasone (FLONASE) 50 mcg/act nasal spray, 2 sprays into each nostril daily For allergies (Patient taking differently: 2 sprays into each nostril as needed For allergies), Disp: 16 g, Rfl: 3    Folic Acid-Vit B4-YFG G12 (FOLBEE) 2 5-25-1 MG TABS, Take by mouth, Disp: , Rfl:     furosemide (LASIX) 40 mg tablet, 1 5 TAB (60MG) ORALLY EVERY MORNING DX: EDEMA, Disp: 42 tablet, Rfl: 4    gabapentin (NEURONTIN) 100 mg capsule, Take 100 mg by mouth 3 (three) times a day, Disp: , Rfl:     glipiZIDE (GLUCOTROL XL) 5 mg 24 hr tablet, Take 1 tablet (5 mg total) by mouth daily, Disp: 30 tablet, Rfl: 11    glucose blood (TRUE METRIX BLOOD GLUCOSE TEST) test strip, Patient to test once daily DX code E11 8, Disp: 100 each, Rfl: 5    levothyroxine 100 mcg tablet, 1 TAB ORALLY EVERY MORNING DX: HYPOTHYROIDISM, Disp: 28 tablet, Rfl: 5    Lidocaine-Menthol (ICY HOT LIDOCAINE PLUS MENTHOL EX), Apply topically, Disp: , Rfl:     loratadine (CLARITIN) 10 mg tablet, Take 1 tablet (10 mg total) by mouth daily For allergies, Disp: 30 tablet, Rfl: 5    metoprolol tartrate (LOPRESSOR) 50 mg tablet, 1 TAB ORALLY TWICE DAILY DX: HYPERTENSION, Disp: 56 tablet, Rfl: 5    Misc  Devices (BED WEDGE) MISC, by Does not apply route daily, Disp: 2 each, Rfl: 0    potassium chloride (MICRO-K) 10 MEQ CR capsule, Take 2 capsules (20 mEq total) by mouth daily, Disp: 180 capsule, Rfl: 1    triamcinolone (KENALOG) 0 1 % lotion, Apply topically 3 (three) times a day, Disp: , Rfl:     warfarin (COUMADIN) 5 mg tablet, Take 5 mg by mouth daily, Disp: , Rfl:       Allergies:  No Known Allergies      Assessment and Plan:  1  Paroxysmal atrial fibrillation (HCC)   on beta-blocker for rate control and on Coumadin for anticoagulation    2  Anticoagulant long-term use   continue Coumadin  INR goal 2-3    3  Multiple subsegmental pulmonary emboli without acute cor pulmonale   on Coumadin    4  Chronic systolic congestive heart failure ([facility] [address] )   currently euvolemic  Continue beta-blocker  Low-dose ACE-inhibitor started  Low-salt diet discussed      5  Cardiomyopathy, unspecified type ([facility] [address] )    Continue beta-blocker  Lisinopril low-dose started    6  Essential hypertension   BP high  Continue beta-blocker  Add lisinopril  Check BMP in 5 days    7  LVH (left ventricular hypertrophy)   tight BP control    8  Mixed hyperlipidemia   continue statin and diet control  His PCP closely monitors his blood work    9  Nonrheumatic mitral valve regurgitation, Nonrheumatic tricuspid valve regurgitation   follow up with serial echocardiograms at recommended intervals    10  Thoracic aortic aneurysm without rupture (HCC)   continue beta-blocker, statin  Tight BP control    Recommend aggressive risk factor modification and therapeutic lifestyle changes  Low-salt, low-calorie, low-fat, low-cholesterol diet with regular exercise and to optimize weight  I will defer the ordering and monitoring of necessity lab studies to you, but I am available and happy to review and manage any of the data at your request in the future  Discussed concepts of atherosclerosis, including signs and symptoms of cardiac disease  Previous studies were reviewed  Safety measures were reviewed  Questions were entertained and answered  Patient was advised to report any problems requiring medical attention  Follow-up with PCP and appropriate specialist and lab work as discussed  Return for follow up visit as scheduled or earlier, if needed  Thank you for allowing me to participate in the care and evaluation of your patient  Should you have any questions, please feel free to contact me        Vera Cortes MD  10/18/2019,1:45 PM

## 2019-10-21 ENCOUNTER — TELEPHONE (OUTPATIENT)
Dept: HEMATOLOGY ONCOLOGY | Facility: HOSPITAL | Age: 84
End: 2019-10-21

## 2019-10-21 NOTE — TELEPHONE ENCOUNTER
[facility] for pt to have labs drawn prior to his appt with Dr Clari Rhodes tomorrow or we will have to r/s his appt

## 2019-10-22 ENCOUNTER — OFFICE VISIT (OUTPATIENT)
Dept: HEMATOLOGY ONCOLOGY | Facility: CLINIC | Age: 84
End: 2019-10-22
Payer: MEDICARE

## 2019-10-22 VITALS
SYSTOLIC BLOOD PRESSURE: 130 MMHG | DIASTOLIC BLOOD PRESSURE: 86 MMHG | OXYGEN SATURATION: 85 % | BODY MASS INDEX: 28.95 KG/M2 | HEIGHT: 68 IN | HEART RATE: 75 BPM | RESPIRATION RATE: 16 BRPM | WEIGHT: 191 LBS | TEMPERATURE: 93.9 F

## 2019-10-22 DIAGNOSIS — D72.828 OTHER ELEVATED WHITE BLOOD CELL (WBC) COUNT: Primary | ICD-10-CM

## 2019-10-22 PROCEDURE — 99214 OFFICE O/P EST MOD 30 MIN: CPT | Performed by: INTERNAL MEDICINE

## 2019-10-22 NOTE — PROGRESS NOTES
Hematology/Oncology Outpatient Follow- up Note  Clarisa Raymond 80 y o  male MRN: @ Encounter: [id number]        Date:  10/22/2019    Presenting Complaint/Diagnosis :     Elevated white count      Previous Hematologic/ Oncologic History:      Workup    Current Hematologic/ Oncologic Treatment:      Observation    Interval History:      He returns for follow-up visit  He states is doing Reasonably well  His most recent white count from a few months ago was in a stable slightly elevated range around 12 5  Hemoglobin was slightly low but his creatinine does fluctuate  Last creatinine I have was in the normal range  Previously it has been elevated  The patient has opted for observation for his blood counts which I think at his age is reasonable  We denies any new complaints  He never had the blood work I had ordered drawn  I'm looking at the BMP and CBC with differential from a few months ago  He does get fatigued on occasion  Otherwise states he is doing well  The rest of his 14 point review of systems today was negative  PSA is slightly elevated and is being monitored by his other physicians would have been ordering it  Test Results:    Imaging: No results found      Labs:   Lab Results   Component Value Date    WBC 12 43 (H) 05/14/2019    HGB 10 9 (L) 05/14/2019    HCT 34 0 (L) 05/14/2019    MCV 96 05/14/2019     05/14/2019     Lab Results   Component Value Date     12/16/2015    K 4 3 05/15/2019     05/15/2019    CO2 28 05/15/2019    ANIONGAP 5 12/16/2015    BUN 14 05/15/2019    CREATININE 1 10 05/15/2019    GLUCOSE 117 12/16/2015    GLUF 103 (H) 05/13/2019    CALCIUM 8 1 (L) 05/15/2019    AST 29 05/13/2019    ALT 62 05/13/2019    ALKPHOS 108 05/13/2019    PROT 7 8 11/27/2015    BILITOT 0 99 11/27/2015    EGFR 60 05/15/2019         Lab Results   Component Value Date    PSA 5 0 (H) 01/30/2019    PSA 2 8 04/16/2016    PSA 2 96 08/11/2014         Lab Results   Component Value Date    JCFDYWRT98 815 04/04/2019         ROS: As stated in the history of present illness otherwise his 14 point review of systems today was negative  Active Problems:   Patient Active Problem List   Diagnosis    Adult hypothyroidism    Chronic a-fib    Essential hypertension    Type 2 diabetes mellitus with diabetic polyneuropathy, without long-term current use of insulin (HCC)    Hyperlipidemia    Skin abnormalities    Gout    GERD (gastroesophageal reflux disease)    Chronic systolic congestive heart failure (HCC)    Chronic kidney disease       Past Medical History:   Past Medical History:   Diagnosis Date    Acute systolic congestive heart failure ([facility] ) 7/26/2018    Allergic contact dermatitis due to plants, except food     Atrial fibrillation (HCC)     Cancer (HCC)     skin cancer    Cataract     Chronic kidney disease     Stage 3    Coagulation test abnormality     Diabetes mellitus ([facility] )     Disease of thyroid gland     hypothyroidism    DVT (deep venous thrombosis) (HCC)     Eczema     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HCC)     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HCC)     GERD (gastroesophageal reflux disease)     Gout     Hyperlipidemia     Hypertension     Hypokalemia     Leukocytosis     Lichen planus     Nonmelanoma skin cancer     Phlebitis or thrombophlebitis of deep vessel of lower extremity (HCC)     Proteinuria        Surgical History:   Past Surgical History:   Procedure Laterality Date    CATARACT EXTRACTION      CHOLECYSTECTOMY      SD ESOPHAGOGASTRODUODENOSCOPY TRANSORAL DIAGNOSTIC N/A 6/14/2017    Procedure: EGD AND COLONOSCOPY;  Surgeon: Archie Dow MD;  Location: [facility]; Service: Gastroenterology    TONSILLECTOMY         Family History:    Family History   Problem Relation Age of Onset    Alcohol abuse Mother     Heart attack Father     Dementia Brother     Other Son         AUTO ACCIDENT       Cancer-related family history is not on file      Social History:   Social History Socioeconomic History    Marital status: /Civil Union     Spouse name: Not on file    Number of children: Not on file    Years of education: Not on file    Highest education level: Not on file   Occupational History    Occupation: retired   Social Needs    Financial resource strain: Not on file    Food insecurity:     Worry: Not on file     Inability: Not on file   Novan needs:     Medical: Not on file     Non-medical: Not on file   Tobacco Use    Smoking status: Never Smoker    Smokeless tobacco: Never Used   Substance and Sexual Activity    Alcohol use: Never     Frequency: Never     Binge frequency: Never    Drug use: No    Sexual activity: Not Currently   Lifestyle    Physical activity:     Days per week: 7 days     Minutes per session: 150+ min    Stress: Not at all   Relationships    Social connections:     Talks on phone: Not on file     Gets together: Not on file     Attends Anabaptism service: Not on file     Active member of club or organization: Not on file     Attends meetings of clubs or organizations: Not on file     Relationship status: Not on file    Intimate partner violence:     Fear of current or ex partner: Not on file     Emotionally abused: Not on file     Physically abused: Not on file     Forced sexual activity: Not on file   Other Topics Concern    Not on file   Social History Narrative    Sedentary lifestyle       Current Medications:   Current Outpatient Medications   Medication Sig Dispense Refill    acetaminophen (TYLENOL) 325 mg tablet Take 2 tablets (650 mg total) by mouth every 8 (eight) hours (Patient taking differently: Take 650 mg by mouth as needed ) 30 tablet 0    atorvastatin (LIPITOR) 10 mg tablet Take 10 mg by mouth daily with dinner      betamethasone dipropionate (DIPROSONE) 0 05 % cream APPLY TOPICALLY TO ITCHING AREAS TWICE DAILY AS NEEDED FOR IRRITATION 45 g 0    donepezil (ARICEPT) 5 mg tablet 1 TAB ORALLY AT BEDTIME DX: DEMENTIA 28 tablet 5    fluticasone (FLONASE) 50 mcg/act nasal spray 2 sprays into each nostril daily For allergies (Patient taking differently: 2 sprays into each nostril as needed For allergies) 16 g 3    Folic Acid-Vit F1-GCI M70 (FOLBEE) 2 5-25-1 MG TABS Take by mouth      furosemide (LASIX) 40 mg tablet 1 5 TAB (60MG) ORALLY EVERY MORNING DX: EDEMA 42 tablet 4    gabapentin (NEURONTIN) 100 mg capsule Take 100 mg by mouth 3 (three) times a day      glipiZIDE (GLUCOTROL XL) 5 mg 24 hr tablet Take 1 tablet (5 mg total) by mouth daily 30 tablet 11    glucose blood (TRUE METRIX BLOOD GLUCOSE TEST) test strip Patient to test once daily DX code E11 8 100 each 5    levothyroxine 100 mcg tablet 1 TAB ORALLY EVERY MORNING DX: HYPOTHYROIDISM 28 tablet 5    Lidocaine-Menthol (ICY HOT LIDOCAINE PLUS MENTHOL EX) Apply topically      lisinopril (ZESTRIL) 2 5 mg tablet Take 1 tablet (2 5 mg total) by mouth daily 90 tablet 3    loratadine (CLARITIN) 10 mg tablet Take 1 tablet (10 mg total) by mouth daily For allergies 30 tablet 5    metoprolol tartrate (LOPRESSOR) 50 mg tablet 1 TAB ORALLY TWICE DAILY DX: HYPERTENSION 56 tablet 5    Misc  Devices (BED WEDGE) MISC by Does not apply route daily 2 each 0    potassium chloride (MICRO-K) 10 MEQ CR capsule Take 2 capsules (20 mEq total) by mouth daily 180 capsule 1    triamcinolone (KENALOG) 0 1 % lotion Apply topically 3 (three) times a day      warfarin (COUMADIN) 5 mg tablet Take 5 mg by mouth daily       No current facility-administered medications for this visit  Allergies: No Known Allergies    Physical Exam:    Body surface area is 2 meters squared      Wt Readings from Last 3 Encounters:   10/22/19 86 6 kg (191 lb)   10/18/19 86 2 kg (190 lb)   10/15/19 85 9 kg (189 lb 6 4 oz)        Temp Readings from Last 3 Encounters:   10/22/19 (!) 93 9 °F (34 4 °C) (Tympanic)   06/13/19 98 5 °F (36 9 °C) (Oral)   05/21/19 98 8 °F (37 1 °C)        BP Readings from Last 3 Encounters:   10/22/19 130/86   10/18/19 162/80   10/15/19 128/84         Pulse Readings from Last 3 Encounters:   10/22/19 75   10/18/19 69   10/15/19 56        Physical Exam     Constitutional   General appearance: No acute distress, well appearing and well nourished  Eyes   Conjunctiva and lids: No swelling, erythema or discharge  Pupils and irises: Equal, round and reactive to light  Ears, Nose, Mouth, and Throat   External inspection of ears and nose: Normal     Nasal mucosa, septum, and turbinates: Normal without edema or erythema  Oropharynx: Normal with no erythema, edema, exudate or lesions  Pulmonary   Respiratory effort: No increased work of breathing or signs of respiratory distress  Auscultation of lungs: Clear to auscultation  Cardiovascular   Palpation of heart: Normal PMI, no thrills  Auscultation of heart: Normal rate and rhythm, normal S1 and S2, without murmurs  Examination of extremities for edema and/or varicosities: Normal     Carotid pulses: Normal     Abdomen   Abdomen: Non-tender, no masses  Liver and spleen: No hepatomegaly or splenomegaly  Lymphatic   Palpation of lymph nodes in neck: No lymphadenopathy  Musculoskeletal   Gait and station: Normal     Digits and nails: Normal without clubbing or cyanosis  Inspection/palpation of joints, bones, and muscles: Normal     Skin   Skin and subcutaneous tissue: Normal without rashes or lesions  Neurologic   Cranial nerves: Cranial nerves 2-12 intact  Sensation: No sensory loss  Psychiatric   Orientation to person, place, and time: Normal     Mood and affect: Normal         Assessment / Plan:      The patient is a pleasant 77-year-old male who was referred to see us for mild leukocytosis  I ordered a myeloproliferative workup to include a flow cytometry, PCR for BCR/ABL and a JAK2 mutation  His workup was all negative   I explained to him that Options at this point included a bone marrow biopsy versus observation  The patient wished to stay on observation  If his counts deteriorate we agreed to consider a bone marrow biopsy  I will now see him back in 12 months  He does understand the importance of follow-up with his fluctuating white count and the risk of mild MDS or other bone marrow disorder so will see me back in 6 months  Again his white count is in the 12 range which is slightly improved compared to previously but still elevated  He is slightly anemic at 10 9  He had his EGD and colonoscopy  He will see his dermatologist for his lesions under the skin  He will continue to follow with his other physicians for his other problems including his elevated PSA  Goals and Barriers:  Current Goal:  Prolong Survival from Elevated white count  Barriers: None  Patient's Capacity to Self Care:  Patient able to self care  Portions of the record may have been created with voice recognition software   Occasional wrong word or "sound a like" substitutions may have occurred due to the inherent limitations of voice recognition software   Read the chart carefully and recognize, using context, where substitutions have occurred

## 2019-10-23 ENCOUNTER — TELEPHONE (OUTPATIENT)
Dept: INTERNAL MEDICINE CLINIC | Facility: CLINIC | Age: 84
End: 2019-10-23

## 2019-10-23 NOTE — TELEPHONE ENCOUNTER
[facility] from [facility] is asking if patient should Continue same dose  Of coumadin or if there is a chage  inr done yesterday

## 2019-10-23 NOTE — TELEPHONE ENCOUNTER
Can Dr Iveth Ambrocio; please put in a New order for PT/INR  Fax the New order to this F#: [phone]    [facility] called from [facility] [place] about this

## 2019-10-23 NOTE — TELEPHONE ENCOUNTER
Spoke to [facility] MALENA from [facility] letting her know we would call her tomw with dr Gregor Habermann response, [facility] MALENA will not be there tomw please [facility] li[facility] main number

## 2019-10-23 NOTE — TELEPHONE ENCOUNTER
According to Dr Sam Zamora   On 10/15 /19, patient was started on Xarelto and taken off warfarin      He does not need to do PT/INR at all

## 2019-10-24 DIAGNOSIS — I48.20 CHRONIC A-FIB (HCC): Primary | Chronic | ICD-10-CM

## 2019-10-24 DIAGNOSIS — R60.0 BILATERAL LOWER EXTREMITY EDEMA: ICD-10-CM

## 2019-10-24 DIAGNOSIS — E11.42 TYPE 2 DIABETES MELLITUS WITH DIABETIC POLYNEUROPATHY, WITHOUT LONG-TERM CURRENT USE OF INSULIN (HCC): Chronic | ICD-10-CM

## 2019-10-24 RX ORDER — WARFARIN SODIUM 5 MG/1
5 TABLET ORAL
Qty: 30 TABLET | Refills: 11 | Status: SHIPPED | OUTPATIENT
Start: 2019-10-24 | End: 2020-07-22

## 2019-10-24 RX ORDER — GABAPENTIN 100 MG/1
100 CAPSULE ORAL 3 TIMES DAILY
Qty: 90 CAPSULE | Refills: 11 | Status: SHIPPED | OUTPATIENT
Start: 2019-10-24 | End: 2020-01-28 | Stop reason: SDUPTHER

## 2019-10-24 RX ORDER — FUROSEMIDE 40 MG/1
40 TABLET ORAL DAILY
Qty: 30 TABLET | Refills: 11 | Status: SHIPPED | OUTPATIENT
Start: 2019-10-24 | End: 2020-01-29 | Stop reason: SDUPTHER

## 2019-10-24 NOTE — TELEPHONE ENCOUNTER
I was under impression he was on xarelto from what he told me, but read Dr Jaqui Mcbride (cardiology) note and does appear he is on warfarin       Coumadin is going to be monitored by cardiology now that he established with Dr Arturo Harding

## 2019-10-25 ENCOUNTER — TELEPHONE (OUTPATIENT)
Dept: CARDIOLOGY CLINIC | Facility: CLINIC | Age: 84
End: 2019-10-25

## 2019-10-25 DIAGNOSIS — I48.0 PAROXYSMAL ATRIAL FIBRILLATION (HCC): Primary | ICD-10-CM

## 2019-10-25 DIAGNOSIS — Z79.01 LONG TERM (CURRENT) USE OF ANTICOAGULANTS: ICD-10-CM

## 2019-10-25 NOTE — TELEPHONE ENCOUNTER
S/w Omid Purvis  Told her we never rec'd the results from 10/22 so I can't give her instructions now not knowing what his inr is  She said that on 10/22 it was 1 8  I asked if he can be retested today and she said the lab comes on Tues  I said then when he gets tested on Tues and I get the results, I will provide dosing instructions then  Gave her our fax # and   Faxed over a new inr order

## 2019-10-25 NOTE — TELEPHONE ENCOUNTER
Emilia Haines from [facility] said they faxed over his lab work for INR on 10/22/19  She didn't know where it was faxed  Do we follow this pt for coumadin? I see notes for Dr Gaston Persaud  Please call Emilia Haines to discuss   Thank you

## 2019-10-29 ENCOUNTER — TELEPHONE (OUTPATIENT)
Dept: CARDIOLOGY CLINIC | Facility: CLINIC | Age: 84
End: 2019-10-29

## 2019-10-29 ENCOUNTER — ANTICOAG VISIT (OUTPATIENT)
Dept: CARDIOLOGY CLINIC | Facility: CLINIC | Age: 84
End: 2019-10-29

## 2019-10-29 ENCOUNTER — TELEPHONE (OUTPATIENT)
Dept: INTERNAL MEDICINE CLINIC | Facility: CLINIC | Age: 84
End: 2019-10-29

## 2019-10-29 DIAGNOSIS — L30.9 DERMATITIS: Primary | ICD-10-CM

## 2019-10-29 LAB — INR PPP: 2 (ref 0.84–1.19)

## 2019-10-29 RX ORDER — TRIAMCINOLONE ACETONIDE 1 MG/ML
LOTION TOPICAL 3 TIMES DAILY
Qty: 60 ML | Refills: 3 | Status: SHIPPED | OUTPATIENT
Start: 2019-10-29 | End: 2019-11-01

## 2019-10-29 NOTE — TELEPHONE ENCOUNTER
Ning Brood called requesting a refill on [facility]     triamcinolone (KENALOG) 0 1 % lotion        He is going through a 60 ml tube every few days can something larger be sent in  It is a large area where it is being applied to      Please send to [facility]

## 2019-10-29 NOTE — TELEPHONE ENCOUNTER
Grecia Postal from [facility] said she sending you a fax with pt info  Patients INR 2 0 and PT 21 9 taken today

## 2019-10-29 NOTE — PROGRESS NOTES
S/w Gabby at [address] [facility] [address] [facility]   Instructed to stay on the same dose and retest again in 1 week

## 2019-11-01 ENCOUNTER — TELEPHONE (OUTPATIENT)
Dept: INTERNAL MEDICINE CLINIC | Facility: CLINIC | Age: 84
End: 2019-11-01

## 2019-11-01 RX ORDER — TRIAMCINOLONE ACETONIDE 1 MG/G
CREAM TOPICAL 2 TIMES DAILY
Qty: 453.6 G | Refills: 3 | Status: SHIPPED | OUTPATIENT
Start: 2019-11-01 | End: 2020-01-06 | Stop reason: HOSPADM

## 2019-11-01 NOTE — TELEPHONE ENCOUNTER
Antonio Hans is calling back    they would like a larger bottle    to be sent, he is going though the tubs, becaus  it being applied to a large area  Zena Knutson

## 2019-11-05 ENCOUNTER — ANTICOAG VISIT (OUTPATIENT)
Dept: CARDIOLOGY CLINIC | Facility: CLINIC | Age: 84
End: 2019-11-05

## 2019-11-05 DIAGNOSIS — I48.0 PAROXYSMAL ATRIAL FIBRILLATION (HCC): Primary | ICD-10-CM

## 2019-11-05 DIAGNOSIS — Z79.01 LONG TERM (CURRENT) USE OF ANTICOAGULANTS: ICD-10-CM

## 2019-11-05 LAB — INR PPP: 1.5 (ref 0.84–1.19)

## 2019-11-05 NOTE — PROGRESS NOTES
S/w Shelly Pride at [address] [facility] [address] [facility]   Instructed to have the patient take 7 5mg Sun/Tues/Thurs and retest again in 1 week

## 2019-11-06 ENCOUNTER — TELEPHONE (OUTPATIENT)
Dept: CARDIOLOGY CLINIC | Facility: CLINIC | Age: 84
End: 2019-11-06

## 2019-11-06 NOTE — TELEPHONE ENCOUNTER
Spoke with patricia , there were no meds changes during last office visit as per office note   Last office note and anticoag with orders has been faxed over to patricia

## 2019-11-06 NOTE — TELEPHONE ENCOUNTER
Yusra called & would like a call back to discuss some med changes that SE6 had changed on pt at his visit on 10/18/19   Yusra also wants pt's last office note, new labs/orders faxed to her at [phone]

## 2019-11-07 ENCOUNTER — TELEPHONE (OUTPATIENT)
Dept: DERMATOLOGY | Facility: CLINIC | Age: 84
End: 2019-11-07

## 2019-11-07 ENCOUNTER — TELEPHONE (OUTPATIENT)
Dept: CARDIOLOGY CLINIC | Facility: CLINIC | Age: 84
End: 2019-11-07

## 2019-11-07 ENCOUNTER — OFFICE VISIT (OUTPATIENT)
Dept: DERMATOLOGY | Facility: CLINIC | Age: 84
End: 2019-11-07
Payer: MEDICARE

## 2019-11-07 DIAGNOSIS — L30.8 PSORIASIFORM DERMATITIS: Primary | ICD-10-CM

## 2019-11-07 DIAGNOSIS — Z85.828 HISTORY OF SKIN CANCER: ICD-10-CM

## 2019-11-07 DIAGNOSIS — L82.1 SEBORRHEIC KERATOSIS: ICD-10-CM

## 2019-11-07 DIAGNOSIS — Z13.89 SCREENING FOR SKIN CONDITION: ICD-10-CM

## 2019-11-07 PROCEDURE — 99214 OFFICE O/P EST MOD 30 MIN: CPT | Performed by: DERMATOLOGY

## 2019-11-07 NOTE — PROGRESS NOTES
[facility] DERMAT[facility]  [address] [place] [address]  Dudley Reynolds [address]  [phone]  [phone]     MRN: [id number] : 1932  Encounter: [id number]  Patient Information: Oc Burnett  Chief complaint:  Six-month skin check rash    History of present illness:  42-year-old male presents for overall skin check history of skin cancers well as continued problems with itching rash basically patient has had problems with his back as well as leg he has been having issues with congestive heart failure has been a hospital in his legs have been swelling  No other concerns noted  Past Medical History:   Diagnosis Date    Acute systolic congestive heart failure ([facility] ) 2018    Allergic contact dermatitis due to plants, except food     Atrial fibrillation (HCC)     Cancer (HCC)     skin cancer    Cataract     Chronic kidney disease     Stage 3    Coagulation test abnormality     Diabetes mellitus ([facility] )     Disease of thyroid gland     hypothyroidism    DVT (deep venous thrombosis) (HCC)     Eczema     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HCC)     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HCC)     GERD (gastroesophageal reflux disease)     Gout     Hyperlipidemia     Hypertension     Hypokalemia     Leukocytosis     Lichen planus     Nonmelanoma skin cancer     Phlebitis or thrombophlebitis of deep vessel of lower extremity (HCC)     Proteinuria      Past Surgical History:   Procedure Laterality Date    CATARACT EXTRACTION      CHOLECYSTECTOMY      IN ESOPHAGOGASTRODUODENOSCOPY TRANSORAL DIAGNOSTIC N/A 2017    Procedure: EGD AND COLONOSCOPY;  Surgeon: James Paul MD;  Location: [facility];   Service: Gastroenterology    TONSILLECTOMY       Social History   Social History     Substance and Sexual Activity   Alcohol Use Never    Frequency: Never    Binge frequency: Never     Social History     Substance and Sexual Activity   Drug Use No     Social History     Tobacco Use   Smoking Status Never Smoker   Smokeless Tobacco Never Used     Family History   Problem Relation Age of Onset    Alcohol abuse Mother     Heart attack Father     Dementia Brother     Other Son         AUTO ACCIDENT     Meds/Allergies   No Known Allergies    Meds:  Prior to Admission medications    Medication Sig Start Date End Date Taking?  Authorizing Provider   acetaminophen (TYLENOL) 325 mg tablet Take 2 tablets (650 mg total) by mouth every 8 (eight) hours  Patient taking differently: Take 650 mg by mouth as needed  5/15/19  Yes Gokul Garcia PA-C   atorvastatin (LIPITOR) 10 mg tablet Take 10 mg by mouth daily with dinner   Yes Historical Provider, MD   donepezil (ARICEPT) 5 mg tablet 1 TAB ORALLY AT BEDTIME DX: DEMENTIA 8/29/19  Yes Rubén Coleman DO   fluticasone (FLONASE) 50 mcg/act nasal spray 2 sprays into each nostril daily For allergies  Patient taking differently: 2 sprays into each nostril as needed For allergies 5/21/19  Yes Elaine Zuniga PA-C   Folic Acid-Vit E5-UQL A60 (FOLBEE) 2 5-25-1 MG TABS Take by mouth   Yes Historical Provider, MD   furosemide (LASIX) 40 mg tablet Take 1 tablet (40 mg total) by mouth daily 10/24/19  Yes Rubén Coleman DO   gabapentin (NEURONTIN) 100 mg capsule Take 1 capsule (100 mg total) by mouth 3 (three) times a day Dx: Neuropathy 10/24/19  Yes Rubén Coleman DO   glipiZIDE (GLUCOTROL XL) 5 mg 24 hr tablet Take 1 tablet (5 mg total) by mouth daily 7/30/18  Yes Aditya Neumann MD   glucose blood (TRUE METRIX BLOOD GLUCOSE TEST) test strip Patient to test once daily DX code E11 8 4/22/19  Yes Jessica Coleman, DO   levothyroxine 100 mcg tablet 1 TAB ORALLY EVERY MORNING DX: HYPOTHYROIDISM 8/29/19  Yes Rubén Coleman DO   Lidocaine-Menthol (ICY HOT LIDOCAINE PLUS MENTHOL EX) Apply topically   Yes Historical Provider, MD   lisinopril (ZESTRIL) 2 5 mg tablet Take 1 tablet (2 5 mg total) by mouth daily 10/18/19  Yes Yayo Lira MD   loratadine (CLARITIN) 10 mg tablet Take 1 tablet (10 mg total) by mouth daily For allergies 5/21/19  Yes Elaine Zuniga PA-C   metoprolol tartrate (LOPRESSOR) 50 mg tablet 1 TAB ORALLY TWICE DAILY DX: HYPERTENSION 8/29/19  Yes Rusty Mena, DO   Misc   Devices (BED WEDGE) MISC by Does not apply route daily 8/6/19  Yes Rubén Coleman, DO   potassium chloride (MICRO-K) 10 MEQ CR capsule Take 2 capsules (20 mEq total) by mouth daily 8/16/19  Yes Rubén Coleman, DO   triamcinolone (KENALOG) 0 1 % cream Apply topically 2 (two) times a day 11/1/19  Yes Rubén Coleman, DO   warfarin (COUMADIN) 5 mg tablet Take 1 tablet (5 mg total) by mouth daily 10/24/19  Yes Rubén Coleman, DO   betamethasone dipropionate (DIPROSONE) 0 05 % cream APPLY TOPICALLY TO ITCHING AREAS TWICE DAILY AS NEEDED FOR IRRITATION  Patient not taking: Reported on 11/7/2019 9/23/19   Frandy Hays MD       Subjective:     Review of Systems:    General: negative for - chills, fatigue, fever,  weight gain or weight loss  Psychological: negative for - anxiety, behavioral disorder, concentration difficulties, decreased libido, depression, irritability, memory difficulties, mood swings, sleep disturbances or suicidal ideation  ENT: negative for - hearing difficulties , nasal congestion, nasal discharge, oral lesions, sinus pain, sneezing, sore throat  Allergy and Immunology: negative for - hives, insect bite sensitivity,  Hematological and Lymphatic: negative for - bleeding problems, blood clots,bruising, swollen lymph nodes  Endocrine: negative for - hair pattern changes, hot flashes, malaise/lethargy, mood swings, palpitations, polydipsia/polyuria, skin changes, temperature intolerance or unexpected weight change  Respiratory: negative for - cough, hemoptysis, orthopnea, shortness of breath, or wheezing  Cardiovascular: negative for - chest pain, dyspnea on exertion, edema,  Gastrointestinal: negative for - abdominal pain, nausea/vomiting  Genito-Urinary: negative for - dysuria, incontinence, irregular/heavy menses or urinary frequency/urgency  Musculoskeletal: negative for - gait disturbance, joint pain, joint stiffness, joint swelling, muscle pain, muscular weakness  Dermatological:  As in HPI  Neurological: negative for confusion, dizziness, headaches, impaired coordination/balance, memory loss, numbness/tingling, seizures, speech problems, tremors or weakness       Objective: There were no vitals taken for this visit  Physical Exam:    General Appearance:    Alert, cooperative, no distress   Head:    Normocephalic, without obvious abnormality, atraumatic           Skin:   A full skin exam was performed including scalp, head scalp, eyes, ears, nose, lips, neck, chest, axilla, abdomen, back, buttocks, bilateral upper extremities, bilateral lower extremities, hands, feet, fingers, toes, fingernails, and toenails erythema scaling noted on the back also on widespread areas of both legs no other evidence of rash normal keratotic papules greasy stuck on appearance previous sites skin cancer well healed without recurrence     Assessment:     1  Psoriasiform dermatitis     2  Screening for skin condition     3  History of skin cancer     4   Seborrheic keratosis           Plan:   Patient with psoriasiform dermatitis/stasis dermatitis at present advised to use triamcinolone ointment to the affected areas on his legs and back till clear  Seborrheic keratosis patient reassured these are normal growths we acquire with age no treatment needed  History of skin cancer in no recurrence nothing else atypical sunblock recommended follow-up in 6 months  Screening for dermatologic disorders nothing else of concern noted on complete exam follow-up in 6 months    Mike Guerra MD  11/7/2019,10:43 AM    Portions of the record may have been created with voice recognition software   Occasional wrong word or "sound a like" substitutions may have occurred due to the inherent limitations of voice recognition software   Read the chart carefully and recognize, using context, where substitutions have occurred

## 2019-11-07 NOTE — PATIENT INSTRUCTIONS
Patient with psoriasiform dermatitis/stasis dermatitis at present advised to use triamcinolone ointment to the affected areas on his legs and back till clear  Seborrheic keratosis patient reassured these are normal growths we acquire with age no treatment needed  History of skin cancer in no recurrence nothing else atypical sunblock recommended follow-up in 6 months  Screening for dermatologic disorders nothing else of concern noted on complete exam follow-up in 6 months

## 2019-11-07 NOTE — TELEPHONE ENCOUNTER
Spoke with frances from [facility] wants pt to stay on the lisinopril 2 5 daily  No change to his med   Kidney function looks good

## 2019-11-12 ENCOUNTER — ANTICOAG VISIT (OUTPATIENT)
Dept: CARDIOLOGY CLINIC | Facility: CLINIC | Age: 84
End: 2019-11-12

## 2019-11-12 LAB — INR PPP: 2.1 (ref 0.84–1.19)

## 2019-11-12 NOTE — PROGRESS NOTES
[facility], s/w [facility]  Pt is to stay on the same dose and retest again in 1 week   Also faxed anticoag visit to [facility]

## 2019-11-20 ENCOUNTER — ANTICOAG VISIT (OUTPATIENT)
Dept: CARDIOLOGY CLINIC | Facility: CLINIC | Age: 84
End: 2019-11-20

## 2019-11-20 LAB — INR PPP: 2.6 (ref 0.84–1.19)

## 2019-11-26 ENCOUNTER — ANTICOAG VISIT (OUTPATIENT)
Dept: CARDIOLOGY CLINIC | Facility: CLINIC | Age: 84
End: 2019-11-26

## 2019-11-26 ENCOUNTER — TELEPHONE (OUTPATIENT)
Dept: CARDIOLOGY CLINIC | Facility: CLINIC | Age: 84
End: 2019-11-26

## 2019-11-26 LAB — INR PPP: 2.5 (ref 0.84–1.19)

## 2019-11-26 NOTE — PROGRESS NOTES
Pt is to stay on the same dose of 7 5mg Sun, Tues, Thurs, and 5mg the rest  Please retest again in 1 week

## 2019-12-04 ENCOUNTER — ANTICOAG VISIT (OUTPATIENT)
Dept: CARDIOLOGY CLINIC | Facility: CLINIC | Age: 84
End: 2019-12-04

## 2019-12-04 ENCOUNTER — TELEPHONE (OUTPATIENT)
Dept: CARDIOLOGY CLINIC | Facility: CLINIC | Age: 84
End: 2019-12-04

## 2019-12-04 LAB — INR PPP: 2.3 (ref 0.84–1.19)

## 2019-12-04 NOTE — TELEPHONE ENCOUNTER
Bob Wynn from [facility] called and is asking for INR results , dosage and when to recheck please call back [phone] ask for belgica

## 2019-12-04 NOTE — PROGRESS NOTES
Pt is to stay on the same dose of 7 5mg Sun/Tues/Thurs and 5mg the rest and retest again in 1 week   Faxed to [facility]

## 2019-12-10 ENCOUNTER — ANTICOAG VISIT (OUTPATIENT)
Dept: CARDIOLOGY CLINIC | Facility: CLINIC | Age: 84
End: 2019-12-10

## 2019-12-10 LAB — INR PPP: 1.2 (ref 0.84–1.19)

## 2019-12-10 NOTE — PROGRESS NOTES
S/w Gabby  States that pt was not giving Coumadin due to a pharmacy error    I told her to restart him on his reg dose and retest again in 1 week

## 2019-12-18 ENCOUNTER — ANTICOAG VISIT (OUTPATIENT)
Dept: CARDIOLOGY CLINIC | Facility: CLINIC | Age: 84
End: 2019-12-18

## 2019-12-18 DIAGNOSIS — E78.2 MIXED HYPERLIPIDEMIA: Primary | Chronic | ICD-10-CM

## 2019-12-18 LAB — INR PPP: 1.6 (ref 0.84–1.19)

## 2019-12-19 RX ORDER — ATORVASTATIN CALCIUM 10 MG/1
TABLET, FILM COATED ORAL
Qty: 28 TABLET | Refills: 5 | Status: SHIPPED | OUTPATIENT
Start: 2019-12-19 | End: 2020-06-02

## 2019-12-20 ENCOUNTER — TELEPHONE (OUTPATIENT)
Dept: CARDIOLOGY CLINIC | Facility: CLINIC | Age: 84
End: 2019-12-20

## 2019-12-20 NOTE — TELEPHONE ENCOUNTER
[facility] from [facility] called pt is having S o b  While walking   pt is scheduled to be seen in February 2020  They would like to know if the pt is to stay on coumadin due to pt starting a new prescription of atorvastatin  [facility] would like a call back   [phone]

## 2019-12-20 NOTE — TELEPHONE ENCOUNTER
Spoke with Vaishnavi Littlejohn at [facility] she stated that the will have patient go to the ER if SOB worsens  She stated that the question was not regarding taking the medication together as originally stated  1  Patients daughter wanted to know is his coumadin can be changed to an alternative medication (eliquis,etc  )so he will not have to get weekly blood work, if he is to continue with the medication long term  2  Patients daughter feels like his SOB has gotten worse since starting the atorvastatin  Please review and advise, thanks

## 2019-12-23 DIAGNOSIS — J31.0 CHRONIC RHINITIS: Primary | ICD-10-CM

## 2019-12-23 RX ORDER — FLUTICASONE PROPIONATE 50 MCG
SPRAY, SUSPENSION (ML) NASAL
Qty: 16 G | Refills: 4 | Status: SHIPPED | OUTPATIENT
Start: 2019-12-23 | End: 2020-01-09 | Stop reason: SDUPTHER

## 2019-12-24 NOTE — TELEPHONE ENCOUNTER
Left detailed message for patient, advised the below  Asked to call back in regards to doing a possible medication change

## 2019-12-26 ENCOUNTER — TELEPHONE (OUTPATIENT)
Dept: CARDIOLOGY CLINIC | Facility: CLINIC | Age: 84
End: 2019-12-26

## 2019-12-26 ENCOUNTER — ANTICOAG VISIT (OUTPATIENT)
Dept: CARDIOLOGY CLINIC | Facility: CLINIC | Age: 84
End: 2019-12-26

## 2019-12-26 LAB — INR PPP: 2.3 (ref 0.84–1.19)

## 2019-12-26 NOTE — TELEPHONE ENCOUNTER
Spoke with Franklyn Zapata @ [facility]   1  She stated that Sathish Cortes has not mentioned anything more to her about having SOB  2  To switch over to Eliquis patient will need a signed script faxed to them at [phone]  Would patient be put on Eliquis 5mg BID? Would you sign the script tomorrow when you are in the office, since Dr Lynnell Koyanagi will not be in the office until 1/13/20? Please advise, thanks

## 2019-12-26 NOTE — TELEPHONE ENCOUNTER
Noam Puri from [facility] called and would like to know if the pt is to start coumadin or eliquis  If pt is to start eliquis they need an order   #[phone]

## 2019-12-26 NOTE — TELEPHONE ENCOUNTER
S/w Gabby, informed her that patient should continue taking Coumadin until Dr Krissy Shah is back in the office to to sign script  Jaxon Braxton verbally understood

## 2019-12-26 NOTE — TELEPHONE ENCOUNTER
Waqar Alejandre, I dont know anything about the patient  What I see that patient is on coumadin  Dr Krissy Shah can prescribe/sign medication next week until which he can continue his coumadin unless there is any emergency   Let me know thanks

## 2019-12-27 NOTE — TELEPHONE ENCOUNTER
S/w Gabby and she said that the daughter wants him to get off Coumadin and go on Eliquis or another agent because she does not like the fact he tests so frequently  I will call  To see if his insurance covers anything else  Asked Mari Cantrell to fax me his rx plan so I can call

## 2019-12-31 NOTE — TELEPHONE ENCOUNTER
I called pt's drug plan [employer] [phone], ID # [id number] and s/w Leda  She states Eliquis is covered, but he has to pay the co-pay which is $15 for a 30 day supply  I will ask Dr Jeri Carver if it ok to switch wants he returns from vacation

## 2020-01-01 ENCOUNTER — HOSPITAL ENCOUNTER (INPATIENT)
Facility: HOSPITAL | Age: 85
LOS: 5 days | Discharge: HOME HEALTH | DRG: 291 | End: 2020-01-06
Attending: EMERGENCY MEDICINE | Admitting: INTERNAL MEDICINE
Payer: MEDICARE

## 2020-01-01 ENCOUNTER — APPOINTMENT (EMERGENCY)
Dept: RADIOLOGY | Facility: HOSPITAL | Age: 85
DRG: 291 | End: 2020-01-01
Payer: MEDICARE

## 2020-01-01 DIAGNOSIS — I50.9 ACUTE EXACERBATION OF CHF (CONGESTIVE HEART FAILURE) (HCC): ICD-10-CM

## 2020-01-01 DIAGNOSIS — I50.23 ACUTE ON CHRONIC SYSTOLIC (CONGESTIVE) HEART FAILURE (HCC): Primary | Chronic | ICD-10-CM

## 2020-01-01 DIAGNOSIS — L98.9 SKIN ABNORMALITIES: ICD-10-CM

## 2020-01-01 PROBLEM — I26.99 PULMONARY EMBOLISM (HCC): Status: ACTIVE | Noted: 2020-01-01

## 2020-01-01 LAB
ALBUMIN SERPL BCP-MCNC: 2.6 G/DL (ref 3.5–5)
ALP SERPL-CCNC: 160 U/L (ref 46–116)
ALT SERPL W P-5'-P-CCNC: 51 U/L (ref 12–78)
ANION GAP SERPL CALCULATED.3IONS-SCNC: 8 MMOL/L (ref 4–13)
AST SERPL W P-5'-P-CCNC: 48 U/L (ref 5–45)
ATRIAL RATE: 416 BPM
BASOPHILS # BLD AUTO: 0.06 THOUSANDS/ΜL (ref 0–0.1)
BASOPHILS NFR BLD AUTO: 1 % (ref 0–1)
BILIRUB DIRECT SERPL-MCNC: 0.24 MG/DL (ref 0–0.2)
BILIRUB SERPL-MCNC: 0.9 MG/DL (ref 0.2–1)
BUN SERPL-MCNC: 17 MG/DL (ref 5–25)
CALCIUM SERPL-MCNC: 8.3 MG/DL (ref 8.3–10.1)
CHLORIDE SERPL-SCNC: 103 MMOL/L (ref 100–108)
CO2 SERPL-SCNC: 32 MMOL/L (ref 21–32)
CREAT SERPL-MCNC: 1.21 MG/DL (ref 0.6–1.3)
EOSINOPHIL # BLD AUTO: 0.35 THOUSAND/ΜL (ref 0–0.61)
EOSINOPHIL NFR BLD AUTO: 3 % (ref 0–6)
ERYTHROCYTE [DISTWIDTH] IN BLOOD BY AUTOMATED COUNT: 14.6 % (ref 11.6–15.1)
GFR SERPL CREATININE-BSD FRML MDRD: 54 ML/MIN/1.73SQ M
GLUCOSE SERPL-MCNC: 184 MG/DL (ref 65–140)
GLUCOSE SERPL-MCNC: 71 MG/DL (ref 65–140)
HCT VFR BLD AUTO: 42.7 % (ref 36.5–49.3)
HGB BLD-MCNC: 13.3 G/DL (ref 12–17)
IMM GRANULOCYTES # BLD AUTO: 0.04 THOUSAND/UL (ref 0–0.2)
IMM GRANULOCYTES NFR BLD AUTO: 0 % (ref 0–2)
INR PPP: 2.53 (ref 0.84–1.19)
LACTATE SERPL-SCNC: 1.7 MMOL/L (ref 0.5–2)
LYMPHOCYTES # BLD AUTO: 0.98 THOUSANDS/ΜL (ref 0.6–4.47)
LYMPHOCYTES NFR BLD AUTO: 9 % (ref 14–44)
MCH RBC QN AUTO: 29.3 PG (ref 26.8–34.3)
MCHC RBC AUTO-ENTMCNC: 31.1 G/DL (ref 31.4–37.4)
MCV RBC AUTO: 94 FL (ref 82–98)
MONOCYTES # BLD AUTO: 0.76 THOUSAND/ΜL (ref 0.17–1.22)
MONOCYTES NFR BLD AUTO: 7 % (ref 4–12)
NEUTROPHILS # BLD AUTO: 9.07 THOUSANDS/ΜL (ref 1.85–7.62)
NEUTS SEG NFR BLD AUTO: 80 % (ref 43–75)
NRBC BLD AUTO-RTO: 0 /100 WBCS
NT-PROBNP SERPL-MCNC: 4532 PG/ML
PLATELET # BLD AUTO: 342 THOUSANDS/UL (ref 149–390)
PMV BLD AUTO: 9.6 FL (ref 8.9–12.7)
POTASSIUM SERPL-SCNC: 3.3 MMOL/L (ref 3.5–5.3)
PROT SERPL-MCNC: 7.5 G/DL (ref 6.4–8.2)
PROTHROMBIN TIME: 27.6 SECONDS (ref 11.6–14.5)
QRS AXIS: 20 DEGREES
QRSD INTERVAL: 84 MS
QT INTERVAL: 400 MS
QTC INTERVAL: 489 MS
RBC # BLD AUTO: 4.54 MILLION/UL (ref 3.88–5.62)
SODIUM SERPL-SCNC: 143 MMOL/L (ref 136–145)
T WAVE AXIS: 41 DEGREES
TROPONIN I SERPL-MCNC: 0.02 NG/ML
VENTRICULAR RATE: 90 BPM
WBC # BLD AUTO: 11.26 THOUSAND/UL (ref 4.31–10.16)

## 2020-01-01 PROCEDURE — 82948 REAGENT STRIP/BLOOD GLUCOSE: CPT

## 2020-01-01 PROCEDURE — 84484 ASSAY OF TROPONIN QUANT: CPT | Performed by: EMERGENCY MEDICINE

## 2020-01-01 PROCEDURE — 99222 1ST HOSP IP/OBS MODERATE 55: CPT | Performed by: INTERNAL MEDICINE

## 2020-01-01 PROCEDURE — 99285 EMERGENCY DEPT VISIT HI MDM: CPT

## 2020-01-01 PROCEDURE — 80076 HEPATIC FUNCTION PANEL: CPT | Performed by: EMERGENCY MEDICINE

## 2020-01-01 PROCEDURE — 71046 X-RAY EXAM CHEST 2 VIEWS: CPT

## 2020-01-01 PROCEDURE — 85025 COMPLETE CBC W/AUTO DIFF WBC: CPT | Performed by: EMERGENCY MEDICINE

## 2020-01-01 PROCEDURE — 99285 EMERGENCY DEPT VISIT HI MDM: CPT | Performed by: EMERGENCY MEDICINE

## 2020-01-01 PROCEDURE — 83880 ASSAY OF NATRIURETIC PEPTIDE: CPT | Performed by: EMERGENCY MEDICINE

## 2020-01-01 PROCEDURE — 83605 ASSAY OF LACTIC ACID: CPT | Performed by: EMERGENCY MEDICINE

## 2020-01-01 PROCEDURE — 1123F ACP DISCUSS/DSCN MKR DOCD: CPT | Performed by: INTERNAL MEDICINE

## 2020-01-01 PROCEDURE — 36415 COLL VENOUS BLD VENIPUNCTURE: CPT | Performed by: EMERGENCY MEDICINE

## 2020-01-01 PROCEDURE — 96374 THER/PROPH/DIAG INJ IV PUSH: CPT

## 2020-01-01 PROCEDURE — 85610 PROTHROMBIN TIME: CPT | Performed by: EMERGENCY MEDICINE

## 2020-01-01 PROCEDURE — 80048 BASIC METABOLIC PNL TOTAL CA: CPT | Performed by: EMERGENCY MEDICINE

## 2020-01-01 PROCEDURE — 87040 BLOOD CULTURE FOR BACTERIA: CPT | Performed by: EMERGENCY MEDICINE

## 2020-01-01 PROCEDURE — 93010 ELECTROCARDIOGRAM REPORT: CPT | Performed by: INTERNAL MEDICINE

## 2020-01-01 PROCEDURE — 93005 ELECTROCARDIOGRAM TRACING: CPT

## 2020-01-01 RX ORDER — DONEPEZIL HYDROCHLORIDE 5 MG/1
5 TABLET, FILM COATED ORAL
Status: DISCONTINUED | OUTPATIENT
Start: 2020-01-01 | End: 2020-01-06 | Stop reason: HOSPADM

## 2020-01-01 RX ORDER — WARFARIN SODIUM 5 MG/1
5 TABLET ORAL
Status: DISCONTINUED | OUTPATIENT
Start: 2020-01-01 | End: 2020-01-06 | Stop reason: HOSPADM

## 2020-01-01 RX ORDER — GABAPENTIN 100 MG/1
100 CAPSULE ORAL 3 TIMES DAILY
Status: DISCONTINUED | OUTPATIENT
Start: 2020-01-01 | End: 2020-01-06 | Stop reason: HOSPADM

## 2020-01-01 RX ORDER — FUROSEMIDE 10 MG/ML
40 INJECTION INTRAMUSCULAR; INTRAVENOUS DAILY
Status: DISCONTINUED | OUTPATIENT
Start: 2020-01-02 | End: 2020-01-02

## 2020-01-01 RX ORDER — ATORVASTATIN CALCIUM 10 MG/1
10 TABLET, FILM COATED ORAL
Status: DISCONTINUED | OUTPATIENT
Start: 2020-01-01 | End: 2020-01-06 | Stop reason: HOSPADM

## 2020-01-01 RX ORDER — LORATADINE 10 MG/1
10 TABLET ORAL DAILY
Status: DISCONTINUED | OUTPATIENT
Start: 2020-01-02 | End: 2020-01-06 | Stop reason: HOSPADM

## 2020-01-01 RX ORDER — LISINOPRIL 2.5 MG/1
2.5 TABLET ORAL DAILY
Status: DISCONTINUED | OUTPATIENT
Start: 2020-01-02 | End: 2020-01-06 | Stop reason: HOSPADM

## 2020-01-01 RX ORDER — FUROSEMIDE 10 MG/ML
80 INJECTION INTRAMUSCULAR; INTRAVENOUS ONCE
Status: COMPLETED | OUTPATIENT
Start: 2020-01-01 | End: 2020-01-01

## 2020-01-01 RX ORDER — POTASSIUM CHLORIDE 20 MEQ/1
20 TABLET, EXTENDED RELEASE ORAL DAILY
Status: DISCONTINUED | OUTPATIENT
Start: 2020-01-02 | End: 2020-01-02

## 2020-01-01 RX ORDER — ACETAMINOPHEN 325 MG/1
650 TABLET ORAL EVERY 6 HOURS PRN
Status: DISCONTINUED | OUTPATIENT
Start: 2020-01-01 | End: 2020-01-06 | Stop reason: HOSPADM

## 2020-01-01 RX ORDER — METOPROLOL TARTRATE 50 MG/1
50 TABLET, FILM COATED ORAL EVERY 12 HOURS SCHEDULED
Status: DISCONTINUED | OUTPATIENT
Start: 2020-01-01 | End: 2020-01-06 | Stop reason: HOSPADM

## 2020-01-01 RX ORDER — ONDANSETRON 2 MG/ML
4 INJECTION INTRAMUSCULAR; INTRAVENOUS EVERY 6 HOURS PRN
Status: DISCONTINUED | OUTPATIENT
Start: 2020-01-01 | End: 2020-01-06 | Stop reason: HOSPADM

## 2020-01-01 RX ADMIN — FUROSEMIDE 80 MG: 10 INJECTION, SOLUTION INTRAMUSCULAR; INTRAVENOUS at 14:06

## 2020-01-01 RX ADMIN — DONEPEZIL HYDROCHLORIDE 5 MG: 5 TABLET ORAL at 22:21

## 2020-01-01 RX ADMIN — METOPROLOL TARTRATE 50 MG: 50 TABLET, FILM COATED ORAL at 20:20

## 2020-01-01 RX ADMIN — ATORVASTATIN CALCIUM 10 MG: 10 TABLET, FILM COATED ORAL at 22:21

## 2020-01-01 RX ADMIN — WARFARIN SODIUM 5 MG: 5 TABLET ORAL at 20:20

## 2020-01-01 RX ADMIN — GABAPENTIN 100 MG: 100 CAPSULE ORAL at 20:20

## 2020-01-01 NOTE — ED PROVIDER NOTES
History  Chief Complaint   Patient presents with    Shortness of Breath     pt presents via EMS from [facility] for worsening sob with exertion and increased swelling in b/l lower extremities  HPI patient is an 28-year-old male, arrives from nursing [facility] by EMS, apparently very short of breath to minimal exertion  Patient reports he normally can walk up and down the hallways  The daughter reports that he is required to walk to an area to get his medications  She reports he can barely walk a few feet without becoming very short of breath  Patient becomes very unsteady as he walks in becomes increasingly short of breath  Daughter is concerned he is going to fall because the amount of shortness of breath he has  Patient reports shortness of breath with minimal dyspnea  She denies any trauma  Patient reports that he gets very uncomfortable with tightness when he is walking especially with minimal exertion  Past medical history congestive heart failure, pulmonary embolism  Family history noncontributory  Social history, lives at a nursing home, nonsmoker      Prior to Admission Medications   Prescriptions Last Dose Informant Patient Reported? Taking? Folic Acid-Vit R4-GPY C36 (FOLBEE) 2 5-25-1 MG TABS  Self Yes No   Sig: Take by mouth   Lidocaine-Menthol (ICY HOT LIDOCAINE PLUS MENTHOL EX)  Self Yes No   Sig: Apply topically   Misc   Devices (BED WEDGE) MISC  Self No No   Sig: by Does not apply route daily   acetaminophen (TYLENOL) 325 mg tablet  Self No No   Sig: Take 2 tablets (650 mg total) by mouth every 8 (eight) hours   Patient taking differently: Take 650 mg by mouth as needed    atorvastatin (LIPITOR) 10 mg tablet   No No   Si TAB ORALLY AT BEDTIME DX:HYPERLIPIDEMIA   betamethasone dipropionate (DIPROSONE) 0 05 % cream  Self No No   Sig: APPLY TOPICALLY TO ITCHING AREAS TWICE DAILY AS NEEDED FOR IRRITATION   Patient not taking: Reported on 2019   donepezil (ARICEPT) 5 mg tablet Self No No   Si TAB ORALLY AT BEDTIME DX: DEMENTIA   fluticasone (FLONASE) 50 mcg/act nasal spray  Self No No   Si sprays into each nostril daily For allergies   Patient taking differently: 2 sprays into each nostril as needed For allergies   fluticasone (FLONASE) 50 mcg/act nasal spray   No No   Sig: INSTILL 2 SPRAYS INTO EACH NOSTRIL EVERY MORNING DX:ALLERGIC RHINITIS   furosemide (LASIX) 40 mg tablet  Self No No   Sig: Take 1 tablet (40 mg total) by mouth daily   gabapentin (NEURONTIN) 100 mg capsule  Self No No   Sig: Take 1 capsule (100 mg total) by mouth 3 (three) times a day Dx: Neuropathy   glipiZIDE (GLUCOTROL XL) 5 mg 24 hr tablet  Self No No   Sig: Take 1 tablet (5 mg total) by mouth daily   glucose blood (TRUE METRIX BLOOD GLUCOSE TEST) test strip  Self No No   Sig: Patient to test once daily DX code E11 8   levothyroxine 100 mcg tablet  Self No No   Si TAB ORALLY EVERY MORNING DX: HYPOTHYROIDISM   lisinopril (ZESTRIL) 2 5 mg tablet  Self No No   Sig: Take 1 tablet (2 5 mg total) by mouth daily   loratadine (CLARITIN) 10 mg tablet  Self No No   Sig: Take 1 tablet (10 mg total) by mouth daily For allergies   metoprolol tartrate (LOPRESSOR) 50 mg tablet  Self No No   Si TAB ORALLY TWICE DAILY DX: HYPERTENSION   potassium chloride (MICRO-K) 10 MEQ CR capsule  Self No No   Sig: Take 2 capsules (20 mEq total) by mouth daily   triamcinolone (KENALOG) 0 1 % cream  Self No No   Sig: Apply topically 2 (two) times a day   triamcinolone (KENALOG) 0 1 % ointment   No No   Sig: Apply topically 2 (two) times a day To rash on legs and back until clear   warfarin (COUMADIN) 5 mg tablet  Self No No   Sig: Take 1 tablet (5 mg total) by mouth daily      Facility-Administered Medications: None       Past Medical History:   Diagnosis Date    Acute systolic congestive heart failure (HCC) 2018    Allergic contact dermatitis due to plants, except food     Atrial fibrillation (HCC)     [facility] skin cancer    Cataract     Chronic kidney disease     Stage 3    Coagulation test abnormality     Diabetes mellitus ([facility] )     Disease of thyroid gland     hypothyroidism    DVT (deep venous thrombosis) (HCC)     Eczema     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HCC)     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HCC)     GERD (gastroesophageal reflux disease)     Gout     Hyperlipidemia     Hypertension     Hypokalemia     Leukocytosis     Lichen planus     Nonmelanoma skin cancer     Phlebitis or thrombophlebitis of deep vessel of lower extremity (HCC)     Proteinuria        Past Surgical History:   Procedure Laterality Date    CATARACT EXTRACTION      CHOLECYSTECTOMY      ND ESOPHAGOGASTRODUODENOSCOPY TRANSORAL DIAGNOSTIC N/A 6/14/2017    Procedure: EGD AND COLONOSCOPY;  Surgeon: Feliciano Shah MD;  Location: [facility]; Service: Gastroenterology    TONSILLECTOMY         Family History   Problem Relation Age of Onset    Alcohol abuse Mother     Heart attack Father     Dementia Brother     Other Son         AUTO ACCIDENT     I have reviewed and agree with the history as documented  Social History     Tobacco Use    Smoking status: Never Smoker    Smokeless tobacco: Never Used   Substance Use Topics    Alcohol use: Never     Frequency: Never     Binge frequency: Never    Drug use: No        Review of Systems   Constitutional: Negative for diaphoresis, fatigue and fever  HENT: Negative for congestion, ear pain, nosebleeds and sore throat  Eyes: Negative for photophobia, pain, discharge and visual disturbance  Respiratory: Positive for shortness of breath  Negative for cough, choking, chest tightness and wheezing  Cardiovascular: Negative for chest pain and palpitations  Gastrointestinal: Negative for abdominal distention, abdominal pain, diarrhea and vomiting  Genitourinary: Negative for dysuria, flank pain and frequency  Musculoskeletal: Negative for back pain, gait problem and joint swelling     Skin: Negative for color change and rash  Neurological: Negative for dizziness, syncope and headaches  Psychiatric/Behavioral: Negative for behavioral problems and confusion  The patient is not nervous/anxious  All other systems reviewed and are negative  Physical Exam  Physical Exam   Constitutional: He is oriented to person, place, and time  He appears well-developed and well-nourished  HENT:   Head: Normocephalic  Right Ear: External ear normal    Left Ear: External ear normal    Nose: Nose normal    Mouth/Throat: Oropharynx is clear and moist    Eyes: Pupils are equal, round, and reactive to light  EOM and lids are normal    Neck: Normal range of motion  Neck supple  Cardiovascular: Normal rate, regular rhythm, normal heart sounds and intact distal pulses  Pulmonary/Chest: Effort normal  No respiratory distress  He has rales  Bilateral lower lung field rales, decreased breath sounds bases   Abdominal: Soft  Bowel sounds are normal  There is no tenderness  Musculoskeletal: Normal range of motion  He exhibits no deformity  Neurological: He is alert and oriented to person, place, and time  Skin: Skin is warm and dry  Psychiatric: He has a normal mood and affect  Nursing note and vitals reviewed  Pulse oximetry 92 on room air adequate oxygenation, there is no hypoxia without exertion      Vital Signs  ED Triage Vitals [01/01/20 1322]   Temperature Pulse Respirations Blood Pressure SpO2   97 9 °F (36 6 °C) 94 20 (!) 186/100 96 %      Temp Source Heart Rate Source Patient Position - Orthostatic VS BP Location FiO2 (%)   Oral Monitor Sitting Left arm --      Pain Score       No Pain           Vitals:    01/01/20 1430 01/01/20 1445 01/01/20 1514 01/01/20 1645   BP:   144/89    Pulse: 97 87 92 97   Patient Position - Orthostatic VS:             Visual Acuity      ED Medications  Medications   furosemide (LASIX) injection 80 mg (80 mg Intravenous Given 1/1/20 1406)       Diagnostic Studies  Results Reviewed     Procedure Component Value Units Date/Time    Hepatic function panel [[id number]]  (Abnormal) Collected:  01/01/20 1403    Lab Status:  Final result Specimen:  Blood from Arm, Right Updated:  01/01/20 1435     Total Bilirubin 0 90 mg/dL      Bilirubin, Direct 0 24 mg/dL      Alkaline Phosphatase 160 U/L      AST 48 U/L      ALT 51 U/L      Total Protein 7 5 g/dL      Albumin 2 6 g/dL     NT-BNP PRO [[id number]]  (Abnormal) Collected:  01/01/20 1403    Lab Status:  Final result Specimen:  Blood from Arm, Right Updated:  01/01/20 1435     NT-proBNP 4,532 pg/mL     Lactic acid, plasma [[id number]]  (Normal) Collected:  01/01/20 1403    Lab Status:  Final result Specimen:  Blood from Arm, Right Updated:  01/01/20 1430     LACTIC ACID 1 7 mmol/L     Narrative:       Result may be elevated if tourniquet was used during collection      Troponin I [[id number]]  (Normal) Collected:  01/01/20 1403    Lab Status:  Final result Specimen:  Blood from Arm, Right Updated:  01/01/20 1429     Troponin I 0 02 ng/mL     Basic metabolic panel [[id number]]  (Abnormal) Collected:  01/01/20 1403    Lab Status:  Final result Specimen:  Blood from Arm, Right Updated:  01/01/20 1426     Sodium 143 mmol/L      Potassium 3 3 mmol/L      Chloride 103 mmol/L      CO2 32 mmol/L      ANION GAP 8 mmol/L      BUN 17 mg/dL      Creatinine 1 21 mg/dL      Glucose 71 mg/dL      Calcium 8 3 mg/dL      eGFR 54 ml/min/1 73sq m     Narrative:       [place] guidelines for Chronic Kidney Disease (CKD):     Stage 1 with normal or high GFR (GFR > 90 mL/min/1 73 square meters)    Stage 2 Mild CKD (GFR = 60-89 mL/min/1 73 square meters)    Stage 3A Moderate CKD (GFR = 45-59 mL/min/1 73 square meters)    Stage 3B Moderate CKD (GFR = 30-44 mL/min/1 73 square meters)    Stage 4 Severe CKD (GFR = 15-29 mL/min/1 73 square meters)    Stage 5 End Stage CKD (GFR <15 mL/min/1 73 square meters)  Note: GFR calculation is accurate only with a steady state creatinine    Protime-INR [[id number]]  (Abnormal) Collected:  01/01/20 1403    Lab Status:  Final result Specimen:  Blood from Arm, Right Updated:  01/01/20 1425     Protime 27 6 seconds      INR 2 53    CBC and differential [[id number]]  (Abnormal) Collected:  01/01/20 1403    Lab Status:  Final result Specimen:  Blood from Arm, Right Updated:  01/01/20 1410     WBC 11 26 Thousand/uL      RBC 4 54 Million/uL      Hemoglobin 13 3 g/dL      Hematocrit 42 7 %      MCV 94 fL      MCH 29 3 pg      MCHC 31 1 g/dL      RDW 14 6 %      MPV 9 6 fL      Platelets 938 Thousands/uL      nRBC 0 /100 WBCs      Neutrophils Relative 80 %      Immat GRANS % 0 %      Lymphocytes Relative 9 %      Monocytes Relative 7 %      Eosinophils Relative 3 %      Basophils Relative 1 %      Neutrophils Absolute 9 07 Thousands/µL      Immature Grans Absolute 0 04 Thousand/uL      Lymphocytes Absolute 0 98 Thousands/µL      Monocytes Absolute 0 76 Thousand/µL      Eosinophils Absolute 0 35 Thousand/µL      Basophils Absolute 0 06 Thousands/µL     Blood culture [[id number]] Collected:  01/01/20 1403    Lab Status: In process Specimen:  Blood from Arm, Left Updated:  01/01/20 1407    Blood culture [[id number]] Collected:  01/01/20 1403    Lab Status:   In process Specimen:  Blood from Arm, Right Updated:  01/01/20 1407                 XR chest pa & lateral    (Results Pending)              Procedures  ECG 12 Lead Documentation Only  Date/Time: 1/1/2020 5:58 PM  Performed by: Justin Sotelo MD  Authorized by: Justin Sotelo MD     Indications / Diagnosis:   shortness of breath  ECG reviewed by me, the ED Provider: yes    Patient location:  ED  Previous ECG:     Previous ECG:  Compared to current    Comparison ECG info:    March 11, 2019    Similarity:  Changes noted  Interpretation:     Interpretation: non-specific    Rate:     ECG rate:   90    ECG rate assessment: normal    Rhythm:     Rhythm: atrial fibrillation Comments:       Chronic atrial fibrillation, now with some PVCs, nonspecific ST-T wave changes no ST elevations  ED Course        start initially requested switching from Coumadin to Eliquis but it appears the patient failed Xarelto and had a pulmonary embolism following Xarelto therapy so needs to stay on Coumadin  Discussed with Cardiology they agree with this  Discussed with Cardiology advised admission for diuresis if possible  Discussed hospital service agrees    Identification of Seniors at Risk      Most Recent Value   (ISAR) Identification of Seniors at Risk   Before the illness or injury that brought you to the Emergency, did you need someone to help you on a regular basis? 1 Filed at: 01/01/2020 1326   In the last 24 hours, have you needed more help than usual?  0 Filed at: 01/01/2020 1326   Have you been hospitalized for one or more nights during the past 6 months? 0 Filed at: 01/01/2020 1326   In general, do you see well?  0 Filed at: 01/01/2020 1326   In general, do you have serious problems with your memory? 0 Filed at: 01/01/2020 1326   Do you take more than three different medications every day? 1 Filed at: 01/01/2020 1326   ISAR Score  2 Filed at: 01/01/2020 1326         chest x-ray shows bilateral pleural effusions, increased markings, consistent with exacerbation congestive heart failure, reviewed old chest x-rays, much worse     patient's liver functions were minimally elevated, nonspecific finding, BNP was markedly elevated at 4500 consistent with heart failure  Cardiac troponin was normal no sign of cardiac ischemia  Patient's electrolytes were within normal limits normal BUN creatinine normal renal function  white count was minimally elevated at 11 2 nonspecific finding, hemoglobin was normal at 13 3 no sign of anemia  INR was 2 5 consistent with patient's Coumadin  Patient diuresed with IV Lasix  He was still symptomatic and short of breath with minimal exertion  I spoke with Cardiology and then with [facility], patient will require IV diuresis to get rid of the fluid  Discussed with daughter at length  MDM  Medical decision making 40-year-old male presents emergency department, recent hospitalization where he had his novel anticoagulant switched to Coumadin  Apparently had a blood clot on rivaroxaban  Discussed with Cardiology advised the patient stay on Coumadin  Nursing home was attempting to switch him to Eliquis  Patient has been increasingly short of breath over the last few days to weeks  He reports severe shortness of breath today with minimal exertion  Apparently has to walk at the nursing home to get his meds  Daughter was concerned because he is short of breath with even trying to dress himself  Chest x-ray consistent with pulmonary edema  Discussed with patient his daughter was diuresed with IV Lasix  Discussed admission  Discussed hospitalization and and treatment      Disposition  Final diagnoses:   Acute exacerbation of CHF (congestive heart failure) ([facility] )     Time reflects when diagnosis was documented in both MDM as applicable and the Disposition within this note     Time User Action Codes Description Comment    1/1/2020  3:22 PM Narciso Brazen Add [I50 23] Acute on chronic systolic (congestive) heart failure ([facility] )     1/1/2020  3:22 PM Narciso Brazen Modify [I50 23] Acute on chronic systolic (congestive) heart failure ([facility] )     1/1/2020  3:22 PM Herrick Brazen Modify [I50 23] Acute on chronic systolic (congestive) heart failure ([facility] )     1/1/2020  5:27 PM Virginia Paulson, [address] [place] [address] [I50 9] Acute exacerbation of CHF (congestive heart failure) [facility])       ED Disposition     ED Disposition Condition Date/Time Comment    Admit Stable Wed Jan 1, 2020  3:18 PM Case was discussed with hospitalist service and the patient's admission status was agreed to be 2 midnights the service of Dr Tolliver      Follow-up Information    None Patient's Medications   Discharge Prescriptions    No medications on file     No discharge procedures on file      ED Provider  Electronically Signed by           Whitney Peng MD  01/01/20 1733       Whitney Peng MD  01/01/20 1757

## 2020-01-01 NOTE — ED NOTES
1  CC SOB hx of CHF and factor 5  2  Is this admission r/t an injury? No   3  Orientation status alert and oriented  4  Abnormal labs, assessment, vitals abnormal labs  5  Medication/ drips   6  Last time narcotics given  7  IV lines, drains, etc  20 g left arm (EMS), 20 g right arm  8  Isolation status none  9  Skin sacral redness, (dr Tristen Miles assessed)  10  Ambulation status self with a walker  11   ED RN phone number [facility]  01/01/20 [id number]

## 2020-01-01 NOTE — ED NOTES
Please note pt wears hearing aids  Takes out prior to going to sleep  Case with pt and labeled with name       Christina Arora RN  01/01/20 2583

## 2020-01-01 NOTE — H&P
History and Physical - [facility] Internal Medicine    Patient Information: Radu Pelayo 80 y o  male MRN: [id number]  Unit/Bed#: ED 08 Encounter: [id number]  Admitting Physician: Rufina Primrose, MD  PCP: Abdirahman Corrales DO  Date of Admission:  01/01/20    Assessment/Plan:    Hospital Problem List:     Principal Problem:    Acute on chronic systolic (congestive) heart failure ([facility] )  Active Problems:    Chronic a-fib    Essential hypertension    Type 2 diabetes mellitus with diabetic polyneuropathy, without long-term current use of insulin ([facility] )    Hyperlipidemia    GERD (gastroesophageal reflux disease)    Pulmonary embolism ([facility] )      Plan for the Primary Problem(s):  · 1  Acute on chronic systolic HF- will give 10DQ lasix IV BID  Cardiology consulted  · 2  Chronic afib- on coumadin  · 3  History of PE- on coumadin  · 4  HTN- on lisinopril and metoprolol  · 5  Dementia- on aricept  · 6  Hypokalemia- on k supplements  · 7  DM- on ISS  ·     Plan for Additional Problems:   ·     VTE Prophylaxis: Warfarin (Coumadin)  / sequential compression device   Code Status: Full  POLST: There is no POLST form on file for this patient (pre-hospital)    Anticipated Length of Stay:  Patient will be admitted on an Inpatient basis with an anticipated length of stay of  More than 2 midnights  Justification for Hospital Stay: SOB    Total Time for Visit, including Counseling / Coordination of Care: 45 minutes  Greater than 50% of this total time spent on direct patient counseling and coordination of care  Chief Complaint:   Increased SOB and lower extremity swelling for past 3 days    History of Present Illness:    Radu Pelayo is a 80 y o  male who presents with pmhx of chronic systolic HF, afib, PE, dementia, DM comes with complaints of increased SOB at NH and lower extremity swelling for past 3 days  Patient states that his lasix was recently decreased from 60mg daily to 40mg daily    Patient denies any chest pain, palpitations or diaphoresis  He called his cardiologist and was told to come to ER to be evaluated  Review of Systems:    Review of Systems   Constitutional: Positive for fatigue  Negative for activity change, appetite change, chills, diaphoresis, fever and unexpected weight change  HENT: Negative  Respiratory: Positive for shortness of breath  Negative for apnea, cough, choking, chest tightness, wheezing and stridor  Cardiovascular: Positive for leg swelling  Negative for chest pain and palpitations  Gastrointestinal: Negative  Musculoskeletal: Negative  Neurological: Positive for weakness  Negative for dizziness, tremors, seizures, syncope, facial asymmetry, speech difficulty, light-headedness, numbness and headaches  Past Medical and Surgical History:     Past Medical History:   Diagnosis Date    Acute systolic congestive heart failure ([facility] ) 7/26/2018    Allergic contact dermatitis due to plants, except food     Atrial fibrillation (HCC)     Cancer (HCC)     skin cancer    Cataract     Chronic kidney disease     Stage 3    Coagulation test abnormality     Diabetes mellitus ([facility] )     Disease of thyroid gland     hypothyroidism    DVT (deep venous thrombosis) (HCC)     Eczema     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HCC)     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HCC)     GERD (gastroesophageal reflux disease)     Gout     Hyperlipidemia     Hypertension     Hypokalemia     Leukocytosis     Lichen planus     Nonmelanoma skin cancer     Phlebitis or thrombophlebitis of deep vessel of lower extremity (HCC)     Proteinuria        Past Surgical History:   Procedure Laterality Date    CATARACT EXTRACTION      CHOLECYSTECTOMY      [place] ESOPHAGOGASTRODUODENOSCOPY TRANSORAL DIAGNOSTIC N/A 6/14/2017    Procedure: EGD AND COLONOSCOPY;  Surgeon: Vi Saunders MD;  Location: [facility];   Service: Gastroenterology    TONSILLECTOMY         Meds/Allergies:    Prior to Admission medications    Medication Sig Start Date End Date Taking?  Authorizing Provider   acetaminophen (TYLENOL) 325 mg tablet Take 2 tablets (650 mg total) by mouth every 8 (eight) hours  Patient taking differently: Take 650 mg by mouth as needed  5/15/19   Priya Singh PA-C   atorvastatin (LIPITOR) 10 mg tablet 1 TAB ORALLY AT BEDTIME DX:HYPERLIPIDEMIA 12/19/19   Rubén Coleman, DO   betamethasone dipropionate (DIPROSONE) 0 05 % cream APPLY TOPICALLY TO ITCHING AREAS TWICE DAILY AS NEEDED FOR IRRITATION  Patient not taking: Reported on 11/7/2019 9/23/19   Aaron Chatterjee MD   donepezil (ARICEPT) 5 mg tablet 1 TAB ORALLY AT BEDTIME DX: DEMENTIA 8/29/19   Rubén Coleman DO   fluticasone (FLONASE) 50 mcg/act nasal spray 2 sprays into each nostril daily For allergies  Patient taking differently: 2 sprays into each nostril as needed For allergies 5/21/19   Elaine Zuniga PA-C   fluticasone (FLONASE) 50 mcg/act nasal spray INSTILL 2 SPRAYS INTO EACH NOSTRIL EVERY MORNING DX:ALLERGIC RHINITIS 12/23/19   Juli Baker, DO   Folic Acid-Vit N5-IEY I29 (FOLBEE) 2 5-25-1 MG TABS Take by mouth    Historical Provider, MD   furosemide (LASIX) 40 mg tablet Take 1 tablet (40 mg total) by mouth daily 10/24/19   Rubén Coleman DO   gabapentin (NEURONTIN) 100 mg capsule Take 1 capsule (100 mg total) by mouth 3 (three) times a day Dx: Neuropathy 10/24/19   Rubén Coleman DO   glipiZIDE (GLUCOTROL XL) 5 mg 24 hr tablet Take 1 tablet (5 mg total) by mouth daily 7/30/18   Jud Sicard, MD   glucose blood (TRUE METRIX BLOOD GLUCOSE TEST) test strip Patient to test once daily DX code E11 8 4/22/19   Rubén Coleman DO   levothyroxine 100 mcg tablet 1 TAB ORALLY EVERY MORNING DX: HYPOTHYROIDISM 8/29/19   Rubén Coleman, DO   Lidocaine-Menthol (ICY HOT LIDOCAINE PLUS MENTHOL EX) Apply topically    Historical Provider, MD   lisinopril (ZESTRIL) 2 5 mg tablet Take 1 tablet (2 5 mg total) by mouth daily 10/18/19   Trini Hurt MD   loratadine (CLARITIN) 10 mg tablet Take 1 tablet (10 mg total) by mouth daily For allergies 5/21/19   Eliane Zuniga PA-C   metoprolol tartrate (LOPRESSOR) 50 mg tablet 1 TAB ORALLY TWICE DAILY DX: HYPERTENSION 8/29/19   Rubén Coleman DO   Misc  Devices (BED WEDGE) MISC by Does not apply route daily 8/6/19   Rubén Coleman DO   potassium chloride (MICRO-K) 10 MEQ CR capsule Take 2 capsules (20 mEq total) by mouth daily 8/16/19   Rubén Coleman,    triamcinolone (KENALOG) 0 1 % cream Apply topically 2 (two) times a day 11/1/19   Rubén Coleman,    triamcinolone (KENALOG) 0 1 % ointment Apply topically 2 (two) times a day To rash on legs and back until clear 11/7/19   Carol Mejias MD   warfarin (COUMADIN) 5 mg tablet Take 1 tablet (5 mg total) by mouth daily 10/24/19   Joshua Matson DO     I have reviewed home medications with patient family member  Allergies: No Known Allergies    Social History:     Marital Status:    Occupation:   Patient Pre-hospital Living Situation: NH  Patient Pre-hospital Level of Mobility: walks  Patient Pre-hospital Diet Restrictions: cardiac  Substance Use History:   Social History     Substance and Sexual Activity   Alcohol Use Never    Frequency: Never    Binge frequency: Never     Social History     Tobacco Use   Smoking Status Never Smoker   Smokeless Tobacco Never Used     Social History     Substance and Sexual Activity   Drug Use No       Family History:    non-contributory    Physical Exam:     Vitals:   Blood Pressure: 144/89 (01/01/20 1514)  Pulse: 92 (01/01/20 1514)  Temperature: 97 9 °F (36 6 °C) (01/01/20 1322)  Temp Source: Oral (01/01/20 1322)  Respirations: 20 (01/01/20 1514)  SpO2: 98 % (01/01/20 1514)    Physical Exam   Constitutional: He is oriented to person, place, and time  He appears well-developed and well-nourished  HENT:   Head: Normocephalic and atraumatic  Eyes: Pupils are equal, round, and reactive to light   EOM are normal    Neck: Normal range of motion  Neck supple  No JVD present  No tracheal deviation present  No thyromegaly present  Cardiovascular: Normal rate, regular rhythm and normal heart sounds  Exam reveals no gallop and no friction rub  No murmur heard  Pulmonary/Chest: No stridor  No respiratory distress  He has no wheezes  He has rales  Abdominal: Soft  Bowel sounds are normal  He exhibits no distension  There is no tenderness  There is no guarding  Musculoskeletal: He exhibits edema  Neurological: He is alert and oriented to person, place, and time  Skin: Skin is warm and dry  Vitals reviewed  Additional Data:     Lab Results: I have personally reviewed pertinent films in PACS    Results from last 7 days   Lab Units 01/01/20  1403   WBC Thousand/uL 11 26*   HEMOGLOBIN g/dL 13 3   HEMATOCRIT % 42 7   PLATELETS Thousands/uL 342   NEUTROS PCT % 80*   LYMPHS PCT % 9*   MONOS PCT % 7   EOS PCT % 3     Results from last 7 days   Lab Units 01/01/20  1403   POTASSIUM mmol/L 3 3*   CHLORIDE mmol/L 103   CO2 mmol/L 32   BUN mg/dL 17   CREATININE mg/dL 1 21   CALCIUM mg/dL 8 3   ALK PHOS U/L 160*   ALT U/L 51   AST U/L 48*     Results from last 7 days   Lab Units 01/01/20  1403   INR  2 53*       Imaging: I have personally reviewed pertinent films in PACS    No results found  EKG, Pathology, and Other Studies Reviewed on Admission:   · EKG:     [facility] / Care Everywhere Records Reviewed: Yes     ** Please Note: This note has been constructed using a voice recognition system   **

## 2020-01-02 ENCOUNTER — APPOINTMENT (INPATIENT)
Dept: NON INVASIVE DIAGNOSTICS | Facility: HOSPITAL | Age: 85
DRG: 291 | End: 2020-01-02
Payer: MEDICARE

## 2020-01-02 LAB
ANION GAP SERPL CALCULATED.3IONS-SCNC: 9 MMOL/L (ref 4–13)
BUN SERPL-MCNC: 17 MG/DL (ref 5–25)
CALCIUM SERPL-MCNC: 8.4 MG/DL (ref 8.3–10.1)
CHLORIDE SERPL-SCNC: 104 MMOL/L (ref 100–108)
CO2 SERPL-SCNC: 31 MMOL/L (ref 21–32)
CREAT SERPL-MCNC: 1.12 MG/DL (ref 0.6–1.3)
ERYTHROCYTE [DISTWIDTH] IN BLOOD BY AUTOMATED COUNT: 14.6 % (ref 11.6–15.1)
GFR SERPL CREATININE-BSD FRML MDRD: 59 ML/MIN/1.73SQ M
GLUCOSE SERPL-MCNC: 108 MG/DL (ref 65–140)
GLUCOSE SERPL-MCNC: 121 MG/DL (ref 65–140)
GLUCOSE SERPL-MCNC: 143 MG/DL (ref 65–140)
GLUCOSE SERPL-MCNC: 202 MG/DL (ref 65–140)
GLUCOSE SERPL-MCNC: 81 MG/DL (ref 65–140)
HCT VFR BLD AUTO: 39.5 % (ref 36.5–49.3)
HGB BLD-MCNC: 12.4 G/DL (ref 12–17)
INR PPP: 2.47 (ref 0.84–1.19)
MCH RBC QN AUTO: 29.3 PG (ref 26.8–34.3)
MCHC RBC AUTO-ENTMCNC: 31.4 G/DL (ref 31.4–37.4)
MCV RBC AUTO: 93 FL (ref 82–98)
PLATELET # BLD AUTO: 303 THOUSANDS/UL (ref 149–390)
PMV BLD AUTO: 9.5 FL (ref 8.9–12.7)
POTASSIUM SERPL-SCNC: 3 MMOL/L (ref 3.5–5.3)
PROTHROMBIN TIME: 27 SECONDS (ref 11.6–14.5)
RBC # BLD AUTO: 4.23 MILLION/UL (ref 3.88–5.62)
SODIUM SERPL-SCNC: 144 MMOL/L (ref 136–145)
WBC # BLD AUTO: 10.57 THOUSAND/UL (ref 4.31–10.16)

## 2020-01-02 PROCEDURE — 99221 1ST HOSP IP/OBS SF/LOW 40: CPT | Performed by: INTERNAL MEDICINE

## 2020-01-02 PROCEDURE — 99233 SBSQ HOSP IP/OBS HIGH 50: CPT | Performed by: INTERNAL MEDICINE

## 2020-01-02 PROCEDURE — 80048 BASIC METABOLIC PNL TOTAL CA: CPT | Performed by: INTERNAL MEDICINE

## 2020-01-02 PROCEDURE — 85610 PROTHROMBIN TIME: CPT | Performed by: INTERNAL MEDICINE

## 2020-01-02 PROCEDURE — 82948 REAGENT STRIP/BLOOD GLUCOSE: CPT

## 2020-01-02 PROCEDURE — 93306 TTE W/DOPPLER COMPLETE: CPT | Performed by: INTERNAL MEDICINE

## 2020-01-02 PROCEDURE — 85027 COMPLETE CBC AUTOMATED: CPT | Performed by: INTERNAL MEDICINE

## 2020-01-02 PROCEDURE — C8929 TTE W OR WO FOL WCON,DOPPLER: HCPCS

## 2020-01-02 RX ORDER — POTASSIUM CHLORIDE 20 MEQ/1
40 TABLET, EXTENDED RELEASE ORAL 2 TIMES DAILY
Status: DISCONTINUED | OUTPATIENT
Start: 2020-01-02 | End: 2020-01-06 | Stop reason: HOSPADM

## 2020-01-02 RX ORDER — FUROSEMIDE 10 MG/ML
40 INJECTION INTRAMUSCULAR; INTRAVENOUS
Status: DISCONTINUED | OUTPATIENT
Start: 2020-01-02 | End: 2020-01-03

## 2020-01-02 RX ORDER — POTASSIUM CHLORIDE 20 MEQ/1
40 TABLET, EXTENDED RELEASE ORAL ONCE
Status: COMPLETED | OUTPATIENT
Start: 2020-01-02 | End: 2020-01-02

## 2020-01-02 RX ADMIN — FUROSEMIDE 40 MG: 10 INJECTION, SOLUTION INTRAMUSCULAR; INTRAVENOUS at 10:21

## 2020-01-02 RX ADMIN — GABAPENTIN 100 MG: 100 CAPSULE ORAL at 16:26

## 2020-01-02 RX ADMIN — POTASSIUM CHLORIDE 20 MEQ: 1500 TABLET, EXTENDED RELEASE ORAL at 10:14

## 2020-01-02 RX ADMIN — ATORVASTATIN CALCIUM 10 MG: 10 TABLET, FILM COATED ORAL at 22:09

## 2020-01-02 RX ADMIN — DONEPEZIL HYDROCHLORIDE 5 MG: 5 TABLET ORAL at 22:10

## 2020-01-02 RX ADMIN — LORATADINE 10 MG: 10 TABLET ORAL at 10:13

## 2020-01-02 RX ADMIN — INSULIN LISPRO 2 UNITS: 100 INJECTION, SOLUTION INTRAVENOUS; SUBCUTANEOUS at 16:29

## 2020-01-02 RX ADMIN — FUROSEMIDE 40 MG: 10 INJECTION, SOLUTION INTRAMUSCULAR; INTRAVENOUS at 16:27

## 2020-01-02 RX ADMIN — POTASSIUM CHLORIDE 40 MEQ: 1500 TABLET, EXTENDED RELEASE ORAL at 18:32

## 2020-01-02 RX ADMIN — WARFARIN SODIUM 5 MG: 5 TABLET ORAL at 18:32

## 2020-01-02 RX ADMIN — METOPROLOL TARTRATE 50 MG: 50 TABLET, FILM COATED ORAL at 10:11

## 2020-01-02 RX ADMIN — GABAPENTIN 100 MG: 100 CAPSULE ORAL at 22:10

## 2020-01-02 RX ADMIN — GABAPENTIN 100 MG: 100 CAPSULE ORAL at 10:11

## 2020-01-02 RX ADMIN — POTASSIUM CHLORIDE 40 MEQ: 1500 TABLET, EXTENDED RELEASE ORAL at 16:27

## 2020-01-02 RX ADMIN — PERFLUTREN 0.8 ML/MIN: 6.52 INJECTION, SUSPENSION INTRAVENOUS at 14:24

## 2020-01-02 RX ADMIN — LISINOPRIL 2.5 MG: 2.5 TABLET ORAL at 10:13

## 2020-01-02 RX ADMIN — METOPROLOL TARTRATE 50 MG: 50 TABLET, FILM COATED ORAL at 22:10

## 2020-01-02 NOTE — PROGRESS NOTES
[facility] Internal Medicine Progress Note  Patient: Radu Pelayo 80 y o  male   MRN: [id number]  PCP: Abdirahman Corrales DO  Unit/Bed#: -01 Encounter: [id number]  Date Of Visit: 01/02/20          * Acute on chronic systolic (congestive) heart failure [facility])  Assessment & Plan  Wt Readings from Last 3 Encounters:   01/01/20 86 9 kg (191 lb 9 3 oz)   10/22/19 86 6 kg (191 lb)   10/18/19 86 2 kg (190 lb)     Continue with current treatment including diuretics  Discussed with Cardiology  He should get another echocardiogram as not had 1 since 2018  GERD (gastroesophageal reflux disease)  Assessment & Plan  Continue with home medication    Hyperlipidemia  Assessment & Plan  Continue with statin    Type 2 diabetes mellitus with diabetic polyneuropathy, without long-term current use of insulin [facility])  Assessment & Plan  Lab Results   Component Value Date    HGBA1C 6 0 08/22/2019       Recent Labs     01/01/20  2124 01/02/20  0557 01/02/20  1137   POCGLU 184* 121 81       Blood Sugar Average: Last 72 hrs:  (P) 734 [id number]     No recent hemoglobin A1c  Continue with current monitoring/treatment    Essential hypertension  Assessment & Plan  Stable and continue with home medications including beta-blocker    Chronic a-fib  Assessment & Plan  Heart rate and rhythm are stable  Continue with Coumadin as well  Localized rash-present on admission  ? Eczema/contact dermatitis  Hypokalemia:  Replete and follow-up as needed    Present on Admission:   Chronic a-fib   Essential hypertension   Type 2 diabetes mellitus with diabetic polyneuropathy, without long-term current use of insulin (HCC)   Hyperlipidemia   GERD (gastroesophageal reflux disease)            VTE Pharmacologic Prophylaxis:   Pharmacologic: Warfarin (Coumadin)  Mechanical VTE Prophylaxis in Place: Yes    Patient Centered Rounds: I have performed bedside rounds with nursing staff today      Discussions with Specialists or Other Care Team Provider:  Yes    Education and Discussions with Family / Patient:  Yes    Time Spent for Care: 35+ minutes  More than 50% of total time spent on counseling and coordination of care as described above  Current Length of Stay: 1 day(s)    Current Patient Status: Inpatient   Certification Statement: The patient will continue to require additional inpatient hospital stay due to Heart failure    Discharge Plan:  Home when stable    Code Status: Level 1 - Full Code      Subjective:   Patient feels little better today  As per patient, he has been more short of breath especially with exertion  He has been to his different doctors and has noticed that with a exertion, he was having more trouble breathing and would be exhausted very easily  He denies any chest pain  No nausea or vomiting  No abdominal pain  He is also forgetful  He has noticed some rash/dry areas on his thighs and also on his back  As per patient, something was put on his lower back with improvement  Objective:     Vitals:   Temp (24hrs), Av 6 °F (36 4 °C), Min:97 2 °F (36 2 °C), Max:98 1 °F (36 7 °C)    Temp:  [97 2 °F (36 2 °C)-98 1 °F (36 7 °C)] 97 8 °F (36 6 °C)  HR:  [] 82  Resp:  [16-24] 18  BP: (117-164)/() 164/92  SpO2:  [93 %-97 %] 96 %  Body mass index is 26 72 kg/m²  Input and Output Summary (last 24 hours): Intake/Output Summary (Last 24 hours) at 2020 1546  Last data filed at 2020 0900  Gross per 24 hour   Intake 480 ml   Output 3600 ml   Net -3120 ml           Physical Exam:     Vital signs reviewed as above  Pleasant male who is somewhat forgetful  He is not in any significant respiratory distress while at rest  He has at least 1+ bilateral lower extremities edema  S1 and S2 audible  Heart rate and rhythm are stable  Has irregularly irregular rhythm  No cyanosis or clubbing  Awake and alert  He knows where he is  However, he also has I believe underlying dementia  Abdomen is soft  Nontender  Bowel sounds are audible  Skin is warm and dry  He has some dry areas on his thighs  Also there is spot on his back which is improved  Oropharynx slightly dry  Neck is supple  Distended neck vein  EOM grossly intact  Conjunctivae are normal    Additional Data:     Labs:    Results from last 7 days   Lab Units 01/02/20  0601 01/01/20  1403   WBC Thousand/uL 10 57* 11 26*   HEMOGLOBIN g/dL 12 4 13 3   HEMATOCRIT % 39 5 42 7   PLATELETS Thousands/uL 303 342   NEUTROS PCT %  --  80*   LYMPHS PCT %  --  9*   MONOS PCT %  --  7   EOS PCT %  --  3     Results from last 7 days   Lab Units 01/02/20  0601 01/01/20  1403   POTASSIUM mmol/L 3 0* 3 3*   CHLORIDE mmol/L 104 103   CO2 mmol/L 31 32   BUN mg/dL 17 17   CREATININE mg/dL 1 12 1 21   CALCIUM mg/dL 8 4 8 3   ALK PHOS U/L  --  160*   ALT U/L  --  51   AST U/L  --  48*     Results from last 7 days   Lab Units 01/02/20  0601   INR  2 47*       * I Have Reviewed All Lab Data Listed Above  * Additional Pertinent Lab Tests Reviewed: All Labs Within Last 24 Hours Reviewed      Recent Cultures (last 7 days):     Results from last 7 days   Lab Units 01/01/20  1403   BLOOD CULTURE  Received in Microbiology Lab  Culture in Progress  Received in Microbiology Lab  Culture in Progress         Last 24 Hours Medication List:     Current Facility-Administered Medications:  acetaminophen 650 mg Oral Q6H PRN Yumi Tatum MD   atorvastatin 10 mg Oral HS Yumi Tatum MD   donepezil 5 mg Oral HS Yumi Tatum MD   furosemide 40 mg Intravenous BID (diuretic) Lisa Bridges MD   gabapentin 100 mg Oral TID Yumi Tatum MD   insulin lispro 1-6 Units Subcutaneous TID [facility] Yumi Tatum MD   lisinopril 2 5 mg Oral Daily Yumi Tatum MD   loratadine 10 mg Oral Daily Yumi Tatum MD   metoprolol tartrate 50 mg Oral Q12H [address] Yumi Tatum MD   ondansetron 4 mg Intravenous Q6H PRN Yumi Tatum MD   potassium chloride 20 mEq Oral Daily Yumi Tatum MD   warfarin 5 mg Oral Daily (warfarin) Tarsha Falk MD        Today, Patient Was Seen By: Chrystal Hopkins MD    ** Please Note: Dragon 360 Dictation voice to text software may have been used in the creation of this document   **

## 2020-01-02 NOTE — ASSESSMENT & PLAN NOTE
Lab Results   Component Value Date    HGBA1C 6 0 08/22/2019       Recent Labs     01/04/20  0633 01/04/20  1122 01/04/20  1603 01/05/20  0554   POCGLU 123 156* 165* 108       Blood Sugar Average: Last 72 hrs:  (P) 146 25     Hemoglobin A1c is 6    Accu-Chek have been stable

## 2020-01-02 NOTE — CONSULTS
Consultation - Cardiology   Matt Bernard 80 y o  male MRN: [id number]  Unit/Bed#: -01 Encounter: [id number]  01/02/20  11:39 AM    Assessment/ Plan:  1-Acute on chronic systolic congestive heart failure, continue diuresis, continue Lipitor, lisinopril, Lopressor, Coumadin, potassium  Daily weights  Salt restriction  Strict I&Os  Echocardiogram pending  2-cardiomyopathy, history, last known EF 45%, new echocardiogram pending  Continue all medications    3-atrial fibrillation, rate controlled  On Coumadin  Goal INR 2-3    4-hypertension, stable  Continue lisinopril, Lopressor  5-history of pulmonary embolism, previously on Xarelto now on Coumadin  6-hyperlipidemia, continue Lipitor      History of Present Illness   Physician Requesting Consult: Leni Swann MD  Reason for Consult / Principal Problem: chf  HPI: Matt Bernard is a 80y o  year old male who presents with  shortness of breath with minimal exertion  Patient lives at [facility], independent living and normally can walk come down the halls with no trouble but noted trouble walking down the chambers to get his medications  He states he gets short of breath with minimal exertion  He states the symptoms started about 1 week ago  He also noted feeling unsteady  He also notes tightness in his chest related to shortness of breath  Patient states he was using Jan stockings with little relief  Pt was recently switched to coumadin, after xarelto failure, with 2nd PE  Consults    EKG:  AFib with PVC or aberrantly conducted complexes, nonspecific ST T wave abnormalities, prolonged QT      Review of Systems   Constitutional: Negative  Respiratory: Positive for shortness of breath  Cardiovascular: Positive for leg swelling  Neurological: Negative  Hematological: Negative          Historical Information   Past Medical History:   Diagnosis Date    Acute systolic congestive heart failure ([facility] [address] ) 7/26/2018    Allergic contact dermatitis due to plants, except food     Atrial fibrillation (HCC)     Cancer (HCC)     skin cancer    Cataract     Chronic kidney disease     Stage 3    Coagulation test abnormality     Diabetes mellitus ([address] )     Disease of thyroid gland     hypothyroidism    DVT (deep venous thrombosis) (HCC)     Eczema     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HCC)     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HCC)     GERD (gastroesophageal reflux disease)     Gout     Hyperlipidemia     Hypertension     Hypokalemia     Leukocytosis     Lichen planus     Nonmelanoma skin cancer     Phlebitis or thrombophlebitis of deep vessel of lower extremity (HCC)     Proteinuria      Past Surgical History:   Procedure Laterality Date    CATARACT EXTRACTION      CHOLECYSTECTOMY      [place] ESOPHAGOGASTRODUODENOSCOPY TRANSORAL DIAGNOSTIC N/A 6/14/2017    Procedure: EGD AND COLONOSCOPY;  Surgeon: Betsy Mcghee MD;  Location: [facility];   Service: Gastroenterology    TONSILLECTOMY       Social History     Substance and Sexual Activity   Alcohol Use Never    Frequency: Never    Binge frequency: Never     Social History     Substance and Sexual Activity   Drug Use No     Social History     Tobacco Use   Smoking Status Never Smoker   Smokeless Tobacco Never Used       Family History:   Family History   Problem Relation Age of Onset    Alcohol abuse Mother     Heart attack Father     Dementia Brother     Other Son         AUTO ACCIDENT       Meds/Allergies   all current active meds have been reviewed and current meds:   Current Facility-Administered Medications   Medication Dose Route Frequency    acetaminophen (TYLENOL) tablet 650 mg  650 mg Oral Q6H PRN    atorvastatin (LIPITOR) tablet 10 mg  10 mg Oral HS    donepezil (ARICEPT) tablet 5 mg  5 mg Oral HS    furosemide (LASIX) injection 40 mg  40 mg Intravenous Daily    gabapentin (NEURONTIN) capsule 100 mg  100 mg Oral TID    insulin lispro (HumaLOG) 100 units/mL subcutaneous injection 1-6 Units 1-6 Units Subcutaneous TID AC    lisinopril (ZESTRIL) tablet 2 5 mg  2 5 mg Oral Daily    loratadine (CLARITIN) tablet 10 mg  10 mg Oral Daily    metoprolol tartrate (LOPRESSOR) tablet 50 mg  50 mg Oral Q12H [address]    ondansetron (ZOFRAN) injection 4 mg  4 mg Intravenous Q6H PRN    potassium chloride (K-DUR,KLOR-CON) CR tablet 20 mEq  20 mEq Oral Daily    warfarin (COUMADIN) tablet 5 mg  5 mg Oral Daily (warfarin)     No Known Allergies    Objective   Vitals: Blood pressure 117/67, pulse (!) 51, temperature (!) 97 2 °F (36 2 °C), resp  rate 18, height 5' 11" (1 803 m), weight 86 9 kg (191 lb 9 3 oz), SpO2 95 %  , Body mass index is 26 72 kg/m² ,   Orthostatic Blood Pressures      Most Recent Value   Blood Pressure  117/67 filed at 01/02/2020 0718   Patient Position - Orthostatic VS  Sitting filed at 01/01/2020 0437          Systolic (87UEL), OIU:101 , Min:117 , KNO:714     Diastolic (51WBJ), HFY:75, Min:67, Max:125        Intake/Output Summary (Last 24 hours) at 1/2/2020 1139  Last data filed at 1/2/2020 0900  Gross per 24 hour   Intake 480 ml   Output 3600 ml   Net -3120 ml       Invasive Devices     Peripheral Intravenous Line            Peripheral IV 01/01/20 Left Arm less than 1 day    Peripheral IV 01/01/20 Right Arm less than 1 day                    Physical Exam:  GEN: Alert and oriented x 3, in no acute distress  Well appearing and well nourished  HEENT: Sclera anicteric, conjunctivae pink, mucous membranes moist  Oropharynx clear  NECK: Supple, no carotid bruits, no significant JVD  Trachea midline, no thyromegaly  HEART: Regular rhythm, normal S1 and S2, no murmurs, clicks, gallops or rubs  PMI nondisplaced, no thrills  LUNGS: Clear to auscultation bilaterally; no wheezes, rales, or rhonchi  No increased work of breathing or signs of respiratory distress  ABDOMEN: Soft, nontender, nondistended, normoactive bowel sounds     EXTREMITIES: +Trace edema Skin warm and well perfused, no clubbing, cyanosis  NEURO: No focal findings  Normal speech  Mood and affect normal    SKIN: Normal without suspicious lesions on exposed skin        Lab Results:     Troponins:   Results from last 7 days   Lab Units 01/01/20  1403   TROPONIN I ng/mL 0 02       CBC with diff:   Results from last 7 days   Lab Units 01/02/20  0601 01/01/20  1403   WBC Thousand/uL 10 57* 11 26*   HEMOGLOBIN g/dL 12 4 13 3   HEMATOCRIT % 39 5 42 7   MCV fL 93 94   PLATELETS Thousands/uL 303 342   MCH pg 29 3 29 3   MCHC g/dL 31 4 31 1*   RDW % 14 6 14 6   MPV fL 9 5 9 6   NRBC AUTO /100 WBCs  --  0         CMP:   Results from last 7 days   Lab Units 01/02/20  0601 01/01/20  1403   POTASSIUM mmol/L 3 0* 3 3*   CHLORIDE mmol/L 104 103   CO2 mmol/L 31 32   BUN mg/dL 17 17   CREATININE mg/dL 1 12 1 21   CALCIUM mg/dL 8 4 8 3   AST U/L  --  48*   ALT U/L  --  51   ALK PHOS U/L  --  160*   EGFR ml/min/1 73sq m 59 54

## 2020-01-02 NOTE — ASSESSMENT & PLAN NOTE
Wt Readings from Last 3 Encounters:   01/05/20 76 4 kg (168 lb 6 9 oz)   10/22/19 86 6 kg (191 lb)   10/18/19 86 2 kg (190 lb)     Off IV diuretic  Weight is down today although he is on oral diuretics now

## 2020-01-02 NOTE — SOCIAL WORK
CM met with pt at bedside  Pt lives in a 2nd floor room at [facility]  Pt does not have to navigate steps-an elevator is present  Pt is independent with ADL's  He uses a walker to ambulate  He has been in [address] in the past, but has never used [place] services  Denies substance abuse, tobacco abuse or mental health issues  Dr Aiyana Karimi is his PCP  [facility] orders his medications and he has no problem with co-pays  He has an Advanced Directive and his POA is his daughter Khushbu Cuellar  Pt does not work or drive  He is transported to appointments by [facility] or a family member  CM discussed d/c needs and pt does not want rehab or [place] considered at this time  He is very active-attends 2 exercise classes per week along with doing his own exercises in his room and he also lifts weights and walks daily  CM will continue to follow and will assess needs as hospitalization progresses  CM reviewed discharge planning process including the following: identifying help at home, patient preference for discharge planning needs, pharmacy preference, and availability of treatment team to discuss questions or concerns patient and/or family may have regarding understanding medications and recognizing signs and symptoms once discharged  CM also encouraged patient to follow up with all recommended appointments after discharge  Patient advised of importance for patient and family to participate in managing patients medical well being

## 2020-01-02 NOTE — PLAN OF CARE
Problem: Potential for Falls  Goal: Patient will remain free of falls  Description  INTERVENTIONS:  - Assess patient frequently for physical needs  -  Identify cognitive and physical deficits and behaviors that affect risk of falls    -  [place] fall precautions as indicated by assessment   - Educate patient/family on patient safety including physical limitations  - Instruct patient to call for assistance with activity based on assessment  - Modify environment to reduce risk of injury  - Consider OT/PT consult to assist with strengthening/mobility  Outcome: Not Progressing     Problem: PAIN - ADULT  Goal: Verbalizes/displays adequate comfort level or baseline comfort level  Description  Interventions:  - Encourage patient to monitor pain and request assistance  - Assess pain using appropriate pain scale  - Administer analgesics based on type and severity of pain and evaluate response  - Implement non-pharmacological measures as appropriate and evaluate response  - Consider cultural and social influences on pain and pain management  - Notify physician/advanced practitioner if interventions unsuccessful or patient reports new pain  Outcome: Not Progressing     Problem: INFECTION - ADULT  Goal: Absence or prevention of progression during hospitalization  Description  INTERVENTIONS:  - Assess and monitor for signs and symptoms of infection  - Monitor lab/diagnostic results  - Monitor all insertion sites, i e  indwelling lines, tubes, and drains  - Monitor endotracheal if appropriate and nasal secretions for changes in amount and color  - [place] appropriate cooling/warming therapies per order  - Administer medications as ordered  - Instruct and encourage patient and family to use good hand hygiene technique  - Identify and instruct in appropriate isolation precautions for identified infection/condition  Outcome: Not Progressing     Problem: SAFETY ADULT  Goal: Maintain or return to baseline ADL function  Description  INTERVENTIONS:  -  Assess patient's ability to carry out ADLs; assess patient's baseline for ADL function and identify physical deficits which impact ability to perform ADLs (bathing, care of mouth/teeth, toileting, grooming, dressing, etc )  - Assess/evaluate cause of self-care deficits   - Assess range of motion  - Assess patient's mobility; develop plan if impaired  - Assess patient's need for assistive devices and provide as appropriate  - Encourage maximum independence but intervene and supervise when necessary  - Involve family in performance of ADLs  - Assess for home care needs following discharge   - Consider OT consult to assist with ADL evaluation and planning for discharge  - Provide patient education as appropriate  Outcome: Not Progressing  Goal: Maintain or return mobility status to optimal level  Description  INTERVENTIONS:  - Assess patient's baseline mobility status (ambulation, transfers, stairs, etc )    - Identify cognitive and physical deficits and behaviors that affect mobility  - Identify mobility aids required to assist with transfers and/or ambulation (gait belt, sit-to-stand, lift, walker, cane, etc )  - [place] fall precautions as indicated by assessment  - Record patient progress and toleration of activity level on Mobility SBAR; progress patient to next Phase/Stage  - Instruct patient to call for assistance with activity based on assessment  - Consider rehabilitation consult to assist with strengthening/weightbearing, etc   Outcome: Not Progressing     Problem: DISCHARGE PLANNING  Goal: Discharge to home or other facility with appropriate resources  Description  INTERVENTIONS:  - Identify barriers to discharge w/patient and caregiver  - Arrange for needed discharge resources and transportation as appropriate  - Identify discharge learning needs (meds, wound care, etc )  - Arrange for interpretive services to assist at discharge as needed  - Refer to Case Management Department for coordinating discharge planning if the patient needs post-hospital services based on physician/advanced practitioner order or complex needs related to functional status, cognitive ability, or social support system  Outcome: Not Progressing     Problem: Knowledge Deficit  Goal: Patient/family/caregiver demonstrates understanding of disease process, treatment plan, medications, and discharge instructions  Description  Complete learning assessment and assess knowledge base    Interventions:  - Provide teaching at level of understanding  - Provide teaching via preferred learning methods  Outcome: Not Progressing

## 2020-01-03 LAB
ANION GAP SERPL CALCULATED.3IONS-SCNC: 6 MMOL/L (ref 4–13)
BUN SERPL-MCNC: 20 MG/DL (ref 5–25)
CALCIUM SERPL-MCNC: 8.7 MG/DL (ref 8.3–10.1)
CHLORIDE SERPL-SCNC: 103 MMOL/L (ref 100–108)
CO2 SERPL-SCNC: 33 MMOL/L (ref 21–32)
CREAT SERPL-MCNC: 1.26 MG/DL (ref 0.6–1.3)
GFR SERPL CREATININE-BSD FRML MDRD: 51 ML/MIN/1.73SQ M
GLUCOSE SERPL-MCNC: 118 MG/DL (ref 65–140)
GLUCOSE SERPL-MCNC: 119 MG/DL (ref 65–140)
GLUCOSE SERPL-MCNC: 125 MG/DL (ref 65–140)
GLUCOSE SERPL-MCNC: 136 MG/DL (ref 65–140)
GLUCOSE SERPL-MCNC: 276 MG/DL (ref 65–140)
INR PPP: 2.41 (ref 0.84–1.19)
MAGNESIUM SERPL-MCNC: 1.7 MG/DL (ref 1.6–2.6)
POTASSIUM SERPL-SCNC: 4 MMOL/L (ref 3.5–5.3)
PROTHROMBIN TIME: 26.5 SECONDS (ref 11.6–14.5)
SODIUM SERPL-SCNC: 142 MMOL/L (ref 136–145)

## 2020-01-03 PROCEDURE — 82948 REAGENT STRIP/BLOOD GLUCOSE: CPT

## 2020-01-03 PROCEDURE — 80048 BASIC METABOLIC PNL TOTAL CA: CPT | Performed by: INTERNAL MEDICINE

## 2020-01-03 PROCEDURE — 99232 SBSQ HOSP IP/OBS MODERATE 35: CPT | Performed by: INTERNAL MEDICINE

## 2020-01-03 PROCEDURE — 83735 ASSAY OF MAGNESIUM: CPT | Performed by: INTERNAL MEDICINE

## 2020-01-03 PROCEDURE — 85610 PROTHROMBIN TIME: CPT | Performed by: INTERNAL MEDICINE

## 2020-01-03 PROCEDURE — 99233 SBSQ HOSP IP/OBS HIGH 50: CPT | Performed by: INTERNAL MEDICINE

## 2020-01-03 RX ORDER — FUROSEMIDE 20 MG/1
20 TABLET ORAL EVERY EVENING
Status: DISCONTINUED | OUTPATIENT
Start: 2020-01-03 | End: 2020-01-06 | Stop reason: HOSPADM

## 2020-01-03 RX ORDER — FUROSEMIDE 40 MG/1
40 TABLET ORAL
Status: DISCONTINUED | OUTPATIENT
Start: 2020-01-03 | End: 2020-01-06 | Stop reason: HOSPADM

## 2020-01-03 RX ADMIN — POTASSIUM CHLORIDE 40 MEQ: 1500 TABLET, EXTENDED RELEASE ORAL at 18:34

## 2020-01-03 RX ADMIN — ACETAMINOPHEN 650 MG: 325 TABLET, FILM COATED ORAL at 08:01

## 2020-01-03 RX ADMIN — GABAPENTIN 100 MG: 100 CAPSULE ORAL at 09:17

## 2020-01-03 RX ADMIN — FUROSEMIDE 40 MG: 40 TABLET ORAL at 12:31

## 2020-01-03 RX ADMIN — METOPROLOL TARTRATE 50 MG: 50 TABLET, FILM COATED ORAL at 09:17

## 2020-01-03 RX ADMIN — GABAPENTIN 100 MG: 100 CAPSULE ORAL at 18:34

## 2020-01-03 RX ADMIN — METOPROLOL TARTRATE 50 MG: 50 TABLET, FILM COATED ORAL at 22:11

## 2020-01-03 RX ADMIN — LORATADINE 10 MG: 10 TABLET ORAL at 09:17

## 2020-01-03 RX ADMIN — ATORVASTATIN CALCIUM 10 MG: 10 TABLET, FILM COATED ORAL at 22:11

## 2020-01-03 RX ADMIN — FUROSEMIDE 20 MG: 40 TABLET ORAL at 18:34

## 2020-01-03 RX ADMIN — DONEPEZIL HYDROCHLORIDE 5 MG: 5 TABLET ORAL at 22:11

## 2020-01-03 RX ADMIN — INSULIN LISPRO 4 UNITS: 100 INJECTION, SOLUTION INTRAVENOUS; SUBCUTANEOUS at 12:06

## 2020-01-03 RX ADMIN — LISINOPRIL 2.5 MG: 2.5 TABLET ORAL at 09:17

## 2020-01-03 RX ADMIN — GABAPENTIN 100 MG: 100 CAPSULE ORAL at 22:12

## 2020-01-03 RX ADMIN — FUROSEMIDE 40 MG: 10 INJECTION, SOLUTION INTRAMUSCULAR; INTRAVENOUS at 09:16

## 2020-01-03 RX ADMIN — POTASSIUM CHLORIDE 40 MEQ: 1500 TABLET, EXTENDED RELEASE ORAL at 09:17

## 2020-01-03 RX ADMIN — WARFARIN SODIUM 5 MG: 5 TABLET ORAL at 18:34

## 2020-01-03 NOTE — PROGRESS NOTES
General Cardiology   Progress Note   Leti Harmon 80 y o  male MRN: [id number]  Unit/Bed#: -01 Encounter: [id number]    Assessment/Plan:    1  Acute on chronic systolic and diastolic heart failure  -discontinue IV Lasix, change to p o  Lasix, 40 mg in the a m  and 20 mg in the p m , for a total of 60 mg daily  -continue lisinopril 2 5 mg daily and metoprolol tartrate 50 mg b i d   -continue p o  Potassium supplementation  -strict I&Os and daily weights  -cardiac diet    2  Nonischemic cardiomyopathy with an EF 45%  -TTE performed yesterday showed systolic function at the lower limits of normal, LVEF of 99-10%, grade 2 diastolic dysfunction biatrial dilatation, moderate mitral regurgitation  -continue ACE inhibitor and beta-blocker    3  Atrial fibrillation, currently rate controlled  -continue Lopressor for rate control  -continue Coumadin for anticoagulation, INR 2 4 today, within goal range  -continue telemetry monitoring    4  Hypertension, currently controlled  -continue lisinopril and Lopressor  -continue to monitor blood pressures    5  Hyperlipidemia  -continue atorvastatin and cardiac diet    6  History of pulmonary embolism  -continue Coumadin for anticoagulation      Subjective:   Patient seen and examined  No significant events since the last encounter       REVIEW OF SYSTEMS:  Constitutional:  Denies fever or chills   Eyes:  Denies change in visual acuity   HENT:  Denies nasal congestion or sore throat   Respiratory:  Denies cough or shortness of breath   Cardiovascular:  Denies chest pain or edema   GI:  Denies abdominal pain, nausea, vomiting, bloody stools, constipation or diarrhea   :  Denies dysuria, frequency, difficulty in urination or nocturia  Musculoskeletal:  Denies back pain or joint pain   Neurologic:  Denies headache, focal weakness or sensory changes   Endocrine:  Denies polyuria or polydipsia   Lymphatic:  Denies swollen glands   Psychiatric:  Denies depression or anxiety Objective:   Vitals:  Vitals:    01/03/20 0843   BP: (!) 164/106   Pulse: (!) 111   Resp: 18   Temp: 97 5 °F (36 4 °C)   SpO2: 97%       Body mass index is 24 08 kg/m²  Systolic (41MQB), URZ:865 , Min:154 , EYV:857     Diastolic (96UJU), OZY:55, Min:92, Max:106      Intake/Output Summary (Last 24 hours) at 1/3/2020 0945  Last data filed at 1/3/2020 0451  Gross per 24 hour   Intake 440 ml   Output 1350 ml   Net -910 ml     Weight (last 2 days)     Date/Time   Weight    01/03/20 08:43:01   78 3 (172 62)    01/01/20 18:35:34   86 9 (191 58)              Telemetry Review: No significant arrhythmias seen on telemetry review  PHYSICAL EXAMS  General:  Patient is not in acute distress, laying in the bed comfortably, awake, alert responding to commands  Head: Normocephalic, Atraumatic     HEENT: White sclera, pink conjunctiva  Neck:  Supple, no neck vein distention  Respiratory: clear to P/A  Cardiovascular: S1-S2 normal, no murmurs, thrills, gallops, rubs, regular rhythm  GI:  Abdomen soft, nontender, non-distended  Extremities: No edema, normal pulses  Integument:  No skin rashes or ulceration  Neurologic:  Patient is awake alert, responding to command, oriented to person, place and time    Nd time   LABORATORY RESULTS:  Results from last 7 days   Lab Units 01/01/20  1403   TROPONIN I ng/mL 0 02     CBC with diff:   Results from last 7 days   Lab Units 01/02/20  0601 01/01/20  1403   WBC Thousand/uL 10 57* 11 26*   HEMOGLOBIN g/dL 12 4 13 3   HEMATOCRIT % 39 5 42 7   MCV fL 93 94   PLATELETS Thousands/uL 303 342   MCH pg 29 3 29 3   MCHC g/dL 31 4 31 1*   RDW % 14 6 14 6   MPV fL 9 5 9 6   NRBC AUTO /100 WBCs  --  0       CMP:  Results from last 7 days   Lab Units 01/03/20  0451 01/02/20  0601 01/01/20  1403   POTASSIUM mmol/L 4 0 3 0* 3 3*   CHLORIDE mmol/L 103 104 103   CO2 mmol/L 33* 31 32   BUN mg/dL 20 17 17   CREATININE mg/dL 1 26 1 12 1 21   CALCIUM mg/dL 8 7 8 4 8 3   AST U/L  --   --  48*   ALT U/L --   --  51   ALK PHOS U/L  --   --  160*   EGFR ml/min/1 73sq m 51 59 54       BMP:  Results from last 7 days   Lab Units 20  0451 20  0601 20  1403   POTASSIUM mmol/L 4 0 3 0* 3 3*   CHLORIDE mmol/L 103 104 103   CO2 mmol/L 33* 31 32   BUN mg/dL 20 17 17   CREATININE mg/dL 1 26 1 12 1 21   CALCIUM mg/dL 8 7 8 4 8 3         Results from last 7 days   Lab Units 20  1403   NT-PRO BNP pg/mL 4,532*      Results from last 7 days   Lab Units 20  0451   MAGNESIUM mg/dL 1 7             Results from last 7 days   Lab Units 20  0451 20  0601 20  1403   INR  2 41* 2 47* 2 53*       Lipid Profile:   Lab Results   Component Value Date    CHOL 108 2015    CHOL 166 2015    CHOL 129 2015     Lab Results   Component Value Date    HDL 50 2019    HDL 43 2018    HDL 46 10/28/2017     Lab Results   Component Value Date    LDLCALC 117 (H) 2019    LDLCALC 70 2018    LDLCALC 53 10/28/2017     Lab Results   Component Value Date    TRIG 128 2019    TRIG 97 2018    TRIG 98 10/28/2017       Cardiac testing:   Results for orders placed during the hospital encounter of 20   Echo complete with contrast if indicated    Narrative [address]  [place], [place] [id number] [phone]    Transthoracic Echocardiogram  2D, M-mode, Doppler, and Color Doppler    Study date:  2020    Patient: Renu Oakes  MR number: [id number]  Account number: [de-identified]  : 1932  Age: 80 years  Gender: Male  Status: Inpatient  Location: Bedside  Height: 71 in  Weight: 191 lb  BP: 117/ 67 mmHg    Indications: Dyspnea, Wheezing, Tachypnea, Shortness of breath      Diagnoses: R06 00 - Dyspnea, unspecified, R06 02 - Shortness of breath, R06 2 - Wheezing    Sonographer:   [facility] , RDCS  Referring Physician:  VIDYA Sherwood:  [facility]  Interpreting Physician:  Jacy Saab Liya Paz MD    SUMMARY    LEFT VENTRICLE:  Systolic function was at the lower limits of normal  LVEF 45-50%  Although no diagnostic regional wall motion abnormality was identified, this possibility cannot be completely excluded on the basis of this study  Wall thickness was at the upper limits of normal   Features were consistent with a pseudonormal left ventricular filling pattern, with concomitant abnormal relaxation and increased filling pressure (grade 2 diastolic dysfunction)  RIGHT VENTRICLE:  Systolic function was mildly reduced  LEFT ATRIUM:  The atrium was moderately to markedly dilated  RIGHT ATRIUM:  The atrium was moderately dilated  MITRAL VALVE:  There was mild annular calcification  There was moderate regurgitation  TRICUSPID VALVE:  There was moderate regurgitation  Pulmonary artery systolic pressure was mildly to moderately increased  Estimated PA systolic pressure >68PKUR    PULMONIC VALVE:  There was trace regurgitation  PERICARDIUM:  Moderate pleural effusion noted    HISTORY: Fatigue  PRIOR HISTORY: Pulmonary embolism, Shortness of breath, GERD, Congestive heart failure  Atrial fibrillation  Risk factors: hypertension, diabetes, and hypercholesterolemia  PROCEDURE: The procedure was performed at the bedside  This was a routine study  The transthoracic approach was used  The study included complete 2D imaging, M-mode, complete spectral Doppler, and color Doppler  The heart rate was 77 bpm,  at the start of the study  Images were obtained from the parasternal, apical, subcostal, and suprasternal notch acoustic windows  Intravenous contrast (  8mL of Definity in NSS) was administered to opacify the left ventricle  This was a  technically difficult study      LEFT VENTRICLE: Size was normal  Systolic function was at the lower limits of normal  LVEF 45-50% Although no diagnostic regional wall motion abnormality was identified, this possibility cannot be completely excluded on the basis of this  study  Wall thickness was at the upper limits of normal  DOPPLER: Features were consistent with a pseudonormal left ventricular filling pattern, with concomitant abnormal relaxation and increased filling pressure (grade 2 diastolic  dysfunction)  RIGHT VENTRICLE: The size was normal  Systolic function was mildly reduced  LEFT ATRIUM: The atrium was moderately to markedly dilated  RIGHT ATRIUM: The atrium was moderately dilated  MITRAL VALVE: There was mild annular calcification  DOPPLER: There was moderate regurgitation  AORTIC VALVE: The valve was trileaflet  DOPPLER: There was no regurgitation  TRICUSPID VALVE: DOPPLER: There was moderate regurgitation  Pulmonary artery systolic pressure was mildly to moderately increased  Estimated PA systolic pressure >27ZEJV    PULMONIC VALVE: Not well visualized  DOPPLER: There was trace regurgitation  PERICARDIUM: Moderate pleural effusion noted There was no pericardial effusion  AORTA: The root exhibited normal size      SYSTEM MEASUREMENT TABLES    2D  %FS: 18 5 %  Ao Diam: 3 1 cm  EDV(Teich): 126 ml  EF(Teich): 38 %  ESV(Teich): 78 1 ml  IVSd: 1 cm  LA Area: 27 7 cm2  LA Diam: 4 4 cm  LVEDV MOD A4C: 97 6 ml  LVEF MOD A4C: 40 9 %  LVESV MOD A4C: 57 7 ml  LVIDd: 5 1 cm  LVIDs: 4 2 cm  LVLd A4C: 7 cm  LVLs A4C: 6 5 cm  LVPWd: 1 cm  RA Area: 24 1 cm2  RVIDd: 4 4 cm  SV MOD A4C: 39 9 ml  SV(Teich): 47 9 ml    CW  MR VTI: 174 cm  MR Vmax: 5 3 m/s  MR Vmean: 4 3 m/s  MR maxP 3 mmHg  MR meanP 2 mmHg  TR Vmax: 3 6 m/s  TR maxP 3 mmHg    MM  TAPSE: 1 9 cm    PW  E': 0 1 m/s  E/E': 16 5  MV A Elvin: 0 4 m/s  MV Dec [place]: 6 1 m/s2  MV DecT: 148 6 ms  MV E Elvin: 0 9 m/s  MV E/A Ratio: 2 4  MV PHT: 43 1 ms  MVA By PHT: 5 1 cm2    [employer] Accredited Echocardiography Laboratory    Prepared and electronically signed by    Deborah Retana MD  Signed 2020 17:01:12       No results found for this or any previous visit  No results found for this or any previous visit  No procedure found  No results found for this or any previous visit  Meds/Allergies   all current active meds have been reviewed  Medications Prior to Admission   Medication    acetaminophen (TYLENOL) 325 mg tablet    atorvastatin (LIPITOR) 10 mg tablet    betamethasone dipropionate (DIPROSONE) 0 05 % cream    donepezil (ARICEPT) 5 mg tablet    fluticasone (FLONASE) 50 mcg/act nasal spray    fluticasone (FLONASE) 50 mcg/act nasal spray    Folic Acid-Vit O4-PQH D82 (FOLBEE) 2 5-25-1 MG TABS    furosemide (LASIX) 40 mg tablet    gabapentin (NEURONTIN) 100 mg capsule    glipiZIDE (GLUCOTROL XL) 5 mg 24 hr tablet    glucose blood (TRUE METRIX BLOOD GLUCOSE TEST) test strip    levothyroxine 100 mcg tablet    Lidocaine-Menthol (ICY HOT LIDOCAINE PLUS MENTHOL EX)    lisinopril (ZESTRIL) 2 5 mg tablet    loratadine (CLARITIN) 10 mg tablet    metoprolol tartrate (LOPRESSOR) 50 mg tablet    Misc  Devices (BED WEDGE) MISC    potassium chloride (MICRO-K) 10 MEQ CR capsule    triamcinolone (KENALOG) 0 1 % cream    triamcinolone (KENALOG) 0 1 % ointment    warfarin (COUMADIN) 5 mg tablet            Counseling / Coordination of Care  Total floor / unit time spent today 15 minutes  Greater than 50% of total time was spent with the patient and / or family counseling and / or coordination of care  ** Please Note: Dragon 360 Dictation voice to text software may have been used in the creation of this document   **

## 2020-01-03 NOTE — SOCIAL WORK
Pt is a tentative d/c for tomorrow to return to [facility]  CM contacted Chasity from [facility] and she explains that she cannot accept pt over the weekend and that she has already explained this to daughter Dontrell Obregon  ROSALBA offered to speak to SLIM to see if he could be discharged today instead of tomorrow, but she states that she would have to see him first and will not be able to see him today  She states that he was on her list to see today, but that daughter Dontrell Obregon told her that she did not need to since the discharge would not be over the weekend  CM unsure of who daughter spoke to because it was not the CM or [facility]  CC notified of this situation

## 2020-01-03 NOTE — PROGRESS NOTES
[facility] Progress Note  Patient: Padma Bailey 80 y o  male   MRN: [id number]  PCP: Joshua Matson DO  Unit/Bed#: -01 Encounter: [id number]  Date Of Visit: 01/03/20          * Acute on chronic systolic (congestive) heart failure [facility])  Assessment & Plan  Wt Readings from Last 3 Encounters:   01/03/20 78 3 kg (172 lb 9 9 oz)   10/22/19 86 6 kg (191 lb)   10/18/19 86 2 kg (190 lb)     Doing much better  Continue with current treatment  Hopefully his diuretics will be switched to oral today  Would watch him 1 more night  Back to his facility hopefully soon      Pulmonary embolism (HCC)-history of  Assessment & Plan  Continue with anticoagulation    GERD (gastroesophageal reflux disease)  Assessment & Plan  Continue with home medication    Hyperlipidemia  Assessment & Plan  Continue with statin    Type 2 diabetes mellitus with diabetic polyneuropathy, without long-term current use of insulin [facility])  Assessment & Plan  Lab Results   Component Value Date    HGBA1C 6 0 08/22/2019       Recent Labs     01/02/20  1535 01/02/20  2042 01/03/20  0454 01/03/20  1128   POCGLU 202* 143* 119 276*       Blood Sugar Average: Last 72 hrs:  (P) 160 [id number]     Hemoglobin A1c is 6  Accu-Chek on the higher side likely secondary to diet    Essential hypertension  Assessment & Plan  Stable and continue with home medications including beta-blocker    Chronic a-fib  Assessment & Plan  Heart rate and rhythm are stable  Continue with Coumadin as well        Present on Admission:   Chronic a-fib   Essential hypertension   Type 2 diabetes mellitus with diabetic polyneuropathy, without long-term current use of insulin (HCC)   Hyperlipidemia   GERD (gastroesophageal reflux disease)   Pulmonary embolism (HCC)-history of            VTE Pharmacologic Prophylaxis:   Pharmacologic: Warfarin (Coumadin)  Mechanical VTE Prophylaxis in Place: Yes    Patient Centered Rounds: I have performed bedside rounds with nursing staff today  Discussions with Specialists or Other Care Team Provider:  Yes    Education and Discussions with Family / Patient:  Yes    Time Spent for Care: 30+ minutes  More than 50% of total time spent on counseling and coordination of care as described above  Current Length of Stay: 2 day(s)    Current Patient Status: Inpatient   Certification Statement: The patient will continue to require additional inpatient hospital stay due to Heart failure    Discharge Plan:  Back to his facility when available    Code Status: Level 1 - Full Code      Subjective:   Patient feels much better  Has much less swelling of his legs  Denies any chest pain  Denies any nausea or vomiting  He is walking better with less exertion or shortness of breath  No fever or chills    Objective:     Vitals:   Temp (24hrs), Av 8 °F (36 6 °C), Min:97 5 °F (36 4 °C), Max:98 1 °F (36 7 °C)    Temp:  [97 5 °F (36 4 °C)-98 1 °F (36 7 °C)] 97 5 °F (36 4 °C)  HR:  [] 85  Resp:  [18] 18  BP: (135-164)/() 135/69  SpO2:  [95 %-98 %] 98 %  Body mass index is 24 08 kg/m²  Input and Output Summary (last 24 hours): Intake/Output Summary (Last 24 hours) at 1/3/2020 1538  Last data filed at 1/3/2020 0451  Gross per 24 hour   Intake 440 ml   Output 1350 ml   Net -910 ml           Physical Exam:     Vital signs reviewed as  Much improved lower extremities edema  Wrinkles are visible now  S1 and S2 audible  Awake and alert  Oriented x3 although he has underlying dementia  Abdomen is soft  Nontender  Bowel sounds are audible  Oropharynx are slightly dry  Neck is supple  Chest:  Improving air entry  Mood and affect are pleasant  Moving his arms and legs better today    Up in the chair    Additional Data:     Labs:    Results from last 7 days   Lab Units 20  0601 20  1403   WBC Thousand/uL 10 57* 11 26*   HEMOGLOBIN g/dL 12 4 13 3   HEMATOCRIT % 39 5 42 7   PLATELETS Thousands/uL 303 342   NEUTROS PCT % --  80*   LYMPHS PCT %  --  9*   MONOS PCT %  --  7   EOS PCT %  --  3     Results from last 7 days   Lab Units 01/03/20  0451  01/01/20  1403   POTASSIUM mmol/L 4 0   < > 3 3*   CHLORIDE mmol/L 103   < > 103   CO2 mmol/L 33*   < > 32   BUN mg/dL 20   < > 17   CREATININE mg/dL 1 26   < > 1 21   CALCIUM mg/dL 8 7   < > 8 3   ALK PHOS U/L  --   --  160*   ALT U/L  --   --  51   AST U/L  --   --  48*    < > = values in this interval not displayed  Results from last 7 days   Lab Units 01/03/20  0451   INR  2 41*       * I Have Reviewed All Lab Data Listed Above  * Additional Pertinent Lab Tests Reviewed: All Labs Within Last 24 Hours Reviewed    Recent Cultures (last 7 days):     Results from last 7 days   Lab Units 01/01/20  1403   BLOOD CULTURE  No Growth at 24 hrs  No Growth at 24 hrs  Last 24 Hours Medication List:     Current Facility-Administered Medications:  acetaminophen 650 mg Oral Q6H PRN Jayy Dennis MD   atorvastatin 10 mg Oral HS Jayy Dennis MD   donepezil 5 mg Oral HS Jayy Dennis MD   furosemide 20 mg Oral QPM Mora A Rajesh, CRNP   furosemide 40 mg Oral Early Morning Mora MERI Mdeel   gabapentin 100 mg Oral TID Jayy Dennis MD   insulin lispro 1-6 Units Subcutaneous TID [facility] Jayy Dennis MD   lisinopril 2 5 mg Oral Daily Jayy Dennis MD   loratadine 10 mg Oral Daily Jayy Dennis MD   metoprolol tartrate 50 mg Oral Q12H [facility] Jayy Dennis MD   ondansetron 4 mg Intravenous Q6H PRN Jayy Dennis MD   potassium chloride 40 mEq Oral BID Erin England MD   warfarin 5 mg Oral Daily (warfarin) Jayy Dennis MD        Today, Patient Was Seen By: Erin England MD    ** Please Note: Dragon 360 Dictation voice to text software may have been used in the creation of this document   **

## 2020-01-04 LAB
ANION GAP SERPL CALCULATED.3IONS-SCNC: 7 MMOL/L (ref 4–13)
BUN SERPL-MCNC: 26 MG/DL (ref 5–25)
CALCIUM SERPL-MCNC: 8.9 MG/DL (ref 8.3–10.1)
CHLORIDE SERPL-SCNC: 101 MMOL/L (ref 100–108)
CO2 SERPL-SCNC: 33 MMOL/L (ref 21–32)
CREAT SERPL-MCNC: 1.35 MG/DL (ref 0.6–1.3)
GFR SERPL CREATININE-BSD FRML MDRD: 47 ML/MIN/1.73SQ M
GLUCOSE SERPL-MCNC: 123 MG/DL (ref 65–140)
GLUCOSE SERPL-MCNC: 125 MG/DL (ref 65–140)
GLUCOSE SERPL-MCNC: 156 MG/DL (ref 65–140)
GLUCOSE SERPL-MCNC: 165 MG/DL (ref 65–140)
INR PPP: 2.46 (ref 0.84–1.19)
POTASSIUM SERPL-SCNC: 4.3 MMOL/L (ref 3.5–5.3)
PROTHROMBIN TIME: 27 SECONDS (ref 11.6–14.5)
SODIUM SERPL-SCNC: 141 MMOL/L (ref 136–145)

## 2020-01-04 PROCEDURE — 85610 PROTHROMBIN TIME: CPT | Performed by: INTERNAL MEDICINE

## 2020-01-04 PROCEDURE — 99232 SBSQ HOSP IP/OBS MODERATE 35: CPT | Performed by: INTERNAL MEDICINE

## 2020-01-04 PROCEDURE — 80048 BASIC METABOLIC PNL TOTAL CA: CPT | Performed by: INTERNAL MEDICINE

## 2020-01-04 PROCEDURE — 82948 REAGENT STRIP/BLOOD GLUCOSE: CPT

## 2020-01-04 RX ADMIN — FUROSEMIDE 20 MG: 40 TABLET ORAL at 17:56

## 2020-01-04 RX ADMIN — INSULIN LISPRO 1 UNITS: 100 INJECTION, SOLUTION INTRAVENOUS; SUBCUTANEOUS at 17:58

## 2020-01-04 RX ADMIN — LISINOPRIL 2.5 MG: 2.5 TABLET ORAL at 09:34

## 2020-01-04 RX ADMIN — INSULIN LISPRO 1 UNITS: 100 INJECTION, SOLUTION INTRAVENOUS; SUBCUTANEOUS at 12:00

## 2020-01-04 RX ADMIN — ATORVASTATIN CALCIUM 10 MG: 10 TABLET, FILM COATED ORAL at 21:58

## 2020-01-04 RX ADMIN — POTASSIUM CHLORIDE 40 MEQ: 1500 TABLET, EXTENDED RELEASE ORAL at 17:55

## 2020-01-04 RX ADMIN — LORATADINE 10 MG: 10 TABLET ORAL at 09:34

## 2020-01-04 RX ADMIN — NYSTATIN AND TRIAMCINOLONE ACETONIDE: 100000; 1 CREAM TOPICAL at 17:59

## 2020-01-04 RX ADMIN — METOPROLOL TARTRATE 50 MG: 50 TABLET, FILM COATED ORAL at 09:34

## 2020-01-04 RX ADMIN — WARFARIN SODIUM 5 MG: 5 TABLET ORAL at 17:56

## 2020-01-04 RX ADMIN — DONEPEZIL HYDROCHLORIDE 5 MG: 5 TABLET ORAL at 21:58

## 2020-01-04 RX ADMIN — GABAPENTIN 100 MG: 100 CAPSULE ORAL at 09:34

## 2020-01-04 RX ADMIN — GABAPENTIN 100 MG: 100 CAPSULE ORAL at 21:58

## 2020-01-04 RX ADMIN — NYSTATIN AND TRIAMCINOLONE ACETONIDE: 100000; 1 CREAM TOPICAL at 09:47

## 2020-01-04 RX ADMIN — METOPROLOL TARTRATE 50 MG: 50 TABLET, FILM COATED ORAL at 21:58

## 2020-01-04 RX ADMIN — GABAPENTIN 100 MG: 100 CAPSULE ORAL at 17:55

## 2020-01-04 RX ADMIN — FUROSEMIDE 40 MG: 40 TABLET ORAL at 05:04

## 2020-01-04 RX ADMIN — POTASSIUM CHLORIDE 40 MEQ: 1500 TABLET, EXTENDED RELEASE ORAL at 09:34

## 2020-01-04 NOTE — PROGRESS NOTES
[facility] Internal Medicine Progress Note  Patient: Mary Han 80 y o  male   MRN: [id number]  PCP: Tasha Alarcon DO  Unit/Bed#: -01 Encounter: [id number]  Date Of Visit: 01/04/20          * Acute on chronic systolic (congestive) heart failure [facility])  Assessment & Plan  Wt Readings from Last 3 Encounters:   01/04/20 80 6 kg (177 lb 11 1 oz)   10/22/19 86 6 kg (191 lb)   10/18/19 86 2 kg (190 lb)     Off IV diuretic  Weight is going up again  Continue with oral diuretic  Pulmonary embolism (HCC)-history of  Assessment & Plan  Continue with anticoagulation    GERD (gastroesophageal reflux disease)  Assessment & Plan  Continue with home medication    Hyperlipidemia  Assessment & Plan  Continue with statin    Type 2 diabetes mellitus with diabetic polyneuropathy, without long-term current use of insulin [facility])  Assessment & Plan  Lab Results   Component Value Date    HGBA1C 6 0 08/22/2019       Recent Labs     01/03/20  1637 01/03/20  2141 01/04/20  0633 01/04/20  1122   POCGLU 125 136 123 156*       Blood Sugar Average: Last 72 hrs:  (P) 806 [id number]     Hemoglobin A1c is 6  Accu-Chek have been stable lately    Essential hypertension  Assessment & Plan  Stable and continue with home medications including beta-blocker    Chronic a-fib  Assessment & Plan  Heart rate and rhythm are stable  Continue with Coumadin as well      Rash bilateral thighs-present on admission:  Would try triamcinolone with  Antifungal      Present on Admission:   Chronic a-fib   Essential hypertension   Type 2 diabetes mellitus with diabetic polyneuropathy, without long-term current use of insulin (HCC)   Hyperlipidemia   GERD (gastroesophageal reflux disease)   Acute on chronic systolic (congestive) heart failure (HCC)   Pulmonary embolism (HCC)-history of            VTE Pharmacologic Prophylaxis:   Pharmacologic: Warfarin (Coumadin)  Mechanical VTE Prophylaxis in Place: Yes    Patient Centered Rounds: I have performed bedside rounds with nursing staff today  Discussions with Specialists or Other Care Team Provider:  Yes    Education and Discussions with Family / Patient:  Yes    Time Spent for Care: 20+ minutes  More than 50% of total time spent on counseling and coordination of care as described above  Current Length of Stay: 3 day(s)    Current Patient Status: Inpatient   Certification Statement: The patient will continue to require additional inpatient hospital stay due to Heart failure    Discharge Plan:  Back to his facility on Monday    Code Status: Level 1 - Full Code      Subjective:   Feels better  Still a rash on his thigh  No chest pain  Breathing is stable  Also has improving swelling of his legs    Objective:     Vitals:   Temp (24hrs), Av 5 °F (36 4 °C), Min:97 4 °F (36 3 °C), Max:97 5 °F (36 4 °C)    Temp:  [97 4 °F (36 3 °C)-97 5 °F (36 4 °C)] 97 4 °F (36 3 °C)  HR:  [64-92] 81  Resp:  [18] 18  BP: (135-150)/() 144/83  SpO2:  [98 %-100 %] 99 %  Body mass index is 24 78 kg/m²  Input and Output Summary (last 24 hours): Intake/Output Summary (Last 24 hours) at 2020 1402  Last data filed at 2020 1300  Gross per 24 hour   Intake 1318 ml   Output 850 ml   Net 468 ml           Physical Exam:     Vital signs reviewed as above  In bed not in any distress  S1 and S2 audible  Improving air entry  Stable lower extremities trace edema  Rash bilateral thighs-in the middle with some excoriation and redness  Abdomen is soft  Nontender  Bowel sounds are audible  No cyanosis or clubbing  Mood and affect are pleasant  Awake and alert    Oriented x3  Moving his arms and legs while in bed    Additional Data:     Labs:    Results from last 7 days   Lab Units 20  0601 20  1403   WBC Thousand/uL 10 57* 11 26*   HEMOGLOBIN g/dL 12 4 13 3   HEMATOCRIT % 39 5 42 7   PLATELETS Thousands/uL 303 342   NEUTROS PCT %  --  80*   LYMPHS PCT %  --  9*   MONOS PCT %  --  7   EOS PCT % --  3     Results from last 7 days   Lab Units 01/04/20  0500  01/01/20  1403   POTASSIUM mmol/L 4 3   < > 3 3*   CHLORIDE mmol/L 101   < > 103   CO2 mmol/L 33*   < > 32   BUN mg/dL 26*   < > 17   CREATININE mg/dL 1 35*   < > 1 21   CALCIUM mg/dL 8 9   < > 8 3   ALK PHOS U/L  --   --  160*   ALT U/L  --   --  51   AST U/L  --   --  48*    < > = values in this interval not displayed  Results from last 7 days   Lab Units 01/04/20  0500   INR  2 46*       * I Have Reviewed All Lab Data Listed Above  * Additional Pertinent Lab Tests Reviewed: All Labs Within Last 24 Hours Reviewed      Recent Cultures (last 7 days):     Results from last 7 days   Lab Units 01/01/20  1403   BLOOD CULTURE  No Growth at 48 hrs  No Growth at 48 hrs  Last 24 Hours Medication List:     Current Facility-Administered Medications:  acetaminophen 650 mg Oral Q6H PRN Tarsha Falk, MD   atorvastatin 10 mg Oral HS Tarsha Falk, MD   donepezil 5 mg Oral HS Tarsha Falk, MD   furosemide 20 mg Oral QPM MERI Santizo   furosemide 40 mg Oral Early Morning MERI Santizo   gabapentin 100 mg Oral TID Tarsha Falk MD   insulin lispro 1-6 Units Subcutaneous TID [facility] Tarsha Falk MD   lisinopril 2 5 mg Oral Daily Tarsha Falk MD   loratadine 10 mg Oral Daily Tarsha Falk MD   metoprolol tartrate 50 mg Oral Q12H [facility] Tarsha Falk MD   nystatin-triamcinolone  Topical BID Chrystal Hopkins MD   ondansetron 4 mg Intravenous Q6H PRN Tarsha Falk MD   potassium chloride 40 mEq Oral BID Chrystal Hopkins MD   warfarin 5 mg Oral Daily (warfarin) Tarsha Falk MD        Today, Patient Was Seen By: Chrystal Hopkins MD    ** Please Note: Dragon 360 Dictation voice to text software may have been used in the creation of this document   **

## 2020-01-05 LAB
ANION GAP SERPL CALCULATED.3IONS-SCNC: 5 MMOL/L (ref 4–13)
BUN SERPL-MCNC: 29 MG/DL (ref 5–25)
CALCIUM SERPL-MCNC: 9.2 MG/DL (ref 8.3–10.1)
CHLORIDE SERPL-SCNC: 103 MMOL/L (ref 100–108)
CO2 SERPL-SCNC: 33 MMOL/L (ref 21–32)
CREAT SERPL-MCNC: 1.36 MG/DL (ref 0.6–1.3)
GFR SERPL CREATININE-BSD FRML MDRD: 46 ML/MIN/1.73SQ M
GLUCOSE SERPL-MCNC: 108 MG/DL (ref 65–140)
GLUCOSE SERPL-MCNC: 131 MG/DL (ref 65–140)
GLUCOSE SERPL-MCNC: 142 MG/DL (ref 65–140)
GLUCOSE SERPL-MCNC: 209 MG/DL (ref 65–140)
INR PPP: 2.07 (ref 0.84–1.19)
POTASSIUM SERPL-SCNC: 4.4 MMOL/L (ref 3.5–5.3)
PROTHROMBIN TIME: 23.5 SECONDS (ref 11.6–14.5)
SODIUM SERPL-SCNC: 141 MMOL/L (ref 136–145)

## 2020-01-05 PROCEDURE — 82948 REAGENT STRIP/BLOOD GLUCOSE: CPT

## 2020-01-05 PROCEDURE — 80048 BASIC METABOLIC PNL TOTAL CA: CPT | Performed by: INTERNAL MEDICINE

## 2020-01-05 PROCEDURE — 99232 SBSQ HOSP IP/OBS MODERATE 35: CPT | Performed by: INTERNAL MEDICINE

## 2020-01-05 PROCEDURE — 85610 PROTHROMBIN TIME: CPT | Performed by: INTERNAL MEDICINE

## 2020-01-05 RX ADMIN — NYSTATIN AND TRIAMCINOLONE ACETONIDE: 100000; 1 CREAM TOPICAL at 09:06

## 2020-01-05 RX ADMIN — FUROSEMIDE 20 MG: 40 TABLET ORAL at 17:30

## 2020-01-05 RX ADMIN — LORATADINE 10 MG: 10 TABLET ORAL at 09:05

## 2020-01-05 RX ADMIN — WARFARIN SODIUM 5 MG: 5 TABLET ORAL at 17:30

## 2020-01-05 RX ADMIN — GABAPENTIN 100 MG: 100 CAPSULE ORAL at 09:05

## 2020-01-05 RX ADMIN — NYSTATIN AND TRIAMCINOLONE ACETONIDE: 100000; 1 CREAM TOPICAL at 17:31

## 2020-01-05 RX ADMIN — POTASSIUM CHLORIDE 40 MEQ: 1500 TABLET, EXTENDED RELEASE ORAL at 09:05

## 2020-01-05 RX ADMIN — METOPROLOL TARTRATE 50 MG: 50 TABLET, FILM COATED ORAL at 09:05

## 2020-01-05 RX ADMIN — FUROSEMIDE 40 MG: 40 TABLET ORAL at 06:11

## 2020-01-05 RX ADMIN — LISINOPRIL 2.5 MG: 2.5 TABLET ORAL at 09:05

## 2020-01-05 RX ADMIN — GABAPENTIN 100 MG: 100 CAPSULE ORAL at 17:00

## 2020-01-05 RX ADMIN — POTASSIUM CHLORIDE 40 MEQ: 1500 TABLET, EXTENDED RELEASE ORAL at 17:30

## 2020-01-05 RX ADMIN — GABAPENTIN 100 MG: 100 CAPSULE ORAL at 21:39

## 2020-01-05 RX ADMIN — DONEPEZIL HYDROCHLORIDE 5 MG: 5 TABLET ORAL at 21:39

## 2020-01-05 RX ADMIN — METOPROLOL TARTRATE 50 MG: 50 TABLET, FILM COATED ORAL at 21:39

## 2020-01-05 RX ADMIN — ATORVASTATIN CALCIUM 10 MG: 10 TABLET, FILM COATED ORAL at 21:39

## 2020-01-05 RX ADMIN — INSULIN LISPRO 2 UNITS: 100 INJECTION, SOLUTION INTRAVENOUS; SUBCUTANEOUS at 13:00

## 2020-01-05 NOTE — PLAN OF CARE
Problem: Potential for Falls  Goal: Patient will remain free of falls  Description  INTERVENTIONS:  - Assess patient frequently for physical needs  -  Identify cognitive and physical deficits and behaviors that affect risk of falls    -  [place] fall precautions as indicated by assessment   - Educate patient/family on patient safety including physical limitations  - Instruct patient to call for assistance with activity based on assessment  - Modify environment to reduce risk of injury  - Consider OT/PT consult to assist with strengthening/mobility  Outcome: Progressing     Problem: PAIN - ADULT  Goal: Verbalizes/displays adequate comfort level or baseline comfort level  Description  Interventions:  - Encourage patient to monitor pain and request assistance  - Assess pain using appropriate pain scale  - Administer analgesics based on type and severity of pain and evaluate response  - Implement non-pharmacological measures as appropriate and evaluate response  - Consider cultural and social influences on pain and pain management  - Notify physician/advanced practitioner if interventions unsuccessful or patient reports new pain  Outcome: Progressing     Problem: INFECTION - ADULT  Goal: Absence or prevention of progression during hospitalization  Description  INTERVENTIONS:  - Assess and monitor for signs and symptoms of infection  - Monitor lab/diagnostic results  - Monitor all insertion sites, i e  indwelling lines, tubes, and drains  - Monitor endotracheal if appropriate and nasal secretions for changes in amount and color  - [place] appropriate cooling/warming therapies per order  - Administer medications as ordered  - Instruct and encourage patient and family to use good hand hygiene technique  - Identify and instruct in appropriate isolation precautions for identified infection/condition  Outcome: Progressing     Problem: SAFETY ADULT  Goal: Maintain or return to baseline ADL function  Description  INTERVENTIONS:  -  Assess patient's ability to carry out ADLs; assess patient's baseline for ADL function and identify physical deficits which impact ability to perform ADLs (bathing, care of mouth/teeth, toileting, grooming, dressing, etc )  - Assess/evaluate cause of self-care deficits   - Assess range of motion  - Assess patient's mobility; develop plan if impaired  - Assess patient's need for assistive devices and provide as appropriate  - Encourage maximum independence but intervene and supervise when necessary  - Involve family in performance of ADLs  - Assess for home care needs following discharge   - Consider OT consult to assist with ADL evaluation and planning for discharge  - Provide patient education as appropriate  Outcome: Progressing  Goal: Maintain or return mobility status to optimal level  Description  INTERVENTIONS:  - Assess patient's baseline mobility status (ambulation, transfers, stairs, etc )    - Identify cognitive and physical deficits and behaviors that affect mobility  - Identify mobility aids required to assist with transfers and/or ambulation (gait belt, sit-to-stand, lift, walker, cane, etc )  - [place] fall precautions as indicated by assessment  - Record patient progress and toleration of activity level on Mobility SBAR; progress patient to next Phase/Stage  - Instruct patient to call for assistance with activity based on assessment  - Consider rehabilitation consult to assist with strengthening/weightbearing, etc   Outcome: Progressing     Problem: DISCHARGE PLANNING  Goal: Discharge to home or other facility with appropriate resources  Description  INTERVENTIONS:  - Identify barriers to discharge w/patient and caregiver  - Arrange for needed discharge resources and transportation as appropriate  - Identify discharge learning needs (meds, wound care, etc )  - Arrange for interpretive services to assist at discharge as needed  - Refer to Case Management Department for coordinating discharge planning if the patient needs post-hospital services based on physician/advanced practitioner order or complex needs related to functional status, cognitive ability, or social support system  Outcome: Progressing     Problem: Knowledge Deficit  Goal: Patient/family/caregiver demonstrates understanding of disease process, treatment plan, medications, and discharge instructions  Description  Complete learning assessment and assess knowledge base    Interventions:  - Provide teaching at level of understanding  - Provide teaching via preferred learning methods  Outcome: Progressing

## 2020-01-05 NOTE — PROGRESS NOTES
[facility] Internal Medicine Progress Note  Patient: Mel Blanco 80 y o  male   MRN: [id number]  PCP: Xochitl Joseph DO  Unit/Bed#: -01 Encounter: [id number]  Date Of Visit: 01/05/20          * Acute on chronic systolic (congestive) heart failure [facility])  Assessment & Plan  Wt Readings from Last 3 Encounters:   01/05/20 76 4 kg (168 lb 6 9 oz)   10/22/19 86 6 kg (191 lb)   10/18/19 86 2 kg (190 lb)     Off IV diuretic  Weight is down today although he is on oral diuretics now  Pulmonary embolism (HCC)-history of  Assessment & Plan  Continue with anticoagulation    GERD (gastroesophageal reflux disease)  Assessment & Plan  Continue with home medication    Hyperlipidemia  Assessment & Plan  Continue with statin    Type 2 diabetes mellitus with diabetic polyneuropathy, without long-term current use of insulin [facility])  Assessment & Plan  Lab Results   Component Value Date    HGBA1C 6 0 08/22/2019       Recent Labs     01/04/20  0633 01/04/20  1122 01/04/20  1603 01/05/20  0554   POCGLU 123 156* 165* 108       Blood Sugar Average: Last 72 hrs:  (P) 146 25     Hemoglobin A1c is 6  Accu-Chek have been stable    Essential hypertension  Assessment & Plan  Stable and continue with home medications including beta-blocker    Chronic a-fib  Assessment & Plan  Heart rate and rhythm are stable  Continue with Coumadin as well  Rash:  Has rash on his thighs and is better      Present on Admission:   Chronic a-fib   Essential hypertension   Type 2 diabetes mellitus with diabetic polyneuropathy, without long-term current use of insulin (HCC)   Hyperlipidemia   GERD (gastroesophageal reflux disease)   Acute on chronic systolic (congestive) heart failure (HCC)   Pulmonary embolism (HCC)-history of            VTE Pharmacologic Prophylaxis:   Pharmacologic: Warfarin (Coumadin)  Mechanical VTE Prophylaxis in Place: Yes    Patient Centered Rounds: I have performed bedside rounds with nursing staff today      Discussions with Specialists or Other Care Team Provider:  Yes    Education and Discussions with Family / Patient:  Yes    Time Spent for Care: 20+ minutes   More than 50% of total time spent on counseling and coordination of care as described above  Current Length of Stay: 4 day(s)    Current Patient Status: Inpatient   Certification Statement: The patient will continue to require additional inpatient hospital stay due to Heart failure    Discharge Plan:  Back to [facility] tomorrow    Code Status: Level 1 - Full Code      Subjective:   Feels better  Rash on his thighs also better  Less itchy  Denies any chest pain  Denies any nausea or vomiting  No fever or chills    Objective:     Vitals:   Temp (24hrs), Av 4 °F (36 3 °C), Min:97 3 °F (36 3 °C), Max:97 5 °F (36 4 °C)    Temp:  [97 3 °F (36 3 °C)-97 5 °F (36 4 °C)] 97 4 °F (36 3 °C)  HR:  [76-93] 81  Resp:  [18-19] 18  BP: (132-144)/(60-95) 140/95  SpO2:  [98 %-100 %] 98 %  Body mass index is 23 49 kg/m²  Input and Output Summary (last 24 hours): Intake/Output Summary (Last 24 hours) at 2020 1004  Last data filed at 2020 0835  Gross per 24 hour   Intake 240 ml   Output 1650 ml   Net -1410 ml           Physical Exam:     Vital signs reviewed  Rash on his thigh slightly better  S1 and S2 audible  Awake and alert  Oriented x3  No cyanosis clubbing  Almost no lower extremities edema  Abdomen is soft  Nontender    Bowel sounds are audible  Improving air entry *      Additional Data:     Labs:    Results from last 7 days   Lab Units 20  0601 20  1403   WBC Thousand/uL 10 57* 11 26*   HEMOGLOBIN g/dL 12 4 13 3   HEMATOCRIT % 39 5 42 7   PLATELETS Thousands/uL 303 342   NEUTROS PCT %  --  80*   LYMPHS PCT %  --  9*   MONOS PCT %  --  7   EOS PCT %  --  3     Results from last 7 days   Lab Units 20  0548  20  1403   POTASSIUM mmol/L 4 4   < > 3 3*   CHLORIDE mmol/L 103   < > 103   CO2 mmol/L 33*   < > 32   BUN mg/dL 29*   < > 17   CREATININE mg/dL 1 36*   < > 1 21   CALCIUM mg/dL 9 2   < > 8 3   ALK PHOS U/L  --   --  160*   ALT U/L  --   --  51   AST U/L  --   --  48*    < > = values in this interval not displayed  Results from last 7 days   Lab Units 01/05/20  0548   INR  2 07*       * I Have Reviewed All Lab Data Listed Above  * Additional Pertinent Lab Tests Reviewed: All Labs Within Last 24 Hours Reviewed      Recent Cultures (last 7 days):     Results from last 7 days   Lab Units 01/01/20  1403   BLOOD CULTURE  No Growth at 72 hrs  No Growth at 72 hrs  Last 24 Hours Medication List:     Current Facility-Administered Medications:  acetaminophen 650 mg Oral Q6H PRN Jeanette Thurston MD   atorvastatin 10 mg Oral HS Jeanette Thurston MD   donepezil 5 mg Oral HS Jeanette Thurston MD   furosemide 20 mg Oral QPM Mora JOSIE Mena, CRNP   furosemide 40 mg Oral Early Morning MoraMERI Zelaya   gabapentin 100 mg Oral TID Jeanette Thurston MD   insulin lispro 1-6 Units Subcutaneous TID [facility] Jeanette Thurston MD   lisinopril 2 5 mg Oral Daily Jeanette Thurston MD   loratadine 10 mg Oral Daily Jeanette Thurston MD   metoprolol tartrate 50 mg Oral Q12H [facility] Jeanette Thurston MD   nystatin-triamcinolone  Topical BID Ham Dickens MD   ondansetron 4 mg Intravenous Q6H PRN Jeanette Thurston MD   potassium chloride 40 mEq Oral BID Ham Dickens MD   warfarin 5 mg Oral Daily (warfarin) Jeanette Thurston MD        Today, Patient Was Seen By: Ham Dickens MD    ** Please Note: Dragon 360 Dictation voice to text software may have been used in the creation of this document   ** 0 = understands/communicates without difficulty

## 2020-01-06 ENCOUNTER — TELEPHONE (OUTPATIENT)
Dept: CARDIOLOGY CLINIC | Facility: CLINIC | Age: 85
End: 2020-01-06

## 2020-01-06 ENCOUNTER — TRANSITIONAL CARE MANAGEMENT (OUTPATIENT)
Dept: INTERNAL MEDICINE CLINIC | Facility: CLINIC | Age: 85
End: 2020-01-06

## 2020-01-06 ENCOUNTER — TELEPHONE (OUTPATIENT)
Dept: INTERNAL MEDICINE CLINIC | Facility: CLINIC | Age: 85
End: 2020-01-06

## 2020-01-06 VITALS
HEIGHT: 71 IN | DIASTOLIC BLOOD PRESSURE: 78 MMHG | TEMPERATURE: 97.1 F | HEART RATE: 67 BPM | WEIGHT: 168.43 LBS | SYSTOLIC BLOOD PRESSURE: 140 MMHG | OXYGEN SATURATION: 98 % | BODY MASS INDEX: 23.58 KG/M2 | RESPIRATION RATE: 16 BRPM

## 2020-01-06 LAB
ANION GAP SERPL CALCULATED.3IONS-SCNC: 8 MMOL/L (ref 4–13)
BACTERIA BLD CULT: NORMAL
BACTERIA BLD CULT: NORMAL
BUN SERPL-MCNC: 28 MG/DL (ref 5–25)
CALCIUM SERPL-MCNC: 9.2 MG/DL (ref 8.3–10.1)
CHLORIDE SERPL-SCNC: 103 MMOL/L (ref 100–108)
CO2 SERPL-SCNC: 30 MMOL/L (ref 21–32)
CREAT SERPL-MCNC: 1.31 MG/DL (ref 0.6–1.3)
GFR SERPL CREATININE-BSD FRML MDRD: 49 ML/MIN/1.73SQ M
GLUCOSE SERPL-MCNC: 105 MG/DL (ref 65–140)
GLUCOSE SERPL-MCNC: 117 MG/DL (ref 65–140)
POTASSIUM SERPL-SCNC: 4.5 MMOL/L (ref 3.5–5.3)
SODIUM SERPL-SCNC: 141 MMOL/L (ref 136–145)

## 2020-01-06 PROCEDURE — 99239 HOSP IP/OBS DSCHRG MGMT >30: CPT | Performed by: INTERNAL MEDICINE

## 2020-01-06 PROCEDURE — 80048 BASIC METABOLIC PNL TOTAL CA: CPT | Performed by: INTERNAL MEDICINE

## 2020-01-06 PROCEDURE — 82948 REAGENT STRIP/BLOOD GLUCOSE: CPT

## 2020-01-06 RX ORDER — POTASSIUM CHLORIDE 20 MEQ/1
40 TABLET, EXTENDED RELEASE ORAL DAILY
Refills: 0 | Status: ON HOLD
Start: 2020-01-06 | End: 2020-01-20

## 2020-01-06 RX ORDER — HYDROXYZINE HYDROCHLORIDE 10 MG/1
10 TABLET, FILM COATED ORAL 2 TIMES DAILY PRN
Status: DISCONTINUED | OUTPATIENT
Start: 2020-01-06 | End: 2020-01-06 | Stop reason: HOSPADM

## 2020-01-06 RX ORDER — FUROSEMIDE 20 MG/1
20 TABLET ORAL EVERY EVENING
Qty: 30 TABLET | Refills: 0 | Status: ON HOLD
Start: 2020-01-06 | End: 2020-01-20

## 2020-01-06 RX ORDER — HYDROXYZINE HYDROCHLORIDE 10 MG/1
10 TABLET, FILM COATED ORAL 2 TIMES DAILY PRN
Qty: 30 TABLET | Refills: 0 | Status: SHIPPED | OUTPATIENT
Start: 2020-01-06 | End: 2020-02-10

## 2020-01-06 RX ADMIN — GABAPENTIN 100 MG: 100 CAPSULE ORAL at 09:08

## 2020-01-06 RX ADMIN — LISINOPRIL 2.5 MG: 2.5 TABLET ORAL at 09:08

## 2020-01-06 RX ADMIN — METOPROLOL TARTRATE 50 MG: 50 TABLET, FILM COATED ORAL at 09:08

## 2020-01-06 RX ADMIN — NYSTATIN AND TRIAMCINOLONE ACETONIDE: 100000; 1 CREAM TOPICAL at 09:08

## 2020-01-06 RX ADMIN — FUROSEMIDE 40 MG: 40 TABLET ORAL at 05:46

## 2020-01-06 RX ADMIN — LORATADINE 10 MG: 10 TABLET ORAL at 09:08

## 2020-01-06 RX ADMIN — POTASSIUM CHLORIDE 40 MEQ: 1500 TABLET, EXTENDED RELEASE ORAL at 09:08

## 2020-01-06 NOTE — PROGRESS NOTES
Pt got himself up out of bed himself, walked 360 ft with walker  No issues with ambulation, no SOB, no fatigue  Pt has no complaints at this time

## 2020-01-06 NOTE — PLAN OF CARE
Problem: Potential for Falls  Goal: Patient will remain free of falls  Description  INTERVENTIONS:  - Assess patient frequently for physical needs  -  Identify cognitive and physical deficits and behaviors that affect risk of falls    -  [place] fall precautions as indicated by assessment   - Educate patient/family on patient safety including physical limitations  - Instruct patient to call for assistance with activity based on assessment  - Modify environment to reduce risk of injury  - Consider OT/PT consult to assist with strengthening/mobility  Outcome: Progressing     Problem: PAIN - ADULT  Goal: Verbalizes/displays adequate comfort level or baseline comfort level  Description  Interventions:  - Encourage patient to monitor pain and request assistance  - Assess pain using appropriate pain scale  - Administer analgesics based on type and severity of pain and evaluate response  - Implement non-pharmacological measures as appropriate and evaluate response  - Consider cultural and social influences on pain and pain management  - Notify physician/advanced practitioner if interventions unsuccessful or patient reports new pain  Outcome: Progressing     Problem: INFECTION - ADULT  Goal: Absence or prevention of progression during hospitalization  Description  INTERVENTIONS:  - Assess and monitor for signs and symptoms of infection  - Monitor lab/diagnostic results  - Monitor all insertion sites, i e  indwelling lines, tubes, and drains  - Monitor endotracheal if appropriate and nasal secretions for changes in amount and color  - [place] appropriate cooling/warming therapies per order  - Administer medications as ordered  - Instruct and encourage patient and family to use good hand hygiene technique  - Identify and instruct in appropriate isolation precautions for identified infection/condition  Outcome: Progressing     Problem: SAFETY ADULT  Goal: Maintain or return to baseline ADL function  Description  INTERVENTIONS:  -  Assess patient's ability to carry out ADLs; assess patient's baseline for ADL function and identify physical deficits which impact ability to perform ADLs (bathing, care of mouth/teeth, toileting, grooming, dressing, etc )  - Assess/evaluate cause of self-care deficits   - Assess range of motion  - Assess patient's mobility; develop plan if impaired  - Assess patient's need for assistive devices and provide as appropriate  - Encourage maximum independence but intervene and supervise when necessary  - Involve family in performance of ADLs  - Assess for home care needs following discharge   - Consider OT consult to assist with ADL evaluation and planning for discharge  - Provide patient education as appropriate  Outcome: Progressing  Goal: Maintain or return mobility status to optimal level  Description  INTERVENTIONS:  - Assess patient's baseline mobility status (ambulation, transfers, stairs, etc )    - Identify cognitive and physical deficits and behaviors that affect mobility  - Identify mobility aids required to assist with transfers and/or ambulation (gait belt, sit-to-stand, lift, walker, cane, etc )  - [place] fall precautions as indicated by assessment  - Record patient progress and toleration of activity level on Mobility SBAR; progress patient to next Phase/Stage  - Instruct patient to call for assistance with activity based on assessment  - Consider rehabilitation consult to assist with strengthening/weightbearing, etc   Outcome: Progressing     Problem: DISCHARGE PLANNING  Goal: Discharge to home or other facility with appropriate resources  Description  INTERVENTIONS:  - Identify barriers to discharge w/patient and caregiver  - Arrange for needed discharge resources and transportation as appropriate  - Identify discharge learning needs (meds, wound care, etc )  - Arrange for interpretive services to assist at discharge as needed  - Refer to Case Management Department for coordinating discharge planning if the patient needs post-hospital services based on physician/advanced practitioner order or complex needs related to functional status, cognitive ability, or social support system  Outcome: Progressing     Problem: Knowledge Deficit  Goal: Patient/family/caregiver demonstrates understanding of disease process, treatment plan, medications, and discharge instructions  Description  Complete learning assessment and assess knowledge base    Interventions:  - Provide teaching at level of understanding  - Provide teaching via preferred learning methods  Outcome: Progressing

## 2020-01-06 NOTE — DISCHARGE SUMMARY
Discharge Summary - [facility] Internal Medicine    Patient Information: Odalis Guevara 80 y o  male MRN: [id number]  Unit/Bed#: -01 Encounter: [id number]    Discharging Physician / Practitioner: Chrystal Hopkins MD  PCP: Trevin Saenz DO  Admission Date: 1/1/2020  Discharge Date: 01/06/20    Reason for Admission:  Acute on chronic systolic heart failure    Discharge Diagnoses:     Principal Problem:    Acute on chronic systolic (congestive) heart failure (HCC)  Active Problems:    Chronic a-fib    Essential hypertension    Type 2 diabetes mellitus with diabetic polyneuropathy, without long-term current use of insulin (HCC)    Hyperlipidemia    GERD (gastroesophageal reflux disease)    Pulmonary embolism (HCC)-history of  Resolved Problems:    * No resolved hospital problems  *    Present on Admission:   Chronic a-fib   Essential hypertension   Type 2 diabetes mellitus with diabetic polyneuropathy, without long-term current use of insulin (HCC)   Hyperlipidemia   GERD (gastroesophageal reflux disease)   Acute on chronic systolic (congestive) heart failure (HCC)   Pulmonary embolism (HCC)-history of    Consultations During Hospital Stay:  · Cardiology    Procedures Performed:     · Echocardiogram showing ejection fraction 45-50% and grade 2 diastolic dysfunction    Significant Findings:     · As above    Incidental Findings:   · None     Test Results Pending at Discharge (will require follow up): · None     Outpatient Tests Requested:  · Patient resume his INR follow-up as previously    Complications:  None    Hospital Course:     Odalis Guevara is a 80 y o  male patient who originally presented to the hospital on 1/1/2020 due to increasing shortness of breath and lower extremities edema with fatigue  Found to have acute on chronic systolic heart failure exacerbation  Diuresed with IV diuretics  Still his diuretic has been adjusted  He would be on oral 1st by 40 in the morning and 20 in the evening  INR remained therapeutic  He also has rash on his eyes and back  Prescribed treatment for that which is improving  Creatinine went up meds he received IV diuretics  It is coming down  He is back to his baseline and being [facility] back [facility]  Condition at Discharge: good     Discharge Day Visit / Exam:     Subjective:  Feels good  No chest pain  Rash on his type better  No fever or chills  No nausea or vomiting  No diarrhea  Vitals: Blood Pressure: 140/78 (01/06/20 0717)  Pulse: 67 (01/06/20 0717)  Temperature: (!) 97 1 °F (36 2 °C) (01/06/20 0717)  Temp Source: Oral (01/05/20 2257)  Respirations: 16 (01/06/20 0717)  Height: 5' 11" (180 3 cm) (01/01/20 1959)  Weight - Scale: 76 4 kg (168 lb 6 9 oz) (01/06/20 0600)  SpO2: 98 % (01/06/20 0717)  Exam:        Vital signs are reviewed as above  Has been up and walking with a walker  S1 and S2 audible  Improving rash on thigh  Abdomen is soft  Nontender bowel sounds are audible  No cyanosis or clubbing  Almost no edema on his legs  Mood and affect are pleasant  Awake and alert  Discharge instructions/Information to patient and family:   See after visit summary for information provided to patient and family  Provisions for Follow-Up Care:  See after visit summary for information related to follow-up care and any pertinent home health orders  Disposition:     [facility]: [facility]    For Discharges to [facility]:   · Not Applicable to this Patient - Not Applicable to this Patient    Planned Readmission:  None     Discharge Statement:  I spent more than 30 minutes discharging the patient  This time was spent on the day of discharge  I had direct contact with the patient on the day of discharge   Greater than 50% of the total time was spent examining patient, answering all patient questions, arranging and discussing plan of care with patient as well as directly providing post-discharge instructions  Additional time then spent on discharge activities  Discharge Medications:  See after visit summary for reconciled discharge medications provided to patient and family  ** Please Note: Dragon 360 Dictation voice to text software may have been used in the creation of this document   **

## 2020-01-06 NOTE — TELEPHONE ENCOUNTER
Patient was changed from Xarelto to Coumadin due to his pulmonary embolism (and not A fib)    Please discuss with your lung doctor if Eliquis will be okay

## 2020-01-06 NOTE — TELEPHONE ENCOUNTER
Dr Lynnell Koyanagi, please see notes  Pt's daughter wants to switch him from Coumadin to Eliquis  Please advise  Thank you

## 2020-01-06 NOTE — SOCIAL WORK
Cm spoke to [facility] and they will sent a representative out to evaluate pt at 09:00 to approve return  Pt to be discharged to [facility] with daughter Chris April transporting  GINNY set up to see pt at [facility]  IMM reviewed with pt and daughter and signed by pt  Copy to pt and copy to MR for scanning  Records faxed to [facility] at [phone]

## 2020-01-06 NOTE — PLAN OF CARE
Problem: Potential for Falls  Goal: Patient will remain free of falls  Description  INTERVENTIONS:  - Assess patient frequently for physical needs  -  Identify cognitive and physical deficits and behaviors that affect risk of falls    -  [place] fall precautions as indicated by assessment   - Educate patient/family on patient safety including physical limitations  - Instruct patient to call for assistance with activity based on assessment  - Modify environment to reduce risk of injury  - Consider OT/PT consult to assist with strengthening/mobility  Outcome: Progressing     Problem: PAIN - ADULT  Goal: Verbalizes/displays adequate comfort level or baseline comfort level  Description  Interventions:  - Encourage patient to monitor pain and request assistance  - Assess pain using appropriate pain scale  - Administer analgesics based on type and severity of pain and evaluate response  - Implement non-pharmacological measures as appropriate and evaluate response  - Consider cultural and social influences on pain and pain management  - Notify physician/advanced practitioner if interventions unsuccessful or patient reports new pain  Outcome: Progressing     Problem: INFECTION - ADULT  Goal: Absence or prevention of progression during hospitalization  Description  INTERVENTIONS:  - Assess and monitor for signs and symptoms of infection  - Monitor lab/diagnostic results  - Monitor all insertion sites, i e  indwelling lines, tubes, and drains  - Monitor endotracheal if appropriate and nasal secretions for changes in amount and color  - [place] appropriate cooling/warming therapies per order  - Administer medications as ordered  - Instruct and encourage patient and family to use good hand hygiene technique  - Identify and instruct in appropriate isolation precautions for identified infection/condition  Outcome: Progressing     Problem: SAFETY ADULT  Goal: Maintain or return to baseline ADL function  Description  INTERVENTIONS:  -  Assess patient's ability to carry out ADLs; assess patient's baseline for ADL function and identify physical deficits which impact ability to perform ADLs (bathing, care of mouth/teeth, toileting, grooming, dressing, etc )  - Assess/evaluate cause of self-care deficits   - Assess range of motion  - Assess patient's mobility; develop plan if impaired  - Assess patient's need for assistive devices and provide as appropriate  - Encourage maximum independence but intervene and supervise when necessary  - Involve family in performance of ADLs  - Assess for home care needs following discharge   - Consider OT consult to assist with ADL evaluation and planning for discharge  - Provide patient education as appropriate  Outcome: Progressing  Goal: Maintain or return mobility status to optimal level  Description  INTERVENTIONS:  - Assess patient's baseline mobility status (ambulation, transfers, stairs, etc )    - Identify cognitive and physical deficits and behaviors that affect mobility  - Identify mobility aids required to assist with transfers and/or ambulation (gait belt, sit-to-stand, lift, walker, cane, etc )  - [place] fall precautions as indicated by assessment  - Record patient progress and toleration of activity level on Mobility SBAR; progress patient to next Phase/Stage  - Instruct patient to call for assistance with activity based on assessment  - Consider rehabilitation consult to assist with strengthening/weightbearing, etc   Outcome: Progressing     Problem: DISCHARGE PLANNING  Goal: Discharge to home or other facility with appropriate resources  Description  INTERVENTIONS:  - Identify barriers to discharge w/patient and caregiver  - Arrange for needed discharge resources and transportation as appropriate  - Identify discharge learning needs (meds, wound care, etc )  - Arrange for interpretive services to assist at discharge as needed  - Refer to Case Management Department for coordinating discharge planning if the patient needs post-hospital services based on physician/advanced practitioner order or complex needs related to functional status, cognitive ability, or social support system  Outcome: Progressing     Problem: Knowledge Deficit  Goal: Patient/family/caregiver demonstrates understanding of disease process, treatment plan, medications, and discharge instructions  Description  Complete learning assessment and assess knowledge base    Interventions:  - Provide teaching at level of understanding  - Provide teaching via preferred learning methods  Outcome: Progressing

## 2020-01-07 ENCOUNTER — TELEPHONE (OUTPATIENT)
Dept: INTERNAL MEDICINE CLINIC | Facility: CLINIC | Age: 85
End: 2020-01-07

## 2020-01-07 ENCOUNTER — TELEPHONE (OUTPATIENT)
Dept: CARDIOLOGY CLINIC | Facility: CLINIC | Age: 85
End: 2020-01-07

## 2020-01-07 ENCOUNTER — APPOINTMENT (EMERGENCY)
Dept: RADIOLOGY | Facility: HOSPITAL | Age: 85
End: 2020-01-07
Payer: MEDICARE

## 2020-01-07 ENCOUNTER — APPOINTMENT (EMERGENCY)
Dept: CT IMAGING | Facility: HOSPITAL | Age: 85
End: 2020-01-07
Payer: MEDICARE

## 2020-01-07 ENCOUNTER — HOSPITAL ENCOUNTER (EMERGENCY)
Facility: HOSPITAL | Age: 85
Discharge: HOME | End: 2020-01-07
Attending: EMERGENCY MEDICINE | Admitting: EMERGENCY MEDICINE
Payer: MEDICARE

## 2020-01-07 ENCOUNTER — ANTICOAG VISIT (OUTPATIENT)
Dept: CARDIOLOGY CLINIC | Facility: CLINIC | Age: 85
End: 2020-01-07

## 2020-01-07 VITALS
TEMPERATURE: 98.8 F | DIASTOLIC BLOOD PRESSURE: 55 MMHG | SYSTOLIC BLOOD PRESSURE: 115 MMHG | OXYGEN SATURATION: 99 % | HEART RATE: 104 BPM | RESPIRATION RATE: 18 BRPM

## 2020-01-07 DIAGNOSIS — R50.9 FEVER: Primary | ICD-10-CM

## 2020-01-07 LAB
ALBUMIN SERPL BCP-MCNC: 2.6 G/DL (ref 3.5–5)
ALP SERPL-CCNC: 136 U/L (ref 46–116)
ALT SERPL W P-5'-P-CCNC: 57 U/L (ref 12–78)
ANION GAP SERPL CALCULATED.3IONS-SCNC: 9 MMOL/L (ref 4–13)
APTT PPP: 43 SECONDS (ref 23–37)
AST SERPL W P-5'-P-CCNC: 59 U/L (ref 5–45)
ATRIAL RATE: 97 BPM
BACTERIA UR QL AUTO: ABNORMAL /HPF
BASOPHILS # BLD AUTO: 0.05 THOUSANDS/ΜL (ref 0–0.1)
BASOPHILS NFR BLD AUTO: 0 % (ref 0–1)
BILIRUB SERPL-MCNC: 1 MG/DL (ref 0.2–1)
BILIRUB UR QL STRIP: NEGATIVE
BUN SERPL-MCNC: 37 MG/DL (ref 5–25)
CALCIUM SERPL-MCNC: 8.7 MG/DL (ref 8.3–10.1)
CHLORIDE SERPL-SCNC: 103 MMOL/L (ref 100–108)
CLARITY UR: CLEAR
CO2 SERPL-SCNC: 28 MMOL/L (ref 21–32)
COLOR UR: YELLOW
CREAT SERPL-MCNC: 1.57 MG/DL (ref 0.6–1.3)
EOSINOPHIL # BLD AUTO: 0.15 THOUSAND/ΜL (ref 0–0.61)
EOSINOPHIL NFR BLD AUTO: 1 % (ref 0–6)
ERYTHROCYTE [DISTWIDTH] IN BLOOD BY AUTOMATED COUNT: 14.6 % (ref 11.6–15.1)
FLUAV RNA NPH QL NAA+PROBE: NORMAL
FLUBV RNA NPH QL NAA+PROBE: NORMAL
GFR SERPL CREATININE-BSD FRML MDRD: 39 ML/MIN/1.73SQ M
GLUCOSE SERPL-MCNC: 77 MG/DL (ref 65–140)
GLUCOSE UR STRIP-MCNC: NEGATIVE MG/DL
HCT VFR BLD AUTO: 42.4 % (ref 36.5–49.3)
HGB BLD-MCNC: 13.4 G/DL (ref 12–17)
HGB UR QL STRIP.AUTO: NEGATIVE
HYALINE CASTS #/AREA URNS LPF: ABNORMAL /LPF
IMM GRANULOCYTES # BLD AUTO: 0.06 THOUSAND/UL (ref 0–0.2)
IMM GRANULOCYTES NFR BLD AUTO: 0 % (ref 0–2)
INR PPP: 2.49 (ref 0.84–1.19)
KETONES UR STRIP-MCNC: NEGATIVE MG/DL
LACTATE SERPL-SCNC: 1.5 MMOL/L (ref 0.5–2)
LEUKOCYTE ESTERASE UR QL STRIP: NEGATIVE
LYMPHOCYTES # BLD AUTO: 0.73 THOUSANDS/ΜL (ref 0.6–4.47)
LYMPHOCYTES NFR BLD AUTO: 5 % (ref 14–44)
MCH RBC QN AUTO: 29.2 PG (ref 26.8–34.3)
MCHC RBC AUTO-ENTMCNC: 31.6 G/DL (ref 31.4–37.4)
MCV RBC AUTO: 92 FL (ref 82–98)
MONOCYTES # BLD AUTO: 1.54 THOUSAND/ΜL (ref 0.17–1.22)
MONOCYTES NFR BLD AUTO: 10 % (ref 4–12)
MUCOUS THREADS UR QL AUTO: ABNORMAL
NEUTROPHILS # BLD AUTO: 13.54 THOUSANDS/ΜL (ref 1.85–7.62)
NEUTS SEG NFR BLD AUTO: 84 % (ref 43–75)
NITRITE UR QL STRIP: NEGATIVE
NON-SQ EPI CELLS URNS QL MICRO: ABNORMAL /HPF
NRBC BLD AUTO-RTO: 0 /100 WBCS
PH UR STRIP.AUTO: 5.5 [PH]
PLATELET # BLD AUTO: 332 THOUSANDS/UL (ref 149–390)
PMV BLD AUTO: 10.1 FL (ref 8.9–12.7)
POTASSIUM SERPL-SCNC: 5.1 MMOL/L (ref 3.5–5.3)
PROCALCITONIN SERPL-MCNC: 0.06 NG/ML
PROT SERPL-MCNC: 7.6 G/DL (ref 6.4–8.2)
PROT UR STRIP-MCNC: ABNORMAL MG/DL
PROTHROMBIN TIME: 27.2 SECONDS (ref 11.6–14.5)
QRS AXIS: -23 DEGREES
QRSD INTERVAL: 78 MS
QT INTERVAL: 310 MS
QTC INTERVAL: 401 MS
RBC # BLD AUTO: 4.59 MILLION/UL (ref 3.88–5.62)
RBC #/AREA URNS AUTO: ABNORMAL /HPF
RSV RNA NPH QL NAA+PROBE: NORMAL
SODIUM SERPL-SCNC: 140 MMOL/L (ref 136–145)
SP GR UR STRIP.AUTO: 1.01 (ref 1–1.03)
T WAVE AXIS: 15 DEGREES
UROBILINOGEN UR QL STRIP.AUTO: 0.2 E.U./DL
VENTRICULAR RATE: 101 BPM
WBC # BLD AUTO: 16.07 THOUSAND/UL (ref 4.31–10.16)
WBC #/AREA URNS AUTO: ABNORMAL /HPF

## 2020-01-07 PROCEDURE — 81001 URINALYSIS AUTO W/SCOPE: CPT | Performed by: PHYSICIAN ASSISTANT

## 2020-01-07 PROCEDURE — 80053 COMPREHEN METABOLIC PANEL: CPT | Performed by: PHYSICIAN ASSISTANT

## 2020-01-07 PROCEDURE — 36415 COLL VENOUS BLD VENIPUNCTURE: CPT | Performed by: PHYSICIAN ASSISTANT

## 2020-01-07 PROCEDURE — 71046 X-RAY EXAM CHEST 2 VIEWS: CPT

## 2020-01-07 PROCEDURE — 85730 THROMBOPLASTIN TIME PARTIAL: CPT | Performed by: PHYSICIAN ASSISTANT

## 2020-01-07 PROCEDURE — 83605 ASSAY OF LACTIC ACID: CPT | Performed by: PHYSICIAN ASSISTANT

## 2020-01-07 PROCEDURE — 99284 EMERGENCY DEPT VISIT MOD MDM: CPT

## 2020-01-07 PROCEDURE — 93005 ELECTROCARDIOGRAM TRACING: CPT

## 2020-01-07 PROCEDURE — 71250 CT THORAX DX C-: CPT

## 2020-01-07 PROCEDURE — 87631 RESP VIRUS 3-5 TARGETS: CPT | Performed by: PHYSICIAN ASSISTANT

## 2020-01-07 PROCEDURE — 87040 BLOOD CULTURE FOR BACTERIA: CPT | Performed by: PHYSICIAN ASSISTANT

## 2020-01-07 PROCEDURE — 85610 PROTHROMBIN TIME: CPT | Performed by: PHYSICIAN ASSISTANT

## 2020-01-07 PROCEDURE — 84145 PROCALCITONIN (PCT): CPT | Performed by: PHYSICIAN ASSISTANT

## 2020-01-07 PROCEDURE — 93010 ELECTROCARDIOGRAM REPORT: CPT | Performed by: INTERNAL MEDICINE

## 2020-01-07 PROCEDURE — 99285 EMERGENCY DEPT VISIT HI MDM: CPT | Performed by: PHYSICIAN ASSISTANT

## 2020-01-07 PROCEDURE — 85025 COMPLETE CBC W/AUTO DIFF WBC: CPT | Performed by: PHYSICIAN ASSISTANT

## 2020-01-07 RX ORDER — SODIUM CHLORIDE 9 MG/ML
3 INJECTION INTRAVENOUS AS NEEDED
Status: DISCONTINUED | OUTPATIENT
Start: 2020-01-07 | End: 2020-01-07 | Stop reason: HOSPADM

## 2020-01-07 NOTE — TELEPHONE ENCOUNTER
I s/w Ada Montoya and notified her of Dr Laurel Fountain response  She said he does not have a pulmonary Dr  She said that they have an appt with Dr Lito Francois on 1/9 so they will further discuss switching him to another anticoag at that time

## 2020-01-07 NOTE — ED PROVIDER NOTES
History  Chief Complaint   Patient presents with    Fever - 9 weeks to 74 years     pt presents from [facility] after being discharged yesterday  per [facility], pt had fever and was c/o generalized body aches  pt has no complaints on arrival     79 yo male patient here with fever  Started around 1200 today  He was discharged yesterday from this hospital for acute CHF exacerbation  Staff checked on him around lunch and he was "shaking" with chills  He had recorded fever at that time and was given tylenol and EMS was called  Reportedly he was hypoxic when they checked his vitals and he was shaking  Upon EMS arrival he had a temp of 100 0 and his O2 sats were 97% and above on room air  At this time he offers no complaints  Denies cough, congestion, chest pain, sob, body aches, n/v, abdominal pain, neck stiffness  History provided by:  Patient   used: No    Fever - 75 years or older   Max temp prior to arrival:  100 0  Temp source:  Oral  Severity:  Mild  Onset quality:  Sudden  Duration:  4 hours  Timing:  Constant  Progression:  Unchanged  Chronicity:  New  Relieved by:  Acetaminophen  Worsened by:  Nothing  Ineffective treatments:  None tried  Associated symptoms: chills    Associated symptoms: no chest pain, no confusion, no congestion, no cough, no diarrhea, no dysuria, no ear pain, no headaches, no myalgias, no nausea, no rash, no rhinorrhea, no somnolence, no sore throat and no vomiting    Risk factors comment:  Recently admitted for CHF exacerbation  Prior to Admission Medications   Prescriptions Last Dose Informant Patient Reported? Taking? Folic Acid-Vit O0-RDE L38 (FOLBEE) 2 5-25-1 MG TABS  Self Yes No   Sig: Take by mouth   Lidocaine-Menthol (ICY HOT LIDOCAINE PLUS MENTHOL EX)  Self Yes No   Sig: Apply topically   Misc   Devices (BED WEDGE) MISC  Self No No   Sig: by Does not apply route daily   acetaminophen (TYLENOL) 325 mg tablet  Self No No   Sig: Take 2 tablets (650 mg total) by mouth every 8 (eight) hours   Patient taking differently: Take 650 mg by mouth as needed    atorvastatin (LIPITOR) 10 mg tablet   No No   Si TAB ORALLY AT BEDTIME DX:HYPERLIPIDEMIA   donepezil (ARICEPT) 5 mg tablet  Self No No   Si TAB ORALLY AT BEDTIME DX: DEMENTIA   fluticasone (FLONASE) 50 mcg/act nasal spray  Self No No   Si sprays into each nostril daily For allergies   Patient taking differently: 2 sprays into each nostril as needed For allergies   fluticasone (FLONASE) 50 mcg/act nasal spray   No No   Sig: INSTILL 2 SPRAYS INTO EACH NOSTRIL EVERY MORNING DX:ALLERGIC RHINITIS   furosemide (LASIX) 20 mg tablet   No No   Sig: Take 1 tablet (20 mg total) by mouth every evening   furosemide (LASIX) 40 mg tablet  Self No No   Sig: Take 1 tablet (40 mg total) by mouth daily   gabapentin (NEURONTIN) 100 mg capsule  Self No No   Sig: Take 1 capsule (100 mg total) by mouth 3 (three) times a day Dx: Neuropathy   glipiZIDE (GLUCOTROL XL) 5 mg 24 hr tablet  Self No No   Sig: Take 1 tablet (5 mg total) by mouth daily   glucose blood (TRUE METRIX BLOOD GLUCOSE TEST) test strip  Self No No   Sig: Patient to test once daily DX code E11 8   hydrOXYzine HCL (ATARAX) 10 mg tablet   No No   Sig: Take 1 tablet (10 mg total) by mouth 2 (two) times a day as needed for itching   levothyroxine 100 mcg tablet  Self No No   Si TAB ORALLY EVERY MORNING DX: HYPOTHYROIDISM   lisinopril (ZESTRIL) 2 5 mg tablet  Self No No   Sig: Take 1 tablet (2 5 mg total) by mouth daily   loratadine (CLARITIN) 10 mg tablet  Self No No   Sig: Take 1 tablet (10 mg total) by mouth daily For allergies   metoprolol tartrate (LOPRESSOR) 50 mg tablet  Self No No   Si TAB ORALLY TWICE DAILY DX: HYPERTENSION   nystatin-triamcinolone (MYCOLOG-II) cream   No No   Sig: Apply topically 2 (two) times a day   potassium chloride (K-DUR,KLOR-CON) 20 mEq tablet   No No   Sig: Take 2 tablets (40 mEq total) by mouth daily   warfarin (COUMADIN) 5 mg tablet  Self No No   Sig: Take 1 tablet (5 mg total) by mouth daily      Facility-Administered Medications: None       Past Medical History:   Diagnosis Date    Acute systolic congestive heart failure ([facility] ) 7/26/2018    Allergic contact dermatitis due to plants, except food     Atrial fibrillation (HCC)     Cancer (HCC)     skin cancer    Cataract     Chronic kidney disease     Stage 3    Coagulation test abnormality     Diabetes mellitus ([facility] )     Disease of thyroid gland     hypothyroidism    DVT (deep venous thrombosis) (HCC)     Eczema     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HCC)     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HCC)     GERD (gastroesophageal reflux disease)     Gout     Hyperlipidemia     Hypertension     Hypokalemia     Leukocytosis     Lichen planus     Nonmelanoma skin cancer     Phlebitis or thrombophlebitis of deep vessel of lower extremity (HCC)     Proteinuria        Past Surgical History:   Procedure Laterality Date    CATARACT EXTRACTION      CHOLECYSTECTOMY      CT ESOPHAGOGASTRODUODENOSCOPY TRANSORAL DIAGNOSTIC N/A 6/14/2017    Procedure: EGD AND COLONOSCOPY;  Surgeon: Alok Mora MD;  Location: [facility]; Service: Gastroenterology    TONSILLECTOMY         Family History   Problem Relation Age of Onset    Alcohol abuse Mother     Heart attack Father     Dementia Brother     Other Son         AUTO ACCIDENT     I have reviewed and agree with the history as documented  Social History     Tobacco Use    Smoking status: Never Smoker    Smokeless tobacco: Never Used   Substance Use Topics    Alcohol use: Never     Frequency: Never     Binge frequency: Never    Drug use: No        Review of Systems   Constitutional: Positive for chills and fever  Negative for activity change, appetite change, diaphoresis, fatigue and unexpected weight change  HENT: Negative for congestion, ear pain, rhinorrhea, sinus pressure, sore throat and trouble swallowing      Eyes: Negative for photophobia and visual disturbance  Respiratory: Negative for apnea, cough, choking, chest tightness, shortness of breath, wheezing and stridor  Cardiovascular: Negative for chest pain, palpitations and leg swelling  Gastrointestinal: Negative for abdominal distention, abdominal pain, blood in stool, constipation, diarrhea, nausea and vomiting  Genitourinary: Negative for decreased urine volume, difficulty urinating, dysuria, enuresis, flank pain, frequency, hematuria and urgency  Musculoskeletal: Negative for arthralgias, myalgias, neck pain and neck stiffness  Skin: Negative for color change, pallor, rash and wound  Allergic/Immunologic: Negative  Neurological: Negative for dizziness, tremors, syncope, weakness, light-headedness, numbness and headaches  Hematological: Negative  Psychiatric/Behavioral: Negative  Negative for confusion  All other systems reviewed and are negative  Physical Exam  Physical Exam   Constitutional: He is oriented to person, place, and time  He appears well-developed and well-nourished  Non-toxic appearance  He does not have a sickly appearance  He does not appear ill  No distress  HENT:   Head: Normocephalic and atraumatic  Eyes: Pupils are equal, round, and reactive to light  EOM and lids are normal    Neck: Normal range of motion  Neck supple  Cardiovascular: Regular rhythm, S1 normal, S2 normal, normal heart sounds, intact distal pulses and normal pulses  Tachycardia present  Exam reveals no gallop, no distant heart sounds, no friction rub and no decreased pulses  No murmur heard  Pulses:       Radial pulses are 2+ on the right side, and 2+ on the left side  Pulmonary/Chest: Effort normal and breath sounds normal  No accessory muscle usage  No apnea, no tachypnea and no bradypnea  No respiratory distress  He has no decreased breath sounds  He has no wheezes  He has no rhonchi  He has no rales  Abdominal: Soft  Normal appearance   He exhibits no distension  There is no tenderness  There is no rigidity, no rebound and no guarding  Musculoskeletal: Normal range of motion  He exhibits no edema, tenderness or deformity  Neurological: He is alert and oriented to person, place, and time  No cranial nerve deficit  GCS eye subscore is 4  GCS verbal subscore is 5  GCS motor subscore is 6  GCS 15  AAOx3  No focal neuro deficits  CN II-XII grossly intact  Speech normal, no aphasia or dysarthria  PERRL  EOMI  Upper and lower extremity strength 5/5 through   strength 5/5 b/l  Gross sensation intact b/l  Skin: Skin is warm, dry and intact  No rash noted  He is not diaphoretic  No erythema  No pallor  No visible rash  Psychiatric: His speech is normal    Nursing note and vitals reviewed        Vital Signs  ED Triage Vitals   Temperature Pulse Respirations Blood Pressure SpO2   01/07/20 1612 01/07/20 1607 01/07/20 1607 01/07/20 1607 01/07/20 1607   98 8 °F (37 1 °C) 78 18 118/70 96 %      Temp src Heart Rate Source Patient Position - Orthostatic VS BP Location FiO2 (%)   -- 01/07/20 1607 01/07/20 1607 01/07/20 1607 --    Monitor Lying Right arm       Pain Score       01/07/20 1607       No Pain           Vitals:    01/07/20 1607 01/07/20 1630 01/07/20 1818 01/07/20 1845   BP: 118/70 106/65  143/75   Pulse: 78 (!) 108 94 97   Patient Position - Orthostatic VS: Lying            Visual Acuity      ED Medications  Medications   sodium chloride (PF) 0 9 % injection 3 mL (has no administration in time range)       Diagnostic Studies  Results Reviewed     Procedure Component Value Units Date/Time    UA w Reflex to Microscopic w Reflex to Culture [[id number]]  (Abnormal) Collected:  01/07/20 1839    Lab Status:  Final result Specimen:  Urine, Clean Catch Updated:  01/07/20 1852     Color, UA Yellow     Clarity, UA Clear     Specific Gravity, UA 1 015     pH, UA 5 5     Leukocytes, UA Negative     Nitrite, UA Negative     Protein,  (2+) mg/dl      Glucose, UA Negative mg/dl      Ketones, UA Negative mg/dl      Urobilinogen, UA 0 2 E U /dl      Bilirubin, UA Negative     Blood, UA Negative    Urine Microscopic [[id number]] Collected:  01/07/20 1839    Lab Status: In process Specimen:  Urine, Clean Catch Updated:  01/07/20 1852    Lactic Acid x2 [[id number]]  (Normal) Collected:  01/07/20 1636    Lab Status:  Final result Specimen:  Blood from Arm, Right Updated:  01/07/20 1706     LACTIC ACID 1 5 mmol/L     Narrative:       Result may be elevated if tourniquet was used during collection      Influenza A/B and RSV PCR [[id number]]  (Normal) Collected:  01/07/20 1615    Lab Status:  Final result Specimen:  Nasopharyngeal Swab Updated:  01/07/20 1703     INFLUENZA A PCR None Detected     INFLUENZA B PCR None Detected     RSV PCR None Detected    Comprehensive metabolic panel [[id number]]  (Abnormal) Collected:  01/07/20 1636    Lab Status:  Final result Specimen:  Blood from Arm, Right Updated:  01/07/20 1703     Sodium 140 mmol/L      Potassium 5 1 mmol/L      Chloride 103 mmol/L      CO2 28 mmol/L      ANION GAP 9 mmol/L      BUN 37 mg/dL      Creatinine 1 57 mg/dL      Glucose 77 mg/dL      Calcium 8 7 mg/dL      AST 59 U/L      ALT 57 U/L      Alkaline Phosphatase 136 U/L      Total Protein 7 6 g/dL      Albumin 2 6 g/dL      Total Bilirubin 1 00 mg/dL      eGFR 39 ml/min/1 73sq m     Narrative:       Bisi guidelines for Chronic Kidney Disease (CKD):     Stage 1 with normal or high GFR (GFR > 90 mL/min/1 73 square meters)    Stage 2 Mild CKD (GFR = 60-89 mL/min/1 73 square meters)    Stage 3A Moderate CKD (GFR = 45-59 mL/min/1 73 square meters)    Stage 3B Moderate CKD (GFR = 30-44 mL/min/1 73 square meters)    Stage 4 Severe CKD (GFR = 15-29 mL/min/1 73 square meters)    Stage 5 End Stage CKD (GFR <15 mL/min/1 73 square meters)  Note: GFR calculation is accurate only with a steady state creatinine    Protime-INR [[id number]]  (Abnormal) Collected:  01/07/20 1636    Lab Status:  Final result Specimen:  Blood from Arm, Right Updated:  01/07/20 1658     Protime 27 2 seconds      INR 2 49    APTT [[id number]]  (Abnormal) Collected:  01/07/20 1636    Lab Status:  Final result Specimen:  Blood from Arm, Right Updated:  01/07/20 1658     PTT 43 seconds     CBC and differential [[id number]]  (Abnormal) Collected:  01/07/20 1636    Lab Status:  Final result Specimen:  Blood from Arm, Right Updated:  01/07/20 1644     WBC 16 07 Thousand/uL      RBC 4 59 Million/uL      Hemoglobin 13 4 g/dL      Hematocrit 42 4 %      MCV 92 fL      MCH 29 2 pg      MCHC 31 6 g/dL      RDW 14 6 %      MPV 10 1 fL      Platelets 260 Thousands/uL      nRBC 0 /100 WBCs      Neutrophils Relative 84 %      Immat GRANS % 0 %      Lymphocytes Relative 5 %      Monocytes Relative 10 %      Eosinophils Relative 1 %      Basophils Relative 0 %      Neutrophils Absolute 13 54 Thousands/µL      Immature Grans Absolute 0 06 Thousand/uL      Lymphocytes Absolute 0 73 Thousands/µL      Monocytes Absolute 1 54 Thousand/µL      Eosinophils Absolute 0 15 Thousand/µL      Basophils Absolute 0 05 Thousands/µL     Procalcitonin [[id number]] Collected:  01/07/20 1636    Lab Status: In process Specimen:  Blood from Arm, Right Updated:  01/07/20 1641    Blood culture #1 [[id number]] Collected:  01/07/20 1636    Lab Status: In process Specimen:  Blood from Arm, Right Updated:  01/07/20 1640    Lactic Acid x2 [[id number]]     Lab Status:  No result Specimen:  Blood     Blood culture #2 [[id number]]     Lab Status:  No result Specimen:  Blood                  CT chest without contrast   Final Result by Marcio Kam MD (01/07 1815)      Moderate right and small left pleural effusions  Mild cardiomegaly           Workstation performed: [id number]         XR chest 2 views   Final Result by Kamala Kelly MD (01/07 1710)      Improved bilateral effusions, greater on the right, and improved bibasilar atelectasis  Underlying pneumonia on the right cannot be excluded  Workstation performed: [id number]                    Procedures  ECG 12 Lead Documentation Only  Date/Time: 1/7/2020 4:12 PM  Performed by: Barbara Berry PA-C  Authorized by: Barbara Berry PA-C     Indications / Diagnosis:  Fever  ECG reviewed by me, the ED Provider: yes    Patient location:  ED  Previous ECG:     Previous ECG:  Compared to current    Comparison ECG info:  Jan 1 2020    Similarity:  No change    Comparison to cardiac monitor: Yes    Interpretation:     Interpretation: abnormal    Quality:     Tracing quality:  Limited by artifact  Rate:     ECG rate:  101    ECG rate assessment: tachycardic    Rhythm:     Rhythm: atrial fibrillation    Ectopy:     Ectopy: none    QRS:     QRS axis:  Normal    QRS intervals:  Normal  Conduction:     Conduction: normal    ST segments:     ST segments:  Normal  T waves:     T waves: normal               ED Course                               MDM  Number of Diagnoses or Management Options  Fever: new and requires workup  Diagnosis management comments: ddx includes but is not limited to pneumonia, cellulitis, uti, influenza, bacteremia  Plan: CXR, UA, flu swab  Cbc/cmp  dispo pending       Amount and/or Complexity of Data Reviewed  Clinical lab tests: ordered and reviewed  Tests in the radiology section of CPT®: ordered and reviewed  Independent visualization of images, tracings, or specimens: yes    Risk of Complications, Morbidity, and/or Mortality  Presenting problems: moderate  Management options: low  General comments: 26-year-old male patient with fever  Completely resolved after single dose of Tylenol  He has no complaints at this time  He feels well  He no longer has body aches  He continues tonight chest pain abdominal pain cough congestion  His labs revealed mild leukocytosis  Otherwise at baseline  CT chest with atelectasis, no signs of pneumonia    Bilateral effusions as compared to prior are slightly improved  Blood cultures are pending  Urine is negative  I did give the patient and her daughter the option for admission  The daughter told the patient she would agree with but over the patient like  At this point time the patient would like to go home and return if symptoms worsen  He has available nursing staff for him should he at any point decompensate  Blood cultures are pending at this time  Discussed earlier return parameters  Patient and her daughter understand and both are in agreement with the plan  Patient is completely asymptomatic this time, a full meal while here  Never hypoxic here  Patient Progress  Patient progress: stable        Disposition  Final diagnoses:   Fever     Time reflects when diagnosis was documented in both MDM as applicable and the Disposition within this note     Time User Action Codes Description Comment    1/7/2020  7:23 PM Ezra Karimi Add [R50 9] Fever       ED Disposition     ED Disposition Condition Date/Time Comment    Discharge Stable Tue Jan 7, 2020  7:23 PM Russell Anderson discharge to home/self care  Follow-up Information     Follow up With Specialties Details Why [address], DO Internal Medicine Call  As needed [address]  [phone]            Patient's Medications   Discharge Prescriptions    No medications on file     No discharge procedures on file      ED Provider  Electronically Signed by           Razia Moore PA-C  01/07/20 1932

## 2020-01-07 NOTE — TELEPHONE ENCOUNTER
S/w Gabby from [employer]  She said pt was just dc'd from the hospital yesterday and today c/o SOB on exertion  He does have an appt with Dr Neal Oseguera on 1/9  She is asking to move up the appt  I asked what his O2 sat was and she said she will check and cb  She called back and said his resting O2 is 98, 95 after ambulating 150 feet  I s/w scheduling to try to move up the appt  There is nothing available today and they don't have transportation tomorrow so he will keep his appt for 1/9

## 2020-01-07 NOTE — TELEPHONE ENCOUNTER
Patient was discharged yesterday from [facility]  [place] He had chest pain and SOB & was admitted for observation     Then 5 min's ago  [place] His Ox was 82,   /105,  Pulse fluctuating 128 - 97 & Temp was 100 1 &  fingers were pale    Was given tylenol at 2 PM  When asked how he felt he said Patient he was cold   [place] She is asking what Dr recommends for him

## 2020-01-08 ENCOUNTER — TELEPHONE (OUTPATIENT)
Dept: INTERNAL MEDICINE CLINIC | Facility: CLINIC | Age: 85
End: 2020-01-08

## 2020-01-08 RX ORDER — WARFARIN SODIUM 2.5 MG/1
TABLET ORAL
COMMUNITY
Start: 2019-12-26 | End: 2020-03-18 | Stop reason: SDUPTHER

## 2020-01-08 NOTE — TELEPHONE ENCOUNTER
CHARLOTTE FROM [facility] CALLED AND SAID THAT SHE NEEDS TO SPEAK WITH YOUR REGARDING MULTIPLE MEDICATION INTERACTIONS   THE MEDS IN QUESTION ARE THE COUMADIN, DONEPEZIL ATARAX  PLEASE CALL HER [phone]

## 2020-01-09 ENCOUNTER — OFFICE VISIT (OUTPATIENT)
Dept: CARDIOLOGY CLINIC | Facility: CLINIC | Age: 85
End: 2020-01-09
Payer: MEDICARE

## 2020-01-09 VITALS
SYSTOLIC BLOOD PRESSURE: 112 MMHG | BODY MASS INDEX: 24.5 KG/M2 | DIASTOLIC BLOOD PRESSURE: 66 MMHG | HEART RATE: 90 BPM | RESPIRATION RATE: 18 BRPM | HEIGHT: 71 IN | OXYGEN SATURATION: 98 % | WEIGHT: 175 LBS

## 2020-01-09 DIAGNOSIS — I48.11 LONGSTANDING PERSISTENT ATRIAL FIBRILLATION (HCC): ICD-10-CM

## 2020-01-09 DIAGNOSIS — I10 ESSENTIAL HYPERTENSION: ICD-10-CM

## 2020-01-09 DIAGNOSIS — R07.9 CHEST PAIN: Primary | ICD-10-CM

## 2020-01-09 DIAGNOSIS — Z79.01 LONG TERM (CURRENT) USE OF ANTICOAGULANTS: ICD-10-CM

## 2020-01-09 DIAGNOSIS — Z86.711 HISTORY OF PULMONARY EMBOLUS (PE): ICD-10-CM

## 2020-01-09 DIAGNOSIS — I42.8 OTHER CARDIOMYOPATHY (HCC): ICD-10-CM

## 2020-01-09 DIAGNOSIS — E78.2 MIXED HYPERLIPIDEMIA: ICD-10-CM

## 2020-01-09 PROCEDURE — 99215 OFFICE O/P EST HI 40 MIN: CPT | Performed by: INTERNAL MEDICINE

## 2020-01-09 NOTE — PROGRESS NOTES
Cardiology Office Note    Brodie Holter 80 y o  male MRN: [id number]    01/09/20          Assessment/Plan:  1  Chest pain  · He notes exertional chest pain over the past few days; will evaluate with a pharmacologic MPI    2  NICM with EF: 45%  · Cont lisinopril and metoprolol     3  Chronic systolic and diastolic CHF   · He is euvolemic today  Continue Lasix 40 milligram q a m  and 20 milligrams q h s     4  Persistent Atrial fibrillation   · He is in rate controlled atrial fibrillation- cont metoprolol and warfarin     5  History of PE  · He was recently admitted to [facility] with bilateral PEs while on xarelto  It was considered a xarelto failure and he was started on coumadin  · Continue coumadin with INR goal 2-3      6  Hypertension- Cont lisinopril; metoprolol; and lasix    7  Hyperlipidemia- cont atorvastatin    8  Cognitive impairment- on aricept    9  Hx of CVA    10  CARLO on CKD- recheck BMP; he may need a referral to nephrology      1  Chest pain  NM myocardial perfusion spect (rx stress and/or rest)   2  Longstanding persistent atrial fibrillation     3  Other cardiomyopathy ([address] )     4  Long term (current) use of anticoagulants     5  History of pulmonary embolus (PE)     6  Essential hypertension     7  Mixed hyperlipidemia         HPI: Brodie Holter is a 80y o  year old male with history of persistent atrial fibrillation, pulmonary embolism, and nonischemic cardiomyopathy who presents for follow-up following a recent hospitalization  He presented to the [facility] ED on 1/2 with with progressive dyspnea and lower extremity edema  He was started on IV Lasix with improvement and was discharged on Lasix 40 milligrams q a m  and 20 milligrams q h s       Since being discharged he reports left-sided exertional chest discomfort for the past few days  He is a limited historian due to mild cognitive impairment  He denies any other associated concerns at this time       He was also recently admitted to [facility] and with bilateral PEs while on Xarelto  His consider Xarelto failure he was switched to Coumadin  He has been on Coumadin with goal INR of 2-3  · TTE: EF: 45% G2DD, moderate MR, moderate TR, PASP: 60mmHg  · Shikha Palencia 8/2018 with no evidence of ischemia       Patient Active Problem List   Diagnosis    Adult hypothyroidism    Chronic a-fib    Essential hypertension    Type 2 diabetes mellitus with diabetic polyneuropathy, without long-term current use of insulin (HCC)    Hyperlipidemia    Skin abnormalities    Gout    GERD (gastroesophageal reflux disease)    Acute on chronic systolic (congestive) heart failure (HCC)    Chronic kidney disease    Pulmonary embolism (HCC)-history of       No Known Allergies      Current Outpatient Medications:     acetaminophen (TYLENOL) 325 mg tablet, Take 2 tablets (650 mg total) by mouth every 8 (eight) hours (Patient taking differently: Take 650 mg by mouth every 6 (six) hours as needed ), Disp: 30 tablet, Rfl: 0    atorvastatin (LIPITOR) 10 mg tablet, 1 TAB ORALLY AT BEDTIME DX:HYPERLIPIDEMIA, Disp: 28 tablet, Rfl: 5    donepezil (ARICEPT) 5 mg tablet, 1 TAB ORALLY AT BEDTIME DX: DEMENTIA, Disp: 28 tablet, Rfl: 5    fluticasone (FLONASE) 50 mcg/act nasal spray, 2 sprays into each nostril daily For allergies (Patient taking differently: 2 sprays into each nostril as needed For allergies), Disp: 16 g, Rfl: 3    Folic Acid-Vit F9-WBK G79 (FOLBEE) 2 5-25-1 MG TABS, Take by mouth, Disp: , Rfl:     furosemide (LASIX) 20 mg tablet, Take 1 tablet (20 mg total) by mouth every evening, Disp: 30 tablet, Rfl: 0    furosemide (LASIX) 40 mg tablet, Take 1 tablet (40 mg total) by mouth daily, Disp: 30 tablet, Rfl: 11    gabapentin (NEURONTIN) 100 mg capsule, Take 1 capsule (100 mg total) by mouth 3 (three) times a day Dx: Neuropathy, Disp: 90 capsule, Rfl: 11    glipiZIDE (GLUCOTROL XL) 5 mg 24 hr tablet, Take 1 tablet (5 mg total) by mouth daily, Disp: 30 tablet, Rfl: 11    glucose blood (TRUE METRIX BLOOD GLUCOSE TEST) test strip, Patient to test once daily DX code E11 8, Disp: 100 each, Rfl: 5    hydrOXYzine HCL (ATARAX) 10 mg tablet, Take 1 tablet (10 mg total) by mouth 2 (two) times a day as needed for itching, Disp: 30 tablet, Rfl: 0    levothyroxine 100 mcg tablet, 1 TAB ORALLY EVERY MORNING DX: HYPOTHYROIDISM, Disp: 28 tablet, Rfl: 5    Lidocaine-Menthol (ICY HOT LIDOCAINE PLUS MENTHOL EX), Apply topically, Disp: , Rfl:     lisinopril (ZESTRIL) 2 5 mg tablet, Take 1 tablet (2 5 mg total) by mouth daily, Disp: 90 tablet, Rfl: 3    loratadine (CLARITIN) 10 mg tablet, Take 1 tablet (10 mg total) by mouth daily For allergies, Disp: 30 tablet, Rfl: 5    metoprolol tartrate (LOPRESSOR) 50 mg tablet, 1 TAB ORALLY TWICE DAILY DX: HYPERTENSION, Disp: 56 tablet, Rfl: 5    Misc   Devices (BED WEDGE) MISC, by Does not apply route daily, Disp: 2 each, Rfl: 0    nystatin-triamcinolone (MYCOLOG-II) cream, Apply topically 2 (two) times a day, Disp: 30 g, Rfl: 0    potassium chloride (K-DUR,KLOR-CON) 20 mEq tablet, Take 2 tablets (40 mEq total) by mouth daily, Disp: , Rfl: 0    warfarin (COUMADIN) 2 5 mg tablet, , Disp: , Rfl:     warfarin (COUMADIN) 5 mg tablet, Take 1 tablet (5 mg total) by mouth daily, Disp: 30 tablet, Rfl: 11    Past Medical History:   Diagnosis Date    [facility] 7/26/2018    Allergic contact dermatitis due to plants, except food     Atrial fibrillation (HCC)     Cancer (HCC)     skin cancer    Cataract     Chronic kidney disease     Stage 3    Coagulation test abnormality     Diabetes mellitus [facility] )     Disease of thyroid gland     hypothyroidism    DVT (deep venous thrombosis) (HCC)     Eczema     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HCC)     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HCC)     GERD (gastroesophageal reflux disease)     Gout     Hyperlipidemia     Hypertension     Hypokalemia     Leukocytosis     Lichen planus     Nonmelanoma skin cancer     Phlebitis or thrombophlebitis of deep vessel of lower extremity (HCC)     Proteinuria        Family History   Problem Relation Age of Onset    Alcohol abuse Mother     Heart attack Father     Dementia Brother     Other Son         AUTO ACCIDENT       Past Surgical History:   Procedure Laterality Date    CATARACT EXTRACTION      CHOLECYSTECTOMY      WI ESOPHAGOGASTRODUODENOSCOPY TRANSORAL DIAGNOSTIC N/A 6/14/2017    Procedure: EGD AND COLONOSCOPY;  Surgeon: Radha Livingston MD;  Location: [facility]; Service: Gastroenterology    TONSILLECTOMY         Social History     Socioeconomic History    Marital status:       Spouse name: Not on file    Number of children: Not on file    Years of education: Not on file    Highest education level: Not on file   Occupational History    Occupation: retired   Social Needs    Financial resource strain: Not on file    Food insecurity:     Worry: Not on file     Inability: Not on file   [facility] needs:     Medical: Not on file     Non-medical: Not on file   Tobacco Use    Smoking status: Never Smoker    Smokeless tobacco: Never Used   Substance and Sexual Activity    Alcohol use: Never     Frequency: Never     Binge frequency: Never    Drug use: No    Sexual activity: Not Currently   Lifestyle    Physical activity:     Days per week: 7 days     Minutes per session: 150+ min    Stress: Not at all   Relationships    Social connections:     Talks on phone: Not on file     Gets together: Not on file     Attends [employer] service: Not on file     Active member of club or organization: Not on file     Attends meetings of clubs or organizations: Not on file     Relationship status: Not on file    Intimate partner violence:     Fear of current or ex partner: Not on file     Emotionally abused: Not on file     Physically abused: Not on file     Forced sexual activity: Not on file   Other Topics Concern    Not on file Social History Narrative    Sedentary lifestyle       Review of Systems   Constitution: Negative for diaphoresis, weight gain and weight loss  HENT: Negative for congestion  Cardiovascular: Positive for chest pain  Negative for dyspnea on exertion, irregular heartbeat, leg swelling, near-syncope, orthopnea, palpitations, paroxysmal nocturnal dyspnea and syncope  Respiratory: Negative for shortness of breath, sleep disturbances due to breathing and snoring  Hematologic/Lymphatic: Does not bruise/bleed easily  Skin: Negative for rash  Musculoskeletal: Negative for myalgias  Gastrointestinal: Negative for nausea and vomiting  Neurological: Negative for excessive daytime sleepiness and light-headedness  Psychiatric/Behavioral: The patient is not nervous/anxious  Vitals: /66   Pulse 90   Resp 18   Ht 5' 11" (1 803 m)   Wt 79 4 kg (175 lb)   SpO2 98%   BMI 24 41 kg/m²       Physical Exam:     GEN: Alert and oriented x 3, in no acute distress  Well appearing and well nourished  HEENT: Sclera anicteric, conjunctivae pink, mucous membranes moist  Oropharynx clear  NECK: Supple, no carotid bruits, no significant JVD  Trachea midline, no thyromegaly  HEART: irregularly irregular rhythm, normal S1 and S2, no murmurs, clicks, gallops or rubs  PMI nondisplaced, no thrills  LUNGS: Clear to auscultation bilaterally; no wheezes, rales, or rhonchi  No increased work of breathing or signs of respiratory distress  ABDOMEN: Soft, nontender, nondistended, normoactive bowel sounds  EXTREMITIES: Skin warm and well perfused, no clubbing, cyanosis, or edema  NEURO: No focal findings  Mild cognitive impairment  Mood and affect normal    SKIN: Normal without suspicious lesions on exposed skin        Lab Results:       Lab Results   Component Value Date    HGBA1C 6 0 08/22/2019    HGBA1C 6 7 (H) 04/02/2019    HGBA1C 6 0 01/30/2019     Lab Results   Component Value Date    CHOL 108 11/27/2015 CHOL 166 07/24/2015    CHOL 129 01/28/2015     Lab Results   Component Value Date    HDL 50 01/30/2019    HDL 43 07/20/2018    HDL 46 10/28/2017     Lab Results   Component Value Date    LDLCALC 117 (H) 01/30/2019    LDLCALC 70 07/20/2018    LDLCALC 53 10/28/2017     Lab Results   Component Value Date    TRIG 128 01/30/2019    TRIG 97 07/20/2018    TRIG 98 10/28/2017     No results found for: CHOLHDL

## 2020-01-10 ENCOUNTER — TELEPHONE (OUTPATIENT)
Dept: INTERNAL MEDICINE CLINIC | Facility: CLINIC | Age: 85
End: 2020-01-10

## 2020-01-10 ENCOUNTER — TELEPHONE (OUTPATIENT)
Dept: CARDIOLOGY CLINIC | Facility: CLINIC | Age: 85
End: 2020-01-10

## 2020-01-10 DIAGNOSIS — R23.8 SKIN BREAKDOWN: Primary | ICD-10-CM

## 2020-01-10 DIAGNOSIS — R53.1 WEAKNESS: Primary | ICD-10-CM

## 2020-01-10 RX ORDER — ANORECTAL OINTMENT 15.7; .44; 24; 20.6 G/100G; G/100G; G/100G; G/100G
OINTMENT TOPICAL 2 TIMES DAILY PRN
Qty: 71 G | Refills: 3 | Status: SHIPPED | OUTPATIENT
Start: 2020-01-10 | End: 2020-09-29

## 2020-01-10 NOTE — TELEPHONE ENCOUNTER
S/w patient's daughter and informed her on information about patient's visit  Offered office note to daughter and she would like that mailed to:     [place] [place] [address], [address] [place] [address]      FYI  Daughter stated that patient gets INR checked  Labs in chart dated on 1/7

## 2020-01-10 NOTE — TELEPHONE ENCOUNTER
S/w Ian Grace from [facility], she stated that order will need to be faxed  Fax# [phone]  Also will fax order for Nuc Stress order with scheduled date of Jan 30th on order  Faxed and confirmation received

## 2020-01-10 NOTE — TELEPHONE ENCOUNTER
----- Message from Mague Stapleton MD sent at 1/9/2020  1:05 PM EST -----  Hey I ordered a BMP for him  Can you please call his facility and make sure they draw it or take him to the lab   Thanks

## 2020-01-10 NOTE — TELEPHONE ENCOUNTER
Called [facility]  N/a  Faxed anticoag visit to [facility] with instructions   See anticoag visit

## 2020-01-10 NOTE — TELEPHONE ENCOUNTER
Layla Fields called back with a fax #  [phone]    I already gave it to Loretta Hopping she will be faxing it over

## 2020-01-10 NOTE — TELEPHONE ENCOUNTER
Vaishnavi Littlejohn called from [facility] regarding this patient  She is requesting for Dr Johnnie Marie to order an "calmoseptine Ointment" for him and a gel cushion to help the pressure while the patient is sitting on his chair      Vaishnavi Littlejohn Phone number -[phone]

## 2020-01-12 LAB — BACTERIA BLD CULT: NORMAL

## 2020-01-14 ENCOUNTER — OFFICE VISIT (OUTPATIENT)
Dept: INTERNAL MEDICINE CLINIC | Facility: CLINIC | Age: 85
End: 2020-01-14
Payer: MEDICARE

## 2020-01-14 ENCOUNTER — TELEPHONE (OUTPATIENT)
Dept: INTERNAL MEDICINE CLINIC | Facility: CLINIC | Age: 85
End: 2020-01-14

## 2020-01-14 VITALS
SYSTOLIC BLOOD PRESSURE: 120 MMHG | RESPIRATION RATE: 14 BRPM | WEIGHT: 177 LBS | DIASTOLIC BLOOD PRESSURE: 72 MMHG | HEIGHT: 71 IN | HEART RATE: 80 BPM | BODY MASS INDEX: 24.78 KG/M2

## 2020-01-14 DIAGNOSIS — I50.42 CHRONIC COMBINED SYSTOLIC (CONGESTIVE) AND DIASTOLIC (CONGESTIVE) HEART FAILURE (HCC): Primary | Chronic | ICD-10-CM

## 2020-01-14 DIAGNOSIS — E11.42 TYPE 2 DIABETES MELLITUS WITH DIABETIC POLYNEUROPATHY, WITHOUT LONG-TERM CURRENT USE OF INSULIN (HCC): Chronic | ICD-10-CM

## 2020-01-14 DIAGNOSIS — Z86.711 HISTORY OF PULMONARY EMBOLISM: Chronic | ICD-10-CM

## 2020-01-14 DIAGNOSIS — N17.9 ACUTE RENAL FAILURE SUPERIMPOSED ON STAGE 3 CHRONIC KIDNEY DISEASE, UNSPECIFIED ACUTE RENAL FAILURE TYPE: ICD-10-CM

## 2020-01-14 DIAGNOSIS — N18.3 ACUTE RENAL FAILURE SUPERIMPOSED ON STAGE 3 CHRONIC KIDNEY DISEASE, UNSPECIFIED ACUTE RENAL FAILURE TYPE: ICD-10-CM

## 2020-01-14 DIAGNOSIS — I48.20 CHRONIC A-FIB (HCC): Chronic | ICD-10-CM

## 2020-01-14 DIAGNOSIS — I10 ESSENTIAL HYPERTENSION: Chronic | ICD-10-CM

## 2020-01-14 DIAGNOSIS — E03.9 ADULT HYPOTHYROIDISM: Chronic | ICD-10-CM

## 2020-01-14 LAB
EXT MICROALBUMIN URINE RANDOM: 174
HBA1C MFR BLD HPLC: 6.3 %
LEFT EYE DIABETIC RETINOPATHY: NORMAL
LEFT EYE IMAGE QUALITY: NORMAL
LEFT EYE MACULAR EDEMA: NORMAL
LEFT EYE OTHER RETINOPATHY: NORMAL
RIGHT EYE DIABETIC RETINOPATHY: NORMAL
RIGHT EYE IMAGE QUALITY: NORMAL
RIGHT EYE MACULAR EDEMA: NORMAL
RIGHT EYE OTHER RETINOPATHY: NORMAL
SEVERITY (EYE EXAM): NORMAL

## 2020-01-14 PROCEDURE — 92250 FUNDUS PHOTOGRAPHY W/I&R: CPT | Performed by: INTERNAL MEDICINE

## 2020-01-14 PROCEDURE — 99495 TRANSJ CARE MGMT MOD F2F 14D: CPT | Performed by: INTERNAL MEDICINE

## 2020-01-14 RX ORDER — COLCHICINE 0.6 MG/1
0.6 TABLET ORAL DAILY
COMMUNITY
End: 2020-02-18 | Stop reason: CLARIF

## 2020-01-14 NOTE — PROGRESS NOTES
INTERNAL MEDICINE TRANSITION OF CARE OFFICE VISIT  [facility] - [facility]    NAME: Darrell Mcbride  AGE: 80 y o  SEX: male  : 1932     DATE: 2020     Assessment and Plan:     1  Chronic combined systolic (congestive) and diastolic (congestive) heart failure (HCC)    Wt Readings from Last 3 Encounters:   20 80 3 kg (177 lb)   20 79 4 kg (175 lb)   20 76 4 kg (168 lb 6 9 oz)     Weight has gone up since discharge  Recommend lasix 40mg BID for the next 3 days  Monitor daily weights  Avoid excess salt and fluid intake  Get stress test per cardiology  2  Type 2 diabetes mellitus with diabetic polyneuropathy, without long-term current use of insulin ([facility] [address] )    Most recent A1c was 6 0 on 2019  Diabetes control remains excellent  - IRIS Diabetic eye exam    3  Essential hypertension    Patient's blood pressure is well controlled  Continue current anti-hypertensives  4  Chronic a-fib    [id number] score is 5 which corresponds to 6 7% annual risk of stroke  Continue coumadin as prescribed  5  History of pulmonary embolism    No evidence of PE on recent CT chest     6  Acute renal failure superimposed on stage 3 chronic kidney disease, unspecified acute renal failure type [facility])    Lab Results   Component Value Date    CREATININE 1 57 (H) 2020    CREATININE 1 31 (H) 2020    CREATININE 1 36 (H) 2020    CREATININE 1 35 (H) 2020    CREATININE 1 26 2020    CREATININE 1 29 2015    CREATININE 1 44 (H) 2015    CREATININE 1 3 2015    CREATININE 1 3 2015    CREATININE 1 3 2014     He will get repeat BMP done that was ordered by cardiology  Will consider setting him up with nephrology  7  Adult hypothyroidism    Continue levothyroxine as prescribed  Will monitor thyroid function         Transitional Care Management Review:     Darrell Mcbride is a 80 y o  male here for TCM follow-up    During the TCM phone call patient stated:    TCM Call (since 12/14/2019)     Date and time call was made  1/7/2020  1:53 PM    Hospital care reviewed  Records reviewed    Patient was hospitialized at  [facility]    Date of Admission  01/01/20    Date of discharge  01/06/20    Diagnosis  CHF    Disposition  Home <img src='C:FILES (X86)    Were the patients medications reviewed and updated  No <img src='C:FILES (X86)    Current Symptoms  None      TCM Call (since 12/14/2019)     Scheduled for follow up? Yes <img src='C:FILES (U38)    Patients specialists  Cardiologist; Neurologist    Cardiologist name  Clint Espinoza MD, Peter Goodwin MD    Cardiologist contact #  [phone]    Neurologist name  MINO Payne MD    Neurologist contact #  [phone]    Did you obtain your prescribed medications  Yes <img src='C:FILES (X86)    Do you need help managing your prescriptions or medications  Yes    Why type of assitance do you need  patient lives at [facility]    Is transportation to your appointment needed  Yes    I have advised the patient to call PCP with any new or worsening symptoms  Yumiko Hernandez    Are you recieving any outpatient services  No           HPI:     Patient presents for hospital follow-up  He was recently admitted to [facility] from 1/1-1/6/2020  He had been experiencing increased shortness of breath, lower extremity edema, and fatigue  His BNP was 4532 which was consistent with acute on chronic systolic congestive heart failure  His I & O's were routinely monitored and he was started on IV lasix  He was transitioned to oral furosemide with dose change - 40mg in AM and 20mg in the evening  His creatinine did elevate with diuretics above his baseline  He was discharged back to [facility] where he lives  Since discharge, he followed up with cardiology and admitted to external chest pain   Dr Hoffmann Friday ordered a nuclear stress test     He is on coumadin for history of PE  Bilateral PEs in past and failed xarelto then was switched to coumadin  Weights have been increasing: Wt Readings from Last 3 Encounters:   01/14/20 80 3 kg (177 lb)   01/09/20 79 4 kg (175 lb)   01/06/20 76 4 kg (168 lb 6 9 oz)     He still has some pitting edema of his lower extremities with chronic venous stasis changes  Has some redness of the skin back in the gluteal folds  No ulceration yet  Started applying calmoseptine and got a gel cushion  The following portions of the patient's history were reviewed and updated as appropriate: allergies, current medications, past family history, past medical history, past social history, past surgical history and problem list      Review of Systems:     Review of Systems   Constitutional: Negative for activity change, appetite change and fatigue  Respiratory: Positive for shortness of breath (exertional)  Negative for apnea, cough, chest tightness and wheezing  Cardiovascular: Positive for leg swelling  Negative for chest pain and palpitations  Gastrointestinal: Negative for abdominal distention, abdominal pain, blood in stool, constipation, diarrhea, nausea and vomiting  Musculoskeletal: Positive for arthralgias and gait problem  Negative for back pain, joint swelling and myalgias  Skin: Positive for color change and rash  Negative for wound  Neurological: Negative for dizziness, weakness, light-headedness, numbness and headaches  Psychiatric/Behavioral: Negative for behavioral problems, confusion, hallucinations, sleep disturbance and suicidal ideas  The patient is not nervous/anxious         Problem List:     Patient Active Problem List   Diagnosis    Adult hypothyroidism    Chronic a-fib    Essential hypertension    Type 2 diabetes mellitus with diabetic polyneuropathy, without long-term current use of insulin (HCC)    Hyperlipidemia    Skin abnormalities    Gout    GERD (gastroesophageal reflux disease)    Acute on chronic systolic (congestive) heart failure (HCC)    Chronic kidney disease    Pulmonary embolism (HCC)-history of      Objective:     /72 (BP Location: Left arm, Patient Position: Sitting)   Pulse 80   Resp 14   Ht 5' 11" (1 803 m)   Wt 80 3 kg (177 lb)   BMI 24 69 kg/m²     Physical Exam   Constitutional: He is oriented to person, place, and time  He appears well-developed and well-nourished  No distress  Eyes: Conjunctivae are normal  Right eye exhibits no discharge  Left eye exhibits no discharge  No scleral icterus  Neck: Neck supple  No JVD present  No thyromegaly present  Cardiovascular: Normal rate, regular rhythm, normal heart sounds and intact distal pulses  Exam reveals no gallop and no friction rub  Pulses are weak pulses  No murmur heard  Pulses:       Dorsalis pedis pulses are 0 on the right side, and 0 on the left side  Posterior tibial pulses are 0 on the right side, and 0 on the left side  Pulmonary/Chest: Effort normal and breath sounds normal  No respiratory distress  He has no wheezes  He has no rales  He exhibits no tenderness  Abdominal: Soft  Bowel sounds are normal  He exhibits no distension  There is no tenderness  Musculoskeletal: Normal range of motion  He exhibits edema (2+ pitting edema (right > left); chronic venous insufficiency)  Feet:   Right Foot:   Skin Integrity: Positive for dry skin  Negative for ulcer, skin breakdown, erythema, warmth or callus  Left Foot:   Skin Integrity: Positive for dry skin  Negative for ulcer, skin breakdown, erythema, warmth or callus  Lymphadenopathy:     He has no cervical adenopathy  Neurological: He is alert and oriented to person, place, and time  Skin: Skin is warm and dry  He is not diaphoretic  Psychiatric: He has a normal mood and affect  His behavior is normal    Vitals reviewed  Diabetic Foot Exam  Patient's shoes and socks removed  Right Foot/Ankle   Right Foot Inspection  Skin Exam: skin normal, skin intact, dry skin and abnormal color no warmth, no callus, no erythema, no maceration, no pre-ulcer, no ulcer and no callus                          Toe Exam: ROM and strength within normal limits and swelling  Sensory     Proprioception: diminished and intact   Monofilament testing: diminished  Vascular  Capillary refills: < 3 seconds  The right DP pulse is 0  The right PT pulse is 0  Left Foot/Ankle  Left Foot Inspection  Skin Exam: skin normal, skin intact, dry skin and abnormal colorno warmth, no erythema, no maceration, no pre-ulcer, no ulcer and no callus                         Toe Exam: ROM and strength within normal limits and swelling                   Sensory     Proprioception: diminished  Monofilament: diminished  Vascular  Capillary refills: < 3 seconds  The left DP pulse is 0  The left PT pulse is 0  Assign Risk Category:  No deformity present;  No loss of protective sensation; Weak pulses       Risk: 0     Laboratory Results: I have personally reviewed the pertinent laboratory results/reports     Component      Latest Ref Rng & Units 1/4/2020 1/5/2020 1/6/2020 1/7/2020                Sodium      136 - 145 mmol/L 141 141 141 140   Potassium      3 5 - 5 3 mmol/L 4 3 4 4 4 5 5 1   Chloride      100 - 108 mmol/L 101 103 103 103   CO2      21 - 32 mmol/L 33 (H) 33 (H) 30 28   Anion Gap      4 - 13 mmol/L 7 5 8 9   BUN      5 - 25 mg/dL 26 (H) 29 (H) 28 (H) 37 (H)   Creatinine      0 60 - 1 30 mg/dL 1 35 (H) 1 36 (H) 1 31 (H) 1 57 (H)   Glucose, Random      65 - 140 mg/dL 125 131 117 77   Calcium      8 3 - 10 1 mg/dL 8 9 9 2 9 2 8 7   AST      5 - 45 U/L    59 (H)   ALT      12 - 78 U/L    57   Alkaline Phosphatase      46 - 116 U/L    136 (H)   Total Protein      6 4 - 8 2 g/dL    7 6   Albumin      3 5 - 5 0 g/dL    2 6 (L)   TOTAL BILIRUBIN      0 20 - 1 00 mg/dL    1 00   eGFR      ml/min/1 73sq m 47 46 49 39     Component      Latest Ref Rng & Units 7/20/2018 1/30/2019 4/2/2019 8/22/2019 Hemoglobin A1C       6 0 6 0 6 7 (H) 6 0     Radiology/Other Diagnostic Testing Results: I have personally reviewed pertinent reports  Xr Chest 2 Views    Result Date: 1/7/2020    Improved bilateral effusions, greater on the right, and improved bibasilar atelectasis  Underlying pneumonia on the right cannot be excluded  Workstation performed: [id number]     Ct Chest Without Contrast    Result Date: 1/7/2020    Moderate right and small left pleural effusions  Mild cardiomegaly  Workstation performed: [id number]     Echo complete with contrast if indicated    Result: 1/2/2020    LEFT VENTRICLE:  Systolic function was at the lower limits of normal  LVEF 45-50%  Although no diagnostic regional wall motion abnormality was identified, this possibility cannot be completely excluded on the basis of this study  Wall thickness was at the upper limits of normal   Features were consistent with a pseudonormal left ventricular filling pattern, with concomitant abnormal relaxation and increased filling pressure (grade 2 diastolic dysfunction)      RIGHT VENTRICLE:  Systolic function was mildly reduced      LEFT ATRIUM:  The atrium was moderately to markedly dilated      RIGHT ATRIUM:  The atrium was moderately dilated      MITRAL VALVE:  There was mild annular calcification  There was moderate regurgitation      TRICUSPID VALVE:  There was moderate regurgitation  Pulmonary artery systolic pressure was mildly to moderately increased    Estimated PA systolic pressure >28HOUB     PULMONIC VALVE:  There was trace regurgitation      PERICARDIUM:  Moderate pleural effusion noted     Current Medications:     Outpatient Medications Prior to Visit   Medication Sig Dispense Refill    acetaminophen (TYLENOL) 325 mg tablet Take 2 tablets (650 mg total) by mouth every 8 (eight) hours (Patient taking differently: Take 650 mg by mouth every 6 (six) hours as needed ) 30 tablet 0    atorvastatin (LIPITOR) 10 mg tablet 1 TAB ORALLY AT BEDTIME DX:HYPERLIPIDEMIA 28 tablet 5    CALMOSEPTINE 0 44-20 6 % OINT Apply topically 2 (two) times a day as needed (to buttock/sacrum) 71 g 3    colchicine (COLCRYS) 0 6 mg tablet Take 0 6 mg by mouth daily      donepezil (ARICEPT) 5 mg tablet 1 TAB ORALLY AT BEDTIME DX: DEMENTIA 28 tablet 5    fluticasone (FLONASE) 50 mcg/act nasal spray 2 sprays into each nostril daily For allergies (Patient taking differently: 2 sprays into each nostril as needed For allergies) 16 g 3    Folic Acid-Vit X1-SFT I95 (FOLBEE) 2 5-25-1 MG TABS Take by mouth      furosemide (LASIX) 20 mg tablet Take 1 tablet (20 mg total) by mouth every evening 30 tablet 0    furosemide (LASIX) 40 mg tablet Take 1 tablet (40 mg total) by mouth daily 30 tablet 11    gabapentin (NEURONTIN) 100 mg capsule Take 1 capsule (100 mg total) by mouth 3 (three) times a day Dx: Neuropathy 90 capsule 11    glipiZIDE (GLUCOTROL XL) 5 mg 24 hr tablet Take 1 tablet (5 mg total) by mouth daily 30 tablet 11    glucose blood (TRUE METRIX BLOOD GLUCOSE TEST) test strip Patient to test once daily DX code E11 8 100 each 5    hydrOXYzine HCL (ATARAX) 10 mg tablet Take 1 tablet (10 mg total) by mouth 2 (two) times a day as needed for itching 30 tablet 0    levothyroxine 100 mcg tablet 1 TAB ORALLY EVERY MORNING DX: HYPOTHYROIDISM 28 tablet 5    Lidocaine-Menthol (ICY HOT LIDOCAINE PLUS MENTHOL EX) Apply topically      lisinopril (ZESTRIL) 2 5 mg tablet Take 1 tablet (2 5 mg total) by mouth daily 90 tablet 3    loratadine (CLARITIN) 10 mg tablet Take 1 tablet (10 mg total) by mouth daily For allergies 30 tablet 5    metoprolol tartrate (LOPRESSOR) 50 mg tablet 1 TAB ORALLY TWICE DAILY DX: HYPERTENSION 56 tablet 5    Misc   Devices (BED WEDGE) MISC by Does not apply route daily 2 each 0    nystatin-triamcinolone (MYCOLOG-II) cream Apply topically 2 (two) times a day 30 g 0    potassium chloride (K-DUR,KLOR-CON) 20 mEq tablet Take 2 tablets (40 mEq total) by mouth daily  0    warfarin (COUMADIN) 2 5 mg tablet       warfarin (COUMADIN) 5 mg tablet Take 1 tablet (5 mg total) by mouth daily 30 tablet 11     No facility-administered medications prior to visit          Juan Amanda DO  [facility]

## 2020-01-14 NOTE — TELEPHONE ENCOUNTER
----- Message from Rick Hendricks DO sent at 1/14/2020 10:35 AM EST -----  Please let patient know there was no evidence of diabetic retinopathy on his eye exam from our office

## 2020-01-14 NOTE — PATIENT INSTRUCTIONS
Heart Failure   AMBULATORY CARE:   Heart failure (HF) is a condition that does not allow your heart to fill or pump properly  Not enough oxygen in your blood gets to your organs and tissues  HF can occur in the right side, the left side, or both lower chambers of your heart  HF is often caused by damage or injury to your heart  The damage may be caused by heart attack, other heart conditions, or high blood pressure  HF is a long-term condition that tends to get worse over time  It is important to manage your health to improve your quality of life  HF can be worsened by heavy alcohol use, smoking, diabetes that is not controlled, or obesity  Common signs and symptoms:   · Difficulty breathing with activity that worsens to difficulty breathing at rest    · Shortness of breath while lying flat    · Severe shortness of breath and coughing at night that usually wakes you     · Chest pain at night    · Periods of no breathing, then breathing fast    · Fatigue or lack of energy (often worsened by physical activity)     · Swelling in your ankles, legs, or abdomen    · Fast heartbeat, purple color around your mouth and nailbeds    · Fingers and toes cool to the touch  Call 911 if:   · You have any of the following signs of a heart attack:      ¨ Squeezing, pressure, or pain in your chest that lasts longer than 5 minutes or returns    ¨ Discomfort or pain in your back, neck, jaw, stomach, or arm     ¨ Trouble breathing    ¨ Nausea or vomiting    ¨ Lightheadedness or a sudden cold sweat, especially with chest pain or trouble breathing    Seek care immediately if:   · You gain 3 or more pounds (1 4 kg) in a day, or more than your healthcare provider says you should  · Your heartbeat is fast, slow, or uneven all the time    Contact your healthcare provider if:   · You have symptoms of worsening HF:      ¨ Shortness of breath at rest, at night, or that is getting worse in any way     ¨ Weight gain of 5 or more pounds (2 2 kg) in a week     ¨ More swelling in your legs or ankles     ¨ Abdominal pain or swelling     ¨ More coughing     ¨ Loss of appetite     ¨ Feeling tired all the time    · You feel hopeless or depressed, or you have lost interest in things you used to enjoy  · You often feel worried or afraid  · You have questions or concerns about your condition or care  Treatment for HF  may include any of the following:  · Medicines  may be needed to help regulate your heart rhythm  You may also need medicines to lower your blood pressure, and to get rid of extra fluids  · Oxygen  may help you breathe easier if your oxygen level is lower than normal  A CPAP machine may be used to keep your airway open while you sleep  · Surgery  can be done to implant a pacemaker in your chest to regulate your heart rhythm  Other types of surgery can open blocked heart vessels, replace a damaged heart valve, or remove scar tissue  Manage or prevent HF:   · Do not smoke  Nicotine and other chemicals in cigarettes and cigars can cause lung damage and make HF difficult to manage  Ask your healthcare provider for information if you currently smoke and need help to quit  E-cigarettes or smokeless tobacco still contain nicotine  Talk to your healthcare provider before you use these products  · Do not drink alcohol or take illegal drugs  Alcohol and drugs can worsen your symptoms quickly  · Weigh yourself every morning  Use the same scale, in the same spot  Do this after you use the bathroom, but before you eat or drink anything  Wear the same type of clothing  Do not wear shoes  Record your weight each day so you will notice any sudden weight gain  Swelling and weight gain are signs of fluid retention  If you are overweight, ask how to lose weight safely  · Check your blood pressure and heart rate every day  Ask for more information about how to measure your blood pressure and heart rate correctly   Ask what these numbers should be for you  · Manage any chronic health conditions you have  These include high blood pressure, diabetes, obesity, high cholesterol, metabolic syndrome, and COPD  You will have fewer symptoms if you manage these health conditions  Follow your healthcare provider's recommendations and follow up with him or her regularly  · Eat heart-healthy foods and limit sodium (salt)  An easy way to do this is to eat more fresh fruits and vegetables and fewer canned and processed foods  Replace butter and margarine with heart-healthy oils such as olive oil and canola oil  Other heart-healthy foods include walnuts, whole-grain breads, low-fat dairy products, beans, and lean meats  Fatty fish such as salmon and tuna are also heart healthy  Ask how much salt you can eat each day  Do not use salt substitutes  · Drink liquids as directed  You may need to limit the amount of liquids you drink if you retain fluid  Ask how much liquid to drink each day and which liquids are best for you  · Stay active  If you are not active, your symptoms are likely to worsen quickly  Walking, bicycling, and other types of physical activity help maintain your strength and improve your mood  Physical activity also helps you manage your weight  Work with your healthcare provider to create an exercise plan that is right for you  · Get vaccines as directed  Get a flu shot every year  You may also need the pneumonia vaccine  The flu and pneumonia can be severe for a person who has HF  Vaccines protect you from these infections  Join a support group:  Living with HF can be difficult  It may be helpful to talk with others who have HF  You may learn how to better manage your condition or get emotional support  For more information:   · [address]  [place] , [place]   Phone: 4- 244 - [phone]  Web Address: https://[employer]/  org   Follow up with your healthcare provider or cardiologist within 2 weeks or as directed: You may need to return for other tests  You may need home health care  A healthcare provider will monitor your vital signs, weight, and make sure your medicines are working  Write down your questions so you remember to ask them during your visits  © 2017 [employer] Information is for End User's use only and may not be sold, redistributed or otherwise used for commercial purposes  All illustrations and images included in CareNotes® are the copyrighted property of [employer] , [employer]  or Branden Hernandez  The above information is an  only  It is not intended as medical advice for individual conditions or treatments  Talk to your doctor, nurse or pharmacist before following any medical regimen to see if it is safe and effective for you

## 2020-01-15 ENCOUNTER — ANTICOAG VISIT (OUTPATIENT)
Dept: CARDIOLOGY CLINIC | Facility: CLINIC | Age: 85
End: 2020-01-15

## 2020-01-15 LAB — INR PPP: 3.8 (ref 0.84–1.19)

## 2020-01-15 NOTE — PROGRESS NOTES
Pt to hold today then take 7 5mg Sun and Thurs, 5 mg the rest of the days and retest again in 1 week

## 2020-01-16 ENCOUNTER — TELEPHONE (OUTPATIENT)
Dept: INTERNAL MEDICINE CLINIC | Facility: CLINIC | Age: 85
End: 2020-01-16

## 2020-01-16 NOTE — TELEPHONE ENCOUNTER
----- Message from Juliette Delgado DO sent at 1/16/2020  8:14 AM EST -----  Call patient and let him know his lab work is stable  A1C was 6 3% and cholesterol is controlled 
Informed patient
no

## 2020-01-17 ENCOUNTER — TELEPHONE (OUTPATIENT)
Dept: INTERNAL MEDICINE CLINIC | Facility: CLINIC | Age: 85
End: 2020-01-17

## 2020-01-17 NOTE — TELEPHONE ENCOUNTER
Charisse Lomax called from [facility] [place]  They monitor his weight daily  He had a 2 pound weight gain in 2 days and today was his extra dose of lasix  There are orders in to call the office to notify our office  When the weight increases this rapidly       #[phone]

## 2020-01-19 ENCOUNTER — APPOINTMENT (EMERGENCY)
Dept: ULTRASOUND IMAGING | Facility: HOSPITAL | Age: 85
DRG: 603 | End: 2020-01-19
Payer: MEDICARE

## 2020-01-19 ENCOUNTER — HOSPITAL ENCOUNTER (INPATIENT)
Facility: HOSPITAL | Age: 85
LOS: 2 days | Discharge: HOME | DRG: 603 | End: 2020-01-22
Attending: EMERGENCY MEDICINE | Admitting: INTERNAL MEDICINE
Payer: MEDICARE

## 2020-01-19 DIAGNOSIS — L03.90 CELLULITIS: ICD-10-CM

## 2020-01-19 DIAGNOSIS — L98.9 SKIN ABNORMALITIES: ICD-10-CM

## 2020-01-19 DIAGNOSIS — I50.42 CHRONIC COMBINED SYSTOLIC (CONGESTIVE) AND DIASTOLIC (CONGESTIVE) HEART FAILURE (HCC): Chronic | ICD-10-CM

## 2020-01-19 DIAGNOSIS — E11.42 TYPE 2 DIABETES MELLITUS WITH DIABETIC POLYNEUROPATHY, WITHOUT LONG-TERM CURRENT USE OF INSULIN (HCC): Chronic | ICD-10-CM

## 2020-01-19 DIAGNOSIS — L03.115 CELLULITIS OF RIGHT LEG: Primary | ICD-10-CM

## 2020-01-19 LAB
ALBUMIN SERPL BCP-MCNC: 2.5 G/DL (ref 3.5–5)
ALP SERPL-CCNC: 137 U/L (ref 46–116)
ALT SERPL W P-5'-P-CCNC: 57 U/L (ref 12–78)
ANION GAP SERPL CALCULATED.3IONS-SCNC: 7 MMOL/L (ref 4–13)
APTT PPP: 52 SECONDS (ref 23–37)
AST SERPL W P-5'-P-CCNC: 43 U/L (ref 5–45)
BASOPHILS # BLD AUTO: 0.05 THOUSANDS/ΜL (ref 0–0.1)
BASOPHILS NFR BLD AUTO: 1 % (ref 0–1)
BILIRUB SERPL-MCNC: 0.5 MG/DL (ref 0.2–1)
BUN SERPL-MCNC: 23 MG/DL (ref 5–25)
CALCIUM SERPL-MCNC: 7.8 MG/DL (ref 8.3–10.1)
CHLORIDE SERPL-SCNC: 105 MMOL/L (ref 100–108)
CO2 SERPL-SCNC: 29 MMOL/L (ref 21–32)
CREAT SERPL-MCNC: 1.47 MG/DL (ref 0.6–1.3)
EOSINOPHIL # BLD AUTO: 0.32 THOUSAND/ΜL (ref 0–0.61)
EOSINOPHIL NFR BLD AUTO: 3 % (ref 0–6)
ERYTHROCYTE [DISTWIDTH] IN BLOOD BY AUTOMATED COUNT: 15 % (ref 11.6–15.1)
GFR SERPL CREATININE-BSD FRML MDRD: 42 ML/MIN/1.73SQ M
GLUCOSE SERPL-MCNC: 87 MG/DL (ref 65–140)
HCT VFR BLD AUTO: 39.6 % (ref 36.5–49.3)
HGB BLD-MCNC: 12.2 G/DL (ref 12–17)
IMM GRANULOCYTES # BLD AUTO: 0.05 THOUSAND/UL (ref 0–0.2)
IMM GRANULOCYTES NFR BLD AUTO: 1 % (ref 0–2)
INR PPP: 2.8 (ref 0.84–1.19)
LACTATE SERPL-SCNC: 1.2 MMOL/L (ref 0.5–2)
LYMPHOCYTES # BLD AUTO: 1.32 THOUSANDS/ΜL (ref 0.6–4.47)
LYMPHOCYTES NFR BLD AUTO: 12 % (ref 14–44)
MCH RBC QN AUTO: 29 PG (ref 26.8–34.3)
MCHC RBC AUTO-ENTMCNC: 30.8 G/DL (ref 31.4–37.4)
MCV RBC AUTO: 94 FL (ref 82–98)
MONOCYTES # BLD AUTO: 0.83 THOUSAND/ΜL (ref 0.17–1.22)
MONOCYTES NFR BLD AUTO: 8 % (ref 4–12)
NEUTROPHILS # BLD AUTO: 8.21 THOUSANDS/ΜL (ref 1.85–7.62)
NEUTS SEG NFR BLD AUTO: 75 % (ref 43–75)
NRBC BLD AUTO-RTO: 0 /100 WBCS
PLATELET # BLD AUTO: 335 THOUSANDS/UL (ref 149–390)
PMV BLD AUTO: 9.1 FL (ref 8.9–12.7)
POTASSIUM SERPL-SCNC: 3.7 MMOL/L (ref 3.5–5.3)
PROT SERPL-MCNC: 7.6 G/DL (ref 6.4–8.2)
PROTHROMBIN TIME: 29.9 SECONDS (ref 11.6–14.5)
RBC # BLD AUTO: 4.21 MILLION/UL (ref 3.88–5.62)
SODIUM SERPL-SCNC: 141 MMOL/L (ref 136–145)
WBC # BLD AUTO: 10.78 THOUSAND/UL (ref 4.31–10.16)

## 2020-01-19 PROCEDURE — 83605 ASSAY OF LACTIC ACID: CPT | Performed by: EMERGENCY MEDICINE

## 2020-01-19 PROCEDURE — 87040 BLOOD CULTURE FOR BACTERIA: CPT | Performed by: EMERGENCY MEDICINE

## 2020-01-19 PROCEDURE — 84145 PROCALCITONIN (PCT): CPT | Performed by: EMERGENCY MEDICINE

## 2020-01-19 PROCEDURE — 80053 COMPREHEN METABOLIC PANEL: CPT | Performed by: EMERGENCY MEDICINE

## 2020-01-19 PROCEDURE — 93005 ELECTROCARDIOGRAM TRACING: CPT

## 2020-01-19 PROCEDURE — 96365 THER/PROPH/DIAG IV INF INIT: CPT

## 2020-01-19 PROCEDURE — 36415 COLL VENOUS BLD VENIPUNCTURE: CPT | Performed by: EMERGENCY MEDICINE

## 2020-01-19 PROCEDURE — 99285 EMERGENCY DEPT VISIT HI MDM: CPT

## 2020-01-19 PROCEDURE — 85610 PROTHROMBIN TIME: CPT | Performed by: EMERGENCY MEDICINE

## 2020-01-19 PROCEDURE — 93971 EXTREMITY STUDY: CPT

## 2020-01-19 PROCEDURE — 85730 THROMBOPLASTIN TIME PARTIAL: CPT | Performed by: EMERGENCY MEDICINE

## 2020-01-19 PROCEDURE — 85025 COMPLETE CBC W/AUTO DIFF WBC: CPT | Performed by: EMERGENCY MEDICINE

## 2020-01-19 RX ORDER — CEFAZOLIN SODIUM 1 G/50ML
1000 SOLUTION INTRAVENOUS ONCE
Status: COMPLETED | OUTPATIENT
Start: 2020-01-19 | End: 2020-01-19

## 2020-01-19 RX ADMIN — CEFAZOLIN SODIUM 1000 MG: 1 SOLUTION INTRAVENOUS at 23:17

## 2020-01-20 DIAGNOSIS — I50.9 ACUTE EXACERBATION OF CHF (CONGESTIVE HEART FAILURE) (HCC): ICD-10-CM

## 2020-01-20 PROBLEM — L03.90 CELLULITIS: Status: ACTIVE | Noted: 2020-01-20

## 2020-01-20 PROBLEM — Z79.01 CHRONIC ANTICOAGULATION: Status: ACTIVE | Noted: 2020-01-20

## 2020-01-20 PROBLEM — N18.30 STAGE 3 CHRONIC KIDNEY DISEASE (HCC): Status: ACTIVE | Noted: 2020-01-20

## 2020-01-20 LAB
ANION GAP SERPL CALCULATED.3IONS-SCNC: 5 MMOL/L (ref 4–13)
BASOPHILS # BLD AUTO: 0.04 THOUSANDS/ΜL (ref 0–0.1)
BASOPHILS NFR BLD AUTO: 0 % (ref 0–1)
BUN SERPL-MCNC: 19 MG/DL (ref 5–25)
CALCIUM SERPL-MCNC: 8.3 MG/DL (ref 8.3–10.1)
CHLORIDE SERPL-SCNC: 106 MMOL/L (ref 100–108)
CO2 SERPL-SCNC: 32 MMOL/L (ref 21–32)
CREAT SERPL-MCNC: 1.38 MG/DL (ref 0.6–1.3)
EOSINOPHIL # BLD AUTO: 0.34 THOUSAND/ΜL (ref 0–0.61)
EOSINOPHIL NFR BLD AUTO: 3 % (ref 0–6)
ERYTHROCYTE [DISTWIDTH] IN BLOOD BY AUTOMATED COUNT: 14.9 % (ref 11.6–15.1)
GFR SERPL CREATININE-BSD FRML MDRD: 46 ML/MIN/1.73SQ M
GLUCOSE P FAST SERPL-MCNC: 78 MG/DL (ref 65–99)
GLUCOSE SERPL-MCNC: 109 MG/DL (ref 65–140)
GLUCOSE SERPL-MCNC: 177 MG/DL (ref 65–140)
GLUCOSE SERPL-MCNC: 78 MG/DL (ref 65–140)
HCT VFR BLD AUTO: 37.4 % (ref 36.5–49.3)
HGB BLD-MCNC: 11.6 G/DL (ref 12–17)
IMM GRANULOCYTES # BLD AUTO: 0.08 THOUSAND/UL (ref 0–0.2)
IMM GRANULOCYTES NFR BLD AUTO: 1 % (ref 0–2)
LYMPHOCYTES # BLD AUTO: 1.36 THOUSANDS/ΜL (ref 0.6–4.47)
LYMPHOCYTES NFR BLD AUTO: 14 % (ref 14–44)
MAGNESIUM SERPL-MCNC: 1.5 MG/DL (ref 1.6–2.6)
MCH RBC QN AUTO: 29.1 PG (ref 26.8–34.3)
MCHC RBC AUTO-ENTMCNC: 31 G/DL (ref 31.4–37.4)
MCV RBC AUTO: 94 FL (ref 82–98)
MONOCYTES # BLD AUTO: 0.82 THOUSAND/ΜL (ref 0.17–1.22)
MONOCYTES NFR BLD AUTO: 8 % (ref 4–12)
NEUTROPHILS # BLD AUTO: 7.34 THOUSANDS/ΜL (ref 1.85–7.62)
NEUTS SEG NFR BLD AUTO: 74 % (ref 43–75)
NRBC BLD AUTO-RTO: 0 /100 WBCS
PLATELET # BLD AUTO: 306 THOUSANDS/UL (ref 149–390)
PMV BLD AUTO: 9.3 FL (ref 8.9–12.7)
POTASSIUM SERPL-SCNC: 3.4 MMOL/L (ref 3.5–5.3)
PROCALCITONIN SERPL-MCNC: <0.05 NG/ML
PROCALCITONIN SERPL-MCNC: <0.05 NG/ML
RBC # BLD AUTO: 3.99 MILLION/UL (ref 3.88–5.62)
SODIUM SERPL-SCNC: 143 MMOL/L (ref 136–145)
TSH SERPL DL<=0.05 MIU/L-ACNC: 3.36 UIU/ML (ref 0.36–3.74)
WBC # BLD AUTO: 9.98 THOUSAND/UL (ref 4.31–10.16)

## 2020-01-20 PROCEDURE — 99285 EMERGENCY DEPT VISIT HI MDM: CPT | Performed by: EMERGENCY MEDICINE

## 2020-01-20 PROCEDURE — 85025 COMPLETE CBC W/AUTO DIFF WBC: CPT | Performed by: PHYSICIAN ASSISTANT

## 2020-01-20 PROCEDURE — 36415 COLL VENOUS BLD VENIPUNCTURE: CPT | Performed by: PHYSICIAN ASSISTANT

## 2020-01-20 PROCEDURE — 97167 OT EVAL HIGH COMPLEX 60 MIN: CPT

## 2020-01-20 PROCEDURE — 83735 ASSAY OF MAGNESIUM: CPT | Performed by: PHYSICIAN ASSISTANT

## 2020-01-20 PROCEDURE — 84145 PROCALCITONIN (PCT): CPT | Performed by: PHYSICIAN ASSISTANT

## 2020-01-20 PROCEDURE — 84443 ASSAY THYROID STIM HORMONE: CPT | Performed by: PHYSICIAN ASSISTANT

## 2020-01-20 PROCEDURE — 93971 EXTREMITY STUDY: CPT | Performed by: SURGERY

## 2020-01-20 PROCEDURE — 99220 PR INITIAL OBSERVATION CARE/DAY 70 MINUTES: CPT | Performed by: INTERNAL MEDICINE

## 2020-01-20 PROCEDURE — 82948 REAGENT STRIP/BLOOD GLUCOSE: CPT

## 2020-01-20 PROCEDURE — 80048 BASIC METABOLIC PNL TOTAL CA: CPT | Performed by: PHYSICIAN ASSISTANT

## 2020-01-20 PROCEDURE — 97163 PT EVAL HIGH COMPLEX 45 MIN: CPT

## 2020-01-20 PROCEDURE — 99223 1ST HOSP IP/OBS HIGH 75: CPT | Performed by: PHYSICIAN ASSISTANT

## 2020-01-20 RX ORDER — MAGNESIUM SULFATE HEPTAHYDRATE 40 MG/ML
2 INJECTION, SOLUTION INTRAVENOUS ONCE
Status: COMPLETED | OUTPATIENT
Start: 2020-01-20 | End: 2020-01-21

## 2020-01-20 RX ORDER — ATORVASTATIN CALCIUM 10 MG/1
10 TABLET, FILM COATED ORAL
Status: DISCONTINUED | OUTPATIENT
Start: 2020-01-20 | End: 2020-01-22 | Stop reason: HOSPADM

## 2020-01-20 RX ORDER — WARFARIN SODIUM 2.5 MG/1
7.5 TABLET ORAL 3 TIMES WEEKLY
Status: DISCONTINUED | OUTPATIENT
Start: 2020-01-20 | End: 2020-01-20 | Stop reason: DRUGHIGH

## 2020-01-20 RX ORDER — LISINOPRIL 2.5 MG/1
2.5 TABLET ORAL DAILY
Status: DISCONTINUED | OUTPATIENT
Start: 2020-01-20 | End: 2020-01-22 | Stop reason: HOSPADM

## 2020-01-20 RX ORDER — CALCIUM CARBONATE 200(500)MG
1000 TABLET,CHEWABLE ORAL DAILY PRN
Status: DISCONTINUED | OUTPATIENT
Start: 2020-01-20 | End: 2020-01-22 | Stop reason: HOSPADM

## 2020-01-20 RX ORDER — FUROSEMIDE 20 MG/1
TABLET ORAL
Qty: 28 TABLET | Refills: 0 | Status: SHIPPED | OUTPATIENT
Start: 2020-01-20 | End: 2020-01-29 | Stop reason: DRUGHIGH

## 2020-01-20 RX ORDER — METOPROLOL TARTRATE 50 MG/1
50 TABLET, FILM COATED ORAL EVERY 12 HOURS SCHEDULED
Status: DISCONTINUED | OUTPATIENT
Start: 2020-01-20 | End: 2020-01-22 | Stop reason: HOSPADM

## 2020-01-20 RX ORDER — WARFARIN SODIUM 5 MG/1
5 TABLET ORAL
Status: DISCONTINUED | OUTPATIENT
Start: 2020-01-20 | End: 2020-01-22 | Stop reason: HOSPADM

## 2020-01-20 RX ORDER — GABAPENTIN 100 MG/1
100 CAPSULE ORAL 3 TIMES DAILY
Status: DISCONTINUED | OUTPATIENT
Start: 2020-01-20 | End: 2020-01-22 | Stop reason: HOSPADM

## 2020-01-20 RX ORDER — CEFAZOLIN SODIUM 2 G/50ML
2000 SOLUTION INTRAVENOUS EVERY 8 HOURS
Status: DISCONTINUED | OUTPATIENT
Start: 2020-01-20 | End: 2020-01-21

## 2020-01-20 RX ORDER — ONDANSETRON 2 MG/ML
4 INJECTION INTRAMUSCULAR; INTRAVENOUS EVERY 6 HOURS PRN
Status: DISCONTINUED | OUTPATIENT
Start: 2020-01-20 | End: 2020-01-22 | Stop reason: HOSPADM

## 2020-01-20 RX ORDER — BISACODYL 10 MG
10 SUPPOSITORY, RECTAL RECTAL DAILY PRN
Status: DISCONTINUED | OUTPATIENT
Start: 2020-01-20 | End: 2020-01-22 | Stop reason: HOSPADM

## 2020-01-20 RX ORDER — COLCHICINE 0.6 MG/1
0.6 TABLET ORAL DAILY
Status: DISCONTINUED | OUTPATIENT
Start: 2020-01-20 | End: 2020-01-22 | Stop reason: HOSPADM

## 2020-01-20 RX ORDER — LEVOTHYROXINE SODIUM 0.1 MG/1
100 TABLET ORAL
Status: DISCONTINUED | OUTPATIENT
Start: 2020-01-20 | End: 2020-01-22 | Stop reason: HOSPADM

## 2020-01-20 RX ORDER — FUROSEMIDE 20 MG/1
20 TABLET ORAL EVERY EVENING
Status: DISCONTINUED | OUTPATIENT
Start: 2020-01-20 | End: 2020-01-22 | Stop reason: HOSPADM

## 2020-01-20 RX ORDER — POTASSIUM CHLORIDE 20 MEQ/1
40 TABLET, EXTENDED RELEASE ORAL DAILY
Status: DISCONTINUED | OUTPATIENT
Start: 2020-01-20 | End: 2020-01-22 | Stop reason: HOSPADM

## 2020-01-20 RX ORDER — LORATADINE 10 MG/1
10 TABLET ORAL DAILY
Status: DISCONTINUED | OUTPATIENT
Start: 2020-01-20 | End: 2020-01-22 | Stop reason: HOSPADM

## 2020-01-20 RX ORDER — DONEPEZIL HYDROCHLORIDE 5 MG/1
5 TABLET, FILM COATED ORAL
Status: DISCONTINUED | OUTPATIENT
Start: 2020-01-20 | End: 2020-01-22 | Stop reason: HOSPADM

## 2020-01-20 RX ORDER — POTASSIUM CHLORIDE 20 MEQ/1
TABLET, EXTENDED RELEASE ORAL
Qty: 56 TABLET | Refills: 0 | Status: SHIPPED | OUTPATIENT
Start: 2020-01-20 | End: 2020-02-12

## 2020-01-20 RX ORDER — WARFARIN SODIUM 7.5 MG/1
7.5 TABLET ORAL
Status: DISCONTINUED | OUTPATIENT
Start: 2020-01-21 | End: 2020-01-21

## 2020-01-20 RX ADMIN — ATORVASTATIN CALCIUM 10 MG: 10 TABLET, FILM COATED ORAL at 22:51

## 2020-01-20 RX ADMIN — LEVOTHYROXINE SODIUM 100 MCG: 100 TABLET ORAL at 04:36

## 2020-01-20 RX ADMIN — METOPROLOL TARTRATE 50 MG: 50 TABLET, FILM COATED ORAL at 08:50

## 2020-01-20 RX ADMIN — INSULIN LISPRO 1 UNITS: 100 INJECTION, SOLUTION INTRAVENOUS; SUBCUTANEOUS at 22:54

## 2020-01-20 RX ADMIN — CEFAZOLIN SODIUM 2000 MG: 2 SOLUTION INTRAVENOUS at 23:13

## 2020-01-20 RX ADMIN — POTASSIUM CHLORIDE 40 MEQ: 1500 TABLET, EXTENDED RELEASE ORAL at 08:46

## 2020-01-20 RX ADMIN — CEFAZOLIN SODIUM 2000 MG: 2 SOLUTION INTRAVENOUS at 16:06

## 2020-01-20 RX ADMIN — LORATADINE 10 MG: 10 TABLET ORAL at 08:51

## 2020-01-20 RX ADMIN — GABAPENTIN 100 MG: 100 CAPSULE ORAL at 22:51

## 2020-01-20 RX ADMIN — GABAPENTIN 100 MG: 100 CAPSULE ORAL at 08:47

## 2020-01-20 RX ADMIN — LISINOPRIL 2.5 MG: 2.5 TABLET ORAL at 08:45

## 2020-01-20 RX ADMIN — DONEPEZIL HYDROCHLORIDE 5 MG: 5 TABLET ORAL at 22:51

## 2020-01-20 RX ADMIN — GABAPENTIN 100 MG: 100 CAPSULE ORAL at 16:12

## 2020-01-20 RX ADMIN — COLCHICINE 0.6 MG: 0.6 TABLET, FILM COATED ORAL at 08:48

## 2020-01-20 RX ADMIN — WARFARIN SODIUM 5 MG: 5 TABLET ORAL at 17:50

## 2020-01-20 RX ADMIN — FUROSEMIDE 20 MG: 20 TABLET ORAL at 17:50

## 2020-01-20 RX ADMIN — METOPROLOL TARTRATE 50 MG: 50 TABLET, FILM COATED ORAL at 22:52

## 2020-01-20 RX ADMIN — MAGNESIUM SULFATE HEPTAHYDRATE 2 G: 40 INJECTION, SOLUTION INTRAVENOUS at 11:08

## 2020-01-20 RX ADMIN — CEFAZOLIN SODIUM 2000 MG: 2 SOLUTION INTRAVENOUS at 10:05

## 2020-01-20 NOTE — UTILIZATION REVIEW
Initial Clinical Review    PATIENT WAS OBSERVATION STATUS 1/19/20 CONVERTED TO INPATIENT ADMISSION AS NEEDING >2MN FOR CONTINUED NEED IV ABX FOR CELLULITIS    Admission: Date/Time/Statement:   Orders Placed This Encounter   Procedures    Inpatient Admission     Standing Status:   Standing     Number of Occurrences:   1     Order Specific Question:   Admitting Physician     Answer:   Dougie Goff [[id number]]     Order Specific Question:   Level of Care     Answer:   Med Surg [[id number]]     Order Specific Question:   Estimated length of stay     Answer:   More than 2 Midnights     Order Specific Question:   Certification     Answer:   I certify that inpatient services are medically necessary for this patient for a duration of greater than two midnights  See H&P and MD Progress Notes for additional information about the patient's course of treatment  ED Arrival Information     Expected Arrival Acuity Means of Arrival Escorted By Service Admission Type    - 1/19/2020 21:02 Urgent Ambulance [facility] EMS Hospitalist Urgent    Arrival Complaint    -        Chief Complaint   Patient presents with    Cellulitis     per EMS patient lives at assisted living the nurse there was concerned about patients right leg  upon observation below knee is reddened hot  to touch and patient reports "tightness"  Assessment/Plan: 80 y o  male to ED [facility] ,afib,skin cancer, ckd stage 3, dm,factor v deficiency,who presents with RLE erythema and warmth  States first noted 12 hours PTA  Denies pain in RLE  Denies recent injury  Denies recurrent skin infections  Erythema/warmth noted to entire RLE distal to the knee  Tenderness noted over small, scabbed-over scrape to the distal, lateral aspect of RLE  No drainage  Denies recent fever/chills  Currently resides at [address] [place] [address]  recently diagnosed with b/l PE while on xarelto for atrial fibrillation  Was switched to coumadin at that time   Pt admitted as observation status with cellulitis continue iv ancef   Anticipated Length of Stay:  Patient will be admitted on an Observation basis with an anticipated length of stay of  Less than 2 midnights  Justification for Hospital Stay: See AP above  ID CONSULT  Impression/Recommendations: This is an 66-year-old male with poorly controlled leg edema and recent admission CHF, admitted last night with right leg cellulitis      Bilateral leg cellulitis, more prominent on right  Source is most likely due to multiple superficial ulcers that developed secondary to poorly controlled leg edema  Cellulitis has the appearance of beta-hemolytic streptococcal infection  Patient is clinically and systemically well  He should do well with a brief course of IV antibiotic  Patient needs to keep his legs elevated to control leg edema  Continue IV cefazolin  Monitor temperature/WBC  Follow-up on pending blood cultures  Serial leg exams  Keep legs elevated to control edema  Anticipate rapid transition to p o  Keflex, when patient is clinically improved      ED Triage Vitals   Temperature Pulse Respirations Blood Pressure SpO2   01/19/20 2129 01/19/20 2129 01/19/20 2129 01/19/20 2129 01/19/20 2129   98 3 °F (36 8 °C) 83 20 156/86 99 %      Temp Source Heart Rate Source Patient Position - Orthostatic VS BP Location FiO2 (%)   01/19/20 2129 01/19/20 2129 01/20/20 0442 01/19/20 2129 --   Oral Monitor Lying Right arm       Pain Score       01/20/20 0451       No Pain        Wt Readings from Last 1 Encounters:   01/19/20 82 8 kg (182 lb 8 7 oz)     Additional Vital Signs:   01/20/20 0850    102    160/88         01/20/20 0845        160/88         01/20/20 0442    73  20  172/88Abnormal   98 %  None          Pertinent Labs/Diagnostic Test Results:   Vascular duplex b/l RIGHT LOWER LIMB  No evidence of acute or chronic deep vein thrombosis   No evidence of superficial thrombophlebitis noted    Doppler evaluation shows a normal response to augmentation maneuvers  Popliteal, posterior tibial and anterior tibial arterial Doppler waveforms are  triphasic/hyperemic  LEFT LOWER LIMB LIMITED  Evaluation shows no evidence of thrombus in the common femoral vein  Doppler evaluation shows a normal response to augmentation maneuvers    Results from last 7 days   Lab Units 01/20/20  0440 01/19/20  2308   WBC Thousand/uL 9 98 10 78*   HEMOGLOBIN g/dL 11 6* 12 2   HEMATOCRIT % 37 4 39 6   PLATELETS Thousands/uL 306 335   NEUTROS ABS Thousands/µL 7 34 8 21*     Results from last 7 days   Lab Units 01/20/20  0440 01/19/20  2308   SODIUM mmol/L 143 141   POTASSIUM mmol/L 3 4* 3 7   CHLORIDE mmol/L 106 105   CO2 mmol/L 32 29   ANION GAP mmol/L 5 7   BUN mg/dL 19 23   CREATININE mg/dL 1 38* 1 47*   EGFR ml/min/1 73sq m 46 42   CALCIUM mg/dL 8 3 7 8*   MAGNESIUM mg/dL 1 5*  --      Results from last 7 days   Lab Units 01/19/20  2308   AST U/L 43   ALT U/L 57   ALK PHOS U/L 137*   TOTAL PROTEIN g/dL 7 6   ALBUMIN g/dL 2 5*   TOTAL BILIRUBIN mg/dL 0 50     Results from last 7 days   Lab Units 01/20/20  0440 01/19/20  2308   GLUCOSE RANDOM mg/dL 78 87     Results from last 7 days   Lab Units 01/14/20   HEMOGLOBIN A1C  6 3     Results from last 7 days   Lab Units 01/19/20  2309 01/15/20   PROTIME seconds 29 9*  --    INR  2 80* 3 80*   PTT seconds 52*  --      Results from last 7 days   Lab Units 01/20/20  0440   TSH 3RD GENERATON uIU/mL 3 362     Results from last 7 days   Lab Units 01/19/20  2308   LACTIC ACID mmol/L 1 2     ED Treatment:   Medication Administration from 01/19/2020 2102 to 01/20/2020 0952       Date/Time Order Dose Route Action Action by Comments     01/19/2020 2347 ceFAZolin (ANCEF) IVPB (premix) 1,000 mg 0 mg Intravenous Stopped Layla Weeks RN      01/19/2020 2317 ceFAZolin (ANCEF) IVPB (premix) 1,000 mg 1,000 mg Intravenous New Bag Layla Weeks RN      01/20/2020 0848 colchicine (COLCRYS) tablet 0 6 mg 0 6 mg Oral Given Nemesio Santiago RN      01/20/2020 0847 gabapentin (NEURONTIN) capsule 100 mg 100 mg Oral Given Ramirez Roberts, LARS      01/20/2020 8159 levothyroxine tablet 100 mcg 100 mcg Oral Given Sumit Fisher RN      01/20/2020 0845 lisinopril (ZESTRIL) tablet 2 5 mg 2 5 mg Oral Given Ramirez Roberts, LARS      01/20/2020 0851 loratadine (CLARITIN) tablet 10 mg 10 mg Oral Given Ramirez Roberts, LARS      01/20/2020 0850 metoprolol tartrate (LOPRESSOR) tablet 50 mg 50 mg Oral Given Ramirez Roberts, LARS      01/20/2020 0846 potassium chloride (K-DUR,KLOR-CON) CR tablet 40 mEq 40 mEq Oral Given Ramirez Roberts, LARS      01/20/2020 9604 warfarin (COUMADIN) tablet 7 5 mg   Oral Canceled Entry Sumit Fisher RN      01/20/2020 0600 insulin lispro (HumaLOG) 100 units/mL subcutaneous injection 1-6 Units 1 Units Subcutaneous Not Given Sumit Fisher RN bg 66        Past Medical History:   Diagnosis Date    Acute systolic congestive heart failure ([facility] ) 7/26/2018    Allergic contact dermatitis due to plants, except food     Atrial fibrillation (HCC)     Cancer ([facility])     skin cancer    Cataract     Chronic kidney disease     Stage 3    Coagulation test abnormality     Congestive heart failure (CHF) ([facility])     Diabetes mellitus ([facility] )     Disease of thyroid gland     hypothyroidism    DVT (deep venous thrombosis) ([facility])     Eczema     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HCC)     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HCC)     GERD (gastroesophageal reflux disease)     Gout     Hyperlipidemia     Hypertension     Hypokalemia     Leukocytosis     Lichen planus     Nonmelanoma skin cancer     Phlebitis or thrombophlebitis of deep vessel of lower extremity ([facility])     Proteinuria      Present on Admission:   Adult hypothyroidism   Chronic a-fib   Essential hypertension   Type 2 diabetes mellitus with diabetic polyneuropathy, without long-term current use of insulin (HCC)   Hyperlipidemia   Chronic combined systolic (congestive) and diastolic (congestive) heart failure (HCC)   Stage 3 chronic kidney disease ([facility] )   History of pulmonary embolism      Admitting Diagnosis: Cellulitis [L03 90]  Age/Sex: 80 y o  male  Admission Orders:  INPATIENT  I/o daily weight   bld cx  procalcitonin  ptinr  Mg bmp  Pt ot  Scheduled Medications:    Medications:  atorvastatin 10 mg Oral HS   cefazolin 2,000 mg Intravenous Q8H   colchicine 0 6 mg Oral Daily   donepezil 5 mg Oral HS   furosemide 20 mg Oral QPM   gabapentin 100 mg Oral TID   insulin lispro 1-5 Units Subcutaneous HS   insulin lispro 1-6 Units Subcutaneous TID AC   levothyroxine 100 mcg Oral Early Morning   lisinopril 2 5 mg Oral Daily   loratadine 10 mg Oral Daily   magnesium sulfate 2 g Intravenous Once   metoprolol tartrate 50 mg Oral Q12H [address]   potassium chloride 40 mEq Oral Daily   warfarin 5 mg Oral Once per day on Mon Wed Fri Sat   [START ON 1/21/2020] warfarin 7 5 mg Oral Once per day on Sun Tue Thu     Continuous IV Infusions:     PRN Meds:    bisacodyl 10 mg Rectal Daily PRN   calcium carbonate 1,000 mg Oral Daily PRN   ondansetron 4 mg Intravenous Q6H PRN       IP CONSULT TO INFECTIOUS DISEASES    Network Utilization Review Department  [email or web address]  ATTENTION: Please call with any questions or concerns to [phone] and carefully listen to the prompts so that you are directed to the right person  All voicemails are confidential   Rosie Deleon all requests for admission clinical reviews, approved or denied determinations and any other requests to dedicated fax number below belonging to the campus where the patient is receiving treatment   List of dedicated fax numbers for the Facilities:  FACILITY NAME UR FAX NUMBER   ADMISSION DENIALS (Administrative/Medical Necessity) [phone]   [address] [place] [address] (Maternity/NICU/Pediatrics) [phone]   Mathieu Courser [phone]     [address] [facility] [phone]   Ana Peters [place] [address] [facility] [phone]   Martha Elizabeth [address] [phone] 2205 [facility]  [phone]   [address] [place] [address] [phone]

## 2020-01-20 NOTE — PLAN OF CARE
Problem: PHYSICAL THERAPY ADULT  Goal: Performs mobility at highest level of function for planned discharge setting  See evaluation for individualized goals  Description  Treatment/Interventions: Functional transfer training, LE strengthening/ROM, Therapeutic exercise, Endurance training, Patient/family training, Equipment eval/education, Bed mobility, Gait training, Spoke to nursing, OT  Equipment Recommended: (trial RW next session)       See flowsheet documentation for full assessment, interventions and recommendations  Note:   Prognosis: Good  Problem List: Decreased strength, Decreased endurance, Impaired balance, Decreased mobility, Decreased skin integrity  Assessment: Pt is 80 y o  male seen for PT evaluation on 1/20/2020 s/p admit to [facility] on 1/19/2020 w/ Cellulitis  Pt presents with RLE erythema and warmth  PT consulted to assess pt's functional mobility and d/c needs  Order placed for PT eval and tx, w/ up w/ A order  Performed at least 2 patient identifiers during session: Name and wristband  Comorbidities affecting pt's physical performance at time of assessment include: acute systolic CHF, A fib, skin cancer, cataract, CKD stage 3, DM, disease of thyroid gland, DVT, factor V deficiency, GERD, gout, HLD, HTN  PTA, pt was independent w/ all functional mobility w/ rollator, ambulates community distances and resident of [facility]  Personal factors affecting pt at time of IE include: positive fall history  Please find objective findings from PT assessment regarding body systems outlined above with impairments and limitations including weakness, impaired balance, decreased endurance, gait deviations, decreased activity tolerance, fall risk and decreased skin integrity, as well as mobility assessment (need for cueing for mobility technique)  The following objective measures performed on IE also reveal limitations: Barthel Index: 50/100 and Modified Bol[place]ar: 4 (moderate/severe disability)   Pt's clinical presentation is currently unstable/unpredictable seen in pt's presentation of abnormal lab value(s), need for input for task focus and mobility technique, on telemetry monitoring and ongoing medical assessment  Pt to benefit from continued PT tx to address deficits as defined above and maximize level of functional independent mobility and consistency  From PT/mobility standpoint, recommendation at time of d/c would be anticipated return to [facility] with PT services & facility staff assist pending progress in order to facilitate return to PLOF  Barriers to Discharge: Other (Comment)(pt resident of shelter)     Recommendation: Home PT, Other (Comment)(anticipated pending progress; facility able to accomodate)     PT - OK to Discharge: No    See flowsheet documentation for full assessment

## 2020-01-20 NOTE — ASSESSMENT & PLAN NOTE
· Creatinine 1 47 on admission which is at baseline  · Avoid hypotension and nephrotoxic agents  · BMP in am

## 2020-01-20 NOTE — ASSESSMENT & PLAN NOTE
· Reports erythema and warmth to RLE that was first noticed 12 hours PTA  Denies recent injury  Denies recurrent skin infections  Erythema/warmth noted to entire RLE distal to the knee  Tenderness noted over small, scabbed-over scrape to the distal, lateral aspect of RLE  No drainage  Denies recent fever/chills  · Mild leukocytosis  WBC 10 78  Afebrile  LA 1 2   · Procalcitonin pending  · BC x 2 pending  · Received initial dose of Ancef admin in ED   Will continue

## 2020-01-20 NOTE — ASSESSMENT & PLAN NOTE
· Recently diagnosed with bilateral PE while on Xarelto for atrial fibrillation   Was switched to Coumadin at that time

## 2020-01-20 NOTE — ED PROVIDER NOTES
History  Chief Complaint   Patient presents with    Cellulitis     per EMS patient lives at assisted living the nurse there was concerned about patients right leg  upon observation below knee is reddened hot  to touch and patient reports "tightness"  42-year-old male presents to the emergency room with right lower extremity redness, swelling, and warmth  Patient currently resides in an independent/assisted living and family reports that staff 1st noticed the symptoms 12 hours prior to arrival   He denies any pain  Denies any recent injury or surgery  Denies recurrent skin infections  He denies any fevers/chills or drainage  He denies any chest pain, shortness of breath, abdominal pain, nausea/vomiting, diarrhea/constipation, or urinary symptoms  No other complaints  History provided by:  Patient           Prior to Admission Medications   Prescriptions Last Dose Informant Patient Reported? Taking? CALMOSEPTINE 0 44-20 6 % OINT 2020 at Unknown time Outside Facility (Specify) No Yes   Sig: Apply topically 2 (two) times a day as needed (to buttock/sacrum)   Folic Acid-Vit X2-QHL F56 (FOLBEE) 2 5-25-1 MG TABS Unknown at Unknown time Outside Facility (Specify) Yes No   Sig: Take by mouth   Lidocaine-Menthol (ICY HOT LIDOCAINE PLUS MENTHOL EX)  Outside Facility (Specify) Yes No   Sig: Apply topically   Misc   Devices (BED WEDGE) MISC  Outside Facility (Specify) No No   Sig: by Does not apply route daily   acetaminophen (TYLENOL) 325 mg tablet 2020 at Unknown time Outside Facility (Specify) No Yes   Sig: Take 2 tablets (650 mg total) by mouth every 8 (eight) hours   Patient taking differently: Take 650 mg by mouth every 6 (six) hours as needed    atorvastatin (LIPITOR) 10 mg tablet 2020 at Unknown time Outside Facility (Specify) No Yes   Si TAB ORALLY AT BEDTIME DX:HYPERLIPIDEMIA   colchicine (COLCRYS) 0 6 mg tablet Unknown at Unknown time Outside Facility (Specify) Yes No   Sig: Take 0 6 mg by mouth daily   donepezil (ARICEPT) 5 mg tablet 2020 at Unknown time Outside Facility (Specify) No Yes   Si TAB ORALLY AT BEDTIME DX: DEMENTIA   fluticasone (FLONASE) 50 mcg/act nasal spray 2020 at Unknown time Outside Facility (Specify) No Yes   Si sprays into each nostril daily For allergies   Patient taking differently: 2 sprays into each nostril as needed For allergies   furosemide (LASIX) 20 mg tablet   No No   Si TAB ORALLY EVERY EVENING DX: EDEMA   furosemide (LASIX) 40 mg tablet 2020 at Unknown time Outside Facility (Specify) No Yes   Sig: Take 1 tablet (40 mg total) by mouth daily   gabapentin (NEURONTIN) 100 mg capsule 2020 at Unknown time Outside Facility (Specify) No Yes   Sig: Take 1 capsule (100 mg total) by mouth 3 (three) times a day Dx: Neuropathy   glipiZIDE (GLUCOTROL XL) 5 mg 24 hr tablet 2020 at Unknown time Outside Facility (Specify) No Yes   Sig: Take 1 tablet (5 mg total) by mouth daily   glucose blood (TRUE METRIX BLOOD GLUCOSE TEST) test strip  Outside Facility (Specify) No No   Sig: Patient to test once daily DX code E11 8   hydrOXYzine HCL (ATARAX) 10 mg tablet  Outside Facility (Specify) No No   Sig: Take 1 tablet (10 mg total) by mouth 2 (two) times a day as needed for itching   levothyroxine 100 mcg tablet 2020 at Unknown time Outside Facility (Specify) No Yes   Si TAB ORALLY EVERY MORNING DX: HYPOTHYROIDISM   lisinopril (ZESTRIL) 2 5 mg tablet 2020 at Unknown time Outside Facility (Specify) No Yes   Sig: Take 1 tablet (2 5 mg total) by mouth daily   loratadine (CLARITIN) 10 mg tablet 2020 at Unknown time Outside Facility (Specify) No Yes   Sig: Take 1 tablet (10 mg total) by mouth daily For allergies   metoprolol tartrate (LOPRESSOR) 50 mg tablet 2020 at Unknown time Outside Facility (Specify) No Yes   Si TAB ORALLY TWICE DAILY DX: HYPERTENSION   nystatin-triamcinolone (MYCOLOG-II) cream 2020 at Unknown time Outside Facility (Specify) No Yes   Sig: Apply topically 2 (two) times a day   potassium chloride (K-DUR,KLOR-CON) 20 mEq tablet   No No   Si TABS (40MEQ) ORALLY EVERY MORNING DX:ELECTROLYTE DEFICIENCY   warfarin (COUMADIN) 2 5 mg tablet Unknown at Unknown time Outside Facility (Specify) Yes No   warfarin (COUMADIN) 5 mg tablet Unknown at Unknown time Outside Facility (Specify) No No   Sig: Take 1 tablet (5 mg total) by mouth daily      Facility-Administered Medications: None       Past Medical History:   Diagnosis Date    Acute systolic congestive heart failure (HCC) 2018    Allergic contact dermatitis due to plants, except food     Atrial fibrillation (HCC)     Cancer (HCC)     skin cancer    Cataract     Chronic kidney disease     Stage 3    Coagulation test abnormality     Congestive heart failure (CHF) (HCC)     Diabetes mellitus ([address] )     Disease of thyroid gland     hypothyroidism    DVT (deep venous thrombosis) (HCC)     Eczema     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HCC)     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HCC)     GERD (gastroesophageal reflux disease)     Gout     Hyperlipidemia     Hypertension     Hypokalemia     Leukocytosis     Lichen planus     Nonmelanoma skin cancer     Phlebitis or thrombophlebitis of deep vessel of lower extremity (HCC)     Proteinuria        Past Surgical History:   Procedure Laterality Date    CATARACT EXTRACTION      CHOLECYSTECTOMY      VT ESOPHAGOGASTRODUODENOSCOPY TRANSORAL DIAGNOSTIC N/A 2017    Procedure: EGD AND COLONOSCOPY;  Surgeon: Alok Mora MD;  Location: [facility]; Service: Gastroenterology    TONSILLECTOMY         Family History   Problem Relation Age of Onset    Alcohol abuse Mother     Heart attack Father     Dementia Brother     Other Son         AUTO ACCIDENT     I have reviewed and agree with the history as documented      Social History     Tobacco Use    Smoking status: Never Smoker    Smokeless tobacco: Never Used Substance Use Topics    Alcohol use: Never     Frequency: Never     Binge frequency: Never    Drug use: Never        Review of Systems   Constitutional: Negative for chills and fever  HENT: Negative for congestion, ear pain and sore throat  Eyes: Negative for pain and visual disturbance  Respiratory: Negative for cough, shortness of breath and wheezing  Cardiovascular: Negative for chest pain and leg swelling  Gastrointestinal: Negative for abdominal pain, diarrhea, nausea and vomiting  Genitourinary: Negative for dysuria, frequency, hematuria and urgency  Musculoskeletal: Negative for neck pain and neck stiffness  Skin: Positive for rash and wound  Neurological: Negative for weakness, numbness and headaches  Psychiatric/Behavioral: Negative for agitation and confusion  All other systems reviewed and are negative  Physical Exam  Physical Exam   Constitutional: He is oriented to person, place, and time  He appears well-developed and well-nourished  HENT:   Head: Normocephalic and atraumatic  Mouth/Throat: Oropharynx is clear and moist    Eyes: Pupils are equal, round, and reactive to light  EOM are normal    Neck: Normal range of motion  Neck supple  Cardiovascular: Normal rate and regular rhythm  Pulmonary/Chest: Effort normal and breath sounds normal    Abdominal: Soft  Bowel sounds are normal    Musculoskeletal: Normal range of motion  He exhibits edema  Redness, warmth, and swelling to the RLE below the knee and distally  NV intact  No drainage or crepitus noted   Neurological: He is alert and oriented to person, place, and time  No focal deficits   Skin: Skin is warm and dry  Nursing note and vitals reviewed        Vital Signs  ED Triage Vitals   Temperature Pulse Respirations Blood Pressure SpO2   01/19/20 2129 01/19/20 2129 01/19/20 2129 01/19/20 2129 01/19/20 2129   98 3 °F (36 8 °C) 83 20 156/86 99 %      Temp Source Heart Rate Source Patient Position - Orthostatic VS BP Location FiO2 (%)   01/19/20 2129 01/19/20 2129 01/20/20 0442 01/19/20 2129 --   Oral Monitor Lying Right arm       Pain Score       01/20/20 0451       No Pain           Vitals:    01/21/20 0737 01/21/20 1534 01/21/20 2355 01/22/20 0740   BP: 138/78 152/95 131/81 163/97   Pulse: 81  78 93   Patient Position - Orthostatic VS: Lying            Visual Acuity      ED Medications  Medications   atorvastatin (LIPITOR) tablet 10 mg (10 mg Oral Given 1/21/20 2130)   colchicine (COLCRYS) tablet 0 6 mg (0 6 mg Oral Given 1/22/20 0738)   donepezil (ARICEPT) tablet 5 mg (5 mg Oral Given 1/21/20 2130)   furosemide (LASIX) tablet 20 mg (20 mg Oral Given 1/21/20 1708)   gabapentin (NEURONTIN) capsule 100 mg (100 mg Oral Given 1/22/20 0738)   levothyroxine tablet 100 mcg (100 mcg Oral Given 1/22/20 0511)   lisinopril (ZESTRIL) tablet 2 5 mg (2 5 mg Oral Given 1/22/20 0741)   loratadine (CLARITIN) tablet 10 mg (10 mg Oral Given 1/22/20 0741)   metoprolol tartrate (LOPRESSOR) tablet 50 mg (50 mg Oral Given 1/22/20 0741)   potassium chloride (K-DUR,KLOR-CON) CR tablet 40 mEq (40 mEq Oral Given 1/22/20 0738)   warfarin (COUMADIN) tablet 5 mg (5 mg Oral Given 1/20/20 1750)   bisacodyl (DULCOLAX) rectal suppository 10 mg (has no administration in time range)   ondansetron (ZOFRAN) injection 4 mg (has no administration in time range)   calcium carbonate (TUMS) chewable tablet 1,000 mg (has no administration in time range)   insulin lispro (HumaLOG) 100 units/mL subcutaneous injection 1-6 Units (1 Units Subcutaneous Not Given 1/22/20 1202)   insulin lispro (HumaLOG) 100 units/mL subcutaneous injection 1-5 Units (1 Units Subcutaneous Not Given 1/21/20 2129)   ammonium lactate (LAC-HYDRIN) 12 % lotion ( Topical Given 1/22/20 4318)   acetaminophen (TYLENOL) tablet 650 mg (650 mg Oral Given 1/21/20 2130)   cephalexin (KEFLEX) capsule 500 mg (500 mg Oral Given 1/22/20 1358)   ceFAZolin (ANCEF) IVPB (premix) 1,000 mg (0 mg Intravenous Stopped 1/19/20 2347)   magnesium sulfate 2 g/50 mL IVPB (premix) 2 g (0 g Intravenous Stopped 1/21/20 0700)   warfarin (COUMADIN) tablet 5 mg (5 mg Oral Given 1/21/20 1708)       Diagnostic Studies  Results Reviewed     Procedure Component Value Units Date/Time    Blood culture #1 [[id number]] Collected:  01/19/20 2309    Lab Status:  Preliminary result Specimen:  Blood from Arm, Left Updated:  01/22/20 1201     Blood Culture No Growth at 48 hrs  Blood culture #2 [[id number]] Collected:  01/19/20 2309    Lab Status:  Preliminary result Specimen:  Blood from Arm, Right Updated:  01/22/20 1201     Blood Culture No Growth at 48 hrs      Basic metabolic panel [[id number]]  (Abnormal) Collected:  01/21/20 0541    Lab Status:  Final result Specimen:  Blood from Hand, Left Updated:  01/21/20 3249     Sodium 141 mmol/L      Potassium 4 0 mmol/L      Chloride 104 mmol/L      CO2 30 mmol/L      ANION GAP 7 mmol/L      BUN 22 mg/dL      Creatinine 1 34 mg/dL      Glucose 100 mg/dL      Calcium 8 7 mg/dL      eGFR 47 ml/min/1 73sq m     Narrative:       [facility] guidelines for Chronic Kidney Disease (CKD):     Stage 1 with normal or high GFR (GFR > 90 mL/min/1 73 square meters)    Stage 2 Mild CKD (GFR = 60-89 mL/min/1 73 square meters)    Stage 3A Moderate CKD (GFR = 45-59 mL/min/1 73 square meters)    Stage 3B Moderate CKD (GFR = 30-44 mL/min/1 73 square meters)    Stage 4 Severe CKD (GFR = 15-29 mL/min/1 73 square meters)    Stage 5 End Stage CKD (GFR <15 mL/min/1 73 square meters)  Note: GFR calculation is accurate only with a steady state creatinine    Magnesium [[id number]]  (Normal) Collected:  01/21/20 0541    Lab Status:  Final result Specimen:  Blood from Hand, Left Updated:  01/21/20 0621     Magnesium 2 0 mg/dL     Protime-INR [[id number]]  (Abnormal) Collected:  01/21/20 0541    Lab Status:  Final result Specimen:  Blood from Hand, Left Updated:  01/21/20 0579     Protime 32 1 seconds      INR 3 06    Procalcitonin [[id number]]  (Normal) Collected:  01/20/20 0440    Lab Status:  Final result Specimen:  Blood from Arm, Right Updated:  01/20/20 1247     Procalcitonin <0 05 ng/ml     Procalcitonin [[id number]]  (Normal) Collected:  01/19/20 2308    Lab Status:  Final result Specimen:  Blood from Arm, Right Updated:  01/20/20 1245     Procalcitonin <0 05 ng/ml     TSH, 3rd generation with Free T4 reflex [[id number]]  (Normal) Collected:  01/20/20 0440    Lab Status:  Final result Specimen:  Blood from Arm, Right Updated:  01/20/20 0517     TSH 3RD GENERATON 3 362 uIU/mL     Narrative:       Patients undergoing fluorescein dye angiography may retain small amounts of fluorescein in the body for 48-72 hours post procedure  Samples containing fluorescein can produce falsely depressed TSH values  If the patient had this procedure,a specimen should be resubmitted post fluorescein clearance        Basic metabolic panel [[id number]]  (Abnormal) Collected:  01/20/20 0440    Lab Status:  Final result Specimen:  Blood from Arm, Right Updated:  01/20/20 0517     Sodium 143 mmol/L      Potassium 3 4 mmol/L      Chloride 106 mmol/L      CO2 32 mmol/L      ANION GAP 5 mmol/L      BUN 19 mg/dL      Creatinine 1 38 mg/dL      Glucose 78 mg/dL      Glucose, Fasting 78 mg/dL      Calcium 8 3 mg/dL      eGFR 46 ml/min/1 73sq m     Narrative:       [facility] guidelines for Chronic Kidney Disease (CKD):     Stage 1 with normal or high GFR (GFR > 90 mL/min/1 73 square meters)    Stage 2 Mild CKD (GFR = 60-89 mL/min/1 73 square meters)    Stage 3A Moderate CKD (GFR = 45-59 mL/min/1 73 square meters)    Stage 3B Moderate CKD (GFR = 30-44 mL/min/1 73 square meters)    Stage 4 Severe CKD (GFR = 15-29 mL/min/1 73 square meters)    Stage 5 End Stage CKD (GFR <15 mL/min/1 73 square meters)  Note: GFR calculation is accurate only with a steady state creatinine    Magnesium [[id number]] (Abnormal) Collected:  01/20/20 0440    Lab Status:  Final result Specimen:  Blood from Arm, Right Updated:  01/20/20 0517     Magnesium 1 5 mg/dL     CBC and differential [[id number]]  (Abnormal) Collected:  01/20/20 0440    Lab Status:  Final result Specimen:  Blood from Arm, Right Updated:  01/20/20 0448     WBC 9 98 Thousand/uL      RBC 3 99 Million/uL      Hemoglobin 11 6 g/dL      Hematocrit 37 4 %      MCV 94 fL      MCH 29 1 pg      MCHC 31 0 g/dL      RDW 14 9 %      MPV 9 3 fL      Platelets 658 Thousands/uL      nRBC 0 /100 WBCs      Neutrophils Relative 74 %      Immat GRANS % 1 %      Lymphocytes Relative 14 %      Monocytes Relative 8 %      Eosinophils Relative 3 %      Basophils Relative 0 %      Neutrophils Absolute 7 34 Thousands/µL      Immature Grans Absolute 0 08 Thousand/uL      Lymphocytes Absolute 1 36 Thousands/µL      Monocytes Absolute 0 82 Thousand/µL      Eosinophils Absolute 0 34 Thousand/µL      Basophils Absolute 0 04 Thousands/µL     Lactic acid x2 [[id number]]  (Normal) Collected:  01/19/20 2308    Lab Status:  Final result Specimen:  Blood from Arm, Right Updated:  01/19/20 2336     LACTIC ACID 1 2 mmol/L     Narrative:       Result may be elevated if tourniquet was used during collection      Comprehensive metabolic panel [[id number]]  (Abnormal) Collected:  01/19/20 2308    Lab Status:  Final result Specimen:  Blood from Arm, Right Updated:  01/19/20 2333     Sodium 141 mmol/L      Potassium 3 7 mmol/L      Chloride 105 mmol/L      CO2 29 mmol/L      ANION GAP 7 mmol/L      BUN 23 mg/dL      Creatinine 1 47 mg/dL      Glucose 87 mg/dL      Calcium 7 8 mg/dL      AST 43 U/L      ALT 57 U/L      Alkaline Phosphatase 137 U/L      Total Protein 7 6 g/dL      Albumin 2 5 g/dL      Total Bilirubin 0 50 mg/dL      eGFR 42 ml/min/1 73sq m     Narrative:       [place] guidelines for Chronic Kidney Disease (CKD):     Stage 1 with normal or high GFR (GFR > 90 mL/min/1 73 square meters)    Stage 2 Mild CKD (GFR = 60-89 mL/min/1 73 square meters)    Stage 3A Moderate CKD (GFR = 45-59 mL/min/1 73 square meters)    Stage 3B Moderate CKD (GFR = 30-44 mL/min/1 73 square meters)    Stage 4 Severe CKD (GFR = 15-29 mL/min/1 73 square meters)    Stage 5 End Stage CKD (GFR <15 mL/min/1 73 square meters)  Note: GFR calculation is accurate only with a steady state creatinine    Protime-INR [[id number]]  (Abnormal) Collected:  01/19/20 2309    Lab Status:  Final result Specimen:  Blood from Arm, Right Updated:  01/19/20 2331     Protime 29 9 seconds      INR 2 80    APTT [[id number]]  (Abnormal) Collected:  01/19/20 2309    Lab Status:  Final result Specimen:  Blood from Arm, Right Updated:  01/19/20 2331     PTT 52 seconds     CBC and differential [[id number]]  (Abnormal) Collected:  01/19/20 2308    Lab Status:  Final result Specimen:  Blood from Arm, Right Updated:  01/19/20 2316     WBC 10 78 Thousand/uL      RBC 4 21 Million/uL      Hemoglobin 12 2 g/dL      Hematocrit 39 6 %      MCV 94 fL      MCH 29 0 pg      MCHC 30 8 g/dL      RDW 15 0 %      MPV 9 1 fL      Platelets 562 Thousands/uL      nRBC 0 /100 WBCs      Neutrophils Relative 75 %      Immat GRANS % 1 %      Lymphocytes Relative 12 %      Monocytes Relative 8 %      Eosinophils Relative 3 %      Basophils Relative 1 %      Neutrophils Absolute 8 21 Thousands/µL      Immature Grans Absolute 0 05 Thousand/uL      Lymphocytes Absolute 1 32 Thousands/µL      Monocytes Absolute 0 83 Thousand/µL      Eosinophils Absolute 0 32 Thousand/µL      Basophils Absolute 0 05 Thousands/µL                  VAS lower limb venous duplex study, unilateral/limited   Final Result by Cindy Parra MD (01/20 1829)                 Procedures  Procedures         ED Course  ED Course as of Jan 22 1516   Sun Jan 19, 2020 2208 Left leg swelling and redness since yesterday  Painful  Has had infection in the past- stinging pain   Independent/ assisted living  No fever  Increased lasix for weight gain 5 days ago  2312 Per vascular tech- neg for DVT                               MDM  Number of Diagnoses or Management Options  Cellulitis of right leg: new and requires workup  Diagnosis management comments: Patient with redness/warmth/swelling to the right lower extremity  Likely secondary to cellulitis  Will get labs, vascular study to rule out DVT, and give antibiotics  Will reassess for dispo  Patient and family updated on results of tests  Patient was offered discharge with trial of p o  Antibiotics versus admission  Family states that they do not think patient would be able to take care of himself at home  They would rather him be admitted for IV antibiotics  Patient reevaluated and feels improved  Patient updated on results of tests and plan of care including admission to hospital for further evaluation of presenting complaint  Patient demonstrates verbal understanding and agrees with plan  Report to [facility] with [facility] for continuation of patient care          Amount and/or Complexity of Data Reviewed  Clinical lab tests: reviewed and ordered  Tests in the radiology section of CPT®: ordered and reviewed  Tests in the medicine section of CPT®: ordered and reviewed  Discussion of test results with the performing providers: yes  Decide to obtain previous medical records or to obtain history from someone other than the patient: yes  Obtain history from someone other than the patient: yes  Review and summarize past medical records: yes  Discuss the patient with other providers: yes  Independent visualization of images, tracings, or specimens: yes    Patient Progress  Patient progress: improved        Disposition  Final diagnoses:   Cellulitis of right leg     Time reflects when diagnosis was documented in both MDM as applicable and the Disposition within this note     Time User Action Codes Description Comment    1/20/2020  1:34 AM Deanne Laughlin Add [J11 423] Cellulitis of right leg     1/22/2020  1:58 PM Darrian Jimenez Add [L98 9] Skin abnormalities     1/22/2020  3:06 PM Darrian Jimenez Add [E11 42] Type 2 diabetes mellitus with diabetic polyneuropathy, without long-term current use of insulin ([facility] [address] )     1/22/2020  3:06 PM Darrian Jimenez Add [I50 42] Chronic combined systolic (congestive) and diastolic (congestive) heart failure ([facility] [address] )     1/22/2020  3:06 PM Darrian Jimenez Add [L03 90] Cellulitis       ED Disposition     ED Disposition Condition Date/Time Comment    Admit Stable Mon Jan 20, 2020  1:34 AM Case was discussed with ZENOBIA and the patient's admission status was agreed to be Admission Status: observation status to the service of Dr Riky Slaughter           Follow-up Information     Follow up With Specialties Details Why [address] [facility]  Follow up  [address]  [phone]            Current Discharge Medication List      START taking these medications    Details   ammonium lactate (LAC-HYDRIN) 12 % lotion Apply topically 2 (two) times a day  Qty: 400 g, Refills: 0    Associated Diagnoses: Cellulitis of right leg; Skin abnormalities      cephalexin (KEFLEX) 500 mg capsule Take 1 capsule (500 mg total) by mouth every 8 (eight) hours for 7 days  Qty: 21 capsule, Refills: 0    Associated Diagnoses: Cellulitis of right leg         CONTINUE these medications which have NOT CHANGED    Details   acetaminophen (TYLENOL) 325 mg tablet Take 2 tablets (650 mg total) by mouth every 8 (eight) hours  Qty: 30 tablet, Refills: 0    Associated Diagnoses: Closed head injury      atorvastatin (LIPITOR) 10 mg tablet 1 TAB ORALLY AT BEDTIME DX:HYPERLIPIDEMIA  Qty: 28 tablet, Refills: 5    Associated Diagnoses: Mixed hyperlipidemia      CALMOSEPTINE 0 44-20 6 % OINT Apply topically 2 (two) times a day as needed (to buttock/sacrum)  Qty: 71 g, Refills: 3    Associated Diagnoses: Skin breakdown      donepezil (ARICEPT) 5 mg tablet 1 TAB ORALLY AT BEDTIME DX: DEMENTIA  Qty: 28 tablet, Refills: 5    Associated Diagnoses: Mild cognitive impairment      fluticasone (FLONASE) 50 mcg/act nasal spray 2 sprays into each nostril daily For allergies  Qty: 16 g, Refills: 3    Associated Diagnoses: Allergic rhinitis, unspecified seasonality, unspecified trigger      !! furosemide (LASIX) 40 mg tablet Take 1 tablet (40 mg total) by mouth daily  Qty: 30 tablet, Refills: 11    Associated Diagnoses: Bilateral lower extremity edema      gabapentin (NEURONTIN) 100 mg capsule Take 1 capsule (100 mg total) by mouth 3 (three) times a day Dx: Neuropathy  Qty: 90 capsule, Refills: 11    Associated Diagnoses: Type 2 diabetes mellitus with diabetic polyneuropathy, without long-term current use of insulin ([facility])      glipiZIDE (GLUCOTROL XL) 5 mg 24 hr tablet Take 1 tablet (5 mg total) by mouth daily  Qty: 30 tablet, Refills: 11    Associated Diagnoses: Controlled type 2 diabetes mellitus with microalbuminuria, without long-term current use of insulin ([facility])      levothyroxine 100 mcg tablet 1 TAB ORALLY EVERY MORNING DX: HYPOTHYROIDISM  Qty: 28 tablet, Refills: 5    Associated Diagnoses: Hypothyroidism, unspecified type      lisinopril (ZESTRIL) 2 5 mg tablet Take 1 tablet (2 5 mg total) by mouth daily  Qty: 90 tablet, Refills: 3    Associated Diagnoses: Chronic systolic congestive heart failure (HCC)      loratadine (CLARITIN) 10 mg tablet Take 1 tablet (10 mg total) by mouth daily For allergies  Qty: 30 tablet, Refills: 5    Associated Diagnoses: Allergic rhinitis, unspecified seasonality, unspecified trigger      metoprolol tartrate (LOPRESSOR) 50 mg tablet 1 TAB ORALLY TWICE DAILY DX: HYPERTENSION  Qty: 56 tablet, Refills: 5    Associated Diagnoses: Chronic a-fib;  Hypertension, uncontrolled; Chronic systolic CHF (congestive heart failure) ([facility])      nystatin-triamcinolone (MYCOLOG-II) cream Apply topically 2 (two) times a day  Qty: 30 g, Refills: 0    Associated Diagnoses: Skin abnormalities      colchicine (COLCRYS) 0 6 mg tablet Take 0 6 mg by mouth daily      Folic Acid-Vit [id number] (FOLBEE) 2 5-25-1 MG TABS Take by mouth      !! furosemide (LASIX) 20 mg tablet 1 TAB ORALLY EVERY EVENING DX: EDEMA  Qty: 28 tablet, Refills: 0    Associated Diagnoses: Acute exacerbation of CHF (congestive heart failure) ([facility])      glucose blood (TRUE METRIX BLOOD GLUCOSE TEST) test strip Patient to test once daily DX code E11 8  Qty: 100 each, Refills: 5    Associated Diagnoses: Type 2 diabetes mellitus with complication, unspecified whether long term insulin use      hydrOXYzine HCL (ATARAX) 10 mg tablet Take 1 tablet (10 mg total) by mouth 2 (two) times a day as needed for itching  Qty: 30 tablet, Refills: 0    Associated Diagnoses: Skin abnormalities      Lidocaine-Menthol (ICY HOT LIDOCAINE PLUS MENTHOL EX) Apply topically      Misc  Devices (BED WEDGE) MISC by Does not apply route daily  Qty: 2 each, Refills: 0    Associated Diagnoses: Chronic systolic congestive heart failure ([facility])      potassium chloride (K-DUR,KLOR-CON) 20 mEq tablet 2 TABS (40MEQ) ORALLY EVERY MORNING DX:ELECTROLYTE DEFICIENCY  Qty: 56 tablet, Refills: 0    Associated Diagnoses: Acute exacerbation of CHF (congestive heart failure) ([facility] [address] )      ! ! warfarin (COUMADIN) 2 5 mg tablet       !! warfarin (COUMADIN) 5 mg tablet Take 1 tablet (5 mg total) by mouth daily  Qty: 30 tablet, Refills: 11    Associated Diagnoses: Chronic a-fib       !! - Potential duplicate medications found  Please discuss with provider              ED Provider  Electronically Signed by           Chris Castellanos DO  01/22/20 1765

## 2020-01-20 NOTE — OCCUPATIONAL THERAPY NOTE
Occupational Therapy Evaluation        Patient Name: Jaime Rivera  DLVBQ'S Date: 1/20/2020 01/20/20 1232   Note Type   Note type Eval only   Restrictions/Precautions   Weight Bearing Precautions Per Order No   Braces or Orthoses Other (Comment)   Other Precautions Bed Alarm;Multiple lines;Telemetry; Fall Risk   Pain Assessment   Pain Assessment No/denies pain   Pain Score No Pain  (tenderness to touch)   Home Living   Type of Home Assisted living  [facility])   Home Layout One level;Performs ADLs on one level;Elevator  (2nd floor apartment, uses the elevator)   Bathroom Shower/Tub Walk-in shower   Bathroom Toilet Standard   Bathroom Equipment Grab bars in shower; Shower chair   [address] Walker  (Rollator)   Additional Comments ambulatory with a rollator   Prior Function   Level of [place] Independent with ADLs and functional mobility; Needs assistance with IADLs   Lives With Facility staff   Receives Help From Family;Personal care attendant  (Facility staff)   ADL Assistance Independent   IADLs Needs assistance   Falls in the last 6 months 0   Vocational Retired   Lifestyle   Autonomy Patient reported independent with basic self care and functional mobility, with walker  patient resides at [facility], does not drive     Reciprocal Relationships Supportive satff and family   Service to Others Retired   Intrinsic Gratification exercising, arts and crafts- woodwork   Psychosocial   Psychosocial (WDL) WDL   ADL   Eating Assistance 5  Supervision/Setup   Grooming Assistance 5  Supervision/Setup   [address] 4  Minimal Assistance   LB [address] 3  Moderate Assistance   [address] 4  Minimal Assistance    [facility] 3  Moderate [address]  3  Moderate Assistance   Functional Assistance 3  Moderate Assistance   Bed Mobility   Supine to Sit 5  Supervision   Additional items Assist x 1;HOB elevated; Increased time required;Verbal cues   Sit to Supine 5  Supervision   Additional items Assist x 1; Increased time required;Verbal cues;HOB elevated   Transfers   Sit to Stand 4  Minimal assistance   Additional items Assist x 1;HOB elevated; Increased time required;Verbal cues   Stand to Sit 4  Minimal assistance   Additional items Assist x 1; Increased time required;Verbal cues   Functional Mobility   Functional Mobility 4  Minimal assistance   Balance   Static Sitting Good   Dynamic Sitting Fair +   Static Standing Fair   Dynamic Standing Fair -   Activity Tolerance   Activity Tolerance Patient limited by fatigue;Patient limited by pain   Nurse Made Aware LARS Alfaro verbalized pt approrpiate to see, made aware of session outcome/recs   RUE Assessment   RUE Assessment WFL   LUE Assessment   LUE Assessment WFL   Hand Function   Gross Motor Coordination Functional   Fine Motor Coordination Functional   Sensation   Light Touch No apparent deficits  (BUEs)   Vision-Basic Assessment   Current Vision Does not wear glasses   Cognition   Overall Cognitive Status WFL   Arousal/Participation Alert; Responsive; Cooperative   Attention Within functional limits   Orientation Level Oriented X4   Memory Within functional limits   Following Commands Follows all commands and directions without difficulty   Assessment   Limitation Decreased ADL status; Decreased endurance;Decreased self-care trans;Decreased high-level ADLs   Prognosis Good   Assessment Patient is a 80 y o  male seen for OT evaluation s/p admit to [address] [place] [address] on 1/19/2020 w/Cellulitis  Commorbidities affecting patient's functional performance at time of assessment include: acute systolic CHF, A fib, skin cancer, cataract, CKD stage 3, DM, disease of thyroid gland, DVT, factor V deficiency, GERD, gout, HLD, HTN  Orders placed for OT evaluation and treatment  Performed at least two patient identifiers during session including name and wristband    Prior to admission,  Patient reported independent with basic self care and functional mobility, with walker  patient resides at [facility], does not drive  Personal factors affecting patient at time of initial evaluation include: difficulty performing ADLs  Upon evaluation, patient requires minimal  assist for UB ADLs, moderate assist for LB ADLs, transfers and functional ambulation in room and bathroom with minimal  assist with no AD/ hand held and will benefit from RW  Occupational performance is affected by the following deficits: dynamic sit/ stand balance deficit with poor standing tolerance time for self care and functional mobility, decreased activity tolerance, increased pain, orthopedic restrictions and postural control and postural alignment deficit, requiring external assistance to complete transitional movements  Therapist completed  extensive additional review of medical records and additional review of physical, cognitive or psychosocial history, clinical examination identifying 5 or more performance deficits, clinical decision making of a high complexity , consistent with a high complexity level evaluation  Patient to benefit from continued Occupational Therapy treatment while in the hospital to address deficits as defined above and maximize level of functional independence with ADLs and functional mobility  Occupational Performance areas to address include: transfer to all surfaces, functional ambulation, functional mobility, community mobility, Leisure Participation and Social participation  From OT standpoint, recommendation at time of d/c would be [facility] OT  Goals   Patient Goals to return home   Plan   Treatment Interventions ADL retraining;Functional transfer training; Endurance training;Patient/family training;Equipment evaluation/education; Compensatory technique education;Continued evaluation; Energy conservation; Activityengagement   Goal Expiration Date 02/03/20   OT Frequency 3-5x/wk   Recommendation   OT Discharge Recommendation Home OT   Barthel Index   Feeding 10   Bathing 0   Grooming Score 0   Dressing Score 5   Bladder Score 10   Bowels Score 10   Toilet Use Score 5   Transfers (Bed/Chair) Score 10   Mobility (Level Surface) Score 0   Stairs Score 0   Barthel Index Score 50   Modified [place] Scale   Modified Jessi Scale 4           1 - Patient will verbalize and demonstrate use of energy conservation/ deep breathing technique and work simplification skills during functional activity with no verbal cues  2 - Patient will verbalize and demonstrate good body mechanics and joint protection techniques during  ADLs/ IADLs with no verbal cues  3 - Patient will increase OOB/ sitting tolerance to 2-4 hours per day for increased participation in self care and leisure tasks with no s/s of exertion  4 - Patient will increase standing tolerance time to 5  minutes with unilateral UE support to complete sink level ADLs@ mod I level  5 - Patient will increase sitting tolerance at edge of bed to 20 minutes to complete UB ADLs @ set up assist level  6 - Patient will transfer bed to Chair / toilet at Set up assist level with AD as indicated  7 - Patient will complete UB ADLs with set up assist      8 - Patient will complete LB ADLs with min assist with the use of adaptive equipment       9 - Patient will complete toileting hygiene with set up assist/ supervision for thoroughness    10 - Patient/ Family  will demonstrate competency with UE Home Exercise Program

## 2020-01-20 NOTE — ED NOTES
CC- Cellulitis    Admission related to injury?- No    Orientation status- A&O x 4    Abnormal labs/abnormal focused assessment/vitals- Mag 1 5    Medication/drips- N/A    Last time narcotics given- N/A     IV lines/drains/etc- 20G R AC    Isolation status- N/A    Skin-  EDEMA /Cellulitis Bilat LE    BMAT screening tool-? Uses Walker     ED nurse's name and phone number- Paige George RN  01/20/20 2321

## 2020-01-20 NOTE — CONSULTS
Consultation - Infectious Disease   Ellen Harper 80 y o  male MRN: [id number]  Unit/Bed#: ED 09 Encounter: [id number]      IMPRESSION & RECOMMENDATIONS:   Impression/Recommendations:  1  RLE cellulitis  Patient at risk with dry appearing skin and chronic rash  Venous doppler negative for DVT  Blood cultures in progress  Rec:   · Continue Ancef 2 grams IV q 8 hours  · Elevate RLE  · Watch erythematous streak of LLE  · Await Procalcitonin  · F/u blood cultures  · Local care to the lower extremities    2  Stage 3 CKD  Cr at baseline on admission  3   Chronic A Fib      4   H/o PE    5  DM type 2    6  HTN    7  Recent hospital admission 1/1/2020 through 1/6/2020 for CHF  Antibiotics:  Cefazolin day #1    Thank you for this consultation  We will follow along with you  HISTORY OF PRESENT ILLNESS:  Reason for Consult: RLE cellulitis    HPI: Ellen Harper is a 80 y o  male with a PMH of DM type 2, H/o PE, A fib and Stage 3 CKD presented to the ED yesterday with complaints of RLE redness, warmth and tightness  Patient had a recent admission 1/1/2020 through 1/6/2020 for CHF  He has been doing well since discharge back to Assisted Living facility until yesterday  He states he noted his RLE developed redness, warmth and tenderness last evening when applying lotion  He denies any fever or chills  He states he otherwise feels fine  He has remained afebrile since presentation  He was noted to have a mild leukocytosis of 10 78  He was given Ancef in the ED  Blood cultures and procalcitonin are currently pending        REVIEW OF SYSTEMS:  Constitutional: denies fever and chills  HEENT: denies HA  Cardiovascular: denies cp  Pulmonary: denies sob and cough  GI: denies N/V/D  Gu: denies dysuria  Back: denies new onset back pains  Skin: denies rash  Ext: reports pain and erythema of the RLE  Musculoskeletal: denies arthralgias  A complete system-based review of systems is otherwise negative  PAST MEDICAL HISTORY:  Past Medical History:   Diagnosis Date    [facility]    Allergic contact dermatitis due to plants, except food     Atrial fibrillation (HCC)     Cancer (HCC)     skin cancer    Cataract     Chronic kidney disease     Stage 3    Coagulation test abnormality     Congestive heart failure (CHF) (HCC)     Diabetes mellitus ([facility] [address] )     Disease of thyroid gland     hypothyroidism    DVT (deep venous thrombosis) (HCC)     Eczema     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HCC)     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HCC)     GERD (gastroesophageal reflux disease)     Gout     Hyperlipidemia     Hypertension     Hypokalemia     Leukocytosis     Lichen planus     Nonmelanoma skin cancer     Phlebitis or thrombophlebitis of deep vessel of lower extremity (HCC)     Proteinuria      Past Surgical History:   Procedure Laterality Date    CATARACT EXTRACTION      CHOLECYSTECTOMY      IN ESOPHAGOGASTRODUODENOSCOPY TRANSORAL DIAGNOSTIC N/A 2017    Procedure: EGD AND COLONOSCOPY;  Surgeon: Norma Young MD;  Location: [facility]; Service: Gastroenterology    TONSILLECTOMY         FAMILY HISTORY:  Non-contributory    SOCIAL HISTORY:  Social History     Substance and Sexual Activity   Alcohol Use Never    Frequency: Never    Binge frequency: Never     Social History     Substance and Sexual Activity   Drug Use Never     Social History     Tobacco Use   Smoking Status Never Smoker   Smokeless Tobacco Never Used       ALLERGIES:  No Known Allergies    MEDICATIONS:  All current active medications have been reviewed      PHYSICAL EXAM:  Vitals:  Temp:  [98 3 °F (36 8 °C)] 98 3 °F (36 8 °C)  HR:  [] 102  Resp:  [18-20] 18  BP: (156-172)/(86-88) 160/88  SpO2:  [98 %-100 %] 100 %  Temp (24hrs), Av 3 °F (36 8 °C), Min:98 3 °F (36 8 °C), Max:98 3 °F (36 8 °C)  Current: Temperature: 98 3 °F (36 8 °C)     Physical Exam:  General:  Well-nourished, well-developed, in no acute distress  Eyes:  Conjunctive clear with no hemorrhages or effusions  Oropharynx:  No ulcers, no lesions  Neck:  Supple, no lymphadenopathy  Lungs:  Clear to auscultation bilaterally, no accessory muscle use  Cardiac:  Regular rate and rhythm, no murmurs  Abdomen:  Soft, non-tender, non-distended  Gu: no ramsey  Back: nontender  Extremities:  RLE erythema, warmth from knee to ankle  Also with erythematous streak along lateral left tibia  Skin:  No rashes, no ulcers  Neurological:  Moves all four extremities spontaneously, sensation grossly intact      LABS, IMAGING, & OTHER STUDIES:  Lab Results:  I have personally reviewed pertinent labs  Results from last 7 days   Lab Units 01/20/20  0440 01/19/20  2308   POTASSIUM mmol/L 3 4* 3 7   CHLORIDE mmol/L 106 105   CO2 mmol/L 32 29   BUN mg/dL 19 23   CREATININE mg/dL 1 38* 1 47*   EGFR ml/min/1 73sq m 46 42   CALCIUM mg/dL 8 3 7 8*   AST U/L  --  43   ALT U/L  --  57   ALK PHOS U/L  --  137*     Results from last 7 days   Lab Units 01/20/20  0440 01/19/20  2308   WBC Thousand/uL 9 98 10 78*   HEMOGLOBIN g/dL 11 6* 12 2   PLATELETS Thousands/uL 306 335             Imaging Studies:   I have personally reviewed pertinent imaging study reports and images in PACS  EKG, Pathology, and Other Studies:   I have personally reviewed pertinent reports

## 2020-01-20 NOTE — SOCIAL WORK
CM spoke with the pt at bedside about dcp  The pt states that he was in the hospital two weeks ago due to SOB  The pt resides at [place]  He uses a walker as needed and states that he is able to complete all of his ADLs without any assistance  The pt has participated in VNA in the past at [facility] and states that he learned a lot while in [employer] twice, once was in [facility] and he can not recall the other facility  The pt daughter Marcelo Singh is his POA  The pt has  No hx of MH/SA  The pt is retired and transported to and from [place] by [facility]  The pt will return to [address] [place] [address] upon dc  The pt is very knowldegable able the PT/OT exercises  CM reviewed discharge planning process including the following: identifying caregivers at home, preference for d/c planning needs, availability of treatment team to discuss questions or concerns patient and/or family may have regarding diagnosis, plan of care, old or new medications and discharge planning  Discharge Checklist reviewed and CM will continue to monitor for progress toward discharge goals in nursing and provider rounds  The pt will return to [facility] assisted living at [facility]  ROSALBA referred pt to [facility] to [facility]

## 2020-01-20 NOTE — ASSESSMENT & PLAN NOTE
· BP adequately controlled on current regimen  · Continue home dose Lisinopril and Lopressor  · Monitor BP per unit protocol

## 2020-01-20 NOTE — ASSESSMENT & PLAN NOTE
Lab Results   Component Value Date    HGBA1C 6 3 01/14/2020       No results for input(s): POCGLU in the last 72 hours      Blood Sugar Average: Last 72 hrs:     · Hold home dose Glipizide while inpt  · FSBS AC & HS / SSI  · Diabetic diet

## 2020-01-20 NOTE — H&P
H&P- Jaime Rivera 11/21/1932, 80 y o  male MRN: [id number]    Unit/Bed#: ED 09 Encounter: [id number]    Primary Care Provider: Leandra Ryder DO   Date and time admitted to hospital: 1/19/2020  9:02 PM        * Cellulitis  Assessment & Plan  · Reports erythema and warmth to RLE that was first noticed 12 hours PTA  Denies recent injury  Denies recurrent skin infections  Erythema/warmth noted to entire RLE distal to the knee  Tenderness noted over small, scabbed-over scrape to the distal, lateral aspect of RLE  No drainage  Denies recent fever/chills  · Mild leukocytosis  WBC 10 78  Afebrile  LA 1 2   · Procalcitonin pending  · BC x 2 pending  · Received initial dose of Ancef admin in ED  Will continue    Chronic a-fib  Assessment & Plan  · Rate controlled  · Continue home dose Lopressor and Coumadin     History of pulmonary embolism  Assessment & Plan  · Recently diagnosed with bilateral PE while on Xarelto for atrial fibrillation  Was switched to Coumadin at that time    Chronic anticoagulation  Assessment & Plan  · 2/2 atrial fibrillation w/ recent dx of bilateral PE at [facility]  Was switched from Xarelto to Coumadin at that time  · Current Coumadin dosing is 7 5mg on Sun- Tues- Thur, and 5mg on M- W- F- Sat  · INR on admission 2 80  · Continue current dosing    Stage 3 chronic kidney disease ([facility] [address] )  Assessment & Plan  · Creatinine 1 47 on admission which is at baseline  · Avoid hypotension and nephrotoxic agents  · BMP in am    Essential hypertension  Assessment & Plan  · BP adequately controlled on current regimen  · Continue home dose Lisinopril and Lopressor  · Monitor BP per unit protocol    Type 2 diabetes mellitus with diabetic polyneuropathy, without long-term current use of insulin (HCC)  Assessment & Plan  Lab Results   Component Value Date    HGBA1C 6 3 01/14/2020       No results for input(s): POCGLU in the last 72 hours      Blood Sugar Average: Last 72 hrs:     · Hold home dose Glipizide while inpt  · FSBS AC & HS / SSI  · Diabetic diet    Chronic combined systolic (congestive) and diastolic (congestive) heart failure (HCC)  Assessment & Plan  Wt Readings from Last 3 Encounters:   01/19/20 82 8 kg (182 lb 8 7 oz)   01/14/20 80 3 kg (177 lb)   01/09/20 79 4 kg (175 lb)     · Denies increasing SOB or BLE edema  · Continue home dose Lasix 20mg daily  · Daily weights and I/Os        Adult hypothyroidism  Assessment & Plan  · Last TSH 14 707 on 3/11/2019  · TSH w/ free T4 in am  · Continue home dose Levothyroxine    Hyperlipidemia  Assessment & Plan  · Continue home dose Lipitor      VTE Prophylaxis: Warfarin (Coumadin)  / foot pump applied   Code Status: Level 1 Full Code  POLST: POLST form is not discussed and not completed at this time  Discussion with family: NA    Anticipated Length of Stay:  Patient will be admitted on an Observation basis with an anticipated length of stay of  Less than 2 midnights  Justification for Hospital Stay: See AP above    Total Time for Visit, including Counseling / Coordination of Care: 45 minutes  Greater than 50% of this total time spent on direct patient counseling and coordination of care  Chief Complaint:   RLE redness    History of Present Illness:    Lin Bejarano is a 80 y o  male who presents with RLE erythema and warmth  States first noted 12 hours PTA  Denies pain in RLE  Denies recent injury  Denies recurrent skin infections  Erythema/warmth noted to entire RLE distal to the knee  Tenderness noted over small, scabbed-over scrape to the distal, lateral aspect of RLE  No drainage  Denies recent fever/chills  [facility] resides at [address] [facility]  Review of Systems:    Review of Systems   Constitutional: Negative for activity change, appetite change, chills and fever  HENT: Negative for congestion, ear pain, sinus pressure and sore throat  Eyes: Negative for visual disturbance     Respiratory: Negative for cough, shortness of breath and wheezing  Cardiovascular: Positive for leg swelling  Negative for chest pain and palpitations  Gastrointestinal: Negative for abdominal pain, constipation, diarrhea, nausea and vomiting  Genitourinary: Negative for difficulty urinating, dysuria and frequency  Musculoskeletal: Negative for neck pain and neck stiffness  Neurological: Negative for dizziness, syncope, light-headedness and headaches  All other systems reviewed and are negative  Past Medical and Surgical History:     Past Medical History:   Diagnosis Date    Acute systolic congestive heart failure ([facility] ) 7/26/2018    Allergic contact dermatitis due to plants, except food     Atrial fibrillation (HCC)     Cancer (HCC)     skin cancer    Cataract     Chronic kidney disease     Stage 3    Coagulation test abnormality     Congestive heart failure (CHF) (HCC)     Diabetes mellitus ([facility] )     Disease of thyroid gland     hypothyroidism    DVT (deep venous thrombosis) (HCC)     Eczema     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HCC)     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HCC)     GERD (gastroesophageal reflux disease)     Gout     Hyperlipidemia     Hypertension     Hypokalemia     Leukocytosis     Lichen planus     Nonmelanoma skin cancer     Phlebitis or thrombophlebitis of deep vessel of lower extremity (HCC)     Proteinuria        Past Surgical History:   Procedure Laterality Date    CATARACT EXTRACTION      CHOLECYSTECTOMY      AK ESOPHAGOGASTRODUODENOSCOPY TRANSORAL DIAGNOSTIC N/A 6/14/2017    Procedure: EGD AND COLONOSCOPY;  Surgeon: Emerson Conrad MD;  Location: [facility]; Service: Gastroenterology    TONSILLECTOMY         Meds/Allergies:    Prior to Admission medications    Medication Sig Start Date End Date Taking?  Authorizing Provider   acetaminophen (TYLENOL) 325 mg tablet Take 2 tablets (650 mg total) by mouth every 8 (eight) hours  Patient taking differently: Take 650 mg by mouth every 6 (six) hours as needed  5/15/19   [employer] Xavi Canela PA-C   atorvastatin (LIPITOR) 10 mg tablet 1 TAB ORALLY AT BEDTIME DX:HYPERLIPIDEMIA 12/19/19   Rubén Coleman DO   CALMOSEPTINE 0 44-20 6 % OINT Apply topically 2 (two) times a day as needed (to buttock/sacrum) 1/10/20   Rubén Coleman DO   colchicine (COLCRYS) 0 6 mg tablet Take 0 6 mg by mouth daily    Historical Provider, MD   donepezil (ARICEPT) 5 mg tablet 1 TAB ORALLY AT BEDTIME DX: DEMENTIA 8/29/19   Rubén Coleman DO   fluticasone (FLONASE) 50 mcg/act nasal spray 2 sprays into each nostril daily For allergies  Patient taking differently: 2 sprays into each nostril as needed For allergies 5/21/19   Elaine Zuniga PA-C   Folic Acid-Vit J1-EDL L21 (FOLBEE) 2 5-25-1 MG TABS Take by mouth    Historical Provider, MD   furosemide (LASIX) 20 mg tablet Take 1 tablet (20 mg total) by mouth every evening 1/6/20   Rosa Gil MD   furosemide (LASIX) 40 mg tablet Take 1 tablet (40 mg total) by mouth daily 10/24/19   Rubén Coleman DO   gabapentin (NEURONTIN) 100 mg capsule Take 1 capsule (100 mg total) by mouth 3 (three) times a day Dx: Neuropathy 10/24/19   Rubén Coleman DO   glipiZIDE (GLUCOTROL XL) 5 mg 24 hr tablet Take 1 tablet (5 mg total) by mouth daily 7/30/18   Roxanna Moran MD   glucose blood (TRUE METRIX BLOOD GLUCOSE TEST) test strip Patient to test once daily DX code E11 8 4/22/19   Rubén Coleman DO   hydrOXYzine HCL (ATARAX) 10 mg tablet Take 1 tablet (10 mg total) by mouth 2 (two) times a day as needed for itching 1/6/20   Rosa Gil MD   levothyroxine 100 mcg tablet 1 TAB ORALLY EVERY MORNING DX: HYPOTHYROIDISM 8/29/19   Rubén Coleman DO   Lidocaine-Menthol (ICY HOT LIDOCAINE PLUS MENTHOL EX) Apply topically    Historical Provider, MD   lisinopril (ZESTRIL) 2 5 mg tablet Take 1 tablet (2 5 mg total) by mouth daily 10/18/19   Sebastian Guadalupe MD   loratadine (CLARITIN) 10 mg tablet Take 1 tablet (10 mg total) by mouth daily For allergies 5/21/19   Elaine VIDYA Zuniga   metoprolol tartrate (LOPRESSOR) 50 mg tablet 1 TAB ORALLY TWICE DAILY DX: HYPERTENSION 8/29/19   Rubén Coleman DO   Misc  Devices (BED WEDGE) MISC by Does not apply route daily 8/6/19   Juan Amanda DO   nystatin-triamcinolone (MYCOLOG-II) cream Apply topically 2 (two) times a day 1/6/20   Lady Annemarie MD   potassium chloride (K-DUR,KLOR-CON) 20 mEq tablet Take 2 tablets (40 mEq total) by mouth daily 1/6/20   Lady Annemarie MD   warfarin (COUMADIN) 2 5 mg tablet  12/26/19   Historical Provider, MD   warfarin (COUMADIN) 5 mg tablet Take 1 tablet (5 mg total) by mouth daily 10/24/19   Juan Amanda DO     I have reviewed home medications with patient personally  Allergies: No Known Allergies    Social History:     Marital Status:    Patient Pre-hospital Living Situation: Lives at independent living center  Patient Pre-hospital Level of Mobility: Ambulatory with walker  Patient Pre-hospital Diet Restrictions: Diabetic  Substance Use History:   Social History     Substance and Sexual Activity   Alcohol Use Never    Frequency: Never    Binge frequency: Never     Social History     Tobacco Use   Smoking Status Never Smoker   Smokeless Tobacco Never Used     Social History     Substance and Sexual Activity   Drug Use No       Family History:    Family History   Problem Relation Age of Onset    Alcohol abuse Mother     Heart attack Father     Dementia Brother     Other Son         AUTO ACCIDENT       Physical Exam:     Vitals:   Blood Pressure: 156/86 (01/19/20 2129)  Pulse: 83 (01/19/20 2129)  Temperature: 98 3 °F (36 8 °C) (01/19/20 2129)  Temp Source: Oral (01/19/20 2129)  Respirations: 20 (01/19/20 2129)  Height: 5' 11" (180 3 cm) (01/19/20 2129)  Weight - Scale: 82 8 kg (182 lb 8 7 oz) (01/19/20 2129)  SpO2: 99 % (01/19/20 2129)    Physical Exam   Constitutional: He is oriented to person, place, and time  He appears well-developed and well-nourished  No distress  HENT:   Head: Normocephalic and atraumatic  Eyes: Pupils are equal, round, and reactive to light  EOM are normal    Neck: Normal range of motion  Neck supple  Cardiovascular: Normal rate, regular rhythm, normal heart sounds and intact distal pulses  Exam reveals no gallop and no friction rub  No murmur heard  Pulmonary/Chest: Effort normal and breath sounds normal  No respiratory distress  He has no wheezes  He has no rales  Abdominal: Soft  Bowel sounds are normal  There is no tenderness  There is no rebound and no guarding  Musculoskeletal: Normal range of motion  He exhibits edema and tenderness  Trace edema BLE  Tenderness to lower, lateral aspect of RLE  Abrasion at site   Neurological: He is alert and oriented to person, place, and time  Skin: Skin is warm and dry  There is erythema  Erythema and warmth to RLE below the knee  Scabbed over abrasion to lower, lateral aspect of RLE with tenderness   Psychiatric: He has a normal mood and affect  His behavior is normal        Additional Data:     Lab Results: I have personally reviewed pertinent reports        Results from last 7 days   Lab Units 01/19/20  2308   WBC Thousand/uL 10 78*   HEMOGLOBIN g/dL 12 2   HEMATOCRIT % 39 6   PLATELETS Thousands/uL 335   NEUTROS PCT % 75   LYMPHS PCT % 12*   MONOS PCT % 8   EOS PCT % 3     Results from last 7 days   Lab Units 01/19/20  2308   SODIUM mmol/L 141   POTASSIUM mmol/L 3 7   CHLORIDE mmol/L 105   CO2 mmol/L 29   BUN mg/dL 23   CREATININE mg/dL 1 47*   ANION GAP mmol/L 7   CALCIUM mg/dL 7 8*   ALBUMIN g/dL 2 5*   TOTAL BILIRUBIN mg/dL 0 50   ALK PHOS U/L 137*   ALT U/L 57   AST U/L 43   GLUCOSE RANDOM mg/dL 87     Results from last 7 days   Lab Units 01/19/20  2309   INR  2 80*         Results from last 7 days   Lab Units 01/14/20   HEMOGLOBIN A1C  6 3     Results from last 7 days   Lab Units 01/19/20  2308   LACTIC ACID mmol/L 1 2       Imaging: NA    VAS lower limb venous duplex study, unilateral/limited    (Results Pending)       EKG, Pathology, and Other Studies Reviewed on Admission:   · EKG: NA    Allscripts / Epic Records Reviewed: Yes     ** Please Note: This note has been constructed using a voice recognition system   **

## 2020-01-20 NOTE — SOCIAL WORK
Cm contacted Abdi Sessions regarding the pt return to the assisted Living    CM was informed that either Nellie Lee or [address] [place] [address] would need to be contacted to assess the pt closer to dc at [phone] or [phone]

## 2020-01-20 NOTE — ASSESSMENT & PLAN NOTE
· 2/2 atrial fibrillation w/ recent dx of bilateral PE at [facility]  Was switched from Xarelto to Coumadin at that time    · Current Coumadin dosing is 7 5mg on Sun- [address], and 5mg on M- W- F- Sat  · INR on admission 2 80  · Continue current dosing

## 2020-01-20 NOTE — ASSESSMENT & PLAN NOTE
Wt Readings from Last 3 Encounters:   01/19/20 82 8 kg (182 lb 8 7 oz)   01/14/20 80 3 kg (177 lb)   01/09/20 79 4 kg (175 lb)     · Denies increasing SOB or BLE edema     · Continue home dose Lasix 20mg daily  · Daily weights and I/Os

## 2020-01-20 NOTE — PLAN OF CARE
Problem: OCCUPATIONAL THERAPY ADULT  Goal: Performs self-care activities at highest level of function for planned discharge setting  See evaluation for individualized goals  Description  Treatment Interventions: ADL retraining, Functional transfer training, Endurance training, Patient/family training, Equipment evaluation/education, Compensatory technique education, Continued evaluation, Energy conservation, Activityengagement          See flowsheet documentation for full assessment, interventions and recommendations  Note:   Limitation: Decreased ADL status, Decreased endurance, Decreased self-care trans, Decreased high-level ADLs  Prognosis: Good  Assessment: (P) Patient is a 80 y o  male seen for OT evaluation s/p admit to [facility] on 1/19/2020 w/Cellulitis  Commorbidities affecting patient's functional performance at time of assessment include: acute systolic CHF, A fib, skin cancer, cataract, CKD stage 3, DM, disease of thyroid gland, DVT, factor V deficiency, GERD, gout, HLD, HTN  Orders placed for OT evaluation and treatment  Performed at least two patient identifiers during session including name and wristband  Prior to admission,  Patient reported independent with basic self care and functional mobility, with walker  patient resides at [facility], does not drive  Personal factors affecting patient at time of initial evaluation include: difficulty performing ADLs  Upon evaluation, patient requires minimal  assist for UB ADLs, moderate assist for LB ADLs, transfers and functional ambulation in room and bathroom with minimal  assist with no AD/ hand held and will benefit from RW    Occupational performance is affected by the following deficits: dynamic sit/ stand balance deficit with poor standing tolerance time for self care and functional mobility, decreased activity tolerance, increased pain, orthopedic restrictions and postural control and postural alignment deficit, requiring external assistance to complete transitional movements  Therapist completed  extensive additional review of medical records and additional review of physical, cognitive or psychosocial history, clinical examination identifying 5 or more performance deficits, clinical decision making of a high complexity , consistent with a high complexity level evaluation  Patient to benefit from continued Occupational Therapy treatment while in the hospital to address deficits as defined above and maximize level of functional independence with ADLs and functional mobility  Occupational Performance areas to address include: transfer to all surfaces, functional ambulation, functional mobility, community mobility, Leisure Participation and Social participation  From OT standpoint, recommendation at time of d/c would be [facility] OT         OT Discharge Recommendation: Home OT

## 2020-01-20 NOTE — PHYSICAL THERAPY NOTE
Physical Therapy Evaluation     Patient's Name: Ellen Harper    Admitting Diagnosis  Cellulitis [L03 90]    Problem List  Patient Active Problem List   Diagnosis    Adult hypothyroidism    Chronic a-fib    Essential hypertension    Type 2 diabetes mellitus with diabetic polyneuropathy, without long-term current use of insulin (HCC)    Hyperlipidemia    Skin abnormalities    Gout    GERD (gastroesophageal reflux disease)    Chronic combined systolic (congestive) and diastolic (congestive) heart failure (HCC)    Acute renal failure superimposed on stage 3 chronic kidney disease (HCC)    History of pulmonary embolism    Cellulitis    Stage 3 chronic kidney disease (HCC)    Chronic anticoagulation       Past Medical History  Past Medical History:   Diagnosis Date    Acute systolic congestive heart failure ([facility] ) 7/26/2018    Allergic contact dermatitis due to plants, except food     Atrial fibrillation (HCC)     Cancer (HCC)     skin cancer    Cataract     Chronic kidney disease     Stage 3    Coagulation test abnormality     Congestive heart failure (CHF) ([facility] )     Diabetes mellitus ([facility] )     Disease of thyroid gland     hypothyroidism    DVT (deep venous thrombosis) (HCC)     Eczema     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HCC)     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HCC)     GERD (gastroesophageal reflux disease)     Gout     Hyperlipidemia     Hypertension     Hypokalemia     Leukocytosis     Lichen planus     Nonmelanoma skin cancer     Phlebitis or thrombophlebitis of deep vessel of lower extremity (HCC)     Proteinuria        Past Surgical History  Past Surgical History:   Procedure Laterality Date    CATARACT EXTRACTION      CHOLECYSTECTOMY      NM ESOPHAGOGASTRODUODENOSCOPY TRANSORAL DIAGNOSTIC N/A 6/14/2017    Procedure: EGD AND COLONOSCOPY;  Surgeon: Los Sanchez MD;  Location: [facility];   Service: Gastroenterology    TONSILLECTOMY          01/20/20 5930   Note Type   Note type Eval only Pain Assessment   Pain Assessment No/denies pain   Pain Score No Pain  (tenderness to touch)   Home Living   Type of Home Assisted living  [facility])   Home Layout Two level;Elevator;Performs ADLs on one level  (2nd floor apartment, uses the elevator)   Bathroom Shower/Tub Walk-in shower   Bathroom Toilet Standard   Bathroom Equipment Grab bars in shower; Shower chair   [facility]  (Rollator)   Additional Comments ambulatory with a rollator at baseline   Prior Function   Level of [place] Independent with ADLs and functional mobility; Needs assistance with IADLs   Lives With Facility staff   Receives Help From Family;Personal care attendant  (Facility staff)   ADL Assistance Independent   IADLs Needs assistance   Falls in the last 6 months 0  ((+) fall history)   Vocational Retired   Comments pt reports he enjoys attending exercise classes   Restrictions/Precautions   Wells Hermila Bearing Precautions Per Order No   Braces or Orthoses Other (Comment)   Other Precautions Fall Risk;Multiple lines;Telemetry   General   Family/Caregiver Present No   Cognition   Overall Cognitive Status WFL   Arousal/Participation Alert   Orientation Level Oriented X4   Memory Within functional limits   Following Commands Follows all commands and directions without difficulty   Comments pt agreeable to PT evaluation   RUE Assessment   RUE Assessment   (defer to OT eval for comments)   LUE Assessment   LUE Assessment   (defer to OT eval for comments)   RLE Assessment   RLE Assessment WFL  (grossly 3+/5 assessed c functional mobility)   LLE Assessment   LLE Assessment WFL  (grossly 3+/5 assessed c functional mobility)   Coordination   Movements are Fluid and Coordinated 1   Sensation WFL   Bed Mobility   Supine to Sit 5  Supervision   Additional items Assist x 1;HOB elevated; Increased time required   Sit to Supine 5  Supervision   Additional items Assist x 1;HOB elevated; Increased time required Transfers   Sit to Stand 4  Minimal assistance  (close SBA/CGA)   Additional items Assist x 1; Increased time required;Verbal cues   Stand to Sit 4  Minimal assistance  (close SBA/CGA)   Additional items Assist x 1; Increased time required;Verbal cues   Ambulation/Elevation   Gait pattern Decreased foot clearance; Short stride; Step to   Gait Assistance 4  Minimal assist   Additional items Assist x 1;Verbal cues   Assistive Device   (HHA)   Distance 10'   Stair Management Assistance Not tested   Balance   Static Sitting Good   Dynamic Sitting Fair +   Static Standing Fair   Dynamic Standing Fair -   Ambulatory Fair -   Endurance Deficit   Endurance Deficit Yes   Activity Tolerance   Activity Tolerance Patient limited by fatigue   Medical Staff Made Aware OT Brian Sarmiento   Nurse Made Aware LARS Mae verbalized pt approrpiate to see, made aware of session outcome/recs   Assessment   Prognosis Good   Problem List Decreased strength;Decreased endurance; Impaired balance;Decreased mobility; Decreased skin integrity   Assessment Pt is 80 y o  male seen for PT evaluation on 1/20/2020 s/p admit to [facility] on 1/19/2020 w/ Cellulitis  Pt presents with RLE erythema and warmth  PT consulted to assess pt's functional mobility and d/c needs  Order placed for PT eval and tx, w/ up w/ A order  Performed at least 2 patient identifiers during session: Name and wristband  Comorbidities affecting pt's physical performance at time of assessment include: acute systolic CHF, A fib, skin cancer, cataract, CKD stage 3, DM, disease of thyroid gland, DVT, factor V deficiency, GERD, gout, HLD, HTN  PTA, pt was independent w/ all functional mobility w/ rollator, ambulates community distances and resident of [facility]  Personal factors affecting pt at time of IE include: positive fall history   Please find objective findings from PT assessment regarding body systems outlined above with impairments and limitations including weakness, impaired balance, decreased endurance, gait deviations, decreased activity tolerance, fall risk and decreased skin integrity, as well as mobility assessment (need for cueing for mobility technique)  The following objective measures performed on IE also reveal limitations: Barthel Index: 50/100 and Modified [place]: 4 (moderate/severe disability)  Pt's clinical presentation is currently unstable/unpredictable seen in pt's presentation of abnormal lab value(s), need for input for task focus and mobility technique, on telemetry monitoring and ongoing medical assessment  Pt to benefit from continued PT tx to address deficits as defined above and maximize level of functional independent mobility and consistency  From PT/mobility standpoint, recommendation at time of d/c would be anticipated return to [facility] with PT services & facility staff assist pending progress in order to facilitate return to OF  Barriers to Discharge Other (Comment)  (pt resident of [facility])   Goals   Patient Goals to return home   STG Expiration Date 01/30/20   Short Term Goal #1 In 7-10 days: Increase bilateral LE strength 1/2 grade to facilitate independent mobility, Perform all bed mobility tasks modified independent to decrease caregiver burden, Perform all transfers modified independent to improve independence, Ambulate > 150 ft  with least restrictive assistive device modified independent w/o LOB and w/ normalized gait pattern 100% of the time, Increase all balance 1/2 grade to decrease risk for falls, Complete exercise program independently, Tolerate 4 hr OOB to faciliate upright tolerance, Improve Barthel Index score to 65 or greater to facilitate independence and PT provider will perform functional balance assessment to determine fall risk   PT Treatment Day 0   Plan   Treatment/Interventions Functional transfer training;LE strengthening/ROM; Therapeutic exercise; Endurance training;Patient/family training;Equipment eval/education; Bed mobility;Gait training;Spoke to nursing;OT   PT Frequency   (3-5x/wk)   Recommendation   Recommendation Home PT; Other (Comment)  (anticipated pending progress; facility able to accomodate)   Equipment Recommended   (trial RW next session)   PT - OK to Discharge No   Modified [place] Scale   Modified [place] Scale 4   Barthel Index   Feeding 10   Bathing 0   Grooming Score 0   Dressing Score 5   Bladder Score 10   Bowels Score 10   Toilet Use Score 5   Transfers (Bed/Chair) Score 10   Mobility (Level Surface) Score 0   Stairs Score 0   Barthel Index Score 50           Cayla Rodriguez, PT, DPT

## 2020-01-21 LAB
ANION GAP SERPL CALCULATED.3IONS-SCNC: 7 MMOL/L (ref 4–13)
BUN SERPL-MCNC: 22 MG/DL (ref 5–25)
CALCIUM SERPL-MCNC: 8.7 MG/DL (ref 8.3–10.1)
CHLORIDE SERPL-SCNC: 104 MMOL/L (ref 100–108)
CO2 SERPL-SCNC: 30 MMOL/L (ref 21–32)
CREAT SERPL-MCNC: 1.34 MG/DL (ref 0.6–1.3)
GFR SERPL CREATININE-BSD FRML MDRD: 47 ML/MIN/1.73SQ M
GLUCOSE SERPL-MCNC: 100 MG/DL (ref 65–140)
GLUCOSE SERPL-MCNC: 109 MG/DL (ref 65–140)
GLUCOSE SERPL-MCNC: 139 MG/DL (ref 65–140)
GLUCOSE SERPL-MCNC: 147 MG/DL (ref 65–140)
GLUCOSE SERPL-MCNC: 95 MG/DL (ref 65–140)
INR PPP: 3.06 (ref 0.84–1.19)
MAGNESIUM SERPL-MCNC: 2 MG/DL (ref 1.6–2.6)
POTASSIUM SERPL-SCNC: 4 MMOL/L (ref 3.5–5.3)
PROTHROMBIN TIME: 32.1 SECONDS (ref 11.6–14.5)
SODIUM SERPL-SCNC: 141 MMOL/L (ref 136–145)

## 2020-01-21 PROCEDURE — 97110 THERAPEUTIC EXERCISES: CPT

## 2020-01-21 PROCEDURE — 85610 PROTHROMBIN TIME: CPT | Performed by: INTERNAL MEDICINE

## 2020-01-21 PROCEDURE — 83735 ASSAY OF MAGNESIUM: CPT | Performed by: INTERNAL MEDICINE

## 2020-01-21 PROCEDURE — 97530 THERAPEUTIC ACTIVITIES: CPT

## 2020-01-21 PROCEDURE — 82948 REAGENT STRIP/BLOOD GLUCOSE: CPT

## 2020-01-21 PROCEDURE — 80048 BASIC METABOLIC PNL TOTAL CA: CPT | Performed by: INTERNAL MEDICINE

## 2020-01-21 PROCEDURE — 99232 SBSQ HOSP IP/OBS MODERATE 35: CPT | Performed by: INTERNAL MEDICINE

## 2020-01-21 RX ORDER — WARFARIN SODIUM 5 MG/1
5 TABLET ORAL
Status: COMPLETED | OUTPATIENT
Start: 2020-01-21 | End: 2020-01-21

## 2020-01-21 RX ORDER — CEFAZOLIN SODIUM 1 G/50ML
1000 SOLUTION INTRAVENOUS EVERY 8 HOURS
Status: DISCONTINUED | OUTPATIENT
Start: 2020-01-21 | End: 2020-01-22

## 2020-01-21 RX ORDER — AMMONIUM LACTATE 12 G/100G
LOTION TOPICAL 2 TIMES DAILY
Status: DISCONTINUED | OUTPATIENT
Start: 2020-01-21 | End: 2020-01-22 | Stop reason: HOSPADM

## 2020-01-21 RX ORDER — ACETAMINOPHEN 325 MG/1
650 TABLET ORAL EVERY 6 HOURS PRN
Status: DISCONTINUED | OUTPATIENT
Start: 2020-01-21 | End: 2020-01-22 | Stop reason: HOSPADM

## 2020-01-21 RX ADMIN — DONEPEZIL HYDROCHLORIDE 5 MG: 5 TABLET ORAL at 21:30

## 2020-01-21 RX ADMIN — CEFAZOLIN SODIUM 1000 MG: 1 SOLUTION INTRAVENOUS at 23:50

## 2020-01-21 RX ADMIN — LORATADINE 10 MG: 10 TABLET ORAL at 07:41

## 2020-01-21 RX ADMIN — CEFAZOLIN SODIUM 2000 MG: 2 SOLUTION INTRAVENOUS at 07:18

## 2020-01-21 RX ADMIN — LEVOTHYROXINE SODIUM 100 MCG: 100 TABLET ORAL at 05:41

## 2020-01-21 RX ADMIN — GABAPENTIN 100 MG: 100 CAPSULE ORAL at 21:30

## 2020-01-21 RX ADMIN — POTASSIUM CHLORIDE 40 MEQ: 1500 TABLET, EXTENDED RELEASE ORAL at 07:39

## 2020-01-21 RX ADMIN — METOPROLOL TARTRATE 50 MG: 50 TABLET, FILM COATED ORAL at 21:30

## 2020-01-21 RX ADMIN — METOPROLOL TARTRATE 50 MG: 50 TABLET, FILM COATED ORAL at 07:40

## 2020-01-21 RX ADMIN — COLCHICINE 0.6 MG: 0.6 TABLET, FILM COATED ORAL at 07:40

## 2020-01-21 RX ADMIN — WARFARIN SODIUM 5 MG: 5 TABLET ORAL at 17:08

## 2020-01-21 RX ADMIN — ACETAMINOPHEN 650 MG: 325 TABLET, FILM COATED ORAL at 21:30

## 2020-01-21 RX ADMIN — GABAPENTIN 100 MG: 100 CAPSULE ORAL at 17:08

## 2020-01-21 RX ADMIN — FUROSEMIDE 20 MG: 20 TABLET ORAL at 17:08

## 2020-01-21 RX ADMIN — ATORVASTATIN CALCIUM 10 MG: 10 TABLET, FILM COATED ORAL at 21:30

## 2020-01-21 RX ADMIN — Medication: at 17:09

## 2020-01-21 RX ADMIN — Medication 1 APPLICATION: at 12:30

## 2020-01-21 RX ADMIN — LISINOPRIL 2.5 MG: 2.5 TABLET ORAL at 07:41

## 2020-01-21 RX ADMIN — CEFAZOLIN SODIUM 1000 MG: 1 SOLUTION INTRAVENOUS at 17:05

## 2020-01-21 RX ADMIN — GABAPENTIN 100 MG: 100 CAPSULE ORAL at 07:40

## 2020-01-21 NOTE — ASSESSMENT & PLAN NOTE
Lab Results   Component Value Date    HGBA1C 6 3 01/14/2020       Recent Labs     01/20/20  1658 01/20/20  2047 01/21/20  0615 01/21/20  1120   POCGLU 109 177* 95 147*       Blood Sugar Average: Last 72 hrs:  (P) 132   Accu-Cheks are stable    Continue with current treatment including sliding scale

## 2020-01-21 NOTE — ASSESSMENT & PLAN NOTE
Currently on Coumadin  INR above 3   Would give him somewhat less Coumadin today because there may be some interaction between Coumadin/antibiotic

## 2020-01-21 NOTE — PROGRESS NOTES
Progress Note - Infectious Disease   Lin Bejarano 80 y o  male MRN: [id number]  Unit/Bed#: -01 Encounter: [id number]      Impression/Recommendations:  1  Bilateral leg cellulitis, more prominent right  Source is most likely the multiple superficial ulcers  Patient is clinically much improved  Leg pain and erythema are improved  Temperature stays down  WBC has normalized  Patient remains systemically well  Admission blood cultures remain negative  Continue IV cefazolin  Serial leg exams  Monitor temperature/WBC  Follow-up on pending blood cultures  Anticipate transition to p o  Keflex in 24 hours, if patient continues to improve clinically  2  Venous stasis with poorly controlled leg edema  Leg edema is much improved with leg elevation overnight  Keep leg elevation to control edema  3  CKD stage 3  Creatinine baseline  Antibiotic at full dose  Monitor creatinine  4  DM, with reasonably well control blood sugar  Management per primary service  Discussed with patient in detail regarding the above plan  Antibiotics:  Cefazolin # 2     Subjective:  Patient feels better  Pain in right leg much improved  No pain in left leg  Temperature stays down  No chills  He is tolerating antibiotic well  No nausea, vomiting or diarrhea  Objective:  Vitals:  Temp:  [97 2 °F (36 2 °C)-97 9 °F (36 6 °C)] 97 2 °F (36 2 °C)  HR:  [79-89] 81  Resp:  [18-23] 18  BP: (138-175)/(77-85) 138/78  SpO2:  [99 %-100 %] 100 %  Temp (24hrs), Av 7 °F (36 5 °C), Min:97 2 °F (36 2 °C), Max:97 9 °F (36 6 °C)  Current: Temperature: (!) 97 2 °F (36 2 °C)    Physical Exam:     General: Awake, alert, cooperative, no distress  Neck:  Supple  No mass  No lymphadenopathy  Lungs: Expansion symmetric, no rales, no wheezing, respirations unlabored  Heart:  Regular rate and rhythm, S1 and S2 normal, no murmur     Abdomen: Soft, nondistended, non-tender, bowel sounds active all four quadrants,        no masses, no organomegaly  Extremities: Improved leg edema  Improved erythema/warmth  Stable small superficial ulcers, without drainage  Improved tenderness  Skin:  No rash  Neuro: Moves all extremities  Invasive Devices     Peripheral Intravenous Line            Peripheral IV 01/19/20 Right Antecubital 1 day                Labs studies:   I have personally reviewed pertinent labs  Results from last 7 days   Lab Units 01/21/20  0541 01/20/20  0440 01/19/20  2308   POTASSIUM mmol/L 4 0 3 4* 3 7   CHLORIDE mmol/L 104 106 105   CO2 mmol/L 30 32 29   BUN mg/dL 22 19 23   CREATININE mg/dL 1 34* 1 38* 1 47*   EGFR ml/min/1 73sq m 47 46 42   CALCIUM mg/dL 8 7 8 3 7 8*   AST U/L  --   --  43   ALT U/L  --   --  57   ALK PHOS U/L  --   --  137*     Results from last 7 days   Lab Units 01/20/20  0440 01/19/20  2308   WBC Thousand/uL 9 98 10 78*   HEMOGLOBIN g/dL 11 6* 12 2   PLATELETS Thousands/uL 306 335     Results from last 7 days   Lab Units 01/19/20  2309   BLOOD CULTURE  Received in Microbiology Lab  Culture in Progress  Received in Microbiology Lab  Culture in Progress  Imaging Studies:   I have personally reviewed pertinent imaging study reports and images in PACS  EKG, Pathology, and Other Studies:   I have personally reviewed pertinent reports

## 2020-01-21 NOTE — ASSESSMENT & PLAN NOTE
Right lower leg mostly  He also has some redness of his left lower leg  Continue with IV antibiotic  Hopefully would be transitioned to oral soon  His skin is also  Dry  Ordered lotion    Also advised patient to keep it moist at home

## 2020-01-21 NOTE — PROGRESS NOTES
Patient committing to ambulating TID in halls  He is a modified independent with a walker  Demonstrates a steady gait

## 2020-01-21 NOTE — PLAN OF CARE
Problem: Potential for Falls  Goal: Patient will remain free of falls  Description  INTERVENTIONS:  - Assess patient frequently for physical needs  -  Identify cognitive and physical deficits and behaviors that affect risk of falls  -  [place] fall precautions as indicated by assessment   - Educate patient/family on patient safety including physical limitations  - Instruct patient to call for assistance with activity based on assessment  - Modify environment to reduce risk of injury  - Consider OT/PT consult to assist with strengthening/mobility  Outcome: Progressing   Note: Calls for assistance with ambulating in halls three times a day

## 2020-01-21 NOTE — PLAN OF CARE
Problem: Potential for Falls  Goal: Patient will remain free of falls  Description  INTERVENTIONS:  - Assess patient frequently for physical needs  -  Identify cognitive and physical deficits and behaviors that affect risk of falls    -  [place] fall precautions as indicated by assessment   - Educate patient/family on patient safety including physical limitations  - Instruct patient to call for assistance with activity based on assessment  - Modify environment to reduce risk of injury  - Consider OT/PT consult to assist with strengthening/mobility  Outcome: Progressing

## 2020-01-21 NOTE — PLAN OF CARE
Problem: OCCUPATIONAL THERAPY ADULT  Goal: Performs self-care activities at highest level of function for planned discharge setting  See evaluation for individualized goals  Description  Treatment Interventions: ADL retraining, Functional transfer training, Endurance training, Patient/family training, Equipment evaluation/education, Compensatory technique education, Continued evaluation, Energy conservation, Activityengagement          See flowsheet documentation for full assessment, interventions and recommendations  Note:   Limitation: Decreased ADL status, Decreased endurance, Decreased self-care trans, Decreased high-level ADLs  Prognosis: Good  Assessment: Pt cooperative and agreeable to skilled OT services with focus on improving functional mobility and self care skills  Pt seated in recliner upon start of session with no c/o pain  Pt performed STS with vc for hand placement  Pt reported no dizziness in stance at RW, however pt required vc's for correct postitioning of self within RW with pt tendency to place RW too far in front   Pt also required vc's for pacing of activities with reminders to slow pace for increased safety  Pt demonstrated good activity tolerance during presented tasks  Pt reviewed/ performed HEP to assist with increasing UB strength  Pt  educated in energy conservation techniques and safe mobility technique  Pt performance demonstrated good carryover of learned techniques and strategies to facilitate safety during self care and daily routine, however pt continues to demonstrate deficits in the areas of safety awareness and functional mobility  Pt would continue to benefit from skilled OT POC to facilitate return to PLOF        OT Discharge Recommendation: Home OT  OT - OK to Discharge: Yes(when medically cleared)

## 2020-01-21 NOTE — PROGRESS NOTES
[facility] Internal Medicine Progress Note  Patient: Darrell Mcbride 80 y o  male   MRN: [id number]  PCP: Alice Rosales DO  Unit/Bed#: -[facility] Encounter: [id number]  Date Of Visit: 01/21/20          * Cellulitis  Assessment & Plan  Right lower leg mostly  He also has some redness of his left lower leg  Continue with IV antibiotic  Hopefully would be transitioned to oral soon  His skin is also  Dry  Ordered lotion  Also advised patient to keep it moist at home    History of pulmonary embolism  Assessment & Plan  Currently on Coumadin  INR above 3  Would give him somewhat less Coumadin today because there may be some interaction between Coumadin/antibiotic    Hyperlipidemia  Assessment & Plan  · Continue with statin    Type 2 diabetes mellitus with diabetic polyneuropathy, without long-term current use of insulin [facility])  Assessment & Plan  Lab Results   Component Value Date    HGBA1C 6 3 01/14/2020       Recent Labs     01/20/20  1658 01/20/20  2047 01/21/20  0615 01/21/20  1120   POCGLU 109 177* 95 147*       Blood Sugar Average: Last 72 hrs:  (P) 132   Accu-Cheks are stable  Continue with current treatment including sliding scale    Essential hypertension  Assessment & Plan  Stable and continue with home medications    Chronic a-fib  Assessment & Plan  Continue with current treatment including beta-blocker    Also on anticoagulation with Coumadin    Adult hypothyroidism  Assessment & Plan  Continue with levothyroxine      Present on Admission:   Cellulitis   Adult hypothyroidism   Chronic a-fib   Essential hypertension   Type 2 diabetes mellitus with diabetic polyneuropathy, without long-term current use of insulin (HCC)   Hyperlipidemia   Chronic combined systolic (congestive) and diastolic (congestive) heart failure (HCC)   Stage 3 chronic kidney disease (HCC)   History of pulmonary embolism            VTE Pharmacologic Prophylaxis:   Pharmacologic: Warfarin (Coumadin)  Mechanical VTE Prophylaxis in Place:  No because of cellulitis and very dry skin    Patient Centered Rounds: I have performed bedside rounds with nursing staff today  Discussions with Specialists or Other Care Team Provider:  Yes    Education and Discussions with Family / Patient:  Yes    Time Spent for Care: 33+ minutes  More than 50% of total time spent on counseling and coordination of care as described above  Current Length of Stay: 1 day(s)    Current Patient Status: Inpatient   Certification Statement: The patient will continue to require additional inpatient hospital stay due to IV antibiotic    Discharge Plan:  Home when stable    Code Status: Level 1 - Full Code      Subjective:   Feels better  Has been walking on the floor with a walker  Has less swelling and redness of his right lower leg for which he came into the hospital   No fever or chills  No nausea, vomiting, diarrhea    Objective:     Vitals:   Temp (24hrs), Av 7 °F (36 5 °C), Min:97 2 °F (36 2 °C), Max:98 1 °F (36 7 °C)    Temp:  [97 2 °F (36 2 °C)-98 1 °F (36 7 °C)] 98 1 °F (36 7 °C)  HR:  [81-88] 81  Resp:  [18] 18  BP: (138-162)/(77-95) 152/95  SpO2:  [99 %-100 %] 100 %  Body mass index is 24 6 kg/m²  Input and Output Summary (last 24 hours): Intake/Output Summary (Last 24 hours) at 2020 1540  Last data filed at 2020 1300  Gross per 24 hour   Intake 1320 ml   Output    Net 1320 ml           Physical Exam:     Vital signs reviewed as above  S1 and S2 audible  Neck is supple  Clinically, does not appear to fluid overloaded  Conjunctiva slightly pale  Chest mostly clear to auscultation  Abdomen is soft  Nontender  Bowel sounds are audible  Has very dry skin of his legs  Has cellulitic skin changes specially of his right lower leg as compared to the left leg  Mood and affect are pleasant  Awake and alert    Oriented x3  Has chronic arthritic joints  Oropharynx are hydrated      Additional Data:     Labs:    Results from last 7 days   Lab Units 01/20/20  0440   WBC Thousand/uL 9 98   HEMOGLOBIN g/dL 11 6*   HEMATOCRIT % 37 4   PLATELETS Thousands/uL 306   NEUTROS PCT % 74   LYMPHS PCT % 14   MONOS PCT % 8   EOS PCT % 3     Results from last 7 days   Lab Units 01/21/20  0541  01/19/20  2308   POTASSIUM mmol/L 4 0   < > 3 7   CHLORIDE mmol/L 104   < > 105   CO2 mmol/L 30   < > 29   BUN mg/dL 22   < > 23   CREATININE mg/dL 1 34*   < > 1 47*   CALCIUM mg/dL 8 7   < > 7 8*   ALK PHOS U/L  --   --  137*   ALT U/L  --   --  57   AST U/L  --   --  43    < > = values in this interval not displayed  Results from last 7 days   Lab Units 01/21/20  0541   INR  3 06*       * I Have Reviewed All Lab Data Listed Above  * Additional Pertinent Lab Tests Reviewed: All Labs Within Last 24 Hours Reviewed      Recent Cultures (last 7 days):     Results from last 7 days   Lab Units 01/19/20  2309   BLOOD CULTURE  No Growth at 24 hrs  No Growth at 24 hrs         Last 24 Hours Medication List:     Current Facility-Administered Medications:  ammonium lactate  Topical BID Chrystal Hopkins MD   atorvastatin 10 mg Oral HS Juni Christina, PA-C   bisacodyl 10 mg Rectal Daily PRN Juni Christina, PA-C   calcium carbonate 1,000 mg Oral Daily PRN Juni Christina, PA-C   cefazolin 1,000 mg Intravenous Q8H Tish Cotto MD   colchicine 0 6 mg Oral Daily Juni Christina, PA-C   donepezil 5 mg Oral HS Juni Christina, PA-C   furosemide 20 mg Oral QPM Juni [place], PA-C   gabapentin 100 mg Oral TID Juni Christina, PA-C   insulin lispro 1-5 Units Subcutaneous HS Juni Christina, PA-C   insulin lispro 1-6 Units Subcutaneous TID AC Juni Christina, PA-C   levothyroxine 100 mcg Oral Early Morning Juni [place], PA-C   lisinopril 2 5 mg Oral Daily Juni Christina, PA-C   loratadine 10 mg Oral Daily Juni Christina, PA-C   metoprolol tartrate 50 mg Oral Q12H [facility] Juni [place], PA-C   ondansetron 4 mg Intravenous Q6H PRN MILVIA Bills-MELISSA   potassium chloride 40 mEq Oral Daily Aimee Escobedo PA-C   warfarin 5 mg Oral Once per day on Mon Wed Fri Sat Aimee Escobedo PA-C   warfarin 5 mg Oral Once (warfarin) Kehinde Russell MD        Today, Patient Was Seen By: Kehinde Russell MD    ** Please Note: Dragon 360 Dictation voice to text software may have been used in the creation of this document   **

## 2020-01-21 NOTE — OCCUPATIONAL THERAPY NOTE
OCCUPATIONAL THERAPY TREATMENT  NOTE         01/21/20 4100   Restrictions/Precautions   Weight Bearing Precautions Per Order No   Other Precautions Multiple lines;Telemetry; Fall Risk   Pain Assessment   Pain Assessment No/denies pain   Pain Score No Pain   Bed Mobility   Additional Comments Rec'd OOB   Transfers   Sit to Stand 4  Minimal assistance  (CGA/ cues for self RW position)   Additional items Assist x 1; Armrests; Increased time required;Verbal cues   Stand to Sit 4  Minimal assistance  (CGA/ Vc's for hand placement )   Additional items Assist x 1; Armrests; Increased time required;Verbal cues   Functional Mobility   Functional Mobility 4  Minimal assistance   Additional items Rolling walker   Cognition   Overall Cognitive Status WFL   Arousal/Participation Alert; Cooperative   Attention Within functional limits   Orientation Level Oriented X4   Memory Within functional limits   Following Commands Follows all commands and directions without difficulty   Comments Pt agreeable to skilled OT   Additional Activities   Additional Activities Other (Comment)   Additional Activities Comments Ed  on proper postioning of self within RW   Activity Tolerance   Activity Tolerance Patient tolerated treatment well   Medical Staff Made Aware LARS Lerner made aware   Assessment   Assessment Pt cooperative and agreeable to skilled OT services with focus on improving functional mobility and self care skills  Pt seated in recliner upon start of session with no c/o pain  Pt performed STS with vc for hand placement  Pt reported no dizziness in stance at RW, however pt required vc's for correct postitioning of self within RW with pt tendency to place RW too far in front   Pt also required vc's for pacing of activities with reminders to slow pace for increased safety  Pt demonstrated good activity tolerance during presented tasks  Pt reviewed/ performed HEP to assist with increasing UB strength  Pt  educated in energy conservation techniques and safe mobility technique  Pt performance demonstrated good carryover of learned techniques and strategies to facilitate safety during self care and daily routine, however pt continues to demonstrate deficits in the areas of safety awareness and functional mobility  Pt would continue to benefit from skilled OT POC to facilitate return to PLOF  Plan   Treatment Interventions ADL retraining;Visual perceptual retraining;Functional transfer training;UE strengthening/ROM; Endurance training;Patient/family training;Equipment evaluation/education; Fine motor coordination activities; Compensatory technique education;Continued evaluation; Energy conservation; Activityengagement   Goal Expiration Date 02/03/20   OT Frequency 3-5x/wk   Recommendation   OT Discharge Recommendation Home OT   OT - OK to Discharge Yes  (when medically cleared)                                                     JOSH Hernandez/L

## 2020-01-22 VITALS
WEIGHT: 177.25 LBS | TEMPERATURE: 97.3 F | HEART RATE: 93 BPM | OXYGEN SATURATION: 100 % | DIASTOLIC BLOOD PRESSURE: 97 MMHG | HEIGHT: 71 IN | SYSTOLIC BLOOD PRESSURE: 163 MMHG | RESPIRATION RATE: 18 BRPM | BODY MASS INDEX: 24.81 KG/M2

## 2020-01-22 LAB
ATRIAL RATE: 357 BPM
GLUCOSE SERPL-MCNC: 119 MG/DL (ref 65–140)
GLUCOSE SERPL-MCNC: 92 MG/DL (ref 65–140)
INR PPP: 2.55 (ref 0.84–1.19)
PROTHROMBIN TIME: 27.8 SECONDS (ref 11.6–14.5)
QRS AXIS: -32 DEGREES
QRSD INTERVAL: 88 MS
QT INTERVAL: 414 MS
QTC INTERVAL: 462 MS
T WAVE AXIS: 56 DEGREES
VENTRICULAR RATE: 75 BPM

## 2020-01-22 PROCEDURE — 97530 THERAPEUTIC ACTIVITIES: CPT

## 2020-01-22 PROCEDURE — 82948 REAGENT STRIP/BLOOD GLUCOSE: CPT

## 2020-01-22 PROCEDURE — 85610 PROTHROMBIN TIME: CPT | Performed by: INTERNAL MEDICINE

## 2020-01-22 PROCEDURE — 97129 THER IVNTJ 1ST 15 MIN: CPT

## 2020-01-22 PROCEDURE — 99239 HOSP IP/OBS DSCHRG MGMT >30: CPT | Performed by: INTERNAL MEDICINE

## 2020-01-22 PROCEDURE — 99232 SBSQ HOSP IP/OBS MODERATE 35: CPT | Performed by: INTERNAL MEDICINE

## 2020-01-22 PROCEDURE — 93010 ELECTROCARDIOGRAM REPORT: CPT | Performed by: INTERNAL MEDICINE

## 2020-01-22 RX ORDER — CEPHALEXIN 500 MG/1
500 CAPSULE ORAL EVERY 8 HOURS SCHEDULED
Qty: 21 CAPSULE | Refills: 0 | Status: SHIPPED | OUTPATIENT
Start: 2020-01-22 | End: 2020-01-29

## 2020-01-22 RX ORDER — CEPHALEXIN 500 MG/1
500 CAPSULE ORAL EVERY 8 HOURS SCHEDULED
Status: DISCONTINUED | OUTPATIENT
Start: 2020-01-22 | End: 2020-01-22 | Stop reason: HOSPADM

## 2020-01-22 RX ORDER — AMMONIUM LACTATE 12 G/100G
LOTION TOPICAL 2 TIMES DAILY
Qty: 400 G | Refills: 0 | Status: SHIPPED | OUTPATIENT
Start: 2020-01-22 | End: 2020-05-22 | Stop reason: SDUPTHER

## 2020-01-22 RX ADMIN — GABAPENTIN 100 MG: 100 CAPSULE ORAL at 07:38

## 2020-01-22 RX ADMIN — Medication: at 07:42

## 2020-01-22 RX ADMIN — LORATADINE 10 MG: 10 TABLET ORAL at 07:41

## 2020-01-22 RX ADMIN — COLCHICINE 0.6 MG: 0.6 TABLET, FILM COATED ORAL at 07:38

## 2020-01-22 RX ADMIN — LEVOTHYROXINE SODIUM 100 MCG: 100 TABLET ORAL at 05:11

## 2020-01-22 RX ADMIN — LISINOPRIL 2.5 MG: 2.5 TABLET ORAL at 07:41

## 2020-01-22 RX ADMIN — METOPROLOL TARTRATE 50 MG: 50 TABLET, FILM COATED ORAL at 07:41

## 2020-01-22 RX ADMIN — CEFAZOLIN SODIUM 1000 MG: 1 SOLUTION INTRAVENOUS at 07:31

## 2020-01-22 RX ADMIN — CEPHALEXIN 500 MG: 500 CAPSULE ORAL at 13:58

## 2020-01-22 RX ADMIN — POTASSIUM CHLORIDE 40 MEQ: 1500 TABLET, EXTENDED RELEASE ORAL at 07:38

## 2020-01-22 NOTE — PLAN OF CARE
Problem: Potential for Falls  Goal: Patient will remain free of falls  Description  INTERVENTIONS:  - Assess patient frequently for physical needs  -  Identify cognitive and physical deficits and behaviors that affect risk of falls  -  [place] fall precautions as indicated by assessment   - Educate patient/family on patient safety including physical limitations  - Instruct patient to call for assistance with activity based on assessment  - Modify environment to reduce risk of injury  - Consider OT/PT consult to assist with strengthening/mobility  Outcome: Adequate for Discharge   Patient ambulating TID in halls, modified independent with steady gait

## 2020-01-22 NOTE — SOCIAL WORK
Reps from [place] approved patient to return to facility  CM was informed that they are unable to provide transportation at this time  CM provided reps w/ copy of clinical information  CM spoke with pt's daughter Nichole Yip)  She will be able to to provide transportation and can arrive at [facility] at 3 PM     RN notified  ROSALBA to follow patient through discharge

## 2020-01-22 NOTE — DISCHARGE SUMMARY
Discharge Summary - [facility] Internal Medicine    Patient Information: Jeanelle Opitz 80 y o  male MRN: [id number]  Unit/Bed#: -01 Encounter: [id number]    Discharging Physician / Practitioner: Bette Pickens MD  PCP: Rick Hendricks DO  Admission Date: 1/19/2020  Discharge Date: 01/22/20    Reason for Admission:  Cellulitis right lower extremity    Discharge Diagnoses:     Principal Problem:    Cellulitis right lower extremity  Active Problems:    Chronic combined systolic (congestive) and diastolic (congestive) heart failure ([address] )    Adult hypothyroidism    Chronic a-fib    Essential hypertension    Type 2 diabetes mellitus with diabetic polyneuropathy, without long-term current use of insulin (HCC)    Hyperlipidemia    History of pulmonary embolism    Stage 3 chronic kidney disease (HCC)    Chronic anticoagulation  Resolved Problems:    * No resolved hospital problems  *    Present on Admission:   Cellulitis-right lower extremity   Adult hypothyroidism   Chronic a-fib   Essential hypertension   Type 2 diabetes mellitus with diabetic polyneuropathy, without long-term current use of insulin (HCC)   Hyperlipidemia   Chronic combined systolic (congestive) and diastolic (congestive) heart failure (HCC)   Stage 3 chronic kidney disease ([address] )   History of pulmonary embolism    Consultations During Hospital Stay:  · Infectious disease consult    Procedures Performed:     · Lower extremities ultrasound negative DVT    Significant Findings:     · As above    Incidental Findings:   · As above     Test Results Pending at Discharge (will require follow up): · None     Outpatient Tests Requested:  · None    Complications:  None    Hospital Course:     Jeanelle Opitz is a 80 y o  male patient who originally presented to the hospital on 1/19/2020 due to redness and swelling of his right lower leg  He was found to have cellulitis and treated with IV antibiotic  Seen by Infectious Disease consultant    His antibiotics have been changed oral now  He would continue oral antibiotics for few more days  Advised him to keep his legs elevated  He also has very dry skin and encouraged to put some lotion on his dry skin especially of his lower extremities as dry skin would make his skin more vulnerable to cracks and infection  His INR today is 2 55  He would resume his home regimen of Coumadin  Also resume his other home medications on discharge  Condition at Discharge: good     Discharge Day Visit / Exam:     Subjective:  He feels better  Decreasing lower extremities edema and redness especially of his right lower leg after putting lotion on  Vitals: Blood Pressure: 163/97 (01/22/20 0740)  Pulse: 93 (01/22/20 0740)  Temperature: (!) 97 3 °F (36 3 °C) (01/22/20 0740)  Temp Source: Oral (01/21/20 0737)  Respirations: 18 (01/21/20 0737)  Height: 5' 11" (180 3 cm) (01/19/20 2129)  Weight - Scale: 80 4 kg (177 lb 4 oz) (01/22/20 0600)  SpO2: 100 % (01/22/20 0742)  Exam:        Vital signs are reviewed as above  Improving right lower extremity cellulitic skin changes  Also skin is less dry with lotion on it  S1 and S2 audible  Awake and alert  Oriented x3  Chest clear to auscultation  Mood and affect are pleasant  Abdomen is soft  Nontender  Bowel sounds are audible  Patient has been walking around on the floor with a walker  He is in good spirits and eager to go back to his facility [facility]              Discharge instructions/Information to patient and family:   See after visit summary for information provided to patient and family  Provisions for Follow-Up Care:  See after visit summary for information related to follow-up care and any pertinent home health orders        Disposition:     Other: Kellee dolan    For [employer] to [facility] SNF:   · Not Applicable to this Patient - Not Applicable to this Patient    Planned Readmission:  None     Discharge Statement:  I spent more than 35 minutes discharging the patient  This time was spent on the day of discharge  I had direct contact with the patient on the day of discharge  Greater than 50% of the total time was spent examining patient, answering all patient questions, arranging and discussing plan of care with patient as well as directly providing post-discharge instructions  Additional time then spent on discharge activities  Discharge Medications:  See after visit summary for reconciled discharge medications provided to patient and family  ** Please Note: Dragon 360 Dictation voice to text software may have been used in the creation of this document   **

## 2020-01-22 NOTE — SOCIAL WORK
ROSALBA met w/ pt to discuss recliner request  Pt was concerned that he won't have a chair he can sit in when he returns to [facility] and can't remember if he has a recliner in his room at that facility  CM informed patient that he will reach out to  Tianna Enriquez in order to ensure that patient has necessary equipment upon return to facility  ROSALBA left  for Areli at [phone]  Waiting callback

## 2020-01-22 NOTE — OCCUPATIONAL THERAPY NOTE
OCCUPATIONAL THERAPY TREATMENT  NOTE       01/22/20 0955   Restrictions/Precautions   Weight Bearing Precautions Per Order No   Other Precautions Multiple lines;Telemetry; Fall Risk   Pain Assessment   Pain Assessment No/denies pain   Pain Score No Pain   Bed Mobility   Additional Comments pt rec'd OOB   Transfers   Sit to Stand 5  Supervision   Additional items Armrests; Increased time required;Verbal cues   Stand to Sit 5  Supervision   Additional items Assist x 1; Armrests; Increased time required;Verbal cues   Functional Mobility   Functional Mobility 5  Supervision   Additional items Rolling walker  (prompts to not lift RW off ground during amb )   Cognition   Overall Cognitive Status [facility]   Arousal/Participation Alert; Cooperative   Attention Within functional limits   Orientation Level Oriented X4   Memory Within functional limits   Following Commands Follows all commands and directions without difficulty   Comments pt agreeable to skilled OT tx   Cognition Assessment Tools MOCA   Score   (23/30)   Additional Activities   Additional Activities Other (Comment)   Additional Activities Comments Ed  provided on safe ambulation technique with RW,memory strategies  Activity Tolerance   Activity Tolerance Patient tolerated treatment well   Medical Staff Made Aware LARS billings made aware   Assessment   Assessment Pt cooperative and agreeable to skilled OT services with focus on improving safety and independence during  functional mobility and self care skills  Pt seated in recliner upon start of session with no report of pain  Sit <>stand at [facility] with no dizziness reported/ LOB noted  Pt required reminders that he does not need to lift RW off ground while ambulating forward   Pt states that he is used to standard walker and sometimes forgets that lifting walker off the ground is not neccessary with RW  Vc's for correct  positioning of self within RW  Pt sit <>stand with S and reminder for hand placement  Pt was administered The [place] Cognitive Assessment ( MoCA)  The MoCA is a rapid early detection tool for possible mild cognitive impairments which could adversely affect patient's judgement and safety  Pt scored 23/30 indicating a mild cognitive impairment  See detail below  Education provided on strategies to assist with memory in home environment  Pt demonstrated good activity tolerance during presented tasks  Pt performance demonstrated inconsistent carryover of learned techniques and strategies to facilitate safety during self care and daily routine, however pt continues to demonstrate deficits in the areas of safety awareness and functional mobility  Pt would continue to benefit from skilled OT POC to facilitate return to PLOF  Plan   Treatment Interventions ADL retraining;Visual perceptual retraining;Functional transfer training;UE strengthening/ROM; Endurance training;Cognitive reorientation;Patient/family training;Equipment evaluation/education; Fine motor coordination activities; Compensatory technique education;Continued evaluation; Energy conservation; Activityengagement   Goal Expiration Date 20   OT Frequency 3-5x/wk   Recommendation   OT Discharge Recommendation Home OT   OT - OK to Discharge Yes  (when medically cleared)       PT SEEN FOR ANILA COGNITIVE ASSESSMENT  SCORED  23/30 INDICATING MILD COGNITIVE IMPAIRMENT FOR AGE/EDUCATION    SCORES ARE AS FOLLOWS:    VISUOSPATIAL/EXECUTIVE FUNCTION: 4/5    NAMIN/3    ATTENTION: 5/6    LANGUAGE: 0/3    ABSTRACTION: 2/2    DELAYED RECALL: 4/5    ORIENTATION: 5/6    +1 for education </= 12 years                                                JOSH Hernandez/THO

## 2020-01-22 NOTE — SOCIAL WORK
CM spoke with [facility]  Pt discharged  Pt spoke with Yasmany Dowell at [facility], sending reps to approve patient to return to the facility  Yasmany Dowell requested clinicals and any new Rx be faxed to [phone] by 3 PM - information faxed

## 2020-01-22 NOTE — PROGRESS NOTES
Progress Note - Infectious Disease   Dyllan Salcedo 80 y o  male MRN: [id number]  Unit/Bed#: -01 Encounter: [id number]      Impression/Recommendations:  1  Bilateral leg cellulitis, more prominent right  Source is most likely the multiple superficial ulcers  Patient is clinically much improved  Leg pain and erythema are improved  Temperature stays down  WBC has normalized  Patient remains systemically well  Admission blood cultures remain negative  Change antibiotic to p o  Keflex  Serial leg exams  Monitor temperature/WBC  Follow-up on pending blood cultures  Treat x7 days total, another 40      2  Venous stasis with poorly controlled leg edema  Leg edema is much improved with leg elevation overnight  Keep leg elevation at assisted living to control edema      3  CKD stage 3  Creatinine baseline  Antibiotic dosage adjusted accordingly  Monitor creatinine      4  DM, with reasonably well control blood sugar  Management per primary service      Discussed with patient in detail regarding the above plan  Discussed with Dr Jonn Chavez from [facility] service  Okay for discharge from ID viewpoint      Antibiotics:  Cefazolin # 3      Subjective:  Patient feels well  Pain in right leg much improved, minimal now  No pain in left leg  Temperature stays down  No chills  He is tolerating antibiotic well  No nausea, vomiting or diarrhea  Objective:  Vitals:  Temp:  [97 3 °F (36 3 °C)-98 1 °F (36 7 °C)] 97 3 °F (36 3 °C)  HR:  [78-93] 93  BP: (131-163)/(81-97) 163/97  SpO2:  [97 %-100 %] 100 %  Temp (24hrs), Av 6 °F (36 4 °C), Min:97 3 °F (36 3 °C), Max:98 1 °F (36 7 °C)  Current: Temperature: (!) 97 3 °F (36 3 °C)    Physical Exam:     General: Awake, alert, cooperative, no distress  Neck:  Supple  No mass  No lymphadenopathy  Lungs: Expansion symmetric, no rales, no wheezing, respirations unlabored  Heart:  Regular rate and rhythm, S1 and S2 normal, no murmur     Abdomen: Soft, nondistended, non-tender, bowel sounds active all four quadrants,        no masses, no organomegaly  Extremities: Much improved leg edema  Much improved erythema/warmth  Stable chronic superficial ulcers, without drainage  Minimal tenderness in right leg  No tenderness in left leg  Skin:  No rash  Neuro: Moves all extremities  Invasive Devices     Peripheral Intravenous Line            Peripheral IV 01/19/20 Right Antecubital 2 days                Labs studies:   I have personally reviewed pertinent labs  Results from last 7 days   Lab Units 01/21/20  0541 01/20/20  0440 01/19/20  2308   POTASSIUM mmol/L 4 0 3 4* 3 7   CHLORIDE mmol/L 104 106 105   CO2 mmol/L 30 32 29   BUN mg/dL 22 19 23   CREATININE mg/dL 1 34* 1 38* 1 47*   EGFR ml/min/1 73sq m 47 46 42   CALCIUM mg/dL 8 7 8 3 7 8*   AST U/L  --   --  43   ALT U/L  --   --  57   ALK PHOS U/L  --   --  137*     Results from last 7 days   Lab Units 01/20/20  0440 01/19/20  2308   WBC Thousand/uL 9 98 10 78*   HEMOGLOBIN g/dL 11 6* 12 2   PLATELETS Thousands/uL 306 335     Results from last 7 days   Lab Units 01/19/20  2309   BLOOD CULTURE  No Growth at 48 hrs  No Growth at 48 hrs  Imaging Studies:   I have personally reviewed pertinent imaging study reports and images in PACS  EKG, Pathology, and Other Studies:   I have personally reviewed pertinent reports

## 2020-01-22 NOTE — PLAN OF CARE
Problem: OCCUPATIONAL THERAPY ADULT  Goal: Performs self-care activities at highest level of function for planned discharge setting  See evaluation for individualized goals  Description  Treatment Interventions: ADL retraining, Functional transfer training, Endurance training, Patient/family training, Equipment evaluation/education, Compensatory technique education, Continued evaluation, Energy conservation, Activityengagement          See flowsheet documentation for full assessment, interventions and recommendations  Outcome: Progressing  Note:   Limitation: Decreased ADL status, Decreased endurance, Decreased self-care trans, Decreased high-level ADLs  Prognosis: Good  Assessment: Pt cooperative and agreeable to skilled OT services with focus on improving safety and independence during  functional mobility and self care skills  Pt seated in recliner upon start of session with no report of pain  Sit <>stand at [facility] with no dizziness reported/ LOB noted  Pt required reminders that he does not need to lift RW off ground while ambulating forward   Pt states that he is used to standard walker and sometimes forgets that lifting walker off the ground is not neccessary with RW  Vc's for correct  positioning of self within RW  Pt sit <>stand with S and reminder for hand placement  Pt was administered The [place] Cognitive Assessment ( MoCA)  The MoCA is a rapid early detection tool for possible mild cognitive impairments which could adversely affect patient's judgement and safety  Pt scored 23/30 indicating a mild cognitive impairment  See detail below  Education provided on strategies to assist with memory in home environment  Pt demonstrated good activity tolerance during presented tasks   Pt performance demonstrated inconsistent carryover of learned techniques and strategies to facilitate safety during self care and daily routine, however pt continues to demonstrate deficits in the areas of safety awareness and functional mobility  Pt would continue to benefit from skilled OT POC to facilitate return to PLOF        OT Discharge Recommendation: Home OT  OT - OK to Discharge: Yes(when medically cleared)

## 2020-01-23 ENCOUNTER — TRANSITIONAL CARE MANAGEMENT (OUTPATIENT)
Dept: INTERNAL MEDICINE CLINIC | Facility: CLINIC | Age: 85
End: 2020-01-23

## 2020-01-23 ENCOUNTER — TELEPHONE (OUTPATIENT)
Dept: INTERNAL MEDICINE CLINIC | Facility: CLINIC | Age: 85
End: 2020-01-23

## 2020-01-23 NOTE — TELEPHONE ENCOUNTER
Brianryan Yimi called to cancel pts TCM appt w/Elizabeth for 2/4-patient only wants to see [facility] and rather wait til 2/18 appt    Please print and give to Corin Booker, not sure if she can get patient in for a 15min TCM w/Kalinaimee    Call back # [phone]

## 2020-01-25 LAB
BACTERIA BLD CULT: NORMAL
BACTERIA BLD CULT: NORMAL

## 2020-01-27 ENCOUNTER — TELEPHONE (OUTPATIENT)
Dept: INTERNAL MEDICINE CLINIC | Facility: CLINIC | Age: 85
End: 2020-01-27

## 2020-01-27 DIAGNOSIS — L98.9 SKIN ABNORMALITIES: ICD-10-CM

## 2020-01-28 ENCOUNTER — TELEPHONE (OUTPATIENT)
Dept: INTERNAL MEDICINE CLINIC | Facility: CLINIC | Age: 85
End: 2020-01-28

## 2020-01-28 ENCOUNTER — OFFICE VISIT (OUTPATIENT)
Dept: NEUROLOGY | Facility: CLINIC | Age: 85
End: 2020-01-28
Payer: MEDICARE

## 2020-01-28 VITALS
HEART RATE: 68 BPM | SYSTOLIC BLOOD PRESSURE: 142 MMHG | WEIGHT: 178 LBS | BODY MASS INDEX: 24.92 KG/M2 | HEIGHT: 71 IN | DIASTOLIC BLOOD PRESSURE: 86 MMHG

## 2020-01-28 DIAGNOSIS — E11.42 TYPE 2 DIABETES MELLITUS WITH DIABETIC POLYNEUROPATHY, WITHOUT LONG-TERM CURRENT USE OF INSULIN (HCC): Primary | Chronic | ICD-10-CM

## 2020-01-28 DIAGNOSIS — G31.84 MILD COGNITIVE IMPAIRMENT: ICD-10-CM

## 2020-01-28 PROCEDURE — 99214 OFFICE O/P EST MOD 30 MIN: CPT | Performed by: PSYCHIATRY & NEUROLOGY

## 2020-01-28 RX ORDER — GABAPENTIN 100 MG/1
100 CAPSULE ORAL 2 TIMES DAILY
Qty: 90 CAPSULE | Refills: 11 | Status: SHIPPED | OUTPATIENT
Start: 2020-01-28 | End: 2020-06-24

## 2020-01-28 RX ORDER — DONEPEZIL HYDROCHLORIDE 10 MG/1
5 TABLET, FILM COATED ORAL
Qty: 30 TABLET | Refills: 6 | Status: SHIPPED | OUTPATIENT
Start: 2020-01-28 | End: 2020-01-29 | Stop reason: SDUPTHER

## 2020-01-28 NOTE — PROGRESS NOTES
Progress Note - Neurology   Anastasio Angelucci 80 y o  male MRN: [id number]  Unit/Bed#:  Encounter: [id number]      Subjective:   Patient is here for a follow-up visit and has been living in a personal care home at this time seems extremely content and recently was also hospitalized for cellulitis of the right leg  Patient suffers from diabetic polyneuropathy as well as mild cognitive impairment and remains on gabapentin and Aricept  His daughter reports further decline with his short-term memory since his last visit and patient also has persistent skin changes in both his legs including swelling of his legs  He denies any negative sensory symptoms such as burning pain or worsening tingling or numbness in his feet  His blood sugars are under better control at this time  ROS:   Review of Systems   Constitutional: Negative  Negative for appetite change and fever  HENT: Negative  Negative for hearing loss, tinnitus, trouble swallowing and voice change  Eyes: Negative  Negative for photophobia and pain  Respiratory: Negative  Negative for shortness of breath  Cardiovascular: Negative  Negative for palpitations  Gastrointestinal: Negative  Negative for nausea and vomiting  Endocrine: Negative  Negative for cold intolerance and heat intolerance  Genitourinary: Negative  Negative for dysuria, frequency and urgency  Musculoskeletal: Negative  Negative for myalgias and neck pain  Skin: Negative  Negative for rash  Neurological: Negative  Negative for dizziness, tremors, seizures, syncope, facial asymmetry, speech difficulty, weakness, light-headedness, numbness and headaches  Hematological: Bruises/bleeds easily  Psychiatric/Behavioral: Negative  Negative for confusion, hallucinations and sleep disturbance  Vitals: There were no vitals filed for this visit  ,There is no height or weight on file to calculate BMI      MEDS:      Current Outpatient Medications:     acetaminophen (TYLENOL) 325 mg tablet, Take 2 tablets (650 mg total) by mouth every 8 (eight) hours (Patient taking differently: Take 650 mg by mouth every 6 (six) hours as needed ), Disp: 30 tablet, Rfl: 0    ammonium lactate (LAC-HYDRIN) 12 % lotion, Apply topically 2 (two) times a day, Disp: 400 g, Rfl: 0    atorvastatin (LIPITOR) 10 mg tablet, 1 TAB ORALLY AT BEDTIME DX:HYPERLIPIDEMIA, Disp: 28 tablet, Rfl: 5    CALMOSEPTINE 0 44-20 6 % OINT, Apply topically 2 (two) times a day as needed (to buttock/sacrum), Disp: 71 g, Rfl: 3    cephalexin (KEFLEX) 500 mg capsule, Take 1 capsule (500 mg total) by mouth every 8 (eight) hours for 7 days, Disp: 21 capsule, Rfl: 0    colchicine (COLCRYS) 0 6 mg tablet, Take 0 6 mg by mouth daily, Disp: , Rfl:     donepezil (ARICEPT) 5 mg tablet, 1 TAB ORALLY AT BEDTIME DX: DEMENTIA, Disp: 28 tablet, Rfl: 5    fluticasone (FLONASE) 50 mcg/act nasal spray, 2 sprays into each nostril daily For allergies (Patient taking differently: 2 sprays into each nostril as needed For allergies), Disp: 16 g, Rfl: 3    Folic Acid-Vit L1-CPQ Z39 (FOLBEE) 2 5-25-1 MG TABS, Take by mouth, Disp: , Rfl:     furosemide (LASIX) 20 mg tablet, 1 TAB ORALLY EVERY EVENING DX: EDEMA, Disp: 28 tablet, Rfl: 0    furosemide (LASIX) 40 mg tablet, Take 1 tablet (40 mg total) by mouth daily, Disp: 30 tablet, Rfl: 11    gabapentin (NEURONTIN) 100 mg capsule, Take 1 capsule (100 mg total) by mouth 3 (three) times a day Dx: Neuropathy, Disp: 90 capsule, Rfl: 11    glipiZIDE (GLUCOTROL XL) 5 mg 24 hr tablet, Take 1 tablet (5 mg total) by mouth daily, Disp: 30 tablet, Rfl: 11    glucose blood (TRUE METRIX BLOOD GLUCOSE TEST) test strip, Patient to test once daily DX code E11 8, Disp: 100 each, Rfl: 5    hydrOXYzine HCL (ATARAX) 10 mg tablet, Take 1 tablet (10 mg total) by mouth 2 (two) times a day as needed for itching, Disp: 30 tablet, Rfl: 0    levothyroxine 100 mcg tablet, 1 TAB ORALLY EVERY MORNING DX: HYPOTHYROIDISM, Disp: 28 tablet, Rfl: 5    Lidocaine-Menthol (ICY HOT LIDOCAINE PLUS MENTHOL EX), Apply topically, Disp: , Rfl:     lisinopril (ZESTRIL) 2 5 mg tablet, Take 1 tablet (2 5 mg total) by mouth daily, Disp: 90 tablet, Rfl: 3    loratadine (CLARITIN) 10 mg tablet, Take 1 tablet (10 mg total) by mouth daily For allergies, Disp: 30 tablet, Rfl: 5    metoprolol tartrate (LOPRESSOR) 50 mg tablet, 1 TAB ORALLY TWICE DAILY DX: HYPERTENSION, Disp: 56 tablet, Rfl: 5    Misc  Devices (BED WEDGE) MISC, by Does not apply route daily, Disp: 2 each, Rfl: 0    nystatin-triamcinolone (MYCOLOG-II) cream, APPLY TOPICALLY TO AFFECTED AREA TWICE DAILY DX: CANDIDIASIS, Disp: 30 g, Rfl: 5    potassium chloride (K-DUR,KLOR-CON) 20 mEq tablet, 2 TABS (40MEQ) ORALLY EVERY MORNING DX:ELECTROLYTE DEFICIENCY, Disp: 56 tablet, Rfl: 0    warfarin (COUMADIN) 2 5 mg tablet, , Disp: , Rfl:     warfarin (COUMADIN) 5 mg tablet, Take 1 tablet (5 mg total) by mouth daily (Patient not taking: Reported on 1/28/2020), Disp: 30 tablet, Rfl: 11  : Physical Exam:  General appearance: alert, appears stated age and cooperative  Head: Normocephalic, without obvious abnormality, atraumatic    Clarence cognitive assessment score is 21/30 as compared to 27/30 at his previous visit  The rest of his neurological examination including cranial nerve, speech, motor and sensory exam remains essentially unchanged, patient has significant skin color changes in both lower extremities up to the knees, with  pedal edema  He ambulates with the help of a walker  Lab Results: I have personally reviewed pertinent reports  Imaging Studies: I have personally reviewed pertinent reports  Assessment:  1  Diabetic polyneuropathy  2  Mild cognitive impairment    Plan:  Patient is advised to lower the dose of gabapentin to 100 mg twice a day since the symptoms under control and gabapentin could be contributing to his pedal edema    He is also advised to increase the dose of Aricept to 10 mg daily, he does participate with mental stimulating exercises at his personal care facility and otherwise feels well  He will return back to see me in 4-5 months       1/28/2020,2:21 PM    Dictation voice to text software has been used in the creation of this document  Please consider this in light of any contextual or grammatical errors

## 2020-01-29 ENCOUNTER — TELEPHONE (OUTPATIENT)
Dept: NEUROLOGY | Facility: CLINIC | Age: 85
End: 2020-01-29

## 2020-01-29 ENCOUNTER — ANTICOAG VISIT (OUTPATIENT)
Dept: CARDIOLOGY CLINIC | Facility: CLINIC | Age: 85
End: 2020-01-29

## 2020-01-29 DIAGNOSIS — R60.0 BILATERAL LOWER EXTREMITY EDEMA: ICD-10-CM

## 2020-01-29 DIAGNOSIS — G31.84 MILD COGNITIVE IMPAIRMENT: ICD-10-CM

## 2020-01-29 LAB — INR PPP: 1.3 (ref 0.84–1.19)

## 2020-01-29 RX ORDER — DONEPEZIL HYDROCHLORIDE 10 MG/1
10 TABLET, FILM COATED ORAL
Qty: 30 TABLET | Refills: 6 | Status: SHIPPED | OUTPATIENT
Start: 2020-01-29 | End: 2020-06-24

## 2020-01-29 RX ORDER — FUROSEMIDE 40 MG/1
40 TABLET ORAL 2 TIMES DAILY
Qty: 180 TABLET | Refills: 0 | Status: SHIPPED | OUTPATIENT
Start: 2020-01-29 | End: 2020-03-12

## 2020-01-29 NOTE — TELEPHONE ENCOUNTER
Pharmacy called to clarify donepezil script  Script is sent for 10mg tablet - take 0 5 tablet (5mg) daily  Pharmacist states patient was told to increase dose to 10mg which would be a whole tablet  Please resend script to pharmacy with correct sig/directions

## 2020-01-29 NOTE — TELEPHONE ENCOUNTER
Call [phone] and [facility] asking if he was holding his Coumadin for some reason  Awaiting call back

## 2020-01-29 NOTE — PROGRESS NOTES
S/w Jair Batista  There was a mix up and pt was not started back on Coumadin when he left the hospital  Told her that per the dc instructions, he should have resume reg dose

## 2020-01-29 NOTE — TELEPHONE ENCOUNTER
I s/w David Paris  Pt was not on the Coumadin since he left the hospital  She does not know where the mix up came from Instructed to restart on 7 5mg Sun/Thurs and 5mg the rest and retest again in 1  Week  Also faxed the order to her with the instructions

## 2020-01-29 NOTE — TELEPHONE ENCOUNTER
Patient to increase furosemide to 40 mg twice a day  Check BMP next week  Please send a new prescription if necessary    Also please update the med list

## 2020-01-29 NOTE — TELEPHONE ENCOUNTER
S/w Cleo Melo she stated that yesterday patient's weight was 173 lbs and today weight is at 176 lbs  Denied any additional symptoms for patient  Patient takes Furosemide 40 mg in the morning and 20 mg in the evening

## 2020-01-29 NOTE — TELEPHONE ENCOUNTER
S/w Sebastian Cintron and verbally understood  Sebastian Cintron stated orders will have to be printed and faxed       Med pending

## 2020-01-30 ENCOUNTER — HOSPITAL ENCOUNTER (OUTPATIENT)
Dept: NON INVASIVE DIAGNOSTICS | Facility: CLINIC | Age: 85
Discharge: HOME | End: 2020-01-30
Payer: MEDICARE

## 2020-01-30 DIAGNOSIS — R07.9 CHEST PAIN: ICD-10-CM

## 2020-01-30 PROCEDURE — 93017 CV STRESS TEST TRACING ONLY: CPT

## 2020-01-30 PROCEDURE — 93016 CV STRESS TEST SUPVJ ONLY: CPT | Performed by: INTERNAL MEDICINE

## 2020-01-30 PROCEDURE — 78452 HT MUSCLE IMAGE SPECT MULT: CPT

## 2020-01-30 PROCEDURE — A9502 TC99M TETROFOSMIN: HCPCS

## 2020-01-30 PROCEDURE — 93018 CV STRESS TEST I&R ONLY: CPT | Performed by: INTERNAL MEDICINE

## 2020-01-30 PROCEDURE — 78452 HT MUSCLE IMAGE SPECT MULT: CPT | Performed by: INTERNAL MEDICINE

## 2020-01-30 RX ADMIN — REGADENOSON 0.4 MG: 0.08 INJECTION, SOLUTION INTRAVENOUS at 13:46

## 2020-01-31 ENCOUNTER — TELEPHONE (OUTPATIENT)
Dept: INTERNAL MEDICINE CLINIC | Facility: CLINIC | Age: 85
End: 2020-01-31

## 2020-01-31 LAB
CHEST PAIN STATEMENT: NORMAL
MAX DIASTOLIC BP: 88 MMHG
MAX HEART RATE: 162 BPM
MAX PREDICTED HEART RATE: 133 BPM
MAX. SYSTOLIC BP: 154 MMHG
PROTOCOL NAME: NORMAL
REASON FOR TERMINATION: NORMAL
TARGET HR FORMULA: NORMAL
TEST INDICATION: NORMAL
TIME IN EXERCISE PHASE: NORMAL

## 2020-02-04 LAB — INR PPP: 1.9 (ref 0.84–1.19)

## 2020-02-05 ENCOUNTER — TELEPHONE (OUTPATIENT)
Dept: CARDIOLOGY CLINIC | Facility: CLINIC | Age: 85
End: 2020-02-05

## 2020-02-05 ENCOUNTER — ANTICOAG VISIT (OUTPATIENT)
Dept: CARDIOLOGY CLINIC | Facility: CLINIC | Age: 85
End: 2020-02-05

## 2020-02-05 NOTE — PROGRESS NOTES
Pt is to stay on the same dose  Pt to continue to take 7 5mg Sun and Thurs, 5mg the rest of the days and retest again in 1 week   (2/11/2020)  Faxed to [facility] [phone]

## 2020-02-05 NOTE — RESULT ENCOUNTER NOTE
Please let him know there was no significant evidence of a new blockage on his stress test  We can review the results further during his next appt  Please advise him to notify us if he has any progressive symptoms

## 2020-02-05 NOTE — TELEPHONE ENCOUNTER
----- Message from Gabriel Gale MD sent at 2/5/2020  7:50 AM EST -----  Please let him know there was no significant evidence of a new blockage on his stress test  We can review the results further during his next appt  Please advise him to notify us if he has any progressive symptoms

## 2020-02-07 ENCOUNTER — TELEPHONE (OUTPATIENT)
Dept: INTERNAL MEDICINE CLINIC | Facility: CLINIC | Age: 85
End: 2020-02-07

## 2020-02-07 NOTE — TELEPHONE ENCOUNTER
Handy Escobar from [facility] called stating that Royal's left leg is drying out itchy and warm to the touch  She is asking if they can use the     amlactin 12% lotion   That he uses for his cellulitis for his left leg?     Please contact meena do at  [phone]    Also she stated that he had a 4 pound weight gain on 2/4

## 2020-02-07 NOTE — TELEPHONE ENCOUNTER
Take lasix 3 times daily on sat and sun then on Monday resume the usual twice daily    Ok to use amlactin

## 2020-02-10 ENCOUNTER — TELEPHONE (OUTPATIENT)
Dept: HEMATOLOGY ONCOLOGY | Facility: HOSPITAL | Age: 85
End: 2020-02-10

## 2020-02-10 ENCOUNTER — TELEPHONE (OUTPATIENT)
Dept: INTERNAL MEDICINE CLINIC | Facility: CLINIC | Age: 85
End: 2020-02-10

## 2020-02-10 DIAGNOSIS — L98.9 SKIN ABNORMALITIES: ICD-10-CM

## 2020-02-10 RX ORDER — HYDROXYZINE HYDROCHLORIDE 10 MG/1
TABLET, FILM COATED ORAL
Qty: 30 TABLET | Refills: 4 | Status: SHIPPED | OUTPATIENT
Start: 2020-02-10 | End: 2020-02-18 | Stop reason: SDUPTHER

## 2020-02-10 NOTE — TELEPHONE ENCOUNTER
Called and [facility] for pt to reschedule his appt with Dr Munir Landry  He was scheduled on 4/21 but Dr Munir Landry is not seeing patients in [place] so the patient can follow us to [place] or we can reschedule him with Dr Subhash Stevenson in [place]

## 2020-02-11 LAB — INR PPP: 2.8 (ref 0.84–1.19)

## 2020-02-12 ENCOUNTER — ANTICOAG VISIT (OUTPATIENT)
Dept: CARDIOLOGY CLINIC | Facility: CLINIC | Age: 85
End: 2020-02-12

## 2020-02-12 DIAGNOSIS — I50.22 CHRONIC SYSTOLIC CHF (CONGESTIVE HEART FAILURE) (HCC): ICD-10-CM

## 2020-02-12 DIAGNOSIS — I50.9 ACUTE EXACERBATION OF CHF (CONGESTIVE HEART FAILURE) (HCC): ICD-10-CM

## 2020-02-12 DIAGNOSIS — I48.20 CHRONIC A-FIB (HCC): ICD-10-CM

## 2020-02-12 DIAGNOSIS — E03.9 HYPOTHYROIDISM, UNSPECIFIED TYPE: ICD-10-CM

## 2020-02-12 DIAGNOSIS — I10 HYPERTENSION, UNCONTROLLED: ICD-10-CM

## 2020-02-12 DIAGNOSIS — R80.9 CONTROLLED TYPE 2 DIABETES MELLITUS WITH MICROALBUMINURIA, WITHOUT LONG-TERM CURRENT USE OF INSULIN (HCC): ICD-10-CM

## 2020-02-12 DIAGNOSIS — E11.29 CONTROLLED TYPE 2 DIABETES MELLITUS WITH MICROALBUMINURIA, WITHOUT LONG-TERM CURRENT USE OF INSULIN (HCC): ICD-10-CM

## 2020-02-12 RX ORDER — LEVOTHYROXINE SODIUM 0.1 MG/1
TABLET ORAL
Qty: 28 TABLET | Refills: 4 | Status: SHIPPED | OUTPATIENT
Start: 2020-02-12 | End: 2020-06-24

## 2020-02-12 RX ORDER — POTASSIUM CHLORIDE 20 MEQ/1
TABLET, EXTENDED RELEASE ORAL
Qty: 56 TABLET | Refills: 4 | Status: SHIPPED | OUTPATIENT
Start: 2020-02-12 | End: 2020-06-24

## 2020-02-12 RX ORDER — METOPROLOL TARTRATE 50 MG/1
TABLET, FILM COATED ORAL
Qty: 56 TABLET | Refills: 4 | Status: SHIPPED | OUTPATIENT
Start: 2020-02-12 | End: 2020-06-24

## 2020-02-12 NOTE — PROGRESS NOTES
Debby from 2/11 rec'd 2/12  Pt is to stay on the same dose  Pt to continue to take 7 5mg Sun and Thurs, 5mg the rest of the days and retest again in 1 week   (2/18/2020)  Faxed to [facility] [phone]

## 2020-02-13 ENCOUNTER — TELEPHONE (OUTPATIENT)
Dept: CARDIOLOGY CLINIC | Facility: CLINIC | Age: 85
End: 2020-02-13

## 2020-02-13 RX ORDER — GLIPIZIDE 5 MG/1
TABLET, EXTENDED RELEASE ORAL
Qty: 28 TABLET | Refills: 5 | Status: SHIPPED | OUTPATIENT
Start: 2020-02-13 | End: 2020-07-22 | Stop reason: SDUPTHER

## 2020-02-13 NOTE — TELEPHONE ENCOUNTER
S/w Yusra from [facility] and verbally understood  Yusra would like results faxed to them @ [phone]

## 2020-02-13 NOTE — TELEPHONE ENCOUNTER
Spoke with Jhony Marquez, she stated  that Blaze Marrow is not having any symptoms  Patient denies SOB, dizziness, trouble breathing, palpitations, etc  Please review and advise, thanks

## 2020-02-13 NOTE — TELEPHONE ENCOUNTER
Tapan Escalante called from [facility] to report that  pt had a 4 pound increase in weight  Pt yesterday weighed at 179 and today pt is 183   Tapan Escalante would like a call back at [phone]

## 2020-02-18 ENCOUNTER — TELEPHONE (OUTPATIENT)
Dept: INTERNAL MEDICINE CLINIC | Facility: CLINIC | Age: 85
End: 2020-02-18

## 2020-02-18 ENCOUNTER — TELEPHONE (OUTPATIENT)
Dept: CARDIOLOGY CLINIC | Facility: CLINIC | Age: 85
End: 2020-02-18

## 2020-02-18 ENCOUNTER — OFFICE VISIT (OUTPATIENT)
Dept: INTERNAL MEDICINE CLINIC | Facility: CLINIC | Age: 85
End: 2020-02-18
Payer: MEDICARE

## 2020-02-18 ENCOUNTER — ANTICOAG VISIT (OUTPATIENT)
Dept: CARDIOLOGY CLINIC | Facility: CLINIC | Age: 85
End: 2020-02-18

## 2020-02-18 VITALS
BODY MASS INDEX: 25.8 KG/M2 | HEART RATE: 76 BPM | SYSTOLIC BLOOD PRESSURE: 136 MMHG | WEIGHT: 185 LBS | DIASTOLIC BLOOD PRESSURE: 88 MMHG

## 2020-02-18 DIAGNOSIS — L29.9 PRURITUS: ICD-10-CM

## 2020-02-18 DIAGNOSIS — I50.42 CHRONIC COMBINED SYSTOLIC (CONGESTIVE) AND DIASTOLIC (CONGESTIVE) HEART FAILURE (HCC): Primary | Chronic | ICD-10-CM

## 2020-02-18 DIAGNOSIS — E11.42 TYPE 2 DIABETES MELLITUS WITH DIABETIC POLYNEUROPATHY, WITHOUT LONG-TERM CURRENT USE OF INSULIN (HCC): Chronic | ICD-10-CM

## 2020-02-18 DIAGNOSIS — N18.30 STAGE 3 CHRONIC KIDNEY DISEASE (HCC): ICD-10-CM

## 2020-02-18 DIAGNOSIS — R52 PAIN: ICD-10-CM

## 2020-02-18 PROBLEM — N17.9 ACUTE RENAL FAILURE SUPERIMPOSED ON STAGE 3 CHRONIC KIDNEY DISEASE (HCC): Status: RESOLVED | Noted: 2019-04-25 | Resolved: 2020-02-18

## 2020-02-18 PROBLEM — M10.9 GOUT: Chronic | Status: RESOLVED | Noted: 2019-03-14 | Resolved: 2020-02-18

## 2020-02-18 PROBLEM — L03.90 CELLULITIS: Status: RESOLVED | Noted: 2020-01-20 | Resolved: 2020-02-18

## 2020-02-18 LAB — INR PPP: 3.5 (ref 0.84–1.19)

## 2020-02-18 PROCEDURE — 99214 OFFICE O/P EST MOD 30 MIN: CPT | Performed by: INTERNAL MEDICINE

## 2020-02-18 PROCEDURE — 2022F DILAT RTA XM EVC RTNOPTHY: CPT | Performed by: INTERNAL MEDICINE

## 2020-02-18 PROCEDURE — 3075F SYST BP GE 130 - 139MM HG: CPT | Performed by: INTERNAL MEDICINE

## 2020-02-18 PROCEDURE — 1111F DSCHRG MED/CURRENT MED MERGE: CPT | Performed by: INTERNAL MEDICINE

## 2020-02-18 PROCEDURE — 1036F TOBACCO NON-USER: CPT | Performed by: INTERNAL MEDICINE

## 2020-02-18 PROCEDURE — 1160F RVW MEDS BY RX/DR IN RCRD: CPT | Performed by: INTERNAL MEDICINE

## 2020-02-18 PROCEDURE — 4040F PNEUMOC VAC/ADMIN/RCVD: CPT | Performed by: INTERNAL MEDICINE

## 2020-02-18 PROCEDURE — 3044F HG A1C LEVEL LT 7.0%: CPT | Performed by: INTERNAL MEDICINE

## 2020-02-18 PROCEDURE — 3079F DIAST BP 80-89 MM HG: CPT | Performed by: INTERNAL MEDICINE

## 2020-02-18 PROCEDURE — 3066F NEPHROPATHY DOC TX: CPT | Performed by: INTERNAL MEDICINE

## 2020-02-18 RX ORDER — HYDROXYZINE HYDROCHLORIDE 10 MG/1
TABLET, FILM COATED ORAL
Qty: 90 TABLET | Refills: 5 | Status: SHIPPED | OUTPATIENT
Start: 2020-02-18 | End: 2020-02-19 | Stop reason: SDUPTHER

## 2020-02-18 RX ORDER — ACETAMINOPHEN 325 MG/1
650 TABLET ORAL EVERY 8 HOURS PRN
Qty: 90 TABLET | Refills: 5 | Status: SHIPPED | OUTPATIENT
Start: 2020-02-18

## 2020-02-18 NOTE — TELEPHONE ENCOUNTER
Shanae Stein from [address] [place] [address] called to notify pt weight gain  Pt gained 2 pounds in one day   xfer to Blanche

## 2020-02-18 NOTE — PROGRESS NOTES
INTERNAL MEDICINE FOLLOW-UP OFFICE VISIT  [facility] [address] [place] [address]    NAME: Darrell Mcbride  AGE: 80 y o  SEX: male  : 1932     DATE: 2020     Assessment and Plan:     1  Chronic combined systolic (congestive) and diastolic (congestive) heart failure (HCC)    Monitor weight  Avoid excess salt  F/u with cardiology  Continue diuretics as prescribed  2  Stage 3 chronic kidney disease ([facility] )    Renal function has returned back to baseline  Will continue to monitor closely  3  Type 2 diabetes mellitus with diabetic polyneuropathy, without long-term current use of insulin ([facility] [address] [address] )    Most recent A1c was 6 3 on 2020  A1C goal <8 0%  Monitor for hypoglycemia  4  Pain    Updated medication sig for lilliana dolan    - acetaminophen (TYLENOL) 325 mg tablet; Take 2 tablets (650 mg total) by mouth every 8 (eight) hours as needed for headaches or fever (pain)  Dispense: 90 tablet; Refill: 5    5  Pruritus    Updated medications for lilliana dolan  - nystatin-triamcinolone (MYCOLOG-II) cream; Apply topically 2 (two) times a day as needed (fungal/groin rash)  Dispense: 30 g; Refill: 5  - hydrOXYzine HCL (ATARAX) 10 mg tablet; Take 10 mg three times daily as needed (morning, dinner, and bedtime) for itching  Dispense: 90 tablet; Refill: 5    BMI Counseling: Body mass index is 25 8 kg/m²  Follow-up plan was not completed due to elderly patient (72 years old) where weight reduction/weight gain would complicate underlying health condition such as: illness or physical disability  Falls Plan of Care: balance, strength, and gait training instructions were provided  Recommended assistive device to help with gait and balance  Return in about 2 months (around 2020) for Follow-up  Chief Complaint:     Chief Complaint   Patient presents with    Follow-up     1 month and w/ labs- 2020      History of Present Illness:     Patient presents for follow-up  Patient's renal function has returned to normal   He denies any increased shortness of breath  There has been some confusion over how he should be taking some of his medications  He has a lot of problems with rash on his arms and his legs and gets very itchy and then he itches it makes it worse  His diabetes remains well controlled  Review of Systems:     Review of Systems   Constitutional: Negative for activity change, appetite change and fatigue  Respiratory: Negative for apnea, cough, chest tightness, shortness of breath and wheezing  Cardiovascular: Positive for leg swelling  Negative for chest pain and palpitations  Gastrointestinal: Negative for abdominal distention, abdominal pain, blood in stool, constipation, diarrhea, nausea and vomiting  Musculoskeletal: Positive for gait problem  Skin: Positive for rash  Pruritus   Neurological: Positive for weakness  Negative for dizziness, light-headedness, numbness and headaches  Psychiatric/Behavioral: Negative for behavioral problems, confusion, hallucinations, sleep disturbance and suicidal ideas  The patient is not nervous/anxious  Problem List:     Patient Active Problem List   Diagnosis    Adult hypothyroidism    Chronic a-fib    Essential hypertension    Type 2 diabetes mellitus with diabetic polyneuropathy, without long-term current use of insulin (HCC)    Hyperlipidemia    Skin abnormalities    GERD (gastroesophageal reflux disease)    Chronic combined systolic (congestive) and diastolic (congestive) heart failure (HCC)    History of pulmonary embolism    Stage 3 chronic kidney disease (HCC)    Chronic anticoagulation      Objective:     /88 (BP Location: Left arm, Patient Position: Sitting, Cuff Size: Standard)   Pulse 76   Wt 83 9 kg (185 lb) Comment: w/ shoes denied off  BMI 25 80 kg/m²     Physical Exam   Constitutional: He is oriented to person, place, and time   He appears well-developed and well-nourished  No distress  Cardiovascular: Normal rate, regular rhythm and normal heart sounds  No murmur heard  Pulmonary/Chest: Effort normal and breath sounds normal  No respiratory distress  He has no wheezes  He has no rales  He exhibits no tenderness  Abdominal: Soft  Bowel sounds are normal  He exhibits no distension  There is no tenderness  Musculoskeletal: He exhibits edema  Neurological: He is alert and oriented to person, place, and time  Skin: Skin is warm and dry  Rash (excoriations and macular rash noted arms/legs) noted  He is not diaphoretic  Psychiatric: He has a normal mood and affect  His behavior is normal    Vitals reviewed      Jose E Ruvalcaba DO  [facility]

## 2020-02-18 NOTE — TELEPHONE ENCOUNTER
S/w Shanae Stein at [facility], pt with a hx of CHF had a 2lb weight gain in 1 day  Pt is experiencing +1 bilateral lower extremity edema, no SOB, pt is on a low sodium diet  Pt is on Lasix 40 mg BID and Potassium 40 MEQ daily   Please advise    Shanae Stein or Dexter Leos at [facility] -[phone]

## 2020-02-18 NOTE — TELEPHONE ENCOUNTER
Patient saw  today & he changed his order for Nystatin cream to prn, & order says groin area  She said it's a fungal infection on back of left hand hand, bi-lateral thigh area  And needs to be a straight order    Not prn      Fax [phone]

## 2020-02-18 NOTE — PATIENT INSTRUCTIONS
Type 2 Diabetes in Adults   AMBULATORY CARE:   Type 2 diabetes  is a disease that affects how your body uses glucose (sugar)  Normally, when the blood sugar level increases, the pancreas makes more insulin  Insulin helps move sugar out of the blood so it can be used for energy  Type 2 diabetes develops because either the body cannot make enough insulin, or it cannot use the insulin correctly  After many years, your pancreas may stop making insulin  Common symptoms include the following:   · More hunger or thirst than usual     · Frequent urination     · Weight loss without trying     · Blurred vision  Call 911 if you have any of the following:   · You have any of the following signs of a stroke:      ¨ Numbness or drooping on one side of your face     ¨ Weakness in an arm or leg    ¨ Confusion or difficulty speaking    ¨ Dizziness, a severe headache, or vision loss    · You have any of the following signs of a heart attack:      ¨ Squeezing, pressure, or pain in your chest that lasts longer than 5 minutes or returns    ¨ Discomfort or pain in your back, neck, jaw, stomach, or arm     ¨ Trouble breathing    ¨ Nausea or vomiting    ¨ Lightheadedness or a sudden cold sweat, especially with chest pain or trouble breathing  Seek care immediately if:   · You have severe abdominal pain, or the pain spreads to your back  You may also be vomiting  · You have trouble staying awake or focusing  · You are shaking or sweating  · You have blurred or double vision  · Your breath has a fruity, sweet smell  · Your breathing is deep and labored, or rapid and shallow  · Your heartbeat is fast and weak  Contact your healthcare provider if:   · You are vomiting or have diarrhea  · You have an upset stomach and cannot eat the foods on your meal plan  · You feel weak or more tired than usual      · You feel dizzy, have headaches, or are easily irritated  · Your skin is red, warm, dry, or swollen  · You have a wound that does not heal      · You have numbness in your arms or legs  · You have trouble coping with your illness, or you feel anxious or depressed  · You have questions or concerns about your condition or care  Treatment for type 2 diabetes  includes keeping your blood sugar at a normal level  You must eat the right foods, and exercise regularly  You may need medicine if you cannot control your blood sugar level with nutrition and exercise  You may also need medicine to prevent heart disease, a complication of type 2 diabetes  You may  need any of the following:  · Hypoglycemic medicines or insulin  may be given to decrease the amount of sugar in your blood  · Blood pressure medicine  may be given to lower your blood pressure  Your blood pressure should be less than 140/90  · Cholesterol lowering medicine  may be given to prevent heart disease  · Antiplatelets , such as aspirin, help prevent blood clots  Take your antiplatelet medicine exactly as directed  These medicines make it more likely for you to bleed or bruise  If you are told to take aspirin, do not take acetaminophen or ibuprofen instead  · Take your medicine as directed  Contact your healthcare provider if you think your medicine is not helping or if you have side effects  Tell him or her if you are allergic to any medicine  Keep a list of the medicines, vitamins, and herbs you take  Include the amounts, and when and why you take them  Bring the list or the pill bottles to follow-up visits  Carry your medicine list with you in case of an emergency  Check your blood sugar level: You will be taught how to check a small drop of blood in a glucose monitor  You will need to check your blood sugar level at least 3 times each day if you are on insulin  Ask your healthcare provider when and how often to check during the day  If you check your blood sugar level before a meal , it should be between 80 and 130 mg/dL   If you check your blood sugar level 1 to 2 hours after a meal , it should be less than 180 mg/dL  Ask your healthcare provider if these are good goals for you  Write down your results, and show them to your healthcare provider  He may use the results to make changes to your medicine, food, and exercise schedules  If your blood sugar level is too low: Your blood sugar level is too low if it goes below 70 mg/dL  If the level is too low, eat or drink 15 grams of fast-acting carbohydrate  These are found naturally in fruits  Fast-acting carbohydrates will raise your blood sugar level quickly  Examples of 15 grams of fast-acting carbohydrate are 4 ounces (½ cup) of fruit juice or 4 ounces of regular soda  Other examples are 2 tablespoons of raisins or 3 to 4 glucose tablets  Check your blood sugar level 15 minutes later  If the level is still low (less than 100 mg/dL), eat another 15 grams of carbohydrate  When the level returns to 100 mg/dL, eat a snack or meal that contains carbohydrates  This will help prevent another drop in blood sugar  Always carefully follow your healthcare provider's instructions on how to treat low blood sugar levels  Check your feet each day for sores:  Wear shoes and socks that fit correctly  Do not trim your toenails  Ask your healthcare provider for more information about foot care  Follow your meal plan:  A dietitian will help you make a meal plan to keep your blood sugar level steady  Do not skip meals  Your blood sugar level may drop too low if you have taken diabetes medicine and do not eat  · Keep track of carbohydrates (sugar and starchy foods)  Your blood sugar level can get too high if you eat too many carbohydrates  Your dietitian will help you plan meals and snacks that have the right amount of carbohydrates  · Eat low-fat foods , such as skinless chicken and low-fat milk  · Eat less sodium (salt)    Limit high-sodium foods, such as soy sauce, potato chips, and soup  Do not add salt to food you cook  Limit your use of table salt  You should have less than 2,300 mg of sodium per day  · Eat high-fiber foods , such as vegetables, whole grain breads, and beans  · Limit alcohol  Alcohol affects your blood sugar level and can make it harder to manage your diabetes  Limit alcohol to 1 drink a day if you are a woman  Limit alcohol to 2 drinks a day if you are a man  A drink of alcohol is 12 ounces of beer, 5 ounces of wine, or 1½ ounces of liquor  Maintain a healthy weight:  Ask your healthcare provider how much you should weigh  A healthy weight can help you control your diabetes  Ask your provider to help you create a weight loss plan if you are overweight  Together you can set manageable weight loss goals  Exercise as directed:  Exercise can help keep your blood sugar level steady, decrease your risk of heart disease, and help you lose weight  Stretch before and after you exercise  Exercise for at least 150 minutes every week  Spread this amount of exercise over at least 3 days a week  Do not skip exercise more than 2 days in a row  Include muscle strengthening activities 2 to 3 days each week  Older adults should include balance training 2 to 3 times each week  Activities that help increase balance include yoga and danial chi  Work with your healthcare provider to create an exercise plan  · Check your blood sugar level before and after exercise  Healthcare providers may tell you to change the amount of insulin you take or food you eat  If your blood sugar level is high, check your blood or urine for ketones before you exercise  Do not exercise if your blood sugar level is high and you have ketones  · If your blood sugar level is less than 100 mg/dL, have a carbohydrate snack before you exercise  Examples are 4 to 6 crackers, ½ banana, 8 ounces (1 cup) of milk, or 4 ounces (½ cup) of juice   Drink water or liquids that do not contain sugar before, during, and after exercise  Ask your dietitian or healthcare provider which liquids you should drink when you exercise  · Do not sit for longer than 30 minutes  If you cannot walk around, at least stand up  This will help you stay active and keep your blood circulating  Do not smoke:  Nicotine and other chemicals in cigarettes and cigars can cause lung damage and make it more difficult to manage your diabetes  Ask your healthcare provider for information if you currently smoke and need help to quit  Do not use e-cigarettes or smokeless tobacco in place of cigarettes or to help you quit  They still contain nicotine  Check your blood pressure as directed:  Ask your healthcare provider what your blood pressure should be  Most adults with diabetes and high blood pressure should have a systolic blood pressure (first number) less than 140  Your diastolic blood pressure (second number) should be less than 90  Wear medical alert identification:  Wear medical alert jewelry or carry a card that says you have diabetes  Ask your healthcare provider where to get these items  Ask about vaccines: You have a higher risk for serious illness if you get the flu, pneumonia, or hepatitis  Ask your healthcare provider if you should get a flu, pneumonia, or hepatitis B vaccine, and when to get the vaccine  Follow up with your healthcare provider as directed: You may need to return to have your A1c checked every 3 months  You will need to return at least once each year to have your feet checked  You will need an eye exam once a year to check for retinopathy  You will also need urine tests every year to check for kidney problems  You may need tests to monitor for heart disease such as an EKG, stress test, blood pressure monitoring, and blood tests  Write down your questions so you remember to ask them during your visits     © 2017 [employer] Information is for End User's use only and may not be sold, redistributed or otherwise used for commercial purposes  All illustrations and images included in CareNotes® are the copyrighted property of A D A M [employer]  or Branden Hernandez  The above information is an  only  It is not intended as medical advice for individual conditions or treatments  Talk to your doctor, nurse or pharmacist before following any medical regimen to see if it is safe and effective for you

## 2020-02-18 NOTE — TELEPHONE ENCOUNTER
[facility] rec[facility]ed an RX for Hydroxyzine 10 mg TID prn    He also has an order for 10 mg BID and is asking if one should be dc'ed ? ??

## 2020-02-18 NOTE — TELEPHONE ENCOUNTER
No I only want it to be prn  They are using it too much  There was no fungal infection today  The script I gave him today is the way I want it

## 2020-02-19 RX ORDER — HYDROXYZINE HYDROCHLORIDE 10 MG/1
10 TABLET, FILM COATED ORAL 3 TIMES DAILY
Qty: 90 TABLET | Refills: 5 | Status: SHIPPED | OUTPATIENT
Start: 2020-02-19 | End: 2020-07-22 | Stop reason: SDUPTHER

## 2020-02-19 NOTE — TELEPHONE ENCOUNTER
She said patient wants the Hydroxyzine as a straight order    so he can use it every day, rather then PRN  So she's asking for a straight order to be faxed to [facility]IN [facility]  @ [phone]

## 2020-02-20 ENCOUNTER — TELEPHONE (OUTPATIENT)
Dept: INTERNAL MEDICINE CLINIC | Facility: CLINIC | Age: 85
End: 2020-02-20

## 2020-02-25 LAB — INR PPP: 2.8 (ref 0.84–1.19)

## 2020-02-26 ENCOUNTER — ANTICOAG VISIT (OUTPATIENT)
Dept: CARDIOLOGY CLINIC | Facility: CLINIC | Age: 85
End: 2020-02-26

## 2020-02-26 NOTE — PROGRESS NOTES
Results from 2/25 rec'd today 2/26  Pt is to stay on the same dose and retest again in 1 week  [facility]ed order to [facility]

## 2020-02-27 ENCOUNTER — TELEPHONE (OUTPATIENT)
Dept: INTERNAL MEDICINE CLINIC | Facility: CLINIC | Age: 85
End: 2020-02-27

## 2020-02-27 DIAGNOSIS — I50.42 CHRONIC COMBINED SYSTOLIC (CONGESTIVE) AND DIASTOLIC (CONGESTIVE) HEART FAILURE (HCC): Primary | Chronic | ICD-10-CM

## 2020-02-27 NOTE — TELEPHONE ENCOUNTER
Grecia Postal from [facility] BURKETT called asking if Dr Benoit Stearns would recommend compression stockings for his legs? He has fluid retention in his legs and it was recommended to him in the past  If advised please fax a script to them  Fax [phone]      ZT#[phone]

## 2020-03-03 ENCOUNTER — ANTICOAG VISIT (OUTPATIENT)
Dept: CARDIOLOGY CLINIC | Facility: CLINIC | Age: 85
End: 2020-03-03

## 2020-03-03 ENCOUNTER — TELEPHONE (OUTPATIENT)
Dept: CARDIOLOGY CLINIC | Facility: CLINIC | Age: 85
End: 2020-03-03

## 2020-03-03 LAB — INR PPP: 2.9 (ref 0.84–1.19)

## 2020-03-06 ENCOUNTER — TELEPHONE (OUTPATIENT)
Dept: CARDIOLOGY CLINIC | Facility: CLINIC | Age: 85
End: 2020-03-06

## 2020-03-06 NOTE — TELEPHONE ENCOUNTER
Called Gabby at [facility] [phone] [facility] on what dr Leroy Tenorio said

## 2020-03-06 NOTE — TELEPHONE ENCOUNTER
No change for now but if the weight goes higher  any further can give an extra 40 mg dose of furosemide for a couple of days

## 2020-03-06 NOTE — TELEPHONE ENCOUNTER
S/w Gabby, stated that pt with a hx of CHF has gained 3 lbs in 1 day  Pt is on Lasix 40 mg BID, no SOB, chronic leg edema, nurse did not listen to pt's lungs   Please advise    [place] [facility] [place]- [phone]

## 2020-03-06 NOTE — TELEPHONE ENCOUNTER
Jim Dacosta from [facility]  called to report pt 3 lbs weight gain in one day   xfer to Blanche

## 2020-03-10 ENCOUNTER — TELEPHONE (OUTPATIENT)
Dept: CARDIOLOGY CLINIC | Facility: CLINIC | Age: 85
End: 2020-03-10

## 2020-03-10 ENCOUNTER — ANTICOAG VISIT (OUTPATIENT)
Dept: CARDIOLOGY CLINIC | Facility: CLINIC | Age: 85
End: 2020-03-10

## 2020-03-10 DIAGNOSIS — R60.0 BILATERAL LOWER EXTREMITY EDEMA: ICD-10-CM

## 2020-03-10 LAB — INR PPP: 2.5 (ref 0.84–1.19)

## 2020-03-11 NOTE — TELEPHONE ENCOUNTER
Spoke with meena at [facility] pt has gain 2lbs since yesterday  Pt is taking lasix 40mg twice a day total 80mg

## 2020-03-12 RX ORDER — FUROSEMIDE 40 MG/1
TABLET ORAL
Qty: 56 TABLET | Refills: 4 | Status: ON HOLD | OUTPATIENT
Start: 2020-03-12 | End: 2020-03-22 | Stop reason: SDUPTHER

## 2020-03-17 LAB — INR PPP: 2.3 (ref 0.84–1.19)

## 2020-03-18 ENCOUNTER — APPOINTMENT (INPATIENT)
Dept: RADIOLOGY | Facility: HOSPITAL | Age: 85
DRG: 871 | End: 2020-03-18
Payer: MEDICARE

## 2020-03-18 ENCOUNTER — APPOINTMENT (EMERGENCY)
Dept: RADIOLOGY | Facility: HOSPITAL | Age: 85
DRG: 871 | End: 2020-03-18
Payer: MEDICARE

## 2020-03-18 ENCOUNTER — TELEPHONE (OUTPATIENT)
Dept: CARDIOLOGY CLINIC | Facility: CLINIC | Age: 85
End: 2020-03-18

## 2020-03-18 ENCOUNTER — HOSPITAL ENCOUNTER (INPATIENT)
Facility: HOSPITAL | Age: 85
LOS: 5 days | Discharge: HOME | DRG: 871 | End: 2020-03-23
Attending: EMERGENCY MEDICINE | Admitting: INTERNAL MEDICINE
Payer: MEDICARE

## 2020-03-18 ENCOUNTER — ANTICOAG VISIT (OUTPATIENT)
Dept: CARDIOLOGY CLINIC | Facility: CLINIC | Age: 85
End: 2020-03-18

## 2020-03-18 DIAGNOSIS — J18.9 PNEUMONIA OF RIGHT LOWER LOBE DUE TO INFECTIOUS ORGANISM: ICD-10-CM

## 2020-03-18 DIAGNOSIS — N17.9 ACUTE KIDNEY INJURY (HCC): ICD-10-CM

## 2020-03-18 DIAGNOSIS — A41.9 SEPSIS (HCC): Primary | ICD-10-CM

## 2020-03-18 DIAGNOSIS — B33.8 RSV (RESPIRATORY SYNCYTIAL VIRUS INFECTION): ICD-10-CM

## 2020-03-18 DIAGNOSIS — N18.30 STAGE 3 CHRONIC KIDNEY DISEASE (HCC): ICD-10-CM

## 2020-03-18 DIAGNOSIS — I50.9 HEART FAILURE (HCC): ICD-10-CM

## 2020-03-18 DIAGNOSIS — J90 PLEURAL EFFUSION, RIGHT: ICD-10-CM

## 2020-03-18 DIAGNOSIS — R60.0 BILATERAL LOWER EXTREMITY EDEMA: ICD-10-CM

## 2020-03-18 PROBLEM — R50.9 FEVER: Status: ACTIVE | Noted: 2020-03-18

## 2020-03-18 PROBLEM — R09.02 HYPOXIC: Status: ACTIVE | Noted: 2020-03-18

## 2020-03-18 LAB
ALBUMIN SERPL BCP-MCNC: 2.7 G/DL (ref 3.5–5)
ALP SERPL-CCNC: 136 U/L (ref 46–116)
ALT SERPL W P-5'-P-CCNC: 41 U/L (ref 12–78)
ANION GAP SERPL CALCULATED.3IONS-SCNC: 8 MMOL/L (ref 4–13)
APTT PPP: 40 SECONDS (ref 23–37)
AST SERPL W P-5'-P-CCNC: 39 U/L (ref 5–45)
BACTERIA UR QL AUTO: ABNORMAL /HPF
BASOPHILS # BLD AUTO: 0.04 THOUSANDS/ΜL (ref 0–0.1)
BASOPHILS NFR BLD AUTO: 1 % (ref 0–1)
BILIRUB SERPL-MCNC: 1 MG/DL (ref 0.2–1)
BILIRUB UR QL STRIP: NEGATIVE
BUN SERPL-MCNC: 28 MG/DL (ref 5–25)
CALCIUM SERPL-MCNC: 8.2 MG/DL (ref 8.3–10.1)
CHLORIDE SERPL-SCNC: 106 MMOL/L (ref 100–108)
CLARITY UR: CLEAR
CO2 SERPL-SCNC: 29 MMOL/L (ref 21–32)
COLOR UR: YELLOW
CREAT SERPL-MCNC: 1.7 MG/DL (ref 0.6–1.3)
EOSINOPHIL # BLD AUTO: 0.04 THOUSAND/ΜL (ref 0–0.61)
EOSINOPHIL NFR BLD AUTO: 1 % (ref 0–6)
ERYTHROCYTE [DISTWIDTH] IN BLOOD BY AUTOMATED COUNT: 16.9 % (ref 11.6–15.1)
FLUAV RNA NPH QL NAA+PROBE: ABNORMAL
FLUBV RNA NPH QL NAA+PROBE: ABNORMAL
GFR SERPL CREATININE-BSD FRML MDRD: 35 ML/MIN/1.73SQ M
GLUCOSE SERPL-MCNC: 69 MG/DL (ref 65–140)
GLUCOSE SERPL-MCNC: 76 MG/DL (ref 65–140)
GLUCOSE SERPL-MCNC: 99 MG/DL (ref 65–140)
GLUCOSE UR STRIP-MCNC: NEGATIVE MG/DL
HCT VFR BLD AUTO: 34.7 % (ref 36.5–49.3)
HGB BLD-MCNC: 10.5 G/DL (ref 12–17)
HGB UR QL STRIP.AUTO: ABNORMAL
HYALINE CASTS #/AREA URNS LPF: ABNORMAL /LPF
IMM GRANULOCYTES # BLD AUTO: 0.03 THOUSAND/UL (ref 0–0.2)
IMM GRANULOCYTES NFR BLD AUTO: 0 % (ref 0–2)
INR PPP: 1.9 (ref 0.84–1.19)
KETONES UR STRIP-MCNC: NEGATIVE MG/DL
LACTATE SERPL-SCNC: 1.1 MMOL/L (ref 0.5–2)
LACTATE SERPL-SCNC: 1.1 MMOL/L (ref 0.5–2)
LEUKOCYTE ESTERASE UR QL STRIP: NEGATIVE
LYMPHOCYTES # BLD AUTO: 0.69 THOUSANDS/ΜL (ref 0.6–4.47)
LYMPHOCYTES NFR BLD AUTO: 8 % (ref 14–44)
MCH RBC QN AUTO: 28.8 PG (ref 26.8–34.3)
MCHC RBC AUTO-ENTMCNC: 30.3 G/DL (ref 31.4–37.4)
MCV RBC AUTO: 95 FL (ref 82–98)
MONOCYTES # BLD AUTO: 0.96 THOUSAND/ΜL (ref 0.17–1.22)
MONOCYTES NFR BLD AUTO: 11 % (ref 4–12)
NEUTROPHILS # BLD AUTO: 6.99 THOUSANDS/ΜL (ref 1.85–7.62)
NEUTS SEG NFR BLD AUTO: 79 % (ref 43–75)
NITRITE UR QL STRIP: NEGATIVE
NON-SQ EPI CELLS URNS QL MICRO: ABNORMAL /HPF
NRBC BLD AUTO-RTO: 0 /100 WBCS
NT-PROBNP SERPL-MCNC: 6631 PG/ML
PH UR STRIP.AUTO: 6 [PH]
PLATELET # BLD AUTO: 233 THOUSANDS/UL (ref 149–390)
PLATELET # BLD AUTO: 274 THOUSANDS/UL (ref 149–390)
PMV BLD AUTO: 9 FL (ref 8.9–12.7)
PMV BLD AUTO: 9.2 FL (ref 8.9–12.7)
POTASSIUM SERPL-SCNC: 3.8 MMOL/L (ref 3.5–5.3)
PROCALCITONIN SERPL-MCNC: <0.05 NG/ML
PROT SERPL-MCNC: 7.3 G/DL (ref 6.4–8.2)
PROT UR STRIP-MCNC: ABNORMAL MG/DL
PROTHROMBIN TIME: 21.9 SECONDS (ref 11.6–14.5)
RBC # BLD AUTO: 3.64 MILLION/UL (ref 3.88–5.62)
RBC #/AREA URNS AUTO: ABNORMAL /HPF
RSV RNA NPH QL NAA+PROBE: DETECTED
SODIUM SERPL-SCNC: 143 MMOL/L (ref 136–145)
SP GR UR STRIP.AUTO: 1.02 (ref 1–1.03)
TROPONIN I SERPL-MCNC: 0.03 NG/ML
UROBILINOGEN UR QL STRIP.AUTO: 0.2 E.U./DL
WBC # BLD AUTO: 8.75 THOUSAND/UL (ref 4.31–10.16)
WBC #/AREA URNS AUTO: ABNORMAL /HPF

## 2020-03-18 PROCEDURE — 99285 EMERGENCY DEPT VISIT HI MDM: CPT

## 2020-03-18 PROCEDURE — 83605 ASSAY OF LACTIC ACID: CPT | Performed by: PHYSICIAN ASSISTANT

## 2020-03-18 PROCEDURE — 84145 PROCALCITONIN (PCT): CPT | Performed by: INTERNAL MEDICINE

## 2020-03-18 PROCEDURE — 36415 COLL VENOUS BLD VENIPUNCTURE: CPT | Performed by: PHYSICIAN ASSISTANT

## 2020-03-18 PROCEDURE — 81001 URINALYSIS AUTO W/SCOPE: CPT | Performed by: PHYSICIAN ASSISTANT

## 2020-03-18 PROCEDURE — 84484 ASSAY OF TROPONIN QUANT: CPT | Performed by: PHYSICIAN ASSISTANT

## 2020-03-18 PROCEDURE — 99285 EMERGENCY DEPT VISIT HI MDM: CPT | Performed by: PHYSICIAN ASSISTANT

## 2020-03-18 PROCEDURE — 94640 AIRWAY INHALATION TREATMENT: CPT

## 2020-03-18 PROCEDURE — 87040 BLOOD CULTURE FOR BACTERIA: CPT | Performed by: PHYSICIAN ASSISTANT

## 2020-03-18 PROCEDURE — 93005 ELECTROCARDIOGRAM TRACING: CPT

## 2020-03-18 PROCEDURE — 71045 X-RAY EXAM CHEST 1 VIEW: CPT

## 2020-03-18 PROCEDURE — 94640 AIRWAY INHALATION TREATMENT: CPT | Performed by: PHYSICIAN ASSISTANT

## 2020-03-18 PROCEDURE — 85049 AUTOMATED PLATELET COUNT: CPT | Performed by: INTERNAL MEDICINE

## 2020-03-18 PROCEDURE — 80053 COMPREHEN METABOLIC PANEL: CPT | Performed by: PHYSICIAN ASSISTANT

## 2020-03-18 PROCEDURE — 87631 RESP VIRUS 3-5 TARGETS: CPT | Performed by: PHYSICIAN ASSISTANT

## 2020-03-18 PROCEDURE — 99223 1ST HOSP IP/OBS HIGH 75: CPT | Performed by: INTERNAL MEDICINE

## 2020-03-18 PROCEDURE — 83880 ASSAY OF NATRIURETIC PEPTIDE: CPT | Performed by: PHYSICIAN ASSISTANT

## 2020-03-18 PROCEDURE — 85025 COMPLETE CBC W/AUTO DIFF WBC: CPT | Performed by: PHYSICIAN ASSISTANT

## 2020-03-18 PROCEDURE — 96360 HYDRATION IV INFUSION INIT: CPT

## 2020-03-18 PROCEDURE — 82948 REAGENT STRIP/BLOOD GLUCOSE: CPT

## 2020-03-18 PROCEDURE — 84145 PROCALCITONIN (PCT): CPT | Performed by: PHYSICIAN ASSISTANT

## 2020-03-18 PROCEDURE — 85610 PROTHROMBIN TIME: CPT | Performed by: PHYSICIAN ASSISTANT

## 2020-03-18 PROCEDURE — 96361 HYDRATE IV INFUSION ADD-ON: CPT

## 2020-03-18 PROCEDURE — 85730 THROMBOPLASTIN TIME PARTIAL: CPT | Performed by: PHYSICIAN ASSISTANT

## 2020-03-18 PROCEDURE — 71046 X-RAY EXAM CHEST 2 VIEWS: CPT

## 2020-03-18 RX ORDER — KETOROLAC TROMETHAMINE 30 MG/ML
15 INJECTION, SOLUTION INTRAMUSCULAR; INTRAVENOUS ONCE
Status: DISCONTINUED | OUTPATIENT
Start: 2020-03-18 | End: 2020-03-18

## 2020-03-18 RX ORDER — ONDANSETRON 2 MG/ML
4 INJECTION INTRAMUSCULAR; INTRAVENOUS EVERY 6 HOURS PRN
Status: DISCONTINUED | OUTPATIENT
Start: 2020-03-18 | End: 2020-03-23 | Stop reason: HOSPADM

## 2020-03-18 RX ORDER — SODIUM CHLORIDE 9 MG/ML
100 INJECTION, SOLUTION INTRAVENOUS CONTINUOUS
Status: DISCONTINUED | OUTPATIENT
Start: 2020-03-18 | End: 2020-03-18

## 2020-03-18 RX ORDER — DONEPEZIL HYDROCHLORIDE 5 MG/1
10 TABLET, FILM COATED ORAL
Status: DISCONTINUED | OUTPATIENT
Start: 2020-03-18 | End: 2020-03-23 | Stop reason: HOSPADM

## 2020-03-18 RX ORDER — FLUTICASONE PROPIONATE 50 MCG
2 SPRAY, SUSPENSION (ML) NASAL DAILY
Status: DISCONTINUED | OUTPATIENT
Start: 2020-03-19 | End: 2020-03-23 | Stop reason: HOSPADM

## 2020-03-18 RX ORDER — WARFARIN SODIUM 5 MG/1
5 TABLET ORAL
Status: DISCONTINUED | OUTPATIENT
Start: 2020-03-18 | End: 2020-03-20

## 2020-03-18 RX ORDER — GABAPENTIN 100 MG/1
100 CAPSULE ORAL 2 TIMES DAILY
Status: DISCONTINUED | OUTPATIENT
Start: 2020-03-18 | End: 2020-03-23 | Stop reason: HOSPADM

## 2020-03-18 RX ORDER — ACETAMINOPHEN 325 MG/1
650 TABLET ORAL ONCE
Status: COMPLETED | OUTPATIENT
Start: 2020-03-18 | End: 2020-03-18

## 2020-03-18 RX ORDER — IPRATROPIUM BROMIDE AND ALBUTEROL SULFATE 2.5; .5 MG/3ML; MG/3ML
3 SOLUTION RESPIRATORY (INHALATION) ONCE
Status: COMPLETED | OUTPATIENT
Start: 2020-03-18 | End: 2020-03-18

## 2020-03-18 RX ORDER — ALBUTEROL SULFATE 90 UG/1
2 AEROSOL, METERED RESPIRATORY (INHALATION) ONCE
Status: COMPLETED | OUTPATIENT
Start: 2020-03-18 | End: 2020-03-18

## 2020-03-18 RX ORDER — ATORVASTATIN CALCIUM 10 MG/1
10 TABLET, FILM COATED ORAL
Status: DISCONTINUED | OUTPATIENT
Start: 2020-03-18 | End: 2020-03-23 | Stop reason: HOSPADM

## 2020-03-18 RX ORDER — HYDRALAZINE HYDROCHLORIDE 20 MG/ML
5 INJECTION INTRAMUSCULAR; INTRAVENOUS EVERY 6 HOURS PRN
Status: DISCONTINUED | OUTPATIENT
Start: 2020-03-18 | End: 2020-03-22

## 2020-03-18 RX ORDER — METHYLPREDNISOLONE SODIUM SUCCINATE 40 MG/ML
20 INJECTION, POWDER, LYOPHILIZED, FOR SOLUTION INTRAMUSCULAR; INTRAVENOUS EVERY 12 HOURS SCHEDULED
Status: DISCONTINUED | OUTPATIENT
Start: 2020-03-18 | End: 2020-03-20

## 2020-03-18 RX ORDER — ACETAMINOPHEN 325 MG/1
650 TABLET ORAL EVERY 6 HOURS PRN
Status: DISCONTINUED | OUTPATIENT
Start: 2020-03-18 | End: 2020-03-23 | Stop reason: HOSPADM

## 2020-03-18 RX ORDER — LEVALBUTEROL 1.25 MG/.5ML
1.25 SOLUTION, CONCENTRATE RESPIRATORY (INHALATION) EVERY 8 HOURS PRN
Status: DISCONTINUED | OUTPATIENT
Start: 2020-03-18 | End: 2020-03-21

## 2020-03-18 RX ORDER — LEVOTHYROXINE SODIUM 0.1 MG/1
100 TABLET ORAL EVERY MORNING
Status: DISCONTINUED | OUTPATIENT
Start: 2020-03-19 | End: 2020-03-23 | Stop reason: HOSPADM

## 2020-03-18 RX ORDER — FUROSEMIDE 10 MG/ML
40 INJECTION INTRAMUSCULAR; INTRAVENOUS ONCE
Status: COMPLETED | OUTPATIENT
Start: 2020-03-18 | End: 2020-03-18

## 2020-03-18 RX ORDER — METOPROLOL TARTRATE 50 MG/1
50 TABLET, FILM COATED ORAL EVERY 12 HOURS SCHEDULED
Status: DISCONTINUED | OUTPATIENT
Start: 2020-03-18 | End: 2020-03-23 | Stop reason: HOSPADM

## 2020-03-18 RX ADMIN — DONEPEZIL HYDROCHLORIDE 10 MG: 5 TABLET ORAL at 22:32

## 2020-03-18 RX ADMIN — SODIUM CHLORIDE 500 ML: 0.9 INJECTION, SOLUTION INTRAVENOUS at 15:53

## 2020-03-18 RX ADMIN — METHYLPREDNISOLONE SODIUM SUCCINATE 20 MG: 40 INJECTION, POWDER, FOR SOLUTION INTRAMUSCULAR; INTRAVENOUS at 22:32

## 2020-03-18 RX ADMIN — FUROSEMIDE 40 MG: 10 INJECTION, SOLUTION INTRAMUSCULAR; INTRAVENOUS at 22:31

## 2020-03-18 RX ADMIN — CEFEPIME HYDROCHLORIDE 2000 MG: 2 INJECTION, POWDER, FOR SOLUTION INTRAVENOUS at 17:49

## 2020-03-18 RX ADMIN — ALBUTEROL SULFATE 2 PUFF: 90 AEROSOL, METERED RESPIRATORY (INHALATION) at 15:53

## 2020-03-18 RX ADMIN — ATORVASTATIN CALCIUM 10 MG: 10 TABLET, FILM COATED ORAL at 22:33

## 2020-03-18 RX ADMIN — IPRATROPIUM BROMIDE AND ALBUTEROL SULFATE 3 ML: 2.5; .5 SOLUTION RESPIRATORY (INHALATION) at 17:07

## 2020-03-18 RX ADMIN — ACETAMINOPHEN 650 MG: 325 TABLET ORAL at 15:52

## 2020-03-18 RX ADMIN — GABAPENTIN 100 MG: 100 CAPSULE ORAL at 22:32

## 2020-03-18 RX ADMIN — VANCOMYCIN HYDROCHLORIDE 1500 MG: 1 INJECTION, POWDER, LYOPHILIZED, FOR SOLUTION INTRAVENOUS at 18:12

## 2020-03-18 RX ADMIN — WARFARIN SODIUM 5 MG: 5 TABLET ORAL at 22:33

## 2020-03-18 RX ADMIN — METOPROLOL TARTRATE 50 MG: 50 TABLET, FILM COATED ORAL at 22:32

## 2020-03-18 NOTE — ASSESSMENT & PLAN NOTE
Wt Readings from Last 3 Encounters:   03/18/20 77 9 kg (171 lb 11 8 oz)   02/18/20 83 9 kg (185 lb)   01/28/20 80 7 kg (178 lb)       Metoprolol  One time dose of Lasix  Patient very short of breath and dyspnea with wheezing rales  Check post diuresis x-ray

## 2020-03-18 NOTE — ASSESSMENT & PLAN NOTE
Secondary to RSV  Continue supportive care    Patient presenting acute respiratory distress  Bronchodilator therapy  Pulmonary toilet

## 2020-03-18 NOTE — ASSESSMENT & PLAN NOTE
With sirs criteria secondary to RSV    Antibiotics started in the ED until pneumonia is ruled out  Patient with right-sided pleural effusion with possible infiltrate  Mild volume overload  Gentle diuresis with post diuresis x-ray tomorrow  Check procalcitonin  If procalcitonin and repeat x-ray are stable will likely discontinue cefepime and treat supportively for viral RSV  Continue supportive care    Parent Tylenol  Bronchodilator therapy   O2

## 2020-03-18 NOTE — H&P
H&P- Alexey Lira 11/21/1932, 80 y o  male MRN: [id number]    Unit/Bed#: -01 Encounter: [id number]    Primary Care Provider: Mikie Curry DO   Date and time admitted to hospital: 3/18/2020  2:57 PM        Fever  Assessment & Plan  With sirs criteria secondary to RSV    Antibiotics started in the ED until pneumonia is ruled out  Patient with right-sided pleural effusion with possible infiltrate  Mild volume overload  Gentle diuresis with post diuresis x-ray tomorrow  Check procalcitonin  If procalcitonin and repeat x-ray are stable will likely discontinue cefepime and treat supportively for viral RSV  Continue supportive care  Parent Tylenol  Bronchodilator therapy   O2    Stage 3 chronic kidney disease (HCC)  Assessment & Plan  Baseline creatinine around 1 3  Nephrology evaluation  Mild fluid overload    Chronic combined systolic (congestive) and diastolic (congestive) heart failure (HCC)  Assessment & Plan  Wt Readings from Last 3 Encounters:   03/18/20 77 9 kg (171 lb 11 8 oz)   02/18/20 83 9 kg (185 lb)   01/28/20 80 7 kg (178 lb)       Metoprolol  One time dose of Lasix  Patient very short of breath and dyspnea with wheezing rales  Check post diuresis x-ray  Hyperlipidemia  Assessment & Plan  Lipitor    Type 2 diabetes mellitus with diabetic polyneuropathy, without long-term current use of insulin (HCC)  Assessment & Plan  Lab Results   Component Value Date    HGBA1C 6 3 (H) 01/14/2020       No results for input(s): POCGLU in the last 72 hours  Blood Sugar Average: Last 72 hrs:   insulin sliding scale    Essential hypertension  Assessment & Plan  Hold Zestril for elevated creatinine  Lasix x1 now  Nephrology consult for renal failure  Chronic a-fib  Assessment & Plan  Metoprolol/ Coumadin    Follow up on INR    Adult hypothyroidism  Assessment & Plan  Levothyroxine      VTE Prophylaxis: Enoxaparin (Lovenox)  / sequential compression device   Code Status:  Full code  POLST: There is no POLST form on file for this patient (pre-hospital)      Anticipated Length of Stay:  Patient will be admitted on an Inpatient basis with an anticipated length of stay greater than 2 midnights Justification for Hospital Stay:  Need to monitor symptoms respiratory status and hydration for renal failure  Total Time for Visit, including Counseling / Coordination of Care: 45 minutes  Greater than 50% of this total time spent on direct patient counseling and coordination of care  Chief Complaint:   Fever    History of Present Illness:    Eileen Mustafa is a 80 y o  male who presents with a past medical history of atrial fibrillation on Coumadin, CHF hypertension hyperlipidemia CKD presenting the emergency room with productive cough and fever for the past 2 days  The patient reportedly lives increased [facility]  He presents with symptoms of subjective fever with low-grade temperatures in the ER  Patient also noted disease cough with yellow-green sputum  He complains of symptoms of shortness of breath wheezing and malaise  He presents with a positive RSV testing in ED  Review of Systems:    Review of Systems   Constitutional: Negative for activity change and appetite change  HENT: Positive for congestion  Respiratory: Positive for cough and shortness of breath  Cardiovascular: Negative for chest pain and leg swelling  Gastrointestinal: Negative for abdominal distention  Genitourinary: Negative for flank pain  Musculoskeletal: Negative for arthralgias  Neurological: Negative for dizziness  All other systems reviewed and are negative        Past Medical and Surgical History:     Past Medical History:   Diagnosis Date    Acute systolic congestive heart failure ([address] ) 7/26/2018    Allergic contact dermatitis due to plants, except food     Atrial fibrillation (HCC)     Cancer (HCC)     skin cancer    Cataract     Chronic kidney disease     Stage 3    Coagulation test abnormality     Congestive heart failure (CHF) (HCC)     Diabetes mellitus ([facility] [address] )     Disease of thyroid gland     hypothyroidism    DVT (deep venous thrombosis) (HCC)     Eczema     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HCC)     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HCC)     GERD (gastroesophageal reflux disease)     Gout     Hyperlipidemia     Hypertension     Hypokalemia     Leukocytosis     Lichen planus     Nonmelanoma skin cancer     Phlebitis or thrombophlebitis of deep vessel of lower extremity (HCC)     Proteinuria        Past Surgical History:   Procedure Laterality Date    CATARACT EXTRACTION      CHOLECYSTECTOMY      FL ESOPHAGOGASTRODUODENOSCOPY TRANSORAL DIAGNOSTIC N/A 6/14/2017    Procedure: EGD AND COLONOSCOPY;  Surgeon: Erik Blanchard MD;  Location: [facility]; Service: Gastroenterology    TONSILLECTOMY         Meds/Allergies:    Prior to Admission medications    Medication Sig Start Date End Date Taking?  Authorizing Provider   acetaminophen (TYLENOL) 325 mg tablet Take 2 tablets (650 mg total) by mouth every 8 (eight) hours as needed for headaches or fever (pain) 2/18/20  Yes Rubén Coleman DO   atorvastatin (LIPITOR) 10 mg tablet 1 TAB ORALLY AT BEDTIME DX:HYPERLIPIDEMIA 12/19/19  Yes Rubén Coleman DO   CALMOSEPTINE 0 44-20 6 % OINT Apply topically 2 (two) times a day as needed (to buttock/sacrum) 1/10/20  Yes Rubén Coleman DO   donepezil (ARICEPT) 10 mg tablet Take 1 tablet (10 mg total) by mouth daily at bedtime Dementia 1/29/20  Yes Bette Lozoya MD   furosemide (LASIX) 40 mg tablet 1 TAB ORALLY TWICE DAILY DX: EDEMA 3/12/20  Yes MERI Santizo   gabapentin (NEURONTIN) 100 mg capsule Take 1 capsule (100 mg total) by mouth 2 (two) times a day Dx: Neuropathy 1/28/20  Yes Bette Lozoya MD   GLIPIZIDE XL 5 MG 24 hr tablet 1 TAB ORALLY EVERY MORNING DX: DIABETES (NIDDM) 2/13/20  Yes Rubén Coleman DO   glucose blood (TRUE METRIX BLOOD GLUCOSE TEST) test strip Patient to test once daily DX code E11 8 4/22/19  Yes Rubén Coleman,    levothyroxine 100 mcg tablet 1 TAB ORALLY EVERY MORNING DX: HYPOTHYROIDISM 2/12/20  Yes Rubén Coleman DO   lisinopril (ZESTRIL) 2 5 mg tablet Take 1 tablet (2 5 mg total) by mouth daily 10/18/19  Yes Salo Burris MD   loratadine (CLARITIN) 10 mg tablet Take 1 tablet (10 mg total) by mouth daily For allergies 5/21/19  Yes Elaine Zuniga PA-C   metoprolol tartrate (LOPRESSOR) 50 mg tablet 1 TAB ORALLY TWICE DAILY DX: HYPERTENSION 2/12/20  Yes Rubén Coleman, DO   potassium chloride (K-DUR,KLOR-CON) 20 mEq tablet 2 TABS (40MEQ) ORALLY EVERY MORNING DX:ELECTROLYTE DEFICIENCY 2/12/20  Yes Robert Jimenes MD   warfarin (COUMADIN) 5 mg tablet Take 1 tablet (5 mg total) by mouth daily 10/24/19  Yes Rubén Coleman,    ammonium lactate (LAC-HYDRIN) 12 % lotion Apply topically 2 (two) times a day 1/22/20   Kehinde Russell MD   fluticasone [facility]) 50 mcg/act nasal spray 2 sprays into each nostril daily For allergies  Patient taking differently: 2 sprays into each nostril as needed For allergies 5/21/19   Elaine Zuniga PA-C   Folic Acid-Vit E4-HYI M63 (FOLBEE) 2 5-25-1 MG TABS Take by mouth    Historical Provider, MD   hydrOXYzine HCL (ATARAX) 10 mg tablet Take 1 tablet (10 mg total) by mouth 3 (three) times a day 2/19/20   Rubén Coleman, DO   Misc  Devices (BED WEDGE) MISC by Does not apply route daily 8/6/19   Juliette Begin, DO   nystatin-triamcinolone (MYCOLOG-II) cream Apply topically 2 (two) times a day as needed (fungal/groin rash) 2/18/20   Juliette Begin, DO   warfarin (COUMADIN) 2 5 mg tablet  12/26/19 3/18/20  Historical Provider, MD     I have reviewed home medications with patient personally  Allergies: No Known Allergies    Social History:     Marital Status:     Occupation:  Retired  Patient Pre-hospital Living Situation:  Assisted care facility  Patient Pre-hospital Level of Mobility:  Mobile  Patient Pre-hospital Diet Restrictions:  Denies  Substance Use History:   Social History     Substance and Sexual Activity   Alcohol Use Never    Frequency: Never    Binge frequency: Never     Social History     Tobacco Use   Smoking Status Never Smoker   Smokeless Tobacco Never Used     Social History     Substance and Sexual Activity   Drug Use Never       Family History:    Family History   Problem Relation Age of Onset    Alcohol abuse Mother     Heart attack Father     Dementia Brother     Other Son         AUTO ACCIDENT       Physical Exam:     Vitals:   Blood Pressure: 170/100 (03/18/20 1936)  Pulse: (!) 136 (03/18/20 1926)  Temperature: (!) 97 4 °F (36 3 °C) (03/18/20 1926)  Temp Source: Axillary (03/18/20 1926)  Respirations: 22 (03/18/20 1926)  Height: 5' 11" (180 3 cm) (03/18/20 1926)  Weight - Scale: 77 9 kg (171 lb 11 8 oz) (03/18/20 1504)  SpO2: 93 % (03/18/20 1926)    Physical Exam   Constitutional: He appears well-developed and well-nourished  Additional Data:     Lab Results: I have personally reviewed pertinent reports  Results from last 7 days   Lab Units 03/18/20  1533   WBC Thousand/uL 8 75   HEMOGLOBIN g/dL 10 5*   HEMATOCRIT % 34 7*   PLATELETS Thousands/uL 274   NEUTROS PCT % 79*   LYMPHS PCT % 8*   MONOS PCT % 11   EOS PCT % 1     Results from last 7 days   Lab Units 03/18/20  1533   SODIUM mmol/L 143   POTASSIUM mmol/L 3 8   CHLORIDE mmol/L 106   CO2 mmol/L 29   BUN mg/dL 28*   CREATININE mg/dL 1 70*   ANION GAP mmol/L 8   CALCIUM mg/dL 8 2*   ALBUMIN g/dL 2 7*   TOTAL BILIRUBIN mg/dL 1 00   ALK PHOS U/L 136*   ALT U/L 41   AST U/L 39   GLUCOSE RANDOM mg/dL 69     Results from last 7 days   Lab Units 03/18/20  1533   INR  1 90*             Results from last 7 days   Lab Units 03/18/20  1533   LACTIC ACID mmol/L 1 1       Imaging: I have personally reviewed pertinent reports        XR chest 1 view portable   Final Result by Sakina Prince DO (03/18 1558)      Increasing right basilar opacity likely representing a combination of pleural effusion and atelectasis  Cannot exclude underlying pneumonia  Correlate clinically  Workstation performed: [id number]         XR chest pa & lateral    (Results Pending)         ** Please Note: This note has been constructed using a voice recognition system   **

## 2020-03-18 NOTE — ED NOTES
1  CC Fever, cough  2  Is this admission r/t an injury? na  3  Orientation status AAOx4  4  Abnormal labs, assessment, vitals elevated BNP,   5  Medication/labs   6  Last time narcotics were given   7  IV lines, drains, etc #[id number]  8  Isolation Status +RSV   9  Skin   10   Ambulation status with assistance  11 ED RN phone number [phone]  1St Adele, RN  03/18/20 9759

## 2020-03-18 NOTE — ASSESSMENT & PLAN NOTE
Lab Results   Component Value Date    HGBA1C 6 3 (H) 01/14/2020       No results for input(s): POCGLU in the last 72 hours      Blood Sugar Average: Last 72 hrs:   insulin sliding scale

## 2020-03-18 NOTE — ED PROVIDER NOTES
History  Chief Complaint   Patient presents with    Fever - 76 years or older     cough and fever,      80-year-old male with history AFib on Coumadin, congestive heart failure, hypertension, hyperlipidemia, and CKD presenting via EMS for evaluation productive cough and fever for the past 2 days  He currently lives at [facility] facility  Patient reports subjective fevers at home but has not been taking his temperature  Cough is productive of yellow and green sputum  He also complains of shortness of breath, wheezing, and malaise  Per review of chart, patient's nurse at his nursing facility called his PCP about patient's recent weight gain of 7 pounds  Patient was advised to go to the ER  No known sick contacts and no recent travel  Patient denies chest pain, abdominal pain, vomiting, diarrhea, sore throat, neck stiffness  History provided by:  Patient and medical records   used: No    Fever - 75 years or older   Max temp prior to arrival:  Unknown  Temp source:  Subjective  Severity:  Moderate  Onset quality:  Gradual  Duration:  2 days  Timing:  Constant  Progression:  Worsening  Chronicity:  New  Relieved by:  Nothing  Worsened by:  Nothing  Ineffective treatments:  None tried  Associated symptoms: chills and cough    Associated symptoms: no chest pain, no confusion, no congestion, no diarrhea, no dysuria, no ear pain, no headaches, no myalgias, no nausea, no rash, no rhinorrhea, no somnolence, no sore throat and no vomiting    Risk factors: no recent travel and no sick contacts        Prior to Admission Medications   Prescriptions Last Dose Informant Patient Reported? Taking?    CALMOSEPTINE 0 44-20 6 % OINT Past Week at Unknown time Self No Yes   Sig: Apply topically 2 (two) times a day as needed (to buttock/sacrum)   Folic Acid-Vit L3-REE G47 (FOLBEE) 2 5-25-1 MG TABS  Self Yes No   Sig: Take by mouth   GLIPIZIDE XL 5 MG 24 hr tablet  Self No No   Si TAB ORALLY EVERY MORNING DX: DIABETES (NIDDM)   Misc   Devices (BED WEDGE) MISC  Self No No   Sig: by Does not apply route daily   acetaminophen (TYLENOL) 325 mg tablet Past Week at Unknown time  No Yes   Sig: Take 2 tablets (650 mg total) by mouth every 8 (eight) hours as needed for headaches or fever (pain)   ammonium lactate (LAC-HYDRIN) 12 % lotion  Self No No   Sig: Apply topically 2 (two) times a day   atorvastatin (LIPITOR) 10 mg tablet  Self No No   Si TAB ORALLY AT BEDTIME DX:HYPERLIPIDEMIA   donepezil (ARICEPT) 10 mg tablet Past Week at Unknown time Self No Yes   Sig: Take 1 tablet (10 mg total) by mouth daily at bedtime Dementia   fluticasone (FLONASE) 50 mcg/act nasal spray  Self No No   Si sprays into each nostril daily For allergies   Patient taking differently: 2 sprays into each nostril as needed For allergies   furosemide (LASIX) 40 mg tablet Past Week at Unknown time  No Yes   Si TAB ORALLY TWICE DAILY DX: EDEMA   gabapentin (NEURONTIN) 100 mg capsule Past Week at Unknown time Self No Yes   Sig: Take 1 capsule (100 mg total) by mouth 2 (two) times a day Dx: Neuropathy   glucose blood (TRUE METRIX BLOOD GLUCOSE TEST) test strip  Self No No   Sig: Patient to test once daily DX code E11 8   hydrOXYzine HCL (ATARAX) 10 mg tablet   No No   Sig: Take 1 tablet (10 mg total) by mouth 3 (three) times a day   levothyroxine 100 mcg tablet  Self No No   Si TAB ORALLY EVERY MORNING DX: HYPOTHYROIDISM   lisinopril (ZESTRIL) 2 5 mg tablet Past Week at Unknown time Self No Yes   Sig: Take 1 tablet (2 5 mg total) by mouth daily   loratadine (CLARITIN) 10 mg tablet  Self No No   Sig: Take 1 tablet (10 mg total) by mouth daily For allergies   metoprolol tartrate (LOPRESSOR) 50 mg tablet  Self No No   Si TAB ORALLY TWICE DAILY DX: HYPERTENSION   nystatin-triamcinolone (MYCOLOG-II) cream   No No   Sig: Apply topically 2 (two) times a day as needed (fungal/groin rash)   potassium chloride (K-DUR,KLOR-CON) 20 mEq tablet  Self No No   Si TABS (40MEQ) ORALLY EVERY MORNING DX:ELECTROLYTE DEFICIENCY   warfarin (COUMADIN) 5 mg tablet 3/17/2020 at Unknown time Self No Yes   Sig: Take 1 tablet (5 mg total) by mouth daily      Facility-Administered Medications: None       Past Medical History:   Diagnosis Date    Acute systolic congestive heart failure ([place] [address] ) 2018    Allergic contact dermatitis due to plants, except food     Atrial fibrillation (HCC)     Cancer (HCC)     skin cancer    Cataract     Chronic kidney disease     Stage 3    Coagulation test abnormality     Congestive heart failure (CHF) (HCC)     Diabetes mellitus ([place] [address] )     Disease of thyroid gland     hypothyroidism    DVT (deep venous thrombosis) (HCC)     Eczema     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HCC)     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HCC)     GERD (gastroesophageal reflux disease)     Gout     Hyperlipidemia     Hypertension     Hypokalemia     Leukocytosis     Lichen planus     Nonmelanoma skin cancer     Phlebitis or thrombophlebitis of deep vessel of lower extremity (HCC)     Proteinuria        Past Surgical History:   Procedure Laterality Date    CATARACT EXTRACTION      CHOLECYSTECTOMY      MA ESOPHAGOGASTRODUODENOSCOPY TRANSORAL DIAGNOSTIC N/A 2017    Procedure: EGD AND COLONOSCOPY;  Surgeon: Tito Manley MD;  Location: [facility]; Service: Gastroenterology    TONSILLECTOMY         Family History   Problem Relation Age of Onset    Alcohol abuse Mother     Heart attack Father     Dementia Brother     Other Son         AUTO ACCIDENT     I have reviewed and agree with the history as documented      E-Cigarette/Vaping     E-Cigarette/Vaping Substances     Social History     Tobacco Use    Smoking status: Never Smoker    Smokeless tobacco: Never Used   Substance Use Topics    Alcohol use: Never     Frequency: Never     Binge frequency: Never    Drug use: Never       Review of Systems   Constitutional: Positive for chills, fever and unexpected weight change  HENT: Negative for congestion, drooling, ear pain, rhinorrhea and sore throat  Eyes: Negative for discharge and redness  Respiratory: Positive for cough, shortness of breath and wheezing  Negative for stridor  Cardiovascular: Positive for leg swelling  Negative for chest pain  Gastrointestinal: Negative for abdominal pain, diarrhea, nausea and vomiting  Genitourinary: Negative for difficulty urinating and dysuria  Musculoskeletal: Negative for myalgias and neck stiffness  Skin: Negative for rash  Allergic/Immunologic: Negative for immunocompromised state  Neurological: Negative for syncope and headaches  Psychiatric/Behavioral: Negative for confusion  All other systems reviewed and are negative  Physical Exam  Physical Exam   Constitutional: He appears well-developed and well-nourished  No distress  Elderly male who appears uncomfortable but non-toxic  Surgical mask in place   HENT:   Head: Normocephalic and atraumatic  Right Ear: External ear normal    Left Ear: External ear normal    Mouth/Throat: Oropharynx is clear and moist    Eyes: Conjunctivae are normal  Right eye exhibits no discharge  Left eye exhibits no discharge  No scleral icterus  Neck: Normal range of motion  Neck supple  Cardiovascular: Normal heart sounds  An irregularly irregular rhythm present  Tachycardia present  No murmur heard  Pulses:       Radial pulses are 2+ on the right side, and 2+ on the left side  Pulmonary/Chest: Effort normal  No stridor  Tachypnea noted  No respiratory distress  He has wheezes  He has rales in the right lower field  Oxygen saturation 100% on 3L NC  Diffuse wheezes present   Abdominal: Soft  Bowel sounds are normal  He exhibits no distension  There is no tenderness  There is no guarding  Musculoskeletal: Normal range of motion  He exhibits no deformity  Compression stockings in place   1+ pitting edema present with chronic venous stasis changes   Neurological: He is alert  He is not disoriented  GCS eye subscore is 4  GCS verbal subscore is 5  GCS motor subscore is 6  Skin: Skin is warm and dry  He is not diaphoretic  Psychiatric: He has a normal mood and affect  His behavior is normal    Nursing note and vitals reviewed  Vital Signs  ED Triage Vitals   Temperature Pulse Respirations Blood Pressure SpO2   03/18/20 1504 03/18/20 1504 03/18/20 1504 03/18/20 1505 03/18/20 1504   (!) 100 8 °F (38 2 °C) (!) 122 (!) 26 157/84 100 %      Temp Source Heart Rate Source Patient Position - Orthostatic VS BP Location FiO2 (%)   03/18/20 1504 03/18/20 1504 03/18/20 1504 03/18/20 1504 --   Tympanic Monitor Sitting Left arm       Pain Score       --                  Vitals:    03/18/20 1504 03/18/20 1505   BP:  157/84   Pulse: (!) 122    Patient Position - Orthostatic VS: Sitting          Visual Acuity      ED Medications  Medications   cefepime (MAXIPIME) 2,000 mg in dextrose 5 % 50 mL IVPB (has no administration in time range)   vancomycin (VANCOCIN) 1,500 mg in sodium chloride 0 9 % 250 mL IVPB (has no administration in time range)   sodium chloride 0 9 % bolus 500 mL (500 mL Intravenous New Bag 3/18/20 1553)   albuterol (PROVENTIL HFA,VENTOLIN HFA) inhaler 2 puff (2 puffs Inhalation Given 3/18/20 1553)   acetaminophen (TYLENOL) tablet 650 mg (650 mg Oral Given 3/18/20 1552)   ipratropium-albuterol (DUO-NEB) 0 5-2 5 mg/3 mL inhalation solution 3 mL (3 mL Nebulization Given 3/18/20 1707)       Diagnostic Studies  Results Reviewed     Procedure Component Value Units Date/Time    Sputum culture and Gram stain [[id number]]     Lab Status:  No result Specimen:  Sputum     Procalcitonin [[id number]] Collected:  03/18/20 1639    Lab Status:   In process Specimen:  Blood from Arm, Right Updated:  03/18/20 1641    Influenza A/B and RSV PCR [[id number]]  (Abnormal) Collected:  03/18/20 1533    Lab Status:  Final result Specimen:  Nasopharyngeal Updated:  03/18/20 1627     INFLUENZA A PCR None Detected     INFLUENZA B PCR None Detected     RSV PCR Detected    Urine Microscopic [[id number]]  (Abnormal) Collected:  03/18/20 1555    Lab Status:  Final result Specimen:  Urine, Other Updated:  03/18/20 1616     RBC, UA None Seen /hpf      WBC, UA 0-1 /hpf      Epithelial Cells Occasional /hpf      Bacteria, UA None Seen /hpf      Hyaline Casts, UA 0-1 /lpf     Comprehensive metabolic panel [[id number]]  (Abnormal) Collected:  03/18/20 1533    Lab Status:  Final result Specimen:  Blood Updated:  03/18/20 1609     Sodium 143 mmol/L      Potassium 3 8 mmol/L      Chloride 106 mmol/L      CO2 29 mmol/L      ANION GAP 8 mmol/L      BUN 28 mg/dL      Creatinine 1 70 mg/dL      Glucose 69 mg/dL      Calcium 8 2 mg/dL      AST 39 U/L      ALT 41 U/L      Alkaline Phosphatase 136 U/L      Total Protein 7 3 g/dL      Albumin 2 7 g/dL      Total Bilirubin 1 00 mg/dL      eGFR 35 ml/min/1 73sq m     Narrative:       [place] guidelines for Chronic Kidney Disease (CKD):     Stage 1 with normal or high GFR (GFR > 90 mL/min/1 73 square meters)    Stage 2 Mild CKD (GFR = 60-89 mL/min/1 73 square meters)    Stage 3A Moderate CKD (GFR = 45-59 mL/min/1 73 square meters)    Stage 3B Moderate CKD (GFR = 30-44 mL/min/1 73 square meters)    Stage 4 Severe CKD (GFR = 15-29 mL/min/1 73 square meters)    Stage 5 End Stage CKD (GFR <15 mL/min/1 73 square meters)  Note: GFR calculation is accurate only with a steady state creatinine    NT-BNP PRO [[id number]]  (Abnormal) Collected:  03/18/20 1533    Lab Status:  Final result Specimen:  Blood Updated:  03/18/20 1609     NT-proBNP 6,631 pg/mL     Lactic acid, plasma x2 [[id number]]  (Normal) Collected:  03/18/20 1533    Lab Status:  Final result Specimen:  Blood Updated:  03/18/20 1602     LACTIC ACID 1 1 mmol/L     Narrative:       Result may be elevated if tourniquet was used during collection      Donna Ward [[id number]] (Abnormal) Collected:  03/18/20 1533    Lab Status:  Final result Specimen:  Blood Updated:  03/18/20 1601     Protime 21 9 seconds      INR 1 90    APTT [[id number]]  (Abnormal) Collected:  03/18/20 1533    Lab Status:  Final result Specimen:  Blood Updated:  03/18/20 1601     PTT 40 seconds     UA w Reflex to Microscopic w Reflex to Culture [[id number]]  (Abnormal) Collected:  03/18/20 1555    Lab Status:  Final result Specimen:  Urine, Other Updated:  03/18/20 1601     Color, UA Yellow     Clarity, UA Clear     Specific Gravity, UA 1 025     pH, UA 6 0     Leukocytes, UA Negative     Nitrite, UA Negative     Protein,  (2+) mg/dl      Glucose, UA Negative mg/dl      Ketones, UA Negative mg/dl      Urobilinogen, UA 0 2 E U /dl      Bilirubin, UA Negative     Blood, UA Trace-lysed    Troponin I [[id number]]  (Normal) Collected:  03/18/20 1533    Lab Status:  Final result Specimen:  Blood Updated:  03/18/20 1559     Troponin I 0 03 ng/mL     Blood culture #2 [[id number]] Collected:  03/18/20 1539    Lab Status:   In process Specimen:  Blood from Arm, Left Updated:  03/18/20 1546    CBC and differential [[id number]]  (Abnormal) Collected:  03/18/20 1533    Lab Status:  Final result Specimen:  Blood Updated:  03/18/20 1536     WBC 8 75 Thousand/uL      RBC 3 64 Million/uL      Hemoglobin 10 5 g/dL      Hematocrit 34 7 %      MCV 95 fL      MCH 28 8 pg      MCHC 30 3 g/dL      RDW 16 9 %      MPV 9 2 fL      Platelets 347 Thousands/uL      nRBC 0 /100 WBCs      Neutrophils Relative 79 %      Immat GRANS % 0 %      Lymphocytes Relative 8 %      Monocytes Relative 11 %      Eosinophils Relative 1 %      Basophils Relative 1 %      Neutrophils Absolute 6 99 Thousands/µL      Immature Grans Absolute 0 03 Thousand/uL      Lymphocytes Absolute 0 69 Thousands/µL      Monocytes Absolute 0 96 Thousand/µL      Eosinophils Absolute 0 04 Thousand/µL      Basophils Absolute 0 04 Thousands/µL     Blood culture #1 [[id number]] Collected:  03/18/20 1533    Lab Status: In process Specimen:  Blood Updated:  03/18/20 1533    Lactic acid, plasma x2 [[id number]]     Lab Status:  No result Specimen:  Blood                  XR chest 1 view portable   Final Result by Francisco Hennessy DO (03/18 1558)      Increasing right basilar opacity likely representing a combination of pleural effusion and atelectasis  Cannot exclude underlying pneumonia  Correlate clinically  Workstation performed: [id number]                    Procedures  ECG 12 Lead Documentation Only  Date/Time: 3/18/2020 3:42 PM  Performed by: Allison Robbins PA-C  Authorized by: Allison Robbins PA-C     Indications / Diagnosis:  SOB  ECG reviewed by me, the ED Provider: yes    Patient location:  ED  Previous ECG:     Previous ECG:  Compared to current    Comparison ECG info:  1/19/20    Comparison to cardiac monitor: Yes    Interpretation:     Interpretation: abnormal    Rate:     ECG rate:  109    ECG rate assessment: tachycardic    Rhythm:     Rhythm: atrial fibrillation    Ectopy:     Ectopy: PVCs      PVCs:  Infrequent  QRS:     QRS axis:  Normal  Conduction:     Conduction: normal    ST segments:     ST segments:  Normal  T waves:     T waves: non-specific               ED Course  ED Course as of Mar 18 1746   Wed Mar 18, 2020   1633 RSV PCR(!): Detected               MDM  Number of Diagnoses or Management Options  Acute kidney injury [facility]): new and requires workup  Pleural effusion, right: new and requires workup  RSV (respiratory syncytial virus infection): new and requires workup  Sepsis [facility]): new and requires workup  Diagnosis management comments: 79yo male with a history of CHF and afib presenting for evaluation of productive cough, fever, and wheezing x 2 days  No known sick contacts but patient lives in an assisted living facility  No recent travel  Temp 100 8 on arrival  Patient also tachycardic and tachypneic   Diffuse wheezing on lung exam with mild respiratory distress  Differential diagnosis includes but is not limited to: influenza, RSV, URI, sinusitis, postnasal drip, pneumonia, bronchitis, CHF exacerbation, sepsis, doubt PE as patient is anticoagulated     Initial ED plan: Check septic workup including blood cultures and lactate  Will check troponin, BNP, and EKG  CXR to evaluate for infiltrate  IV fluid bolus and albuterol inhaler for wheezing    Final assessment: Labs significant for an CARLO with creatinine of 1 7 (up from 1 3)  Hemoglobin at baseline  BNP elevated at 6600  White count and lactate normal  CXR reveals right pleural effusion with atelectasis vs infiltrate  Patient is RSV positive  Given SIRS criteria, will treat with cefepime and vancomycin for possible overlying pneumonia  Patient re-evaluated and continues to be mildly tachypneic  Will admit for further management          Amount and/or Complexity of Data Reviewed  Clinical lab tests: ordered and reviewed  Tests in the radiology section of CPT®: ordered and reviewed  Review and summarize past medical records: yes  Discuss the patient with other providers: yes  Independent visualization of images, tracings, or specimens: yes    Risk of Complications, Morbidity, and/or Mortality  Presenting problems: high  Diagnostic procedures: moderate  Management options: moderate          Disposition  Final diagnoses:   Sepsis ([facility] [address] )   RSV (respiratory syncytial virus infection)   Pleural effusion, right   Acute kidney injury ([facility] [address] )     Time reflects when diagnosis was documented in both MDM as applicable and the Disposition within this note     Time User Action Codes Description Comment    3/18/2020  5:45  [facility] Wendy [A41 9] Sepsis ([facility] [address] )     3/18/2020  5:46  [facility] [B97 4] RSV (respiratory syncytial virus infection)     3/18/2020  5:46 [facility] [J90] Pleural effusion, right     3/18/2020  5:46  [facility] [N17 9] Acute kidney injury [facility])       ED Disposition     ED Disposition Condition Date/Time Comment    Admit Stable Wed Mar 18, 2020  5:45 PM Case was discussed with Dr Sebastian Gunn and the patient's admission status was agreed to be Admission Status: inpatient status to the service of Dr Sebastian Gunn   Follow-up Information    None         Patient's Medications   Discharge Prescriptions    No medications on file     No discharge procedures on file      PDMP Review     None          ED Provider  Electronically Signed by           Daniele Randhawa PA-C  03/18/20 9989

## 2020-03-18 NOTE — PROGRESS NOTES
Pt is to take 5mg daily and retest again in 2  weeks (3/31/2020)  Faxed to [facility] [place] [phone]

## 2020-03-19 ENCOUNTER — APPOINTMENT (INPATIENT)
Dept: RADIOLOGY | Facility: HOSPITAL | Age: 85
DRG: 871 | End: 2020-03-19
Payer: MEDICARE

## 2020-03-19 LAB
ALBUMIN SERPL BCP-MCNC: 2.5 G/DL (ref 3.5–5)
ALP SERPL-CCNC: 117 U/L (ref 46–116)
ALT SERPL W P-5'-P-CCNC: 38 U/L (ref 12–78)
ANION GAP SERPL CALCULATED.3IONS-SCNC: 8 MMOL/L (ref 4–13)
AST SERPL W P-5'-P-CCNC: 37 U/L (ref 5–45)
ATRIAL RATE: 111 BPM
BILIRUB SERPL-MCNC: 1.3 MG/DL (ref 0.2–1)
BUN SERPL-MCNC: 27 MG/DL (ref 5–25)
CALCIUM SERPL-MCNC: 7.9 MG/DL (ref 8.3–10.1)
CHLORIDE SERPL-SCNC: 105 MMOL/L (ref 100–108)
CO2 SERPL-SCNC: 30 MMOL/L (ref 21–32)
CREAT SERPL-MCNC: 1.71 MG/DL (ref 0.6–1.3)
GFR SERPL CREATININE-BSD FRML MDRD: 35 ML/MIN/1.73SQ M
GLUCOSE SERPL-MCNC: 103 MG/DL (ref 65–140)
GLUCOSE SERPL-MCNC: 107 MG/DL (ref 65–140)
GLUCOSE SERPL-MCNC: 110 MG/DL (ref 65–140)
GLUCOSE SERPL-MCNC: 141 MG/DL (ref 65–140)
GLUCOSE SERPL-MCNC: 169 MG/DL (ref 65–140)
GLUCOSE SERPL-MCNC: 214 MG/DL (ref 65–140)
GLUCOSE SERPL-MCNC: 84 MG/DL (ref 65–140)
INR PPP: 1.71 (ref 0.84–1.19)
MAGNESIUM SERPL-MCNC: 1.9 MG/DL (ref 1.6–2.6)
PHOSPHATE SERPL-MCNC: 4.4 MG/DL (ref 2.3–4.1)
POTASSIUM SERPL-SCNC: 3.9 MMOL/L (ref 3.5–5.3)
PROCALCITONIN SERPL-MCNC: 0.16 NG/ML
PROCALCITONIN SERPL-MCNC: 1.1 NG/ML
PROT SERPL-MCNC: 6.8 G/DL (ref 6.4–8.2)
PROTHROMBIN TIME: 20.2 SECONDS (ref 11.6–14.5)
QRS AXIS: 70 DEGREES
QRSD INTERVAL: 82 MS
QT INTERVAL: 324 MS
QTC INTERVAL: 436 MS
SODIUM SERPL-SCNC: 143 MMOL/L (ref 136–145)
T WAVE AXIS: 234 DEGREES
VENTRICULAR RATE: 109 BPM

## 2020-03-19 PROCEDURE — 99223 1ST HOSP IP/OBS HIGH 75: CPT | Performed by: INTERNAL MEDICINE

## 2020-03-19 PROCEDURE — 84145 PROCALCITONIN (PCT): CPT | Performed by: INTERNAL MEDICINE

## 2020-03-19 PROCEDURE — 93010 ELECTROCARDIOGRAM REPORT: CPT | Performed by: INTERNAL MEDICINE

## 2020-03-19 PROCEDURE — 71046 X-RAY EXAM CHEST 2 VIEWS: CPT

## 2020-03-19 PROCEDURE — 97163 PT EVAL HIGH COMPLEX 45 MIN: CPT

## 2020-03-19 PROCEDURE — 87070 CULTURE OTHR SPECIMN AEROBIC: CPT | Performed by: PHYSICIAN ASSISTANT

## 2020-03-19 PROCEDURE — 80053 COMPREHEN METABOLIC PANEL: CPT | Performed by: INTERNAL MEDICINE

## 2020-03-19 PROCEDURE — 99222 1ST HOSP IP/OBS MODERATE 55: CPT | Performed by: INTERNAL MEDICINE

## 2020-03-19 PROCEDURE — 82570 ASSAY OF URINE CREATININE: CPT | Performed by: INTERNAL MEDICINE

## 2020-03-19 PROCEDURE — 82948 REAGENT STRIP/BLOOD GLUCOSE: CPT

## 2020-03-19 PROCEDURE — 82043 UR ALBUMIN QUANTITATIVE: CPT | Performed by: INTERNAL MEDICINE

## 2020-03-19 PROCEDURE — 83735 ASSAY OF MAGNESIUM: CPT | Performed by: INTERNAL MEDICINE

## 2020-03-19 PROCEDURE — 99232 SBSQ HOSP IP/OBS MODERATE 35: CPT | Performed by: PHYSICIAN ASSISTANT

## 2020-03-19 PROCEDURE — 85610 PROTHROMBIN TIME: CPT | Performed by: INTERNAL MEDICINE

## 2020-03-19 PROCEDURE — 87205 SMEAR GRAM STAIN: CPT | Performed by: PHYSICIAN ASSISTANT

## 2020-03-19 PROCEDURE — 81001 URINALYSIS AUTO W/SCOPE: CPT | Performed by: INTERNAL MEDICINE

## 2020-03-19 PROCEDURE — 97166 OT EVAL MOD COMPLEX 45 MIN: CPT

## 2020-03-19 PROCEDURE — 84100 ASSAY OF PHOSPHORUS: CPT | Performed by: INTERNAL MEDICINE

## 2020-03-19 RX ORDER — FUROSEMIDE 10 MG/ML
40 INJECTION INTRAMUSCULAR; INTRAVENOUS DAILY
Status: DISCONTINUED | OUTPATIENT
Start: 2020-03-19 | End: 2020-03-20

## 2020-03-19 RX ADMIN — WARFARIN SODIUM 5 MG: 5 TABLET ORAL at 16:58

## 2020-03-19 RX ADMIN — METHYLPREDNISOLONE SODIUM SUCCINATE 20 MG: 40 INJECTION, POWDER, FOR SOLUTION INTRAMUSCULAR; INTRAVENOUS at 21:00

## 2020-03-19 RX ADMIN — ENOXAPARIN SODIUM 40 MG: 40 INJECTION SUBCUTANEOUS at 08:10

## 2020-03-19 RX ADMIN — ATORVASTATIN CALCIUM 10 MG: 10 TABLET, FILM COATED ORAL at 21:00

## 2020-03-19 RX ADMIN — METHYLPREDNISOLONE SODIUM SUCCINATE 20 MG: 40 INJECTION, POWDER, FOR SOLUTION INTRAMUSCULAR; INTRAVENOUS at 08:10

## 2020-03-19 RX ADMIN — CEFEPIME HYDROCHLORIDE 1000 MG: 1 INJECTION, POWDER, FOR SOLUTION INTRAMUSCULAR; INTRAVENOUS at 21:00

## 2020-03-19 RX ADMIN — LEVOTHYROXINE SODIUM 100 MCG: 100 TABLET ORAL at 06:45

## 2020-03-19 RX ADMIN — GABAPENTIN 100 MG: 100 CAPSULE ORAL at 16:58

## 2020-03-19 RX ADMIN — ACETAMINOPHEN 650 MG: 325 TABLET, FILM COATED ORAL at 16:58

## 2020-03-19 RX ADMIN — DONEPEZIL HYDROCHLORIDE 10 MG: 5 TABLET ORAL at 21:00

## 2020-03-19 RX ADMIN — METOPROLOL TARTRATE 50 MG: 50 TABLET, FILM COATED ORAL at 23:25

## 2020-03-19 RX ADMIN — INSULIN LISPRO 1 UNITS: 100 INJECTION, SOLUTION INTRAVENOUS; SUBCUTANEOUS at 11:48

## 2020-03-19 RX ADMIN — CEFEPIME HYDROCHLORIDE 1000 MG: 1 INJECTION, POWDER, FOR SOLUTION INTRAMUSCULAR; INTRAVENOUS at 08:13

## 2020-03-19 RX ADMIN — FLUTICASONE PROPIONATE 2 SPRAY: 50 SPRAY, METERED NASAL at 10:43

## 2020-03-19 RX ADMIN — GABAPENTIN 100 MG: 100 CAPSULE ORAL at 08:04

## 2020-03-19 NOTE — PROGRESS NOTES
Spoke w/ pt's daughter  Pt has hearing aids but did not bring the , she will contact [facility] and see if they can drop off  Pts hearing aids are in labeled container in bedside drawer

## 2020-03-19 NOTE — ASSESSMENT & PLAN NOTE
· Presented with URI symptoms, SIRS criteria secondary to RSV  · Antibiotics started in the ED until pneumonia is ruled out    His repeat CXR shows no infiltrate, however is procalcitonin is significantly elevated 1 10  · Will continue antibiotics and repeat procalcitonin, consider short course 5 days  · Continue supportive treatment for viral RSV  · Bronchodilator therapy, supplemental O2 and wean as able  · Incentive spirometer, respiratory protocol

## 2020-03-19 NOTE — PHYSICAL THERAPY NOTE
Physical Therapy Evaluation     Patient's Name: Wilfredo Dorantes    Admitting Diagnosis  RSV (respiratory syncytial virus infection) [B97 4]  Fever [R50 9]  Pleural effusion, right [J90]  Acute kidney injury ([place] [address] ) [N17 9]  Sepsis ([place] [address] ) [A41 9]    Problem List  Patient Active Problem List   Diagnosis    Adult hypothyroidism    Chronic a-fib    Essential hypertension    Type 2 diabetes mellitus with diabetic polyneuropathy, without long-term current use of insulin ([place] [address] )    Hyperlipidemia    Skin abnormalities    GERD (gastroesophageal reflux disease)    Chronic combined systolic (congestive) and diastolic (congestive) heart failure ([place] [address] )    History of pulmonary embolism    Stage 3 chronic kidney disease (HCC)    Chronic anticoagulation    Hypoxic    Fever       Past Medical History  Past Medical History:   Diagnosis Date    Acute systolic congestive heart failure ([place] [address] ) 7/26/2018    Allergic contact dermatitis due to plants, except food     Atrial fibrillation (HCC)     Cancer (HCC)     skin cancer    Cataract     Chronic kidney disease     Stage 3    Coagulation test abnormality     Congestive heart failure (CHF) (HCC)     Diabetes mellitus ([place] [address] )     Disease of thyroid gland     hypothyroidism    DVT (deep venous thrombosis) (HCC)     Eczema     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HCC)     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HCC)     GERD (gastroesophageal reflux disease)     Gout     Hyperlipidemia     Hypertension     Hypokalemia     Leukocytosis     Lichen planus     Nonmelanoma skin cancer     Phlebitis or thrombophlebitis of deep vessel of lower extremity (HCC)     Proteinuria        Past Surgical History  Past Surgical History:   Procedure Laterality Date    CATARACT EXTRACTION      CHOLECYSTECTOMY      NV ESOPHAGOGASTRODUODENOSCOPY TRANSORAL DIAGNOSTIC N/A 6/14/2017    Procedure: EGD AND COLONOSCOPY;  Surgeon: Ngoc Torres MD;  Location: [facility];   Service: Gastroenterology    TONSILLECTOMY 03/19/20 1239   Note Type   Note type Eval only   Pain Assessment   Pain Assessment Tool Pain Assessment not indicated - pt denies pain   Pain Score No Pain   Home Living   Type of Home Assisted living  [facility] [place])   Home Layout Elevator;Performs ADLs on one level  (2nd floor apartment, uses the elevator)   Bathroom Shower/Tub Walk-in shower   Bathroom Toilet Standard   Bathroom Equipment Grab bars in shower; Shower chair   [facility]  (Rollator)   Additional Comments ambulatory with a rollator at baseline   Prior Function   Level of [place] Independent with ADLs and functional mobility; Needs assistance with IADLs   Lives With Facility staff   Receives Help From Other (Comment)  (Facility staff)   ADL Assistance Independent   IADLs Needs assistance   Falls in the last 6 months 0   Vocational Retired   Restrictions/Precautions   Wells Hermila Bearing Precautions Per Order No   Other Precautions Contact/isolation;Droplet precautions; Fall Risk;Hard of hearing;Bed Alarm; Chair Alarm   General   Family/Caregiver Present No   Cognition   Overall Cognitive Status WFL   Arousal/Participation Cooperative   Orientation Level Oriented X4   Memory Within functional limits   Following Commands Follows all commands and directions without difficulty   Comments patient agreeable to PT eval   RUE Assessment   RUE Assessment WFL  (based on functional assessment)   LUE Assessment   LUE Assessment WFL  (based on functional assessment)   RLE Assessment   RLE Assessment WFL  (grossly assessed with functional mobility: at least 3+/5)   LLE Assessment   LLE Assessment WFL  (grossly assessed with functional mobility: at least 3+/5)   Coordination   Movements are Fluid and Coordinated 1   Sensation X  (history of type 2 DM with diabetic polyneuropathy)   Light Touch   RLE Light Touch Grossly intact   LLE Light Touch Grossly intact   Bed Mobility   Additional Comments patient sitting out of bed in recliner chair upon arrival   Transfers   Sit to Stand 5  Supervision   Additional items Assist x 1; Increased time required;Verbal cues;Armrests; Impulsive   Stand to Sit 5  Supervision   Additional items Assist x 1; Increased time required;Verbal cues;Armrests   Additional Comments SpO2: 86-92% on RA with mobility   Ambulation/Elevation   Gait pattern Inconsistent berenice;Decreased foot clearance; Forward Flexion   Gait Assistance 5  Supervision   Additional items Assist x 1;Verbal cues   Assistive Device Rolling walker   Distance 25' + 10'   Stair Management Assistance Not tested   Balance   Static Sitting Good   Dynamic Sitting Fair +   Static Standing Fair   Dynamic Standing [address] -   Endurance Deficit   Endurance Deficit Yes   Activity Tolerance   Activity Tolerance Patient limited by fatigue  (mild GARCIA)   Medical Staff Made Aware OT Zaina Tatum; OT Reji Hall   Nurse Made Aware LARS Narayanan confirmed patient appropriate for therapy; post-session: patient returned to recliner chair, all needs within reach and chair alarm engaged   Assessment   Prognosis Good   Problem List Decreased strength;Decreased endurance; Impaired balance;Decreased mobility; Decreased safety awareness; Impaired hearing   Assessment Pt is 80 y o  male seen for PT evaluation s/p admit to [facility] on 3/18/2020 w/ Fever  PT consulted to assess pt's functional mobility and d/c needs  Order placed for PT eval and tx, w/ activity as tolerated order   Performed at least 2 patient identifiers during session: Name and   Comorbidities affecting pt's physical performance at time of assessment include: hypoxic, stage 3, chronic kidney disease, chronic combined systolic and diastolic heart failure, hyperlipidemia, type 2 DM with diabetic polyneuropathy, essential hypertension, chronic a-fib, adult hypothyroidism  PTA, pt was independent w/ all functional mobility w/ rollator, resident of [facility] and retired   Personal factors affecting pt at time of IE include: ambulating w/ assistive device, hearing impairments, impulsivity, limited insight into impairments, inability to perform IADLs and inability to perform ADLs  Please find objective findings from PT assessment regarding body systems outlined above with impairments and limitations including weakness, impaired balance, decreased endurance, gait deviations, decreased activity tolerance, decreased functional mobility tolerance, decreased safety awareness and fall risk  The following objective measures performed on IE also reveal limitations: Barthel Index: 55/100 and Modified Jessi: 3 (moderate disability)  Pt's clinical presentation is currently unstable/unpredictable seen in pt's presentation of ongoing medical management/monitoring, fatigue impacting overall mobility status and need for input for task focus and mobility technique/safety  Pt to benefit from continued PT tx to address deficits as defined above and maximize level of functional independent mobility and consistency  From PT/mobility standpoint, recommendation at time of d/c would be return to half-way with home PT pending progress in order to facilitate return to PLOF  Barriers to Discharge None   Goals   Patient Goals "no more tests"   [facility] Expiration Date 03/29/20   Short Term Goal #1 In 7-10 days: Increase bilateral LE strength 1/2 grade to facilitate independent mobility, Perform all bed mobility tasks modified independent to decrease caregiver burden, Perform all transfers modified independent to improve independence, Ambulate > 150 ft  with RW modified independent w/o LOB and w/ normalized gait pattern 100% of the time and Increase all balance 1 grade to decrease risk for falls   PT Treatment Day 0   Plan   Treatment/Interventions Functional transfer training;LE strengthening/ROM; Therapeutic exercise; Endurance training;Patient/family training;Equipment eval/education; Bed mobility;Gait training;Spoke to nursing;OT   PT Frequency 2-3x/wk   Recommendation   Recommendation Home PT; Other (Comment)  (return to residential with home PT)   PT - OK to Discharge Yes  (when medically cleared)   Modified Dickson Scale   Modified Dickson Scale 3   Barthel Index   Feeding 10   Bathing 0   Grooming Score 5   Dressing Score 5   Bladder Score 10   Bowels Score 10   Toilet Use Score 5   Transfers (Bed/Chair) Score 10   Mobility (Level Surface) Score 0   Stairs Score 0   Barthel Index Score 55       Conchita Oseguera, PT, DPT

## 2020-03-19 NOTE — ASSESSMENT & PLAN NOTE
· Metoprolol/ Coumadin    INR subtherapeutic, will continue Coumadin and monitoring  Lab Results   Component Value Date    INR 1 71 (H) 03/19/2020    INR 1 90 (H) 03/18/2020    INR 2 30 (A) 03/17/2020

## 2020-03-19 NOTE — ASSESSMENT & PLAN NOTE
Wt Readings from Last 3 Encounters:   03/18/20 77 9 kg (171 lb 11 8 oz)   02/18/20 83 9 kg (185 lb)   01/28/20 80 7 kg (178 lb)     · Continue Metoprolol, lisinopril on hold secondary to acute kidney injury  · Received 1x dose IV Lasix without significant change in pleural effusion  · Will review with pulmonology, to see if he has benefit of thoracentesis

## 2020-03-19 NOTE — SOCIAL WORK
Justen spoke with the pt dtr about the pt dcp  She states that the pt resides at [address] [place] [address], S W  assisted Living  The pt uses a walker and has been able to complete his ADL's w/o any assistance  The pt has had assistance with lotioning the dry skin on his back  The pt uses the facility [address] for his medication  Omelia Aas transports him to and from [facility]  CM reviewed discharge planning process including the following: identifying caregivers at home, preference for d/c planning needs, availability of treatment team to discuss questions or concerns patient and/or family may have regarding diagnosis, plan of care, old or new medications and discharge planning  Discharge Checklist reviewed and CM will continue to monitor for progress toward discharge goals in nursing and provider rounds  The pt is from [address] [place] [address], S   and they will be contacted once the pt is ready to dc

## 2020-03-19 NOTE — ASSESSMENT & PLAN NOTE
Lab Results   Component Value Date    HGBA1C 6 3 (H) 01/14/2020       Recent Labs     03/18/20  2116 03/18/20  2358 03/19/20  0620 03/19/20  0749   POCGLU 76 99 103 84       Blood Sugar Average: Last 72 hrs:  (P) 90 5   · Monitor BG, initiated SSI  · CCO diet

## 2020-03-19 NOTE — PLAN OF CARE
Problem: OCCUPATIONAL THERAPY ADULT  Goal: Performs self-care activities at highest level of function for planned discharge setting  See evaluation for individualized goals  Description  Treatment Interventions: ADL retraining, Functional transfer training, Endurance training, Patient/family training, Equipment evaluation/education, Compensatory technique education, Continued evaluation, Energy conservation, Activityengagement          See flowsheet documentation for full assessment, interventions and recommendations  Note:   Limitation: Decreased ADL status, Decreased Safe judgement during ADL, Decreased high-level ADLs, Decreased endurance     Assessment: Pt is a 80 y o  male seen for OT evaluation (time 4742-0425) s/p admit to [facility] on 3/18/2020 w/ Fever  Comorbidities affecting pt's functional performance at time of assessment include: adult hypothyroidism ,chronic a-fib, HTN, DM2, HLD, chronic combined systolic and diastolic heart failure, CKD3, hypoxic  Evaluation required an expanded review of medical and/or therapy records and additional review of physical, cognitive, or psychosocial history related to current functional performance    Personal factors affecting pt at time of IE include:difficulty performing ADLS, difficulty performing IADLS , health management  and environment  Prior to admission, Pt reports INDEP with ADLs, has assist with IADLs, and completes functional mobility using rollator at baseline  Upon evaluation: Based on functional assessment, pt requires supervision for grooming and UB ADLs, Min A for LB ADLs and toileting, supervision for sit<>stand transfers with RW, and supervision for functional mobility using RW  2* the following deficits impacting occupational performance: weakness, decreased balance, decreased tolerance, impulsivity and decreased safety awareness  Cognition appears to be [facility] and vision is [facility] - please refer to flowsheet above for details   Pt to benefit from continued skilled OT tx while in the hospital to address deficits as defined above and maximize level of functional independence w ADL's and functional mobility  Occupational Performance areas to address include: grooming, bathing/shower, toilet hygiene, dressing, socialization, health maintenance, functional mobility and social participation  From OT standpoint, recommendation at time of d/c would be home OT and return to PLOF       OT Discharge Recommendation: Home OT(+ return to PLOF)  OT - OK to Discharge: (once medically cleared)

## 2020-03-19 NOTE — PLAN OF CARE
Problem: PHYSICAL THERAPY ADULT  Goal: Performs mobility at highest level of function for planned discharge setting  See evaluation for individualized goals  Description  Treatment/Interventions: Functional transfer training, LE strengthening/ROM, Therapeutic exercise, Endurance training, Patient/family training, Equipment eval/education, Bed mobility, Gait training, Spoke to nursing, OT          See flowsheet documentation for full assessment, interventions and recommendations  Note:   Prognosis: Good  Problem List: Decreased strength, Decreased endurance, Impaired balance, Decreased mobility, Decreased safety awareness, Impaired hearing  Assessment: Pt is 80 y o  male seen for PT evaluation s/p admit to [facility] on 3/18/2020 w/ Fever  PT consulted to assess pt's functional mobility and d/c needs  Order placed for PT eval and tx, w/ activity as tolerated order   Performed at least 2 patient identifiers during session: Name and   Comorbidities affecting pt's physical performance at time of assessment include: hypoxic, stage 3, chronic kidney disease, chronic combined systolic and diastolic heart failure, hyperlipidemia, type 2 DM with diabetic polyneuropathy, essential hypertension, chronic a-fib, adult hypothyroidism  PTA, pt was independent w/ all functional mobility w/ rollator, resident of [facility] and retired  Personal factors affecting pt at time of IE include: ambulating w/ assistive device, hearing impairments, impulsivity, limited insight into impairments, inability to perform IADLs and inability to perform ADLs  Please find objective findings from PT assessment regarding body systems outlined above with impairments and limitations including weakness, impaired balance, decreased endurance, gait deviations, decreased activity tolerance, decreased functional mobility tolerance, decreased safety awareness and fall risk   The following objective measures performed on IE also reveal limitations: Barthel Index: 55/100 and Modified [place]: 3 (moderate disability)  Pt's clinical presentation is currently unstable/unpredictable seen in pt's presentation of ongoing medical management/monitoring, fatigue impacting overall mobility status and need for input for task focus and mobility technique/safety  Pt to benefit from continued PT tx to address deficits as defined above and maximize level of functional independent mobility and consistency  From PT/mobility standpoint, recommendation at time of d/c would be return to long term with home PT pending progress in order to facilitate return to PLOF  Barriers to Discharge: None     Recommendation: Home PT, Other (Comment)(return to [facility] with home PT)     PT - OK to Discharge: Yes(when medically cleared)    See flowsheet documentation for full assessment

## 2020-03-19 NOTE — OCCUPATIONAL THERAPY NOTE
Occupational Therapy Evaluation     Patient Name: Darrell Mcbride  Today's Date: 3/19/2020  Problem List  Principal Problem:    Fever  Active Problems:    Adult hypothyroidism    Chronic a-fib    Essential hypertension    Type 2 diabetes mellitus with diabetic polyneuropathy, without long-term current use of insulin (HCC)    Hyperlipidemia    Chronic combined systolic (congestive) and diastolic (congestive) heart failure (HCC)    Stage 3 chronic kidney disease ([facility] [address] )    Hypoxic    Past Medical History  Past Medical History:   Diagnosis Date    Acute systolic congestive heart failure ([facility] [address] ) 7/26/2018    Allergic contact dermatitis due to plants, except food     Atrial fibrillation (HCC)     Cancer (HCC)     skin cancer    Cataract     Chronic kidney disease     Stage 3    Coagulation test abnormality     Congestive heart failure (CHF) (HCC)     Diabetes mellitus ([facility] [address] )     Disease of thyroid gland     hypothyroidism    DVT (deep venous thrombosis) (HCC)     Eczema     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HCC)     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HCC)     GERD (gastroesophageal reflux disease)     Gout     Hyperlipidemia     Hypertension     Hypokalemia     Leukocytosis     Lichen planus     Nonmelanoma skin cancer     Phlebitis or thrombophlebitis of deep vessel of lower extremity (HCC)     Proteinuria      Past Surgical History  Past Surgical History:   Procedure Laterality Date    CATARACT EXTRACTION      CHOLECYSTECTOMY      CO ESOPHAGOGASTRODUODENOSCOPY TRANSORAL DIAGNOSTIC N/A 6/14/2017    Procedure: EGD AND COLONOSCOPY;  Surgeon: David Rae MD;  Location: [facility]; Service: Gastroenterology    TONSILLECTOMY           03/19/20 0430   Note Type   Note type Eval only   Restrictions/Precautions   Weight Bearing Precautions Per Order No   Other Precautions Contact/isolation;Droplet precautions; Fall Risk;Hard of hearing;Bed Alarm; Chair Alarm   Pain Assessment   Pain Assessment Tool Pain Assessment not indicated - pt denies pain   Pain Score No Pain   Home Living   Type of Home Assisted living  [facility])   Home Layout Elevator;Performs ADLs on one level  (2nd floor apartment, uses the elevator)   Bathroom Shower/Tub Walk-in shower   Bathroom Toilet Standard   Bathroom Equipment Grab bars in shower; Shower chair   [address] [facility]  (Rollator)   Additional Comments ambulatory with a rollator at baseline   Prior Function   Level of [place] Independent with ADLs and functional mobility; Needs assistance with IADLs   Lives With Facility staff   Receives Help From Other (Comment)  (Facility staff)   ADL Assistance Independent   IADLs Needs assistance   Falls in the last 6 months 0   Vocational Retired   Lifestyle   Autonomy Pt reports INDEP with ADLs, has assist with IADLs, and completes functional mobility using rollator at baseline   Reciprocal Relationships Pt lives in [facility]   Service to Others Pt is retired   Intrinsic Gratification Pt enjoys walking the halls and chatting with the other residents   Psychosocial   Psychosocial (WDL) [address] Pt is very pleasant and cooperative   ADL   Eating Assistance 7  Independent   Grooming Assistance 5  Supervision/Setup   UB [address] 5  Supervision/Setup   LB [address] 4  Minimal Assistance   LB Bathing Deficit Verbal cueing;Supervision/safety;Steadying   R [address] 1 management down;Verbal cueing;Supervison/safety   Additional Comments Assist levels based on functional assessment of performance skills and deficits  Bed Mobility   Additional Comments Pt seated in b/s chair at start of session and agreeable to OT  Pt seen with PT due to decreased activity tolerance   Pt seated in b/s chair at end of session with all needs met, call bell within reach, and chair alarm active  Transfers   Sit to Stand 5  Supervision   Additional items Assist x 1; Increased time required;Verbal cues;Armrests; Impulsive   Stand to Sit 5  Supervision   Additional items Assist x 1; Increased time required;Verbal cues;Armrests   Toilet transfer 5  Supervision  (SBA)   Additional items Assist x 1; Increased time required;Verbal cues;Standard toilet; Impulsive  (VCs for use of grab bars and sequencing of hand placement)   Additional Comments SpO2: 86-92% on RA with mobility  Pt used RW for UE support   Functional Mobility   Functional Mobility 5  Supervision   Additional Comments Ax1 using RW <> bathroom  Pt required VCs for safe use of RW - to maintain contact with floor instead of picking it up  Pt impulsive at times with RW use requiring VCs for redirection  Balance   Static Sitting Good   Dynamic Sitting Fair +   Static Standing Fair   Dynamic Standing Fair -   Ambulatory Fair -   Activity Tolerance   Activity Tolerance Patient limited by fatigue  (GARCIA)   Medical Staff Made Aware PT Radha, [address]   Nurse Made Aware RN Janette Mccauley confirmed patient appropriate for therapy; post-session: patient returned to recliner chair, all needs within reach and chair alarm engaged   RUE Assessment   RUE Assessment WFL   RUE Strength   RUE Overall Strength Within Functional Limits - strength 5/5   R Shoulder Flexion 5/5   R Elbow Extension 5/5   LUE Assessment   LUE Assessment WFL   LUE Strength   LUE Overall Strength Within Functional Limits - strength 5/5   L Shoulder Flexion 5/5   L Elbow Extension 5/5   Hand Function   Gross Motor Coordination Functional   Fine Motor Coordination Functional   Vision-Basic Assessment   Current Vision Wears glasses only for reading   Patient Visual Report   (denies acute visual changes)   Vision - Complex Assessment   Acuity Able to read clock/calendar on wall without difficulty; Able to read employee name badge without difficulty   Cognition   Overall Cognitive Status WFL   Arousal/Participation Alert; Responsive;Arousable; Cooperative   Attention Within functional limits   Orientation Level Oriented X4   Memory Within functional limits   Following Commands Follows all commands and directions without difficulty   Comments Pt able to identify self by full name and birthdate   Assessment   Limitation Decreased ADL status; Decreased Safe judgement during ADL;Decreased high-level ADLs; Decreased endurance   Assessment Pt is a 80 y o  male seen for OT evaluation (time 9943-3643) [place] [facility] [place] w/ Fever  Comorbidities affecting pt's functional performance at time of assessment include: adult hypothyroidism ,chronic a-fib, HTN, DM2, HLD, chronic combined systolic and diastolic heart failure, CKD3, hypoxic  Evaluation required an expanded review of medical and/or therapy records and additional review of physical, cognitive, or psychosocial history related to current functional performance    Personal factors affecting pt at time of IE include:difficulty performing ADLS, difficulty performing IADLS , health management  and environment  Prior to admission, Pt reports INDEP with ADLs, has assist with IADLs, and completes functional mobility using rollator at baseline  Upon evaluation: Based on functional assessment, pt requires supervision for grooming and UB ADLs, Min A for LB ADLs and toileting, supervision for sit<>stand transfers with RW, and supervision for functional mobility using RW  2* the following deficits impacting occupational performance: weakness, decreased balance, decreased tolerance, impulsivity and decreased safety awareness  Cognition appears to be [facility] and vision is [facility] - please refer to flowsheet above for details  Pt to benefit from continued skilled OT tx while in the hospital to address deficits as defined above and maximize level of functional independence w ADL's and functional mobility   Occupational Performance areas to address include: grooming, bathing/shower, toilet hygiene, dressing, socialization, health maintenance, functional mobility and social participation  From OT standpoint, recommendation at time of d/c would be home OT and return to PLOF  Goals   Patient Goals "no more tests"   Plan   Treatment Interventions ADL retraining;Functional transfer training; Endurance training;Patient/family training;Equipment evaluation/education; Compensatory technique education;Continued evaluation; Energy conservation; Activityengagement   Goal Expiration Date 03/26/20   OT Frequency 2-3x/wk   Recommendation   OT Discharge Recommendation Home OT  (+ return to PLOF)   OT - OK to Discharge   (once medically cleared)   Barthel Index   Feeding 10   Bathing 0   Grooming Score 5   Dressing Score 5   Bladder Score 10   Bowels Score 10   Toilet Use Score 5   Transfers (Bed/Chair) Score 10   Mobility (Level Surface) Score 0   Stairs Score 0   Barthel Index Score 55   Modified [place] Scale   Modified Jessi Scale 3     Goals to be met within 7 days:    Pt will complete grooming/oral hygiene tasks Mod I while standing at sink    Pt will complete UB bathing/dressing Mod I while seated    Pt will complete LB bathing/dressing Mod I while seated     Pt will improve functional activity tolerance while standing at sink to 10+ minutes in order to increase safety and independence during functional transfers and ADL tasks  Pt will improve dynamic sitting/standing balance to good to increase safety and independence during functional transfers and ADL and decrease risk for falls      Pt will increase independence during STS transfers with least restrictive AD Mod I     Pt will complete transfer to raised toilet with grab bars Mod I     Pt will complete toileting tasks (clothing management up/down, hygiene) Mod I     Pt will complete shower stall transfer  without LOB with supervision after set-up using shower chair and grab bars    Pt will attend to continued cognitive assessment 100% of the time in order to provide most appropriate recommendations for d/c plans  Pt will identify and implement at least 3 healthy coping strategies to increase participation in meaningful activities and decrease risk for readmission      Lexus Quintero OTR/L

## 2020-03-19 NOTE — PLAN OF CARE
Problem: RESPIRATORY - ADULT  Goal: Achieves optimal ventilation and oxygenation  Description  INTERVENTIONS:  - Assess for changes in respiratory status  - Assess for changes in mentation and behavior  - Position to facilitate oxygenation and minimize respiratory effort  - Oxygen administered by appropriate delivery if ordered  - Initiate smoking cessation education as indicated  - Encourage broncho-pulmonary hygiene including cough, deep breathe, Incentive Spirometry  -  Problem: SAFETY ADULT  Goal: Patient will remain free of falls  Description  INTERVENTIONS:  - Assess patient frequently for physical needs  -  Identify cognitive and physical deficits and behaviors that affect risk of falls    -  [place] fall precautions as indicated by assessment   - Educate patient/family on patient safety including physical limitations  - Instruct patient to call for assistance with activity based on assessment  - Modify environment to reduce risk of injury  - Consider OT/PT consult to assist with strengthening/mobility  Outcome: Progressing  Goal: Maintain or return to baseline ADL function  Description  INTERVENTIONS:  -  Assess patient's ability to carry out ADLs; assess patient's baseline for ADL function and identify physical deficits which impact ability to perform ADLs (bathing, care of mouth/teeth, toileting, grooming, dressing, etc )  - Assess/evaluate cause of self-care deficits   - Assess range of motion  - Assess patient's mobility; develop plan if impaired  - Assess patient's need for assistive devices and provide as appropriate  - Encourage maximum independence but intervene and supervise when necessary  - Involve family in performance of ADLs  - Assess for home care needs following discharge   - Consider OT consult to assist with ADL evaluation and planning for discharge  - Provide patient education as appropriate  Outcome: Progressing  Goal: Maintain or return mobility status to optimal level  Description  INTERVENTIONS:  - Assess patient's baseline mobility status (ambulation, transfers, stairs, etc )    - Identify cognitive and physical deficits and behaviors that affect mobility  - Identify mobility aids required to assist with transfers and/or ambulation (gait belt, sit-to-stand, lift, walker, cane, etc )  - [place] fall precautions as indicated by assessment  - Record patient progress and toleration of activity level on Mobility SBAR; progress patient to next Phase/Stage  - Instruct patient to call for assistance with activity based on assessment  - Consider rehabilitation consult to assist with strengthening/weightbearing, etc   Outcome: Progressing   - Assess and instruct to report SOB or any respiratory difficulty  - Respiratory Therapy support as indicated  Outcome: Progressing     Problem: INFECTION - ADULT  Goal: Absence or prevention of progression during hospitalization  Description  INTERVENTIONS:  - Assess and monitor for signs and symptoms of infection  - Monitor lab/diagnostic results  - Monitor all insertion sites, i e  indwelling lines, tubes, and drains  - Monitor endotracheal if appropriate and nasal secretions for changes in amount and color  - [place] appropriate cooling/warming therapies per order  - Administer medications as ordered  - Instruct and encourage patient and family to use good hand hygiene technique  - Identify and instruct in appropriate isolation precautions for identified infection/condition  Outcome: Progressing

## 2020-03-19 NOTE — ASSESSMENT & PLAN NOTE
· Baseline creatinine around 1 3     · Nephrology evaluation to be appreciated  · Mild fluid overload on presentation

## 2020-03-19 NOTE — CONSULTS
Consultation - Nephrology   Jania Saha 80 y o  male MRN: [id number]  Unit/Bed#: -01 Encounter: [id number]    Referring PHYSICIAN: Eric Solis     REASON FOR THE CONSULTATION:  Acute on chronic renal failure    DATE OF CONSULTATION:  March 19, 2020    ADMISSION DIAGNOSIS: Fever     CHIEF COMPLAINT     Patient who lives in personal care facility  Seems to be confused was brought to the hospital the cough and fever for past 2 days and I am being consulted for renal failure    HPI     Patient lives in personal care facility with history of cardiomyopathy and atrial fibrillation  Patient claims he had a fall outside though he is confused and not sure what exactly happened but he was having chills in personal care facility with some cough and runny nose that so decided to come the hospital    He is feeling better since he came in still has runny nose    No chest pain no palpitation still has a cough no fever since he came in no chills    He denies ever had a kidney problem the past denies taking nonsteroidal pain killer for does history of cardiomyopathy and on diuretic    No urinary complaint    PAST MEDICAL HISTORY     Past Medical History:   Diagnosis Date    Acute systolic congestive heart failure ([facility] [address] ) 7/26/2018    Allergic contact dermatitis due to plants, except food     Atrial fibrillation (HCC)     Cancer (HCC)     skin cancer    Cataract     Chronic kidney disease     Stage 3    Coagulation test abnormality     Congestive heart failure (CHF) (HCC)     Diabetes mellitus ([address] )     Disease of thyroid gland     hypothyroidism    DVT (deep venous thrombosis) (HCC)     Eczema     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HCC)     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HCC)     GERD (gastroesophageal reflux disease)     Gout     Hyperlipidemia     Hypertension     Hypokalemia     Leukocytosis     Lichen planus     Nonmelanoma skin cancer     Phlebitis or thrombophlebitis of deep vessel of lower extremity ([address] )     Proteinuria        PAST SURGICAL HISTORY     Past Surgical History:   Procedure Laterality Date    CATARACT EXTRACTION      CHOLECYSTECTOMY      MO ESOPHAGOGASTRODUODENOSCOPY TRANSORAL DIAGNOSTIC N/A 6/14/2017    Procedure: EGD AND COLONOSCOPY;  Surgeon: Vi Saunders MD;  Location: [facility];   Service: Gastroenterology    TONSILLECTOMY         ALLERGIES     No Known Allergies    SOCIAL HISTORY     Social History     Substance and Sexual Activity   Alcohol Use Never    Frequency: Never    Binge frequency: Never     Social History     Substance and Sexual Activity   Drug Use Never     Social History     Tobacco Use   Smoking Status Never Smoker   Smokeless Tobacco Never Used       FAMILY HISTORY     Family History   Problem Relation Age of Onset    Alcohol abuse Mother     Heart attack Father     Dementia Brother     Other Son         AUTO ACCIDENT       CURRENT MEDICATIONS       Current Facility-Administered Medications:     acetaminophen (TYLENOL) tablet 650 mg, 650 mg, Oral, Q6H PRN, Hetul Solis, DO    atorvastatin (LIPITOR) tablet 10 mg, 10 mg, Oral, HS, Hetul Solis, DO, 10 mg at 03/18/20 2233    cefepime (MAXIPIME) 1,000 mg in dextrose 5 % 50 mL IVPB, 1,000 mg, Intravenous, Q12H, Hetul Solis, DO, Last Rate: 100 mL/hr at 03/19/20 0813, 1,000 mg at 03/19/20 0813    donepezil (ARICEPT) tablet 10 mg, 10 mg, Oral, HS, Hetul Solis, DO, 10 mg at 03/18/20 2232    enoxaparin (LOVENOX) subcutaneous injection 40 mg, 40 mg, Subcutaneous, Daily, Hetul Solis, DO, 40 mg at 03/19/20 0810    fluticasone (FLONASE) 50 mcg/act nasal spray 2 spray, 2 spray, Nasal, Daily, Hetul Solsi, DO, 2 spray at 03/19/20 1043    furosemide (LASIX) injection 40 mg, 40 mg, Intravenous, Daily, Dewey Solorio PA-C, Stopped at 03/19/20 0932    gabapentin (NEURONTIN) capsule 100 mg, 100 mg, Oral, BID, Hetul Solis, DO, 100 mg at 03/19/20 0804    hydrALAZINE (APRESOLINE) injection 5 mg, 5 mg, Intravenous, Q6H PRN, Hetul Solis, DO    insulin lispro (HumaLOG) 100 units/mL subcutaneous injection 1-5 Units, 1-5 Units, Subcutaneous, TID AC, 1 Units at 03/19/20 1148 **AND** Fingerstick Glucose (POCT), , , TID AC, Hetul Solis, DO    levalbuterol (XOPENEX) inhalation solution 1 25 mg, 1 25 mg, Nebulization, Q8H PRN, Hetul Solis, DO    levothyroxine tablet 100 mcg, 100 mcg, Oral, QAM, Hetul Solis, DO, 100 mcg at 03/19/20 0645    methylPREDNISolone sodium succinate (Solu-MEDROL) injection 20 mg, 20 mg, Intravenous, Q12H [address], Hetul Solis, DO, 20 mg at 03/19/20 0810    metoprolol tartrate (LOPRESSOR) tablet 50 mg, 50 mg, Oral, Q12H [address], Hetul Solis, DO, 50 mg at 03/18/20 2232    ondansetron (ZOFRAN) injection 4 mg, 4 mg, Intravenous, Q6H PRN, Hetul Solis, DO    warfarin (COUMADIN) tablet 5 mg, 5 mg, Oral, Daily (warfarin), Hetul Solis, DO, 5 mg at 03/18/20 2233    REVIEW OF SYSTEMS     Review of Systems   Constitutional: Positive for fatigue and fever  Negative for activity change  HENT: Positive for postnasal drip  Negative for congestion and ear discharge  Eyes: Negative for photophobia and pain  Respiratory: Positive for cough and shortness of breath  Negative for apnea and choking  Cardiovascular: Negative for chest pain and palpitations  Gastrointestinal: Negative for abdominal distention, abdominal pain, blood in stool, diarrhea and nausea  Endocrine: Negative for heat intolerance and polyphagia  Genitourinary: Negative for decreased urine volume, difficulty urinating, flank pain and urgency  Musculoskeletal: Negative for neck pain and neck stiffness  Skin: Negative for color change and wound  Allergic/Immunologic: Negative for food allergies and immunocompromised state  Neurological: Negative for seizures and facial asymmetry  Hematological: Negative for adenopathy  Does not bruise/bleed easily  Psychiatric/Behavioral: Negative for self-injury and suicidal ideas         LAB RESULTS        Results from last 7 days   Lab Units 03/19/20  0429 03/18/20  2135 03/18/20  1533   WBC Thousand/uL  --   --  8 75   HEMOGLOBIN g/dL  --   --  10 5*   HEMATOCRIT %  --   --  34 7*   PLATELETS Thousands/uL  --  233 274   POTASSIUM mmol/L 3 9  --  3 8   CHLORIDE mmol/L 105  --  106   CO2 mmol/L 30  --  29   BUN mg/dL 27*  --  28*   CREATININE mg/dL 1 71*  --  1 70*   EGFR ml/min/1 73sq m 35  --  35   CALCIUM mg/dL 7 9*  --  8 2*   MAGNESIUM mg/dL 1 9  --   --    PHOSPHORUS mg/dL 4 4*  --   --        I have personally reviewed the old medical records and patient's previously known baseline creatinine level is 1 2    RADIOLOGY RESULTS     Results for orders placed during the hospital encounter of 03/18/20   XR chest 1 view portable    Narrative CHEST     INDICATION:  Shortness of breath  COMPARISON: Chest film and CT chest 1/7/2020    EXAM PERFORMED/VIEWS:  XR CHEST PORTABLE    FINDINGS:    Cardiomediastinal silhouette appears stable  Increasing right basilar opacity likely representing a combination of pleural effusion and atelectasis  Cannot exclude underlying pneumonia  Subpulmonic left pleural effusion suspected  No pneumothorax  Osseous structures appear within normal limits for patient age  Impression Increasing right basilar opacity likely representing a combination of pleural effusion and atelectasis  Cannot exclude underlying pneumonia  Correlate clinically  Workstation performed: [id number]       Results for orders placed during the hospital encounter of 03/18/20   XR chest pa & lateral    Narrative CHEST     INDICATION:   pleural effusion  COMPARISON:  3/18/2020    EXAM PERFORMED/VIEWS:  XR CHEST PA & LATERAL      FINDINGS:    Cardia mediastinal silhouette is stable  Persistent unchanged right pleural effusion compared with the prior exam which appears moderate in size  No left-sided effusion  Left lung is clear    There is consolidation in the right lower lung field which is most likely atelectasis  Pneumonia or   underlying mass cannot be discerned given the pleural fluid  There is no evidence of pneumothorax or mediastinal shift  Osseous structures appear within normal limits for patient age  Impression Stable moderate size right pleural effusion and right lung base consolidation        Workstation performed: [id number]       Results for orders placed during the hospital encounter of 01/07/20   CT chest without contrast    Narrative CT CHEST WITHOUT IV CONTRAST    INDICATION:   Pneumonia  fever; f/u cxr  COMPARISON:  Two-view chest from earlier today    TECHNIQUE: CT examination of the chest was performed without intravenous contrast   Axial, sagittal, and coronal 2D reformatted images were created from the source data and submitted for interpretation  Radiation dose length product (DLP) for this visit:  219 mGy-cm   This examination, like all CT scans performed in the [facility], was performed utilizing techniques to minimize radiation dose exposure, including the use of iterative   reconstruction and automated exposure control  FINDINGS:    LUNGS:  Bibasilar compressive atelectasis secondary to bilateral pleural effusions  No endotracheal or endobronchial lesion  PLEURA:  Moderate right and small left pleural effusions  HEART/GREAT VESSELS:  Mild cardiomegaly  Coronary artery calcifications  MEDIASTINUM AND TUAN:  Unremarkable  CHEST WALL AND LOWER NECK:   Unremarkable  VISUALIZED STRUCTURES IN THE UPPER ABDOMEN:  Nodular contour of the partially imaged liver may indicate cirrhosis  OSSEOUS STRUCTURES:  No acute fracture or destructive osseous lesion  Impression Moderate right and small left pleural effusions  Mild cardiomegaly  Workstation performed: [id number]       No results found for this or any previous visit  No results found for this or any previous visit    No results found for this or any previous visit  OBJECTIVE     Current Weight: Weight - Scale: 77 9 kg (171 lb 11 8 oz)  Vitals:    03/19/20 1100   BP:    Pulse:    Resp:    Temp:    SpO2: 93%       Intake/Output Summary (Last 24 hours) at 3/19/2020 1222  Last data filed at 3/19/2020 0915  Gross per 24 hour   Intake 1210 ml   Output 350 ml   Net 860 ml       PHYSICAL EXAMINATION     Physical Exam   Constitutional: He is oriented to person, place, and time  He appears well-developed  No distress  HENT:   Head: Normocephalic  Mouth/Throat: Oropharynx is clear and moist  No oropharyngeal exudate  Eyes: Pupils are equal, round, and reactive to light  Conjunctivae are normal  No scleral icterus  Neck: Normal range of motion  Neck supple  No JVD present  Cardiovascular: Normal rate  Murmur heard  Pulmonary/Chest: Effort normal  No respiratory distress  He has wheezes  He has no rales  Abdominal: Soft  Bowel sounds are normal  He exhibits no distension and no mass  There is no tenderness  Musculoskeletal: Normal range of motion  He exhibits no edema  Neurological: He is alert and oriented to person, place, and time  Skin: Skin is warm  No rash noted  Psychiatric: He has a normal mood and affect  His behavior is normal         PLAN / RECOMMENDATIONS      Acute kidney injury:  Possible volume depletion with possible it cardiorenal syndrome  Will need some more workup  At this point will avoid any nephrotoxic medicine if possible  Patient is on diuretic which will continue as he needs for cardiomyopathy    CKD stage 3:  Baseline creatinine is about 1 2  Again possibly to cardiorenal syndrome or diabetes may be contributing    Pneumonia:  On antibiotic which will continue    Dementia patient is on Aricept and does seems to forgetful    Anemia:  Overall stable    Thank you for the consultation to participate in patient's care  I have personally discussed my plan with the referring physician       [place], MD  Nephrology  3/19/2020        Portions of the record may have been created with voice recognition software  Occasional wrong word or "sound a like" substitutions may have occurred due to the inherent limitations of voice recognition software  Read the chart carefully and recognize, using context, where substitutions have occurred

## 2020-03-19 NOTE — PROGRESS NOTES
Progress Note - Mary Han 11/21/1932, 80 y o  male MRN: [id number]    Unit/Bed#: -01 Encounter: [id number]    Primary Care Provider: Tasha Alarcon DO   Date and time admitted to hospital: 3/18/2020  2:57 PM      * Fever  Assessment & Plan  · Presented with URI symptoms, viral sepsis criteria secondary to RSV as evidence by tachycardia, fever, and (+) RSV screen; cannot entirely rule out bacterial pneumonia as his procalcitonin is elevated  · Antibiotics started in the ED until pneumonia is ruled out  His repeat CXR shows no infiltrate, however is procalcitonin is significantly elevated 1 10  · Will continue antibiotics and repeat procalcitonin, consider short course 5 days  · Continue supportive treatment for viral RSV  · Bronchodilator therapy, supplemental O2 and wean as able  · Incentive spirometer, respiratory protocol    Hypoxic  Assessment & Plan  · Secondary to RSV, see treatment plan above  · Today patient is stable on room air    Chronic combined systolic (congestive) and diastolic (congestive) heart failure (HCC)  Assessment & Plan  Wt Readings from Last 3 Encounters:   03/18/20 77 9 kg (171 lb 11 8 oz)   02/18/20 83 9 kg (185 lb)   01/28/20 80 7 kg (178 lb)     · Continue Metoprolol, lisinopril on hold secondary to acute kidney injury  · Received 1x dose IV Lasix without significant change in pleural effusion  · He has evidence of JVD no no significant lower extremity compared to prior (per patient this is improved)  Per nephrology/cardiology will continue to trial IV diuresis and closely monitor renal functions  · Will consider thoracentesis discussion tomorrow if no significant improvement    Chronic a-fib  Assessment & Plan  · Metoprolol/ Coumadin    INR subtherapeutic, will continue Coumadin and monitoring  Lab Results   Component Value Date    INR 1 71 (H) 03/19/2020    INR 1 90 (H) 03/18/2020    INR 2 30 (A) 03/17/2020       Adult hypothyroidism  Assessment & Plan  · Continue home dose levothyroxine, TSH within range 1/2020    Essential hypertension  Assessment & Plan  · Hold Zestril for elevated creatinine, received Lasix in ER  · Nephrology consulted    Type 2 diabetes mellitus with diabetic polyneuropathy, without long-term current use of insulin [facility])  Assessment & Plan  Lab Results   Component Value Date    HGBA1C 6 3 (H) 01/14/2020       Recent Labs     03/18/20  2116 03/18/20  2358 03/19/20  0620 03/19/20  0749   POCGLU 76 99 103 84       Blood Sugar Average: Last 72 hrs:  (P) 90 5   · Monitor BG, initiated SSI  · CCO diet     Stage 3 chronic kidney disease (HCC)  Assessment & Plan  · Baseline creatinine around 1 3     · Nephrology evaluation to be appreciated - acute kidney injury on presentation with elevated creatinine  · Mild fluid overload on presentation  Lab Results   Component Value Date    CREATININE 1 71 (H) 03/19/2020    CREATININE 1 70 (H) 03/18/2020    CREATININE 1 34 (H) 01/21/2020     Hyperlipidemia  Assessment & Plan  · Lipitor        VTE Pharmacologic Prophylaxis:   Pharmacologic: Warfarin (Coumadin)  Mechanical VTE Prophylaxis in Place: Yes    Patient Centered Rounds: I have performed bedside rounds with nursing staff today  Discussions with Specialists or Other Care Team Provider: nephngozi olivas, CM     Education and Discussions with Family / Patient: patient; daughter updated by phone extensively regarding his current workup and diagnoses    Time Spent for Care: 30 minutes  More than 50% of total time spent on counseling and coordination of care as described above      Current Length of Stay: 1 day(s)    Current Patient Status: Inpatient   Certification Statement: The patient will continue to require additional inpatient hospital stay due to RSV support and pleural effusion evaluation as well as renal monitoring    Discharge Plan: pending    Code Status: Level 1 - Full Code      Subjective:   Patient seen this morning, she still short of breath but reports he is feeling better  Denies any chest pain or palpitations at present time  He is no longer hypoxic  He reports that overnight he has been blowing his nose and coughing up more sputum, which he thinks is helping  Objective:     Vitals:   Temp (24hrs), Av 3 °F (36 8 °C), Min:96 9 °F (36 1 °C), Max:100 8 °F (38 2 °C)    Temp:  [96 9 °F (36 1 °C)-100 8 °F (38 2 °C)] 97 1 °F (36 2 °C)  HR:  [] 78  Resp:  [17-26] 17  BP: ()/() 100/59  SpO2:  [93 %-100 %] 99 %  Body mass index is 23 95 kg/m²  Input and Output Summary (last 24 hours): Intake/Output Summary (Last 24 hours) at 3/19/2020 1107  Last data filed at 3/19/2020 0915  Gross per 24 hour   Intake 1210 ml   Output 350 ml   Net 860 ml       Physical Exam:     Physical Exam   Constitutional: He is oriented to person, place, and time  Vital signs are normal  He appears well-developed and well-nourished  No distress  Cardiovascular: Normal rate, regular rhythm, S1 normal, S2 normal and normal heart sounds  No murmur heard  Pulmonary/Chest: Accessory muscle usage present  Tachypnea noted  No respiratory distress  He has decreased breath sounds in the right lower field  He has wheezes  He has rhonchi  He has no rales  No cyanosis, no hypoxia during conversation but still exhibiting some conversational dyspnea   Abdominal: Soft  Bowel sounds are normal  He exhibits no distension  There is no tenderness  Musculoskeletal: He exhibits no edema  Neurological: He is alert and oriented to person, place, and time  Psychiatric: He has a normal mood and affect  Nursing note and vitals reviewed        Additional Data:     Labs:    Results from last 7 days   Lab Units 20  2135 20  1533   WBC Thousand/uL  --  8 75   HEMOGLOBIN g/dL  --  10 5*   HEMATOCRIT %  --  34 7*   PLATELETS Thousands/uL 233 274   NEUTROS PCT %  --  79*   LYMPHS PCT %  --  8*   MONOS PCT %  --  11   EOS PCT %  --  1     Results from last 7 days   Lab Units 03/19/20  0429   SODIUM mmol/L 143   POTASSIUM mmol/L 3 9   CHLORIDE mmol/L 105   CO2 mmol/L 30   BUN mg/dL 27*   CREATININE mg/dL 1 71*   ANION GAP mmol/L 8   CALCIUM mg/dL 7 9*   ALBUMIN g/dL 2 5*   TOTAL BILIRUBIN mg/dL 1 30*   ALK PHOS U/L 117*   ALT U/L 38   AST U/L 37   GLUCOSE RANDOM mg/dL 107     Results from last 7 days   Lab Units 03/19/20  0429   INR  1 71*     Results from last 7 days   Lab Units 03/19/20  0749 03/19/20  0620 03/18/20  2358 03/18/20  2116   POC GLUCOSE mg/dl 84 103 99 76         Results from last 7 days   Lab Units 03/19/20  0429 03/18/20  2135 03/18/20  1639 03/18/20  1533   LACTIC ACID mmol/L  --  1 1  --  1 1   PROCALCITONIN ng/ml 1 10* 0 16 <0 05  --            * I Have Reviewed All Lab Data Listed Above  * Additional Pertinent Lab Tests Reviewed: Barb 66 Admission Reviewed    Imaging:    Imaging Reports Reviewed Today Include: CXR x2 reviewed   Imaging Personally Reviewed by Myself Includes:  CXR x2 reviewed     Recent Cultures (last 7 days):     Results from last 7 days   Lab Units 03/18/20  1539 03/18/20  1533   BLOOD CULTURE  Received in Microbiology Lab  Culture in Progress  Received in Microbiology Lab  Culture in Progress         Last 24 Hours Medication List:     Current Facility-Administered Medications:  acetaminophen 650 mg Oral Q6H PRN Hetul Solis, DO    atorvastatin 10 mg Oral HS Hetul Solis, DO    cefepime 1,000 mg Intravenous Q12H Hetul Solis, DO Last Rate: 1,000 mg (03/19/20 0813)   donepezil 10 mg Oral HS Hetul Solis, DO    enoxaparin 40 mg Subcutaneous Daily Hetul Solis, DO    fluticasone 2 spray Nasal Daily Hetul Solis, DO    furosemide 40 mg Intravenous Daily Dewey Solorio PA-C    gabapentin 100 mg Oral BID Hetul Solis, DO    hydrALAZINE 5 mg Intravenous Q6H PRN Hetul Solis, DO    insulin lispro 1-5 Units Subcutaneous TID AC Hetul Solis, DO    levalbuterol 1 25 mg Nebulization Q8H PRN Hetul Solis, DO    levothyroxine 100 mcg Oral QAM Hetul Karo Duval, DO    methylPREDNISolone sodium succinate 20 mg Intravenous Q12H [facility], DO    metoprolol tartrate 50 mg Oral Q12H [facility], DO    ondansetron 4 mg Intravenous Q6H PRN [facility], DO    warfarin 5 mg Oral Daily (warfarin) [facility], DO         Today, Patient Was Seen By: Jennifer Agee PA-C    ** Please Note: Dictation voice to text software may have been used in the creation of this document   **

## 2020-03-19 NOTE — CONSULTS
Consultation - Cardiology   Heather Mejias 80 y o  male MRN: [id number]  Unit/Bed#: -01 Encounter: [id number]  03/19/20  9:20 AM    Assessment/ Plan:  1- Shortness of breath, multifactorial, in the setting of RSV and acute on chronic systolic CHF  Appear slightly overloaded today on exam  Add lasix 40mg QD and reassess  Further supportive care per primary team      2- Nonischemic cardiomyopathy with EF 45%  Continue beta-blocker and ACE-inhibitor  3- Persistent atrial fibrillation, episodes of RVR overnight (HR 120s) but mostly rate controlled otherwise (70-80s)  Continue Lopressor 50 mg BID  Continue warfarin, goal INR 2-3, today subtherapeutic 1 71  Continue to monitor  Place on telemetry  4- CARLO, baseline Cr seems to be 1 4  Today 1 7  Nephrology consulted for further management  5- History of PE and CVA  On coumadin  On statin  6- Hypertension  Soft  Continue meds as above  Continue to monitor  7- Hyperlipidemia  Continue statin  History of Present Illness   Physician Requesting Consult: Aidee Subramanian, *  Reason for Consult / Principal Problem: SOB, sepsis  HPI: Heather Mejias is a 80y o  year old male with history of atrial fibrillation on Coumadin, systolic CHF, thoracic aneurysm, MR/TR , pulmonary hypertension, hypertension, hyperlipidemia, CKD who presents with fever, generalized fatigue, productive cough, intermittent wheezing and shortness of breath for 3 days  He also reports of increased lower extremity edema and 7 lb of weight gain over the last 2 weeks  He denies orthopnea or PND at this time  He follows with Dr Candido Berger as outpatient  He was found RSV positive in the ED with moderate right side pleural effusion on CXR  His proBNP was also found to be elevated (6K) with CARLO and elevated creatinine 1 71  His troponins were found negative x3       Inpatient consult to Cardiology  Consult performed by: Darrian Sandoval PA-C  Consult ordered by: Emir Gonzalez DO        EKG: Atrial fibrillation with RVR and premature ventricular aberrantly conducted complexes, nonspecific ST and T wave abnormalities  Review of Systems   Constitutional: Positive for fatigue and fever  Negative for appetite change, chills and diaphoresis  Respiratory: Positive for cough and shortness of breath  Negative for chest tightness  Cardiovascular: Negative for chest pain, palpitations and leg swelling  Gastrointestinal: Negative for diarrhea, nausea and vomiting  Endocrine: Negative for cold intolerance and heat intolerance  Genitourinary: Negative for difficulty urinating, dysuria and enuresis  Musculoskeletal: Negative for arthralgias, back pain and gait problem  Allergic/Immunologic: Negative for environmental allergies and food allergies  Neurological: Negative for dizziness, facial asymmetry and headaches  Hematological: Negative for adenopathy  Does not bruise/bleed easily  Psychiatric/Behavioral: Negative for agitation, behavioral problems and confusion         Historical Information   Past Medical History:   Diagnosis Date    Acute systolic congestive heart failure ([facility] ) 7/26/2018    Allergic contact dermatitis due to plants, except food     Atrial fibrillation (HCC)     Cancer (HCC)     skin cancer    Cataract     Chronic kidney disease     Stage 3    Coagulation test abnormality     Congestive heart failure (CHF) (HCC)     Diabetes mellitus ([facility] )     Disease of thyroid gland     hypothyroidism    DVT (deep venous thrombosis) (HCC)     Eczema     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HCC)     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HCC)     GERD (gastroesophageal reflux disease)     Gout     Hyperlipidemia     Hypertension     Hypokalemia     Leukocytosis     Lichen planus     Nonmelanoma skin cancer     Phlebitis or thrombophlebitis of deep vessel of lower extremity (HCC)     Proteinuria      Past Surgical History:   Procedure Laterality Date    CATARACT EXTRACTION      CHOLECYSTECTOMY      [place] ESOPHAGOGASTRODUODENOSCOPY TRANSORAL DIAGNOSTIC N/A 6/14/2017    Procedure: EGD AND COLONOSCOPY;  Surgeon: Zohra Felipe MD;  Location: [facility]; Service: Gastroenterology    TONSILLECTOMY       Social History     Substance and Sexual Activity   Alcohol Use Never    Frequency: Never    Binge frequency: Never     Social History     Substance and Sexual Activity   Drug Use Never     Social History     Tobacco Use   Smoking Status Never Smoker   Smokeless Tobacco Never Used       Family History:   Family History   Problem Relation Age of Onset    Alcohol abuse Mother     Heart attack Father     Dementia Brother     Other Son         AUTO ACCIDENT       Meds/Allergies   No Known Allergies    Objective   Vitals: Blood pressure 100/59, pulse 78, temperature (!) 97 1 °F (36 2 °C), temperature source Oral, resp  rate 17, height 5' 11" (1 803 m), weight 77 9 kg (171 lb 11 8 oz), SpO2 99 %  , Body mass index is 23 95 kg/m² ,   Orthostatic Blood Pressures      Most Recent Value   Blood Pressure  100/59 filed at 03/19/2020 0739   Patient Position - Orthostatic VS  Lying filed at 03/18/2020 3181          Systolic (03JIP), RQH:335 , Min:98 , SKN:499     Diastolic (00TJT), WVL:74, Min:57, Max:129        Intake/Output Summary (Last 24 hours) at 3/19/2020 0920  Last data filed at 3/19/2020 0915  Gross per 24 hour   Intake 1210 ml   Output 350 ml   Net 860 ml       Invasive Devices     Peripheral Intravenous Line            Peripheral IV 03/18/20 Right Antecubital less than 1 day                    Physical Exam:  GEN: Alert and oriented x 3, in no acute distress  Well appearing and well nourished  HEENT: Sclera anicteric, conjunctivae pink, mucous membranes moist  Oropharynx clear  NECK: +JVD  Supple, no carotid bruits  Trachea midline, no thyromegaly  HEART: Regular rhythm, normal S1 and S2, no murmurs, clicks, gallops or rubs  PMI nondisplaced, no thrills     LUNGS: +decreased breath sounds R base; no wheezes, rales, or rhonchi  No increased work of breathing or signs of respiratory distress  ABDOMEN: Soft, nontender, nondistended, normoactive bowel sounds  EXTREMITIES: +Trace bilateral edema  Skin warm and well perfused, no clubbing, cyanosis  NEURO: No focal findings  Normal speech  Mood and affect normal    SKIN: Normal without suspicious lesions on exposed skin        Lab Results:     Troponins:   Results from last 7 days   Lab Units 03/18/20  1533   TROPONIN I ng/mL 0 03       CBC with diff:   Results from last 7 days   Lab Units 03/18/20  2135 03/18/20  1533   WBC Thousand/uL  --  8 75   HEMOGLOBIN g/dL  --  10 5*   HEMATOCRIT %  --  34 7*   MCV fL  --  95   PLATELETS Thousands/uL 233 274   MCH pg  --  28 8   MCHC g/dL  --  30 3*   RDW %  --  16 9*   MPV fL 9 0 9 2   NRBC AUTO /100 WBCs  --  0         CMP:   Results from last 7 days   Lab Units 03/19/20  0429 03/18/20  1533   POTASSIUM mmol/L 3 9 3 8   CHLORIDE mmol/L 105 106   CO2 mmol/L 30 29   BUN mg/dL 27* 28*   CREATININE mg/dL 1 71* 1 70*   CALCIUM mg/dL 7 9* 8 2*   AST U/L 37 39   ALT U/L 38 41   ALK PHOS U/L 117* 136*   EGFR ml/min/1 73sq m 35 35

## 2020-03-20 ENCOUNTER — APPOINTMENT (INPATIENT)
Dept: INTERVENTIONAL RADIOLOGY/VASCULAR | Facility: HOSPITAL | Age: 85
DRG: 871 | End: 2020-03-20
Payer: MEDICARE

## 2020-03-20 LAB
25(OH)D3 SERPL-MCNC: 10.9 NG/ML (ref 30–100)
ANION GAP SERPL CALCULATED.3IONS-SCNC: 10 MMOL/L (ref 4–13)
BACTERIA UR QL AUTO: ABNORMAL /HPF
BASOPHILS # BLD MANUAL: 0 THOUSAND/UL (ref 0–0.1)
BASOPHILS NFR MAR MANUAL: 0 % (ref 0–1)
BILIRUB UR QL STRIP: NEGATIVE
BUN SERPL-MCNC: 34 MG/DL (ref 5–25)
CALCIUM SERPL-MCNC: 8.7 MG/DL (ref 8.3–10.1)
CHLORIDE SERPL-SCNC: 102 MMOL/L (ref 100–108)
CLARITY UR: CLEAR
CO2 SERPL-SCNC: 27 MMOL/L (ref 21–32)
COLOR UR: YELLOW
CREAT SERPL-MCNC: 1.73 MG/DL (ref 0.6–1.3)
CREAT UR-MCNC: 179 MG/DL
EOSINOPHIL # BLD MANUAL: 0 THOUSAND/UL (ref 0–0.4)
EOSINOPHIL NFR BLD MANUAL: 0 % (ref 0–6)
ERYTHROCYTE [DISTWIDTH] IN BLOOD BY AUTOMATED COUNT: 17.1 % (ref 11.6–15.1)
FERRITIN SERPL-MCNC: 214 NG/ML (ref 8–388)
GFR SERPL CREATININE-BSD FRML MDRD: 35 ML/MIN/1.73SQ M
GLUCOSE SERPL-MCNC: 175 MG/DL (ref 65–140)
GLUCOSE SERPL-MCNC: 182 MG/DL (ref 65–140)
GLUCOSE SERPL-MCNC: 234 MG/DL (ref 65–140)
GLUCOSE SERPL-MCNC: 250 MG/DL (ref 65–140)
GLUCOSE SERPL-MCNC: 342 MG/DL (ref 65–140)
GLUCOSE UR STRIP-MCNC: NEGATIVE MG/DL
HCT VFR BLD AUTO: 36.7 % (ref 36.5–49.3)
HGB BLD-MCNC: 11.2 G/DL (ref 12–17)
HGB UR QL STRIP.AUTO: NEGATIVE
HYALINE CASTS #/AREA URNS LPF: ABNORMAL /LPF
INR PPP: 1.45 (ref 0.84–1.19)
IRON SATN MFR SERPL: 10 %
IRON SERPL-MCNC: 26 UG/DL (ref 65–175)
KETONES UR STRIP-MCNC: NEGATIVE MG/DL
LEUKOCYTE ESTERASE UR QL STRIP: NEGATIVE
LYMPHOCYTES # BLD AUTO: 0.49 THOUSAND/UL (ref 0.6–4.47)
LYMPHOCYTES # BLD AUTO: 5 % (ref 14–44)
MCH RBC QN AUTO: 29.1 PG (ref 26.8–34.3)
MCHC RBC AUTO-ENTMCNC: 30.5 G/DL (ref 31.4–37.4)
MCV RBC AUTO: 95 FL (ref 82–98)
MICROALBUMIN UR-MCNC: 997 MG/L (ref 0–20)
MICROALBUMIN/CREAT 24H UR: 557 MG/G CREATININE (ref 0–30)
MONOCYTES # BLD AUTO: 0.29 THOUSAND/UL (ref 0–1.22)
MONOCYTES NFR BLD: 3 % (ref 4–12)
NEUTROPHILS # BLD MANUAL: 8.81 THOUSAND/UL (ref 1.85–7.62)
NEUTS SEG NFR BLD AUTO: 90 % (ref 43–75)
NITRITE UR QL STRIP: NEGATIVE
NON-SQ EPI CELLS URNS QL MICRO: ABNORMAL /HPF
NRBC BLD AUTO-RTO: 0 /100 WBCS
PH UR STRIP.AUTO: 5.5 [PH]
PHOSPHATE SERPL-MCNC: 3.8 MG/DL (ref 2.3–4.1)
PLATELET # BLD AUTO: 247 THOUSANDS/UL (ref 149–390)
PLATELET BLD QL SMEAR: ADEQUATE
PMV BLD AUTO: 9.5 FL (ref 8.9–12.7)
POTASSIUM SERPL-SCNC: 4 MMOL/L (ref 3.5–5.3)
PROCALCITONIN SERPL-MCNC: 1.33 NG/ML
PROT UR STRIP-MCNC: ABNORMAL MG/DL
PROTHROMBIN TIME: 17.7 SECONDS (ref 11.6–14.5)
PTH-INTACT SERPL-MCNC: 91.2 PG/ML (ref 18.4–80.1)
RBC # BLD AUTO: 3.85 MILLION/UL (ref 3.88–5.62)
RBC #/AREA URNS AUTO: ABNORMAL /HPF
SODIUM SERPL-SCNC: 139 MMOL/L (ref 136–145)
SP GR UR STRIP.AUTO: >=1.03 (ref 1–1.03)
TIBC SERPL-MCNC: 272 UG/DL (ref 250–450)
TOTAL CELLS COUNTED SPEC: 100
URATE SERPL-MCNC: 9.6 MG/DL (ref 4.2–8)
UROBILINOGEN UR QL STRIP.AUTO: 0.2 E.U./DL
VARIANT LYMPHS # BLD AUTO: 2 %
WBC # BLD AUTO: 9.79 THOUSAND/UL (ref 4.31–10.16)
WBC #/AREA URNS AUTO: ABNORMAL /HPF

## 2020-03-20 PROCEDURE — 84145 PROCALCITONIN (PCT): CPT | Performed by: PHYSICIAN ASSISTANT

## 2020-03-20 PROCEDURE — 84100 ASSAY OF PHOSPHORUS: CPT | Performed by: INTERNAL MEDICINE

## 2020-03-20 PROCEDURE — 84550 ASSAY OF BLOOD/URIC ACID: CPT | Performed by: INTERNAL MEDICINE

## 2020-03-20 PROCEDURE — 80048 BASIC METABOLIC PNL TOTAL CA: CPT | Performed by: INTERNAL MEDICINE

## 2020-03-20 PROCEDURE — 97110 THERAPEUTIC EXERCISES: CPT

## 2020-03-20 PROCEDURE — 82728 ASSAY OF FERRITIN: CPT | Performed by: INTERNAL MEDICINE

## 2020-03-20 PROCEDURE — 85610 PROTHROMBIN TIME: CPT | Performed by: GENERAL PRACTICE

## 2020-03-20 PROCEDURE — 0W993ZZ DRAINAGE OF RIGHT PLEURAL CAVITY, PERCUTANEOUS APPROACH: ICD-10-PCS | Performed by: RADIOLOGY

## 2020-03-20 PROCEDURE — 32555 ASPIRATE PLEURA W/ IMAGING: CPT | Performed by: RADIOLOGY

## 2020-03-20 PROCEDURE — 32555 ASPIRATE PLEURA W/ IMAGING: CPT

## 2020-03-20 PROCEDURE — 85007 BL SMEAR W/DIFF WBC COUNT: CPT | Performed by: INTERNAL MEDICINE

## 2020-03-20 PROCEDURE — 85027 COMPLETE CBC AUTOMATED: CPT | Performed by: INTERNAL MEDICINE

## 2020-03-20 PROCEDURE — 99233 SBSQ HOSP IP/OBS HIGH 50: CPT | Performed by: PHYSICIAN ASSISTANT

## 2020-03-20 PROCEDURE — 97116 GAIT TRAINING THERAPY: CPT

## 2020-03-20 PROCEDURE — 82948 REAGENT STRIP/BLOOD GLUCOSE: CPT

## 2020-03-20 PROCEDURE — 83970 ASSAY OF PARATHORMONE: CPT | Performed by: INTERNAL MEDICINE

## 2020-03-20 PROCEDURE — 83540 ASSAY OF IRON: CPT | Performed by: INTERNAL MEDICINE

## 2020-03-20 PROCEDURE — 99232 SBSQ HOSP IP/OBS MODERATE 35: CPT | Performed by: INTERNAL MEDICINE

## 2020-03-20 PROCEDURE — 82306 VITAMIN D 25 HYDROXY: CPT | Performed by: INTERNAL MEDICINE

## 2020-03-20 PROCEDURE — 83550 IRON BINDING TEST: CPT | Performed by: INTERNAL MEDICINE

## 2020-03-20 RX ORDER — WARFARIN SODIUM 7.5 MG/1
7.5 TABLET ORAL
Status: DISCONTINUED | OUTPATIENT
Start: 2020-03-21 | End: 2020-03-21

## 2020-03-20 RX ORDER — FUROSEMIDE 40 MG/1
40 TABLET ORAL DAILY
Status: DISCONTINUED | OUTPATIENT
Start: 2020-03-20 | End: 2020-03-23 | Stop reason: HOSPADM

## 2020-03-20 RX ORDER — METHYLPREDNISOLONE SODIUM SUCCINATE 40 MG/ML
40 INJECTION, POWDER, LYOPHILIZED, FOR SOLUTION INTRAMUSCULAR; INTRAVENOUS EVERY 8 HOURS SCHEDULED
Status: DISCONTINUED | OUTPATIENT
Start: 2020-03-20 | End: 2020-03-21

## 2020-03-20 RX ORDER — DIPHENHYDRAMINE HYDROCHLORIDE 50 MG/ML
12.5 INJECTION INTRAMUSCULAR; INTRAVENOUS ONCE
Status: COMPLETED | OUTPATIENT
Start: 2020-03-20 | End: 2020-03-20

## 2020-03-20 RX ORDER — LIDOCAINE WITH 8.4% SOD BICARB 0.9%(10ML)
SYRINGE (ML) INJECTION CODE/TRAUMA/SEDATION MEDICATION
Status: COMPLETED | OUTPATIENT
Start: 2020-03-20 | End: 2020-03-20

## 2020-03-20 RX ORDER — BENZONATATE 100 MG/1
100 CAPSULE ORAL 3 TIMES DAILY PRN
Status: DISCONTINUED | OUTPATIENT
Start: 2020-03-20 | End: 2020-03-23 | Stop reason: HOSPADM

## 2020-03-20 RX ORDER — OLANZAPINE 10 MG/1
5 INJECTION, POWDER, LYOPHILIZED, FOR SOLUTION INTRAMUSCULAR ONCE
Status: COMPLETED | OUTPATIENT
Start: 2020-03-20 | End: 2020-03-20

## 2020-03-20 RX ORDER — ERGOCALCIFEROL 1.25 MG/1
50000 CAPSULE ORAL WEEKLY
Status: DISCONTINUED | OUTPATIENT
Start: 2020-03-20 | End: 2020-03-23 | Stop reason: HOSPADM

## 2020-03-20 RX ADMIN — ERGOCALCIFEROL 50000 UNITS: 1.25 CAPSULE ORAL at 15:47

## 2020-03-20 RX ADMIN — BENZONATATE 100 MG: 100 CAPSULE ORAL at 21:04

## 2020-03-20 RX ADMIN — METHYLPREDNISOLONE SODIUM SUCCINATE 20 MG: 40 INJECTION, POWDER, FOR SOLUTION INTRAMUSCULAR; INTRAVENOUS at 08:44

## 2020-03-20 RX ADMIN — METHYLPREDNISOLONE SODIUM SUCCINATE 40 MG: 40 INJECTION, POWDER, FOR SOLUTION INTRAMUSCULAR; INTRAVENOUS at 21:12

## 2020-03-20 RX ADMIN — DIPHENHYDRAMINE HYDROCHLORIDE 12.5 MG: 50 INJECTION, SOLUTION INTRAMUSCULAR; INTRAVENOUS at 20:49

## 2020-03-20 RX ADMIN — CEFEPIME HYDROCHLORIDE 1000 MG: 1 INJECTION, POWDER, FOR SOLUTION INTRAMUSCULAR; INTRAVENOUS at 21:04

## 2020-03-20 RX ADMIN — METOPROLOL TARTRATE 50 MG: 50 TABLET, FILM COATED ORAL at 21:04

## 2020-03-20 RX ADMIN — ENOXAPARIN SODIUM 40 MG: 40 INJECTION SUBCUTANEOUS at 08:43

## 2020-03-20 RX ADMIN — Medication 10 ML: at 15:11

## 2020-03-20 RX ADMIN — OLANZAPINE 5 MG: 10 INJECTION, POWDER, FOR SOLUTION INTRAMUSCULAR at 20:49

## 2020-03-20 RX ADMIN — GABAPENTIN 100 MG: 100 CAPSULE ORAL at 08:42

## 2020-03-20 RX ADMIN — METHYLPREDNISOLONE SODIUM SUCCINATE 40 MG: 40 INJECTION, POWDER, FOR SOLUTION INTRAMUSCULAR; INTRAVENOUS at 12:51

## 2020-03-20 RX ADMIN — FUROSEMIDE 40 MG: 40 TABLET ORAL at 12:50

## 2020-03-20 RX ADMIN — DONEPEZIL HYDROCHLORIDE 10 MG: 5 TABLET ORAL at 21:04

## 2020-03-20 RX ADMIN — WARFARIN SODIUM 5 MG: 5 TABLET ORAL at 17:27

## 2020-03-20 RX ADMIN — GABAPENTIN 100 MG: 100 CAPSULE ORAL at 17:27

## 2020-03-20 RX ADMIN — INSULIN LISPRO 3 UNITS: 100 INJECTION, SOLUTION INTRAVENOUS; SUBCUTANEOUS at 12:50

## 2020-03-20 RX ADMIN — ATORVASTATIN CALCIUM 10 MG: 10 TABLET, FILM COATED ORAL at 21:04

## 2020-03-20 RX ADMIN — BENZONATATE 100 MG: 100 CAPSULE ORAL at 06:15

## 2020-03-20 RX ADMIN — INSULIN LISPRO 2 UNITS: 100 INJECTION, SOLUTION INTRAVENOUS; SUBCUTANEOUS at 17:27

## 2020-03-20 RX ADMIN — CEFEPIME HYDROCHLORIDE 1000 MG: 1 INJECTION, POWDER, FOR SOLUTION INTRAMUSCULAR; INTRAVENOUS at 08:42

## 2020-03-20 RX ADMIN — METOPROLOL TARTRATE 50 MG: 50 TABLET, FILM COATED ORAL at 08:42

## 2020-03-20 RX ADMIN — FLUTICASONE PROPIONATE 2 SPRAY: 50 SPRAY, METERED NASAL at 08:44

## 2020-03-20 RX ADMIN — FUROSEMIDE 40 MG: 10 INJECTION, SOLUTION INTRAMUSCULAR; INTRAVENOUS at 08:44

## 2020-03-20 RX ADMIN — LEVOTHYROXINE SODIUM 100 MCG: 100 TABLET ORAL at 06:14

## 2020-03-20 RX ADMIN — INSULIN LISPRO 1 UNITS: 100 INJECTION, SOLUTION INTRAVENOUS; SUBCUTANEOUS at 08:44

## 2020-03-20 RX ADMIN — BENZONATATE 100 MG: 100 CAPSULE ORAL at 00:34

## 2020-03-20 NOTE — PHYSICAL THERAPY NOTE
03/20/20 1055   Pain Assessment   Pain Assessment Tool Pain Assessment not indicated - pt denies pain   Pain Score No Pain   Restrictions/Precautions   Weight Bearing Precautions Per Order No   Other Precautions Contact/isolation;Droplet precautions; Impulsive; Chair Alarm; Bed Alarm;Multiple lines;Telemetry; Fall Risk   General   Chart Reviewed Yes   Family/Caregiver Present No   Cognition   Orientation Level Oriented X4   Transfers   Sit to Stand 5  Supervision   Additional items Assist x 1;Verbal cues   Stand to Sit 5  Supervision   Additional items Assist x 1;Verbal cues   Additional Comments SpO2 96-89   Ambulation/Elevation   Gait pattern Inconsistent berenice; Foward flexed;Decreased foot clearance   Gait Assistance 5  Supervision   Additional items Assist x 1;Verbal cues   Assistive Device Rolling walker   Distance 45'X2   Balance   Static Sitting Good   Dynamic Sitting Fair +   Static Standing Fair   Dynamic Standing Fair -   Ambulatory Fair -   Endurance Deficit   Endurance Deficit Yes   Endurance Deficit Description fatigue, SOB   Activity Tolerance   Activity Tolerance Patient tolerated treatment well   Nurse Made Aware [place] SOUSA   TKR Sitting;25 reps;AROM; Bilateral   Assessment   Prognosis Good   Problem List Decreased strength;Decreased endurance; Impaired balance;Decreased mobility; Decreased safety awareness   Assessment PT tolerated tx well today  He did have slight SOB post gait training but recovered quickly  Pt was able to progress endurance with gait training to 45'x2 with RW and supervision x 1  Pt was able to complete all exercises given to him today  Pt's plan is to return to his long-term with P [facility]  Services  Goals   Patient Goals to get out of the hospital   STG Expiration Date 03/29/20   PT Treatment Day 1   Plan   Treatment/Interventions Functional transfer training;LE strengthening/ROM; Therapeutic exercise; Endurance training;Patient/family training;Equipment eval/education; Bed mobility;Gait training;Spoke to nursing   Progress Progressing toward goals   PT Frequency 2-3x/wk   Recommendation   Recommendation Home PT  (return to [facility])   PT - OK to Discharge Yes   Additional Comments once medically cleared   Gabby Wyatt, PTA

## 2020-03-20 NOTE — PROGRESS NOTES
NEPHROLOGY PROGRESS NOTE    Patient: Padma Bailey               Sex: male          DOA: 3/18/2020  2:57 PM   YOB: 1932        Age:  80 y o         LOS:  LOS: 2 days       HPI     Admitted with cough and cold and had acute on chronic renal failure    SUBJECTIVE     Feeling better    No shortness of breath no chest pain no palpitation    CURRENT MEDICATIONS       Current Facility-Administered Medications:     acetaminophen (TYLENOL) tablet 650 mg, 650 mg, Oral, Q6H PRN, Hetul Solis, DO, 650 mg at 03/19/20 1658    atorvastatin (LIPITOR) tablet 10 mg, 10 mg, Oral, HS, Hetul Solis, DO, 10 mg at 03/19/20 2100    benzonatate (TESSALON PERLES) capsule 100 mg, 100 mg, Oral, TID PRN, Anoop Randhawa PA-C, 100 mg at 03/20/20 0615    cefepime (MAXIPIME) 1,000 mg in dextrose 5 % 50 mL IVPB, 1,000 mg, Intravenous, Q12H, Hetul Solis, DO, Last Rate: 100 mL/hr at 03/20/20 0842, 1,000 mg at 03/20/20 0842    donepezil (ARICEPT) tablet 10 mg, 10 mg, Oral, HS, Hetul Solis, DO, 10 mg at 03/19/20 2100    enoxaparin (LOVENOX) subcutaneous injection 40 mg, 40 mg, Subcutaneous, Daily, Hetul Solis, DO, 40 mg at 03/20/20 0843    fluticasone (FLONASE) 50 mcg/act nasal spray 2 spray, 2 spray, Nasal, Daily, Hetul Solis, DO, 2 spray at 03/20/20 0844    furosemide (LASIX) injection 40 mg, 40 mg, Intravenous, Daily, Dewey Solorio PA-C, 40 mg at 03/20/20 0844    gabapentin (NEURONTIN) capsule 100 mg, 100 mg, Oral, BID, Hetul Solis, DO, 100 mg at 03/20/20 0842    hydrALAZINE (APRESOLINE) injection 5 mg, 5 mg, Intravenous, Q6H PRN, Hetul Solis, DO    insulin lispro (HumaLOG) 100 units/mL subcutaneous injection 1-5 Units, 1-5 Units, Subcutaneous, TID AC, 1 Units at 03/20/20 0844 **AND** Fingerstick Glucose (POCT), , , TID AC, Hetul Solis, DO    levalbuterol (XOPENEX) inhalation solution 1 25 mg, 1 25 mg, Nebulization, Q8H PRN, Hetul Solis, DO    levothyroxine tablet 100 mcg, 100 mcg, Oral, QAM, Hetul Solis, DO, 100 mcg at 03/20/20 4145    methylPREDNISolone sodium succinate (Solu-MEDROL) injection 40 mg, 40 mg, Intravenous, Q8H LAITH, Celeste Moura PA-C    metoprolol tartrate (LOPRESSOR) tablet 50 mg, 50 mg, Oral, Q12H [address], Hetul Soils, DO, 50 mg at 03/20/20 0842    ondansetron (ZOFRAN) injection 4 mg, 4 mg, Intravenous, Q6H PRN, Hetul Solis, DO    warfarin (COUMADIN) tablet 5 mg, 5 mg, Oral, Daily (warfarin), Hetul Solis, DO, 5 mg at 03/19/20 1658    OBJECTIVE     Current Weight: Weight - Scale: 77 9 kg (171 lb 11 8 oz)  Vitals:    03/20/20 1120   BP: 143/87   Pulse: 92   Resp: 16   Temp: 98 6 °F (37 °C)   SpO2: 93%       Intake/Output Summary (Last 24 hours) at 3/20/2020 1150  Last data filed at 3/20/2020 0929  Gross per 24 hour   Intake 560 ml   Output    Net 560 ml       PHYSICAL EXAMINATION     Physical Exam   Constitutional: He is oriented to person, place, and time  He appears well-developed  No distress  HENT:   Head: Normocephalic  Mouth/Throat: Oropharynx is clear and moist    Eyes: Conjunctivae are normal  No scleral icterus  Neck: Neck supple  No JVD present  Cardiovascular: Normal rate and normal heart sounds  Pulmonary/Chest: Effort normal  He has wheezes  Abdominal: Soft  There is no tenderness  Musculoskeletal: Normal range of motion  He exhibits no edema  Neurological: He is alert and oriented to person, place, and time  Skin: Skin is warm  No rash noted  Psychiatric: He has a normal mood and affect   His behavior is normal         LAB RESULTS     Results from last 7 days   Lab Units 03/20/20  0509 03/19/20  0429 03/18/20  2135 03/18/20  1533   WBC Thousand/uL 9 79  --   --  8 75   HEMOGLOBIN g/dL 11 2*  --   --  10 5*   HEMATOCRIT % 36 7  --   --  34 7*   PLATELETS Thousands/uL 247  --  233 274   POTASSIUM mmol/L 4 0 3 9  --  3 8   CHLORIDE mmol/L 102 105  --  106   CO2 mmol/L 27 30  --  29   BUN mg/dL 34* 27*  --  28*   CREATININE mg/dL 1 73* 1 71*  --  1 70*   EGFR ml/min/1 73sq m 35 35  -- 35   CALCIUM mg/dL 8 7 7 9*  --  8 2*   MAGNESIUM mg/dL  --  1 9  --   --    PHOSPHORUS mg/dL 3 8 4 4*  --   --        RADIOLOGY RESULTS      Results for orders placed during the hospital encounter of 03/18/20   XR chest 1 view portable    Narrative CHEST     INDICATION:  Shortness of breath  COMPARISON: Chest film and CT chest 1/7/2020    EXAM PERFORMED/VIEWS:  XR CHEST PORTABLE    FINDINGS:    Cardiomediastinal silhouette appears stable  Increasing right basilar opacity likely representing a combination of pleural effusion and atelectasis  Cannot exclude underlying pneumonia  Subpulmonic left pleural effusion suspected  No pneumothorax  Osseous structures appear within normal limits for patient age  Impression Increasing right basilar opacity likely representing a combination of pleural effusion and atelectasis  Cannot exclude underlying pneumonia  Correlate clinically  Workstation performed: [id number]       Results for orders placed during the hospital encounter of 03/18/20   XR chest pa & lateral    Narrative CHEST     INDICATION:   pleural effusion  COMPARISON:  3/18/2020    EXAM PERFORMED/VIEWS:  XR CHEST PA & LATERAL      FINDINGS:    Cardia mediastinal silhouette is stable  Persistent unchanged right pleural effusion compared with the prior exam which appears moderate in size  No left-sided effusion  Left lung is clear  There is consolidation in the right lower lung field which is most likely atelectasis  Pneumonia or   underlying mass cannot be discerned given the pleural fluid  There is no evidence of pneumothorax or mediastinal shift  Osseous structures appear within normal limits for patient age        Impression Stable moderate size right pleural effusion and right lung base consolidation        Workstation performed: [id number]       Results for orders placed during the hospital encounter of 01/07/20   CT chest without contrast    Narrative CT CHEST WITHOUT IV CONTRAST    INDICATION:   Pneumonia  fever; f/u cxr  COMPARISON:  Two-view chest from earlier today    TECHNIQUE: CT examination of the chest was performed without intravenous contrast   Axial, sagittal, and coronal 2D reformatted images were created from the source data and submitted for interpretation  Radiation dose length product (DLP) for this visit:  219 mGy-cm   This examination, like all CT scans performed in the [facility], was performed utilizing techniques to minimize radiation dose exposure, including the use of iterative   reconstruction and automated exposure control  FINDINGS:    LUNGS:  Bibasilar compressive atelectasis secondary to bilateral pleural effusions  No endotracheal or endobronchial lesion  PLEURA:  Moderate right and small left pleural effusions  HEART/GREAT VESSELS:  Mild cardiomegaly  Coronary artery calcifications  MEDIASTINUM AND TUAN:  Unremarkable  CHEST WALL AND LOWER NECK:   Unremarkable  VISUALIZED STRUCTURES IN THE UPPER ABDOMEN:  Nodular contour of the partially imaged liver may indicate cirrhosis  OSSEOUS STRUCTURES:  No acute fracture or destructive osseous lesion  Impression Moderate right and small left pleural effusions  Mild cardiomegaly  Workstation performed: [id number]       No results found for this or any previous visit  No results found for this or any previous visit  No results found for this or any previous visit  PLAN / RECOMMENDATIONS      Acute kidney injury:  Seems to be stable at this point  Related to  cardiorenal syndrome with  Will reduce diuretic by changing to p o  As I think he is seems to be euvolemic at this point    Bronchitis:  On antibiotic    COPD:  May be responsible worsening shortness of breath    On steroid which will continue    Hypertension:  Reasonably well controlled will continue to monitor    Bone and mineral disorder:  Patient does have low vitamin-D and will start him on lazaro dose of vitamin-D    Mirna Cowden, MD  Nephrology  3/20/2020        Portions of the record may have been created with voice recognition software  Occasional wrong word or "sound a like" substitutions may have occurred due to the inherent limitations of voice recognition software  Read the chart carefully and recognize, using context, where substitutions have occurred

## 2020-03-20 NOTE — PROGRESS NOTES
Progress Note - Mel Blanco 11/21/1932, 80 y o  male MRN: [id number]    Unit/Bed#: -01 Encounter: [id number]    Primary Care Provider: Xochitl Joseph DO   Date and time admitted to hospital: 3/18/2020  2:57 PM        * Fever  Assessment & Plan  · Presented with URI symptoms, SIRS criteria secondary to RSV  · Antibiotics started in the ED until pneumonia is ruled out  His repeat CXR shows no infiltrate, however is procalcitonin is significantly elevated 1 1 -> 1 3  · Will continue antibiotics and repeat procalcitonin again in am  · Continue supportive treatment for viral RSV  · Bronchodilator therapy, supplemental O2 and wean as able  · Incentive spirometer, respiratory protocol, flutter valve    Hypoxic  Assessment & Plan  · Secondary to RSV, see treatment plan above  · Today patient is stable on room air 93% on my evaluation, though still appears dyspneic with minimal conversation  · Also suspect that pleural effusion contributing to symptoms    Chronic combined systolic (congestive) and diastolic (congestive) heart failure (HCC)  Assessment & Plan  Wt Readings from Last 3 Encounters:   03/18/20 77 9 kg (171 lb 11 8 oz)   02/18/20 83 9 kg (185 lb)   01/28/20 80 7 kg (178 lb)     · Continue Metoprolol, lisinopril on hold secondary to acute kidney injury  · Received 1x dose IV Lasix without significant change in pleural effusion  · Status post thoracentesis today for 1 5 L right lung, left lung had < 400 and was not tapped  · Patient significantly improved post thoracentesis, will continue to monitor closely  · Nephrology has resumed home dose Lasix 40 mg daily  · Cardiology following is all, appreciate input INR subtherapeutic    Chronic a-fib  Assessment & Plan  · Metoprolol/ Coumadin    INR subtherapeutic, will continue Coumadin and monitoring  Lab Results   Component Value Date    INR 1 45 (H) 03/20/2020    INR 1 71 (H) 03/19/2020    INR 1 90 (H) 03/18/2020       Adult hypothyroidism  Assessment & Plan  · Continue home dose levothyroxine, TSH within range 1/2020    Essential hypertension  Assessment & Plan  · Hold Zestril for elevated creatinine, received Lasix in ER  · Nephrology consulted    Type 2 diabetes mellitus with diabetic polyneuropathy, without long-term current use of insulin [facility])  Assessment & Plan  Lab Results   Component Value Date    HGBA1C 6 3 (H) 01/14/2020       Recent Labs     03/19/20  2135 03/20/20  0604 03/20/20  1115 03/20/20  1602   POCGLU 110 182* 342* 234*       Blood Sugar Average: Last 72 hrs:  (P) 552 [id number]   · Monitor BG, initiated SSI  · CCO diet     Stage 3 chronic kidney disease (HCC)  Assessment & Plan  · Baseline creatinine around 1 3     · Nephrology evaluation to be appreciated  · Mild fluid overload on presentation, no euvolemic on exam now        VTE Pharmacologic Prophylaxis:   Pharmacologic: Enoxaparin (Lovenox)  Mechanical VTE Prophylaxis in Place: Yes    Patient Centered Rounds: I have performed bedside rounds with nursing staff today  Discussions with Specialists or Other Care Team Provider:  Nephrology, Cardiology, case management, interventional radiology team    Education and Discussions with Family / Patient:  Discussed multiple times by phone with the daughterLino    Time Spent for Care: 30 minutes  More than 50% of total time spent on counseling and coordination of care as described above      Current Length of Stay: 2 day(s)    Current Patient Status: Inpatient   Certification Statement: The patient will continue to require additional inpatient hospital stay due to Status post thoracentesis, continue to monitor for RSV symptoms/resolution of symptoms    Discharge Plan:  Anticipate discharge next 24-48 hours if continues to clinically improved status post thoracentesis, however patient is a resident of [facility], and may not be able to discharge until Monday    Code Status: Level 1 - Full Code      Subjective:   Patient seen this morning, still appears distant minimal conversation, though feels the same as yesterday  No chest pain  Cough is productive of yellow sputum  Denies any further subjective fevers or chills  No palpitations or dizziness  Per nursing, even with minimal ambulation patient appears winded  Objective:     Vitals:   Temp (24hrs), Av 5 °F (36 9 °C), Min:98 1 °F (36 7 °C), Max:99 2 °F (37 3 °C)    Temp:  [98 1 °F (36 7 °C)-99 2 °F (37 3 °C)] 98 2 °F (36 8 °C)  HR:  [] 101  Resp:  [16-19] 16  BP: (107-157)/(55-90) 150/89  SpO2:  [93 %-97 %] 95 %  Body mass index is 23 95 kg/m²  Input and Output Summary (last 24 hours): Intake/Output Summary (Last 24 hours) at 3/20/2020 1805  Last data filed at 3/20/2020 1242  Gross per 24 hour   Intake 540 ml   Output    Net 540 ml       Physical Exam:     Physical Exam   Constitutional: He is oriented to person, place, and time  Vital signs are normal  He appears well-developed  No distress  Cardiovascular: Normal rate, regular rhythm, S1 normal, S2 normal and normal heart sounds  No murmur heard  Pulmonary/Chest: Effort normal  No respiratory distress  He has decreased breath sounds in the right lower field  He has wheezes  He has rhonchi  He has no rales  Conversational dyspnea, no hypoxia though   Abdominal: Soft  Bowel sounds are normal  He exhibits no distension  There is no tenderness  Musculoskeletal: He exhibits no edema  Neurological: He is alert and oriented to person, place, and time  Psychiatric: He has a normal mood and affect  His behavior is normal    Nursing note and vitals reviewed        Additional Data:     Labs:    Results from last 7 days   Lab Units 20  0509  20  1533   WBC Thousand/uL 9 79  --  8 75   HEMOGLOBIN g/dL 11 2*  --  10 5*   HEMATOCRIT % 36 7  --  34 7*   PLATELETS Thousands/uL 247   < > 274   NEUTROS PCT %  --   --  79*   LYMPHS PCT %  --   --  8*   LYMPHO PCT % 5*  --   --    MONOS PCT %  --   --  11 MONO PCT % 3*  --   --    EOS PCT % 0  --  1    < > = values in this interval not displayed  Results from last 7 days   Lab Units 03/20/20  0509 03/19/20  0429   SODIUM mmol/L 139 143   POTASSIUM mmol/L 4 0 3 9   CHLORIDE mmol/L 102 105   CO2 mmol/L 27 30   BUN mg/dL 34* 27*   CREATININE mg/dL 1 73* 1 71*   ANION GAP mmol/L 10 8   CALCIUM mg/dL 8 7 7 9*   ALBUMIN g/dL  --  2 5*   TOTAL BILIRUBIN mg/dL  --  1 30*   ALK PHOS U/L  --  117*   ALT U/L  --  38   AST U/L  --  37   GLUCOSE RANDOM mg/dL 175* 107     Results from last 7 days   Lab Units 03/20/20  0509   INR  1 45*     Results from last 7 days   Lab Units 03/20/20  1602 03/20/20  1115 03/20/20  0604 03/19/20  2135 03/19/20  1657 03/19/20  1140 03/19/20  1113 03/19/20  0749 03/19/20  0620 03/18/20  2358 03/18/20  2116   POC GLUCOSE mg/dl 234* 342* 182* 110 141* 169* 214* 84 103 99 76         Results from last 7 days   Lab Units 03/20/20  0509 03/19/20  0429 03/18/20  2135 03/18/20  1639 03/18/20  1533   LACTIC ACID mmol/L  --   --  1 1  --  1 1   PROCALCITONIN ng/ml 1 33* 1 10* 0 16 <0 05  --            * I Have Reviewed All Lab Data Listed Above  * Additional Pertinent Lab Tests Reviewed: All Labs Within Last 24 Hours Reviewed    Imaging:    Imaging Reports Reviewed Today Include:  CXR reviewed again  Imaging Personally Reviewed by Myself Includes:  CXR reviewed again this admission as well as January    Recent Cultures (last 7 days):     Results from last 7 days   Lab Units 03/19/20  2334 03/18/20  1539 03/18/20  1533   BLOOD CULTURE   --  No Growth at 24 hrs  No Growth at 24 hrs     GRAM STAIN RESULT  2+ Epithelial cells per low power field*  No polys seen*  2+ Gram positive cocci in pairs*  2+ Gram negative rods*  --   --        Last 24 Hours Medication List:     Current Facility-Administered Medications:  acetaminophen 650 mg Oral Q6H PRN Hetul Solis, DO    atorvastatin 10 mg Oral HS Hetul Solis, DO    benzonatate 100 mg Oral TID PRN Magda Castañeda, VIDYA    cefepime 1,000 mg Intravenous Q12H Hetul Solis, DO Last Rate: 1,000 mg (03/20/20 0842)   donepezil 10 mg Oral HS Hetul Solis, DO    enoxaparin 40 mg Subcutaneous Daily Hetul Solis, DO    ergocalciferol 50,000 Units Oral Weekly Elton Braden MD    fluticasone 2 spray Nasal Daily Hetul Solis, DO    furosemide 40 mg Oral Daily Elton Braden MD    gabapentin 100 mg Oral BID Hetul Solis, DO    hydrALAZINE 5 mg Intravenous Q6H PRN Hetul Solis, DO    insulin lispro 1-5 Units Subcutaneous TID AC Hetul Solis, DO    levalbuterol 1 25 mg Nebulization Q8H PRN Hetul Solis, DO    levothyroxine 100 mcg Oral QAM Hetul Solis, DO    methylPREDNISolone sodium succinate 40 mg Intravenous Q8H [address] Celeste Moura PA-C    metoprolol tartrate 50 mg Oral Q12H [address] Hetul Solis, DO    ondansetron 4 mg Intravenous Q6H PRN Hetul Solis, DO    [START ON 3/21/2020] warfarin 7 5 mg Oral Daily (warfarin) Rissa Castillo PA-C         Today, Patient Was Seen By: Rissa Castillo PA-C    ** Please Note: Dictation voice to text software may have been used in the creation of this document   **

## 2020-03-20 NOTE — ASSESSMENT & PLAN NOTE
· Baseline creatinine around 1 3     · Nephrology evaluation to be appreciated  · Mild fluid overload on presentation, no euvolemic on exam now

## 2020-03-20 NOTE — PLAN OF CARE
Problem: PHYSICAL THERAPY ADULT  Goal: Performs mobility at highest level of function for planned discharge setting  See evaluation for individualized goals  Description  Treatment/Interventions: Functional transfer training, LE strengthening/ROM, Therapeutic exercise, Endurance training, Patient/family training, Equipment eval/education, Bed mobility, Gait training, Spoke to nursing, OT          See flowsheet documentation for full assessment, interventions and recommendations  Outcome: Progressing  Note:   Prognosis: Good  Problem List: Decreased strength, Decreased endurance, Impaired balance, Decreased mobility, Decreased safety awareness  Assessment: PT tolerated tx well today  He did have slight SOB post gait training but recovered quickly  Pt was able to progress endurance with gait training to 45'x2 with RW and supervision x 1  Pt was able to complete all exercises given to him today  Pt's plan is to return to his residential with [facility]  Barriers to Discharge: None     Recommendation: Home PT(return to [facility])     PT - OK to Discharge: Yes    See flowsheet documentation for full assessment

## 2020-03-20 NOTE — ASSESSMENT & PLAN NOTE
Wt Readings from Last 3 Encounters:   03/18/20 77 9 kg (171 lb 11 8 oz)   02/18/20 83 9 kg (185 lb)   01/28/20 80 7 kg (178 lb)     · Continue Metoprolol, lisinopril on hold secondary to acute kidney injury  · Received 1x dose IV Lasix without significant change in pleural effusion  · Status post thoracentesis today for 1 5 L right lung, left lung had < 400 and was not tapped  · Patient significantly improved post thoracentesis, will continue to monitor closely  · Nephrology has resumed home dose Lasix 40 mg daily  · Cardiology following is all, appreciate input INR subtherapeutic

## 2020-03-20 NOTE — ASSESSMENT & PLAN NOTE
· Metoprolol/ Coumadin    INR subtherapeutic, will continue Coumadin and monitoring  Lab Results   Component Value Date    INR 1 45 (H) 03/20/2020    INR 1 71 (H) 03/19/2020    INR 1 90 (H) 03/18/2020

## 2020-03-20 NOTE — ASSESSMENT & PLAN NOTE
· Secondary to RSV, see treatment plan above  · Today patient is stable on room air 93% on my evaluation, though still appears dyspneic with minimal conversation  · Also suspect that pleural effusion contributing to symptoms

## 2020-03-20 NOTE — ASSESSMENT & PLAN NOTE
· Presented with URI symptoms, SIRS criteria secondary to RSV  · Antibiotics started in the ED until pneumonia is ruled out    His repeat CXR shows no infiltrate, however is procalcitonin is significantly elevated 1 1 -> 1 3  · Will continue antibiotics and repeat procalcitonin again in am  · Continue supportive treatment for viral RSV  · Bronchodilator therapy, supplemental O2 and wean as able  · Incentive spirometer, respiratory protocol, flutter valve

## 2020-03-20 NOTE — ASSESSMENT & PLAN NOTE
Lab Results   Component Value Date    HGBA1C 6 3 (H) 01/14/2020       Recent Labs     03/19/20  2135 03/20/20  0604 03/20/20  1115 03/20/20  1602   POCGLU 110 182* 342* 234*       Blood Sugar Average: Last 72 hrs:  (P) 440 [id number]   · Monitor BG, initiated SSI  · CCO diet

## 2020-03-20 NOTE — PROGRESS NOTES
Cardiology Progress Note - Courtney Sandifer 80 y o  male MRN: [id number]    Unit/Bed#: -01 Encounter: [id number]      Assessment/Plan:  1- Shortness of breath, multifactorial in the setting of RSV, acute CHF  Patient with R pleural effusion on CXR  Continue IV Lasix 40mg daily for now and continue to monitor response  Patient is currently I&O positive (1 4L)  Salt restrictions, daily weights  2- NICM, EF 45%  Continue BB, ACEi when able to from a renal standpoint  3- Persistent AF, heart rates controlled overnight  Continue with Lopressor 50 mg BID  INR currently subtherapeutic (1 4)  INR goal is 2-3  4- CARLO, Creatinine holding at 1 7  On IV diuretics  5- Hx of PE and CVA  On Coumadin  On statin  6- HTN  Stable overnight, continue medications as above  Continue to monitor  7- HLD  Continue statin  Subjective:   Patient seen and examined  No new complaints today  Objective:     Vitals: Blood pressure 143/87, pulse 92, temperature 98 6 °F (37 °C), resp  rate 16, height 5' 11" (1 803 m), weight 77 9 kg (171 lb 11 8 oz), SpO2 93 %  , Body mass index is 23 95 kg/m² ,   Orthostatic Blood Pressures      Most Recent Value   Blood Pressure  143/87 filed at 03/20/2020 1120   Patient Position - Orthostatic VS  Lying filed at 03/20/2020 0316            Intake/Output Summary (Last 24 hours) at 3/20/2020 1124  Last data filed at 3/20/2020 0525  Gross per 24 hour   Intake 560 ml   Output    Net 560 ml         Physical Exam:    GEN: Courtney Sandifer appears well, alert and oriented x 3, pleasant and cooperative   HEENT: pupils equal, round, and reactive to light; extraocular muscles intact  NECK: supple, no carotid bruits   HEART: +irregular rhythm, normal S1 and S2, no murmurs, clicks, gallops or rubs   LUNGS: +decreased breath sounds RLL base; no wheezes, rales, or rhonchi   ABDOMEN: normal bowel sounds, soft, no tenderness, no distention  EXTREMITIES: +trace edema   peripheral pulses normal; no clubbing, cyanosis      Medications:      Current Facility-Administered Medications:     acetaminophen (TYLENOL) tablet 650 mg, 650 mg, Oral, Q6H PRN, Hetul Solis, DO, 650 mg at 03/19/20 1658    atorvastatin (LIPITOR) tablet 10 mg, 10 mg, Oral, HS, Hetul Solis, DO, 10 mg at 03/19/20 2100    benzonatate (TESSALON PERLES) capsule 100 mg, 100 mg, Oral, TID PRN, Fanny VIDYA Malone, 100 mg at 03/20/20 0615    cefepime (MAXIPIME) 1,000 mg in dextrose 5 % 50 mL IVPB, 1,000 mg, Intravenous, Q12H, Hetul Solis, DO, Last Rate: 100 mL/hr at 03/20/20 0842, 1,000 mg at 03/20/20 0842    donepezil (ARICEPT) tablet 10 mg, 10 mg, Oral, HS, Hetul Solis, DO, 10 mg at 03/19/20 2100    enoxaparin (LOVENOX) subcutaneous injection 40 mg, 40 mg, Subcutaneous, Daily, Hetul Solis, DO, 40 mg at 03/20/20 0843    fluticasone (FLONASE) 50 mcg/act nasal spray 2 spray, 2 spray, Nasal, Daily, Hetul Solis, DO, 2 spray at 03/20/20 0844    furosemide (LASIX) injection 40 mg, 40 mg, Intravenous, Daily, Dewey Solorio PA-C, 40 mg at 03/20/20 0844    gabapentin (NEURONTIN) capsule 100 mg, 100 mg, Oral, BID, Hetul Solis, DO, 100 mg at 03/20/20 0842    hydrALAZINE (APRESOLINE) injection 5 mg, 5 mg, Intravenous, Q6H PRN, Hetul Solis, DO    insulin lispro (HumaLOG) 100 units/mL subcutaneous injection 1-5 Units, 1-5 Units, Subcutaneous, TID AC, 1 Units at 03/20/20 0844 **AND** Fingerstick Glucose (POCT), , , TID AC, Hetul Solis, DO    levalbuterol (XOPENEX) inhalation solution 1 25 mg, 1 25 mg, Nebulization, Q8H PRN, Hetul Solis, DO    levothyroxine tablet 100 mcg, 100 mcg, Oral, QAM, Hetul Solis, DO, 100 mcg at 03/20/20 0614    methylPREDNISolone sodium succinate (Solu-MEDROL) injection 40 mg, 40 mg, Intravenous, Q8H [address], Celeste Moura PA-C    metoprolol tartrate (LOPRESSOR) tablet 50 mg, 50 mg, Oral, Q12H [address], Hetul Solis, DO, 50 mg at 03/20/20 0842    ondansetron (ZOFRAN) injection 4 mg, 4 mg, Intravenous, Q6H PRN, Hetul Solis, DO   warfarin (COUMADIN) tablet 5 mg, 5 mg, Oral, Daily (warfarin), Eric Solis DO, 5 mg at 03/19/20 1658     Labs & Results:    Results from last 7 days   Lab Units 03/18/20  1533   TROPONIN I ng/mL 0 03     Results from last 7 days   Lab Units 03/20/20  0509 03/18/20  2135 03/18/20  1533   WBC Thousand/uL 9 79  --  8 75   HEMOGLOBIN g/dL 11 2*  --  10 5*   HEMATOCRIT % 36 7  --  34 7*   PLATELETS Thousands/uL 247 233 274         Results from last 7 days   Lab Units 03/20/20  0509 03/19/20  0429 03/18/20  1533   POTASSIUM mmol/L 4 0 3 9 3 8   CHLORIDE mmol/L 102 105 106   CO2 mmol/L 27 30 29   BUN mg/dL 34* 27* 28*   CREATININE mg/dL 1 73* 1 71* 1 70*   CALCIUM mg/dL 8 7 7 9* 8 2*   ALK PHOS U/L  --  117* 136*   ALT U/L  --  38 41   AST U/L  --  37 39     Results from last 7 days   Lab Units 03/20/20  0509 03/19/20  0429 03/18/20  1533   INR  1 45* 1 71* 1 90*   PTT seconds  --   --  40*     Results from last 7 days   Lab Units 03/19/20  0429   MAGNESIUM mg/dL 1 9

## 2020-03-20 NOTE — PLAN OF CARE
Problem: RESPIRATORY - ADULT  Goal: Achieves optimal ventilation and oxygenation  Description  INTERVENTIONS:  - Assess for changes in respiratory status  - Assess for changes in mentation and behavior  - Position to facilitate oxygenation and minimize respiratory effort  - Oxygen administered by appropriate delivery if ordered  - Initiate smoking cessation education as indicated  - Encourage broncho-pulmonary hygiene including cough, deep breathe, Incentive Spirometry  - Assess the need for suctioning and aspirate as needed  - Assess and instruct to report SOB or any respiratory difficulty  - Respiratory Therapy support as indicated  Outcome: Progressing     Problem: INFECTION - ADULT  Goal: Absence or prevention of progression during hospitalization  Description  INTERVENTIONS:  - Assess and monitor for signs and symptoms of infection  - Monitor lab/diagnostic results  - Monitor all insertion sites, i e  indwelling lines, tubes, and drains  - Monitor endotracheal if appropriate and nasal secretions for changes in amount and color  - [place] appropriate cooling/warming therapies per order  - Administer medications as ordered  - Instruct and encourage patient and family to use good hand hygiene technique  - Identify and instruct in appropriate isolation precautions for identified infection/condition  Outcome: Progressing     Problem: SAFETY ADULT  Goal: Patient will remain free of falls  Description  INTERVENTIONS:  - Assess patient frequently for physical needs  -  Identify cognitive and physical deficits and behaviors that affect risk of falls    -  [place] fall precautions as indicated by assessment   - Educate patient/family on patient safety including physical limitations  - Instruct patient to call for assistance with activity based on assessment  - Modify environment to reduce risk of injury  - Consider OT/PT consult to assist with strengthening/mobility  Outcome: Progressing     Problem: Potential for Falls  Goal: Patient will remain free of falls  Description  INTERVENTIONS:  - Assess patient frequently for physical needs  -  Identify cognitive and physical deficits and behaviors that affect risk of falls    -  [place] fall precautions as indicated by assessment   - Educate patient/family on patient safety including physical limitations  - Instruct patient to call for assistance with activity based on assessment  - Modify environment to reduce risk of injury  - Consider OT/PT consult to assist with strengthening/mobility  Outcome: Progressing

## 2020-03-21 ENCOUNTER — APPOINTMENT (INPATIENT)
Dept: RADIOLOGY | Facility: HOSPITAL | Age: 85
DRG: 871 | End: 2020-03-21
Payer: MEDICARE

## 2020-03-21 LAB
ANION GAP SERPL CALCULATED.3IONS-SCNC: 8 MMOL/L (ref 4–13)
ARTERIAL PATENCY WRIST A: YES
BASE EXCESS BLDA CALC-SCNC: 1.7 MMOL/L
BUN SERPL-MCNC: 44 MG/DL (ref 5–25)
CALCIUM SERPL-MCNC: 8.2 MG/DL (ref 8.3–10.1)
CHLORIDE SERPL-SCNC: 102 MMOL/L (ref 100–108)
CO2 SERPL-SCNC: 28 MMOL/L (ref 21–32)
CREAT SERPL-MCNC: 1.65 MG/DL (ref 0.6–1.3)
ERYTHROCYTE [DISTWIDTH] IN BLOOD BY AUTOMATED COUNT: 16.2 % (ref 11.6–15.1)
GFR SERPL CREATININE-BSD FRML MDRD: 37 ML/MIN/1.73SQ M
GLUCOSE SERPL-MCNC: 112 MG/DL (ref 65–140)
GLUCOSE SERPL-MCNC: 114 MG/DL (ref 65–140)
GLUCOSE SERPL-MCNC: 192 MG/DL (ref 65–140)
GLUCOSE SERPL-MCNC: 210 MG/DL (ref 65–140)
GLUCOSE SERPL-MCNC: 253 MG/DL (ref 65–140)
GLUCOSE SERPL-MCNC: 304 MG/DL (ref 65–140)
HCO3 BLDA-SCNC: 24.9 MMOL/L (ref 22–28)
HCT VFR BLD AUTO: 32.5 % (ref 36.5–49.3)
HGB BLD-MCNC: 10.1 G/DL (ref 12–17)
INR PPP: 1.92 (ref 0.84–1.19)
MCH RBC QN AUTO: 28.9 PG (ref 26.8–34.3)
MCHC RBC AUTO-ENTMCNC: 31.1 G/DL (ref 31.4–37.4)
MCV RBC AUTO: 93 FL (ref 82–98)
NRBC BLD AUTO-RTO: 0 /100 WBCS
O2 CT BLDA-SCNC: 16.1 ML/DL (ref 16–23)
OXYHGB MFR BLDA: 98.5 % (ref 94–97)
PCO2 BLDA: 34.3 MM HG (ref 36–44)
PH BLDA: 7.48 [PH] (ref 7.35–7.45)
PLATELET # BLD AUTO: 230 THOUSANDS/UL (ref 149–390)
PMV BLD AUTO: 9.6 FL (ref 8.9–12.7)
PO2 BLDA: 162.7 MM HG (ref 75–129)
POTASSIUM SERPL-SCNC: 3.4 MMOL/L (ref 3.5–5.3)
PROCALCITONIN SERPL-MCNC: 0.85 NG/ML
PROTHROMBIN TIME: 22.2 SECONDS (ref 11.6–14.5)
RBC # BLD AUTO: 3.49 MILLION/UL (ref 3.88–5.62)
SODIUM SERPL-SCNC: 138 MMOL/L (ref 136–145)
SPECIMEN SOURCE: ABNORMAL
WBC # BLD AUTO: 14.35 THOUSAND/UL (ref 4.31–10.16)

## 2020-03-21 PROCEDURE — 94640 AIRWAY INHALATION TREATMENT: CPT

## 2020-03-21 PROCEDURE — 84145 PROCALCITONIN (PCT): CPT | Performed by: PHYSICIAN ASSISTANT

## 2020-03-21 PROCEDURE — 94760 N-INVAS EAR/PLS OXIMETRY 1: CPT

## 2020-03-21 PROCEDURE — 99232 SBSQ HOSP IP/OBS MODERATE 35: CPT | Performed by: INTERNAL MEDICINE

## 2020-03-21 PROCEDURE — 94664 DEMO&/EVAL PT USE INHALER: CPT

## 2020-03-21 PROCEDURE — 82948 REAGENT STRIP/BLOOD GLUCOSE: CPT

## 2020-03-21 PROCEDURE — 99232 SBSQ HOSP IP/OBS MODERATE 35: CPT | Performed by: PHYSICIAN ASSISTANT

## 2020-03-21 PROCEDURE — 82805 BLOOD GASES W/O2 SATURATION: CPT | Performed by: PHYSICIAN ASSISTANT

## 2020-03-21 PROCEDURE — 80048 BASIC METABOLIC PNL TOTAL CA: CPT | Performed by: INTERNAL MEDICINE

## 2020-03-21 PROCEDURE — 36600 WITHDRAWAL OF ARTERIAL BLOOD: CPT

## 2020-03-21 PROCEDURE — 85027 COMPLETE CBC AUTOMATED: CPT | Performed by: INTERNAL MEDICINE

## 2020-03-21 PROCEDURE — 71046 X-RAY EXAM CHEST 2 VIEWS: CPT

## 2020-03-21 PROCEDURE — 85610 PROTHROMBIN TIME: CPT | Performed by: PHYSICIAN ASSISTANT

## 2020-03-21 PROCEDURE — 94668 MNPJ CHEST WALL SBSQ: CPT

## 2020-03-21 RX ORDER — WARFARIN SODIUM 5 MG/1
5 TABLET ORAL
Status: DISCONTINUED | OUTPATIENT
Start: 2020-03-21 | End: 2020-03-23 | Stop reason: HOSPADM

## 2020-03-21 RX ORDER — HALOPERIDOL 5 MG/ML
1 INJECTION INTRAMUSCULAR ONCE
Status: COMPLETED | OUTPATIENT
Start: 2020-03-21 | End: 2020-03-21

## 2020-03-21 RX ORDER — METHYLPREDNISOLONE SODIUM SUCCINATE 40 MG/ML
40 INJECTION, POWDER, LYOPHILIZED, FOR SOLUTION INTRAMUSCULAR; INTRAVENOUS EVERY 12 HOURS SCHEDULED
Status: DISCONTINUED | OUTPATIENT
Start: 2020-03-21 | End: 2020-03-23 | Stop reason: HOSPADM

## 2020-03-21 RX ORDER — SODIUM CHLORIDE FOR INHALATION 0.9 %
3 VIAL, NEBULIZER (ML) INHALATION EVERY 6 HOURS PRN
Status: DISCONTINUED | OUTPATIENT
Start: 2020-03-21 | End: 2020-03-22

## 2020-03-21 RX ORDER — QUETIAPINE FUMARATE 25 MG/1
12.5 TABLET, FILM COATED ORAL 2 TIMES DAILY PRN
Status: DISCONTINUED | OUTPATIENT
Start: 2020-03-21 | End: 2020-03-23 | Stop reason: HOSPADM

## 2020-03-21 RX ORDER — VANCOMYCIN HYDROCHLORIDE 1 G/200ML
12.5 INJECTION, SOLUTION INTRAVENOUS EVERY 12 HOURS
Status: DISCONTINUED | OUTPATIENT
Start: 2020-03-21 | End: 2020-03-21

## 2020-03-21 RX ORDER — QUETIAPINE FUMARATE 25 MG/1
12.5 TABLET, FILM COATED ORAL
Status: DISCONTINUED | OUTPATIENT
Start: 2020-03-21 | End: 2020-03-21

## 2020-03-21 RX ORDER — LEVALBUTEROL 1.25 MG/.5ML
1.25 SOLUTION, CONCENTRATE RESPIRATORY (INHALATION) EVERY 6 HOURS PRN
Status: DISCONTINUED | OUTPATIENT
Start: 2020-03-21 | End: 2020-03-22

## 2020-03-21 RX ADMIN — INSULIN LISPRO 2 UNITS: 100 INJECTION, SOLUTION INTRAVENOUS; SUBCUTANEOUS at 12:00

## 2020-03-21 RX ADMIN — ENOXAPARIN SODIUM 40 MG: 40 INJECTION SUBCUTANEOUS at 09:28

## 2020-03-21 RX ADMIN — VANCOMYCIN HYDROCHLORIDE 1500 MG: 5 INJECTION, POWDER, LYOPHILIZED, FOR SOLUTION INTRAVENOUS at 09:28

## 2020-03-21 RX ADMIN — METHYLPREDNISOLONE SODIUM SUCCINATE 40 MG: 40 INJECTION, POWDER, FOR SOLUTION INTRAMUSCULAR; INTRAVENOUS at 06:06

## 2020-03-21 RX ADMIN — LEVALBUTEROL HYDROCHLORIDE 1.25 MG: 1.25 SOLUTION, CONCENTRATE RESPIRATORY (INHALATION) at 02:17

## 2020-03-21 RX ADMIN — LEVOTHYROXINE SODIUM 100 MCG: 100 TABLET ORAL at 06:06

## 2020-03-21 RX ADMIN — HALOPERIDOL LACTATE 1 MG: 5 INJECTION INTRAMUSCULAR at 02:09

## 2020-03-21 RX ADMIN — FUROSEMIDE 40 MG: 40 TABLET ORAL at 09:27

## 2020-03-21 RX ADMIN — DONEPEZIL HYDROCHLORIDE 10 MG: 5 TABLET ORAL at 21:03

## 2020-03-21 RX ADMIN — ISODIUM CHLORIDE 3 ML: 0.03 SOLUTION RESPIRATORY (INHALATION) at 02:28

## 2020-03-21 RX ADMIN — CEFEPIME HYDROCHLORIDE 1000 MG: 1 INJECTION, POWDER, FOR SOLUTION INTRAMUSCULAR; INTRAVENOUS at 11:35

## 2020-03-21 RX ADMIN — FLUTICASONE PROPIONATE 2 SPRAY: 50 SPRAY, METERED NASAL at 09:29

## 2020-03-21 RX ADMIN — ATORVASTATIN CALCIUM 10 MG: 10 TABLET, FILM COATED ORAL at 21:04

## 2020-03-21 RX ADMIN — WARFARIN SODIUM 5 MG: 5 TABLET ORAL at 17:36

## 2020-03-21 RX ADMIN — CEFEPIME HYDROCHLORIDE 1000 MG: 1 INJECTION, POWDER, FOR SOLUTION INTRAMUSCULAR; INTRAVENOUS at 21:04

## 2020-03-21 RX ADMIN — GABAPENTIN 100 MG: 100 CAPSULE ORAL at 17:36

## 2020-03-21 RX ADMIN — LEVALBUTEROL HYDROCHLORIDE 1.25 MG: 1.25 SOLUTION, CONCENTRATE RESPIRATORY (INHALATION) at 22:09

## 2020-03-21 RX ADMIN — METOPROLOL TARTRATE 50 MG: 50 TABLET, FILM COATED ORAL at 09:27

## 2020-03-21 RX ADMIN — GABAPENTIN 100 MG: 100 CAPSULE ORAL at 09:27

## 2020-03-21 RX ADMIN — METHYLPREDNISOLONE SODIUM SUCCINATE 40 MG: 40 INJECTION, POWDER, FOR SOLUTION INTRAMUSCULAR; INTRAVENOUS at 21:03

## 2020-03-21 RX ADMIN — ISODIUM CHLORIDE 3 ML: 0.03 SOLUTION RESPIRATORY (INHALATION) at 22:10

## 2020-03-21 RX ADMIN — METOPROLOL TARTRATE 50 MG: 50 TABLET, FILM COATED ORAL at 21:04

## 2020-03-21 RX ADMIN — INSULIN LISPRO 3 UNITS: 100 INJECTION, SOLUTION INTRAVENOUS; SUBCUTANEOUS at 09:12

## 2020-03-21 NOTE — PROGRESS NOTES
NEPHROLOGY PROGRESS NOTE    Patient: Matt Primus               Sex: male          DOA: 3/18/2020  2:57 PM   YOB: 1932        Age:  80 y o         LOS:  LOS: 3 days       HPI     Martene [place] came in  with shortness of breath and cough    SUBJECTIVE     Feeling better though still coughing a lot    No shortness of breath    Slightly confused denies any chest pain no palpitation    CURRENT MEDICATIONS       Current Facility-Administered Medications:     acetaminophen (TYLENOL) tablet 650 mg, 650 mg, Oral, Q6H PRN, Hetul Solis, DO, 650 mg at 03/19/20 1658    atorvastatin (LIPITOR) tablet 10 mg, 10 mg, Oral, HS, Hetul Solis, DO, 10 mg at 03/20/20 2104    benzonatate (TESSALON PERLES) capsule 100 mg, 100 mg, Oral, TID PRN, Miroslava Goodpasture, PA-C, 100 mg at 03/20/20 2104    cefepime (MAXIPIME) 1,000 mg in dextrose 5 % 50 mL IVPB, 1,000 mg, Intravenous, Q12H, Hetul Solis, DO, Last Rate: 100 mL/hr at 03/20/20 2104, 1,000 mg at 03/20/20 2104    donepezil (ARICEPT) tablet 10 mg, 10 mg, Oral, HS, Hetul Solis, DO, 10 mg at 03/20/20 2104    enoxaparin (LOVENOX) subcutaneous injection 40 mg, 40 mg, Subcutaneous, Daily, Hetul Solis, DO, 40 mg at 03/21/20 0138    ergocalciferol (VITAMIN D2) capsule 50,000 Units, 50,000 Units, Oral, Weekly, Norris Izaguirre MD, 50,000 Units at 03/20/20 1547    fluticasone (FLONASE) 50 mcg/act nasal spray 2 spray, 2 spray, Nasal, Daily, Hetul Solis, DO, 2 spray at 03/21/20 0929    furosemide (LASIX) tablet 40 mg, 40 mg, Oral, Daily, Norris Izaguirre MD, 40 mg at 03/21/20 5234    gabapentin (NEURONTIN) capsule 100 mg, 100 mg, Oral, BID, Hetul Solis, DO, 100 mg at 03/21/20 3892    hydrALAZINE (APRESOLINE) injection 5 mg, 5 mg, Intravenous, Q6H PRN, Eric Solis DO    insulin lispro (HumaLOG) 100 units/mL subcutaneous injection 1-5 Units, 1-5 Units, Subcutaneous, TID AC, 3 Units at 03/21/20 0912 **AND** Fingerstick Glucose (POCT), , , TID AC, Eric Solis,     levalbuterol (Velora Ash[place]) inhalation solution 1 25 mg, 1 25 mg, Nebulization, Q6H PRN, Kehinde Russell MD    levothyroxine tablet 100 mcg, 100 mcg, Oral, QAM, Hetul Solis, DO, 100 mcg at 03/21/20 0606    methylPREDNISolone sodium succinate (Solu-MEDROL) injection 40 mg, 40 mg, Intravenous, Q12H LAITH, Celeste Moura PA-C    metoprolol tartrate (LOPRESSOR) tablet 50 mg, 50 mg, Oral, Q12H [address], Hetul Solis, DO, 50 mg at 03/21/20 0927    ondansetron (ZOFRAN) injection 4 mg, 4 mg, Intravenous, Q6H PRN, Hetul Solis, DO    QUEtiapine (SEROquel) tablet 12 5 mg, 12 5 mg, Oral, HS PRN, Celeste Moura PA-C    sodium chloride 0 9 % inhalation solution 3 mL, 3 mL, Nebulization, Q6H PRN, Kehinde Russell MD, 3 mL at 03/21/20 0228    vancomycin (VANCOCIN) 1,500 mg in sodium chloride 0 9 % 250 mL IVPB, 20 mg/kg, Intravenous, Q24H, Celeste Moura PA-C, Last Rate: 166 7 mL/hr at 03/21/20 0928, 1,500 mg at 03/21/20 9561    warfarin (COUMADIN) tablet 5 mg, 5 mg, Oral, Daily (warfarin), Mingo Lewis PA-C    OBJECTIVE     Current Weight: Weight - Scale: 77 6 kg (171 lb)  Vitals:    03/21/20 1108   BP: 120/71   Pulse: 83   Resp: 17   Temp: (!) 97 1 °F (36 2 °C)   SpO2: 99%       Intake/Output Summary (Last 24 hours) at 3/21/2020 1130  Last data filed at 3/20/2020 1242  Gross per 24 hour   Intake 220 ml   Output    Net 220 ml       PHYSICAL EXAMINATION     Physical Exam   Constitutional: He is oriented to person, place, and time  He appears well-developed  No distress  HENT:   Head: Normocephalic  Mouth/Throat: Oropharynx is clear and moist    Eyes: Conjunctivae are normal  No scleral icterus  Neck: Neck supple  No JVD present  Cardiovascular: Normal rate and normal heart sounds  Pulmonary/Chest: Effort normal  He has no wheezes  Abdominal: Soft  There is no tenderness  Musculoskeletal: Normal range of motion  He exhibits no edema  Neurological: He is alert and oriented to person, place, and time  Skin: Skin is warm  No rash noted  Psychiatric: He has a normal mood and affect  His behavior is normal         LAB RESULTS     Results from last 7 days   Lab Units 03/21/20  0500 03/20/20  0509 03/19/20  0429 03/18/20  2135 03/18/20  1533   WBC Thousand/uL 14 35* 9 79  --   --  8 75   HEMOGLOBIN g/dL 10 1* 11 2*  --   --  10 5*   HEMATOCRIT % 32 5* 36 7  --   --  34 7*   PLATELETS Thousands/uL 230 247  --  233 274   POTASSIUM mmol/L 3 4* 4 0 3 9  --  3 8   CHLORIDE mmol/L 102 102 105  --  106   CO2 mmol/L 28 27 30  --  29   BUN mg/dL 44* 34* 27*  --  28*   CREATININE mg/dL 1 65* 1 73* 1 71*  --  1 70*   EGFR ml/min/1 73sq m 37 35 35  --  35   CALCIUM mg/dL 8 2* 8 7 7 9*  --  8 2*   MAGNESIUM mg/dL  --   --  1 9  --   --    PHOSPHORUS mg/dL  --  3 8 4 4*  --   --        RADIOLOGY RESULTS      Results for orders placed during the hospital encounter of 03/18/20   XR chest 1 view portable    Narrative CHEST     INDICATION:  Shortness of breath  COMPARISON: Chest film and CT chest 1/7/2020    EXAM PERFORMED/VIEWS:  XR CHEST PORTABLE    FINDINGS:    Cardiomediastinal silhouette appears stable  Increasing right basilar opacity likely representing a combination of pleural effusion and atelectasis  Cannot exclude underlying pneumonia  Subpulmonic left pleural effusion suspected  No pneumothorax  Osseous structures appear within normal limits for patient age  Impression Increasing right basilar opacity likely representing a combination of pleural effusion and atelectasis  Cannot exclude underlying pneumonia  Correlate clinically  Workstation performed: [id number]       Results for orders placed during the hospital encounter of 03/18/20   XR chest pa & lateral    Narrative CHEST     INDICATION:   pleural effusion  COMPARISON:  3/18/2020    EXAM PERFORMED/VIEWS:  XR CHEST PA & LATERAL      FINDINGS:    Cardia mediastinal silhouette is stable      Persistent unchanged right pleural effusion compared with the prior exam which appears moderate in size  No left-sided effusion  Left lung is clear  There is consolidation in the right lower lung field which is most likely atelectasis  Pneumonia or   underlying mass cannot be discerned given the pleural fluid  There is no evidence of pneumothorax or mediastinal shift  Osseous structures appear within normal limits for patient age  Impression Stable moderate size right pleural effusion and right lung base consolidation        Workstation performed: [id number]       Results for orders placed during the hospital encounter of 01/07/20   CT chest without contrast    Narrative CT CHEST WITHOUT IV CONTRAST    INDICATION:   Pneumonia  fever; f/u cxr  COMPARISON:  Two-view chest from earlier today    TECHNIQUE: CT examination of the chest was performed without intravenous contrast   Axial, sagittal, and coronal 2D reformatted images were created from the source data and submitted for interpretation  Radiation dose length product (DLP) for this visit:  219 mGy-cm   This examination, like all CT scans performed in the [facility], was performed utilizing techniques to minimize radiation dose exposure, including the use of iterative   reconstruction and automated exposure control  FINDINGS:    LUNGS:  Bibasilar compressive atelectasis secondary to bilateral pleural effusions  No endotracheal or endobronchial lesion  PLEURA:  Moderate right and small left pleural effusions  HEART/GREAT VESSELS:  Mild cardiomegaly  Coronary artery calcifications  MEDIASTINUM AND TUAN:  Unremarkable  CHEST WALL AND LOWER NECK:   Unremarkable  VISUALIZED STRUCTURES IN THE UPPER ABDOMEN:  Nodular contour of the partially imaged liver may indicate cirrhosis  OSSEOUS STRUCTURES:  No acute fracture or destructive osseous lesion  Impression Moderate right and small left pleural effusions  Mild cardiomegaly        Workstation performed: [id number]       No results found for this or any previous visit  No results found for this or any previous visit  No results found for this or any previous visit  PLAN / RECOMMENDATIONS      Acute kidney injury slowly improving with cutting down dose of diuretic  Will continue to monitor    CKD stage 3 baseline creatinine is about 1 3    Shortness of breath and cough:  Likely because of COPD being treated with steroid and nebulizer    Hypertension:  Seems to be very well control    Bone and mineral disorder:  PTH within acceptable range and vitamin-D is low which is being replaced    Will continue to monitor    Gini Og MD  Nephrology  3/21/2020        Portions of the record may have been created with voice recognition software  Occasional wrong word or "sound a like" substitutions may have occurred due to the inherent limitations of voice recognition software  Read the chart carefully and recognize, using context, where substitutions have occurred

## 2020-03-21 NOTE — NURSING NOTE
16:12 PM Patients daughter updated about patients status via phone conversation  Daughter Harmeet Wang stated that she will repeat the updates to her brother who was calling the patient nurse before

## 2020-03-21 NOTE — QUICK NOTE
Patient not combative, but confused after switching rooms  Discussed with nursing staff, will add 1:1 observation to continue to monitor  He is not aggressive at this time, but does appear to be still slightly confused  Alert and oriented to self and place  He recalls meeting me multiple times including earlier this morning  He knows that he is here for an infection, though initially thought it was for the leg and then recalls that it is for pulmonary infection  Will continue to monitor

## 2020-03-21 NOTE — ASSESSMENT & PLAN NOTE
· Baseline creatinine around 1 3     · Nephrology evaluation appreciated  · Mild fluid overload on presentation, now euvolemic on exam  · Creatinine improving, continue to monitor

## 2020-03-21 NOTE — ASSESSMENT & PLAN NOTE
· Metoprolol/ Coumadin    INR remains subtherapeutic though improving, will continue Coumadin and monitoring    Lab Results   Component Value Date    INR 1 92 (H) 03/21/2020    INR 1 45 (H) 03/20/2020    INR 1 71 (H) 03/19/2020

## 2020-03-21 NOTE — ASSESSMENT & PLAN NOTE
Lab Results   Component Value Date    HGBA1C 6 3 (H) 01/14/2020       Recent Labs     03/20/20  1115 03/20/20  1602 03/20/20  2126 03/21/20  0619   POCGLU 342* 234* 250* 192*       Blood Sugar Average: Last 72 hrs:  (P) 168 [id number]   · Monitor BG, continue SSI  · CCO diet
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
## 2020-03-21 NOTE — ASSESSMENT & PLAN NOTE
· Presented with URI symptoms, SIRS vs sepsis criteria secondary to RSV  · Antibiotics started in the ED    His repeat CXR shows no infiltrate, however is procalcitonin is elevated 1 1 -> 1 3 -> pending  · Will continue antibiotics - WBC is elevated today and procal still pending, remains afebrile and without O2 support need  · Continue supportive treatment for viral RSV  · Bronchodilator therapy, supplemental O2 as needed, currently high 90s room air  · Incentive spirometer, respiratory protocol, flutter valve  · Confusion overnight, on soft-restraints, will try removal and monitor

## 2020-03-21 NOTE — PROGRESS NOTES
Cardiology Progress Note - Lin Bejarano 80 y o  male MRN: [id number]    Unit/Bed#: -01 Encounter: [id number]      Assessment/Plan:  1- Shortness of breath, multifactorial in the setting of RSV, acute CHF  He is s/p R thoracentesis with 1 5L fluid removal  Patient reports symptomatic improvement  Appears euvolemic on exam today, was switched to PO Lasix 40mg yesterday by nephrology  Salt restrictions, daily weights, I&O monitoring       2- NICM, EF 45%  Continue BB, ACEi when able to from a renal standpoint       3- Persistent AF, HR acceptable overnight  Continue with Lopressor 50 mg BID  On coumadin, INR 1 9 today  INR goal is 2-3       4- CARLO, Creatinine holding at 1 65  Restarted on PO diuretics       5- Hx of PE and CVA  On Coumadin  On statin      6- HTN  Stable, continue medications as above  Continue to monitor      7- HLD  Continue statin  Cardiology signing off, please call/reconsult as needed  Subjective:   Patient with episodes of confusion yesterday evening requiring one-to-one observation  Patient is non-combative and non-aggressive  Patient reports improvement of SOB with thoracentesis  Denies CP or discomfort  Objective:     Vitals: Blood pressure 128/94, pulse 92, temperature (!) 97 °F (36 1 °C), temperature source Oral, resp  rate 17, height 5' 11" (1 803 m), weight 77 9 kg (171 lb 11 8 oz), SpO2 99 %  , Body mass index is 23 95 kg/m² ,   Orthostatic Blood Pressures      Most Recent Value   Blood Pressure  128/94 filed at 03/21/2020 0900   Patient Position - Orthostatic VS  Sitting filed at 03/21/2020 0900            Intake/Output Summary (Last 24 hours) at 3/21/2020 0915  Last data filed at 3/20/2020 1242  Gross per 24 hour   Intake 440 ml   Output    Net 440 ml         Physical Exam:    GEN: Lin Beajrano appears well, alert and oriented x 3, pleasant and cooperative   HEENT: pupils equal, round, and reactive to light; extraocular muscles intact  NECK: supple, no carotid bruits HEART: +irregular rhythm, normal S1 and S2, no murmurs, clicks, gallops or rubs   LUNGS: clear to auscultation bilaterally; no wheezes, rales, or rhonchi   ABDOMEN: normal bowel sounds, soft, no tenderness, no distention  EXTREMITIES: peripheral pulses normal; no clubbing, cyanosis, or edema      Medications:      Current Facility-Administered Medications:     acetaminophen (TYLENOL) tablet 650 mg, 650 mg, Oral, Q6H PRN, Hetul Solis, DO, 650 mg at 03/19/20 1658    atorvastatin (LIPITOR) tablet 10 mg, 10 mg, Oral, HS, Hetul Solis, DO, 10 mg at 03/20/20 2104    benzonatate (TESSALON PERLES) capsule 100 mg, 100 mg, Oral, TID PRN, Arleth Shi PA-C, 100 mg at 03/20/20 2104    cefepime (MAXIPIME) 1,000 mg in dextrose 5 % 50 mL IVPB, 1,000 mg, Intravenous, Q12H, Hetul Solis, DO, Last Rate: 100 mL/hr at 03/20/20 2104, 1,000 mg at 03/20/20 2104    donepezil (ARICEPT) tablet 10 mg, 10 mg, Oral, HS, Hetul Solis, DO, 10 mg at 03/20/20 2104    enoxaparin (LOVENOX) subcutaneous injection 40 mg, 40 mg, Subcutaneous, Daily, Hetul Solis, DO, 40 mg at 03/20/20 0843    ergocalciferol (VITAMIN D2) capsule 50,000 Units, 50,000 Units, Oral, Weekly, Stevie Mackay MD, 50,000 Units at 03/20/20 1547    fluticasone (FLONASE) 50 mcg/act nasal spray 2 spray, 2 spray, Nasal, Daily, Hetul Solis, DO, 2 spray at 03/20/20 0844    furosemide (LASIX) tablet 40 mg, 40 mg, Oral, Daily, Stevie Mackay MD, 40 mg at 03/20/20 1250    gabapentin (NEURONTIN) capsule 100 mg, 100 mg, Oral, BID, Hetul Solis, DO, 100 mg at 03/20/20 1727    hydrALAZINE (APRESOLINE) injection 5 mg, 5 mg, Intravenous, Q6H PRN, Eric Solis, DO    insulin lispro (HumaLOG) 100 units/mL subcutaneous injection 1-5 Units, 1-5 Units, Subcutaneous, TID AC, 3 Units at 03/21/20 0912 **AND** Fingerstick Glucose (POCT), , , TID AC, Eric Solis, DO    levalbuterol (XOPENEX) inhalation solution 1 25 mg, 1 25 mg, Nebulization, Q6H PRN, Issa Baldwin MD    levothyroxine tablet 100 mcg, 100 mcg, Oral, QAM, Hetul Solis, DO, 100 mcg at 03/21/20 0606    methylPREDNISolone sodium succinate (Solu-MEDROL) injection 40 mg, 40 mg, Intravenous, Q12H LAITH, Celeste Moura PA-C    metoprolol tartrate (LOPRESSOR) tablet 50 mg, 50 mg, Oral, Q12H [address], Hetul Solis, DO, 50 mg at 03/20/20 2104    ondansetron (ZOFRAN) injection 4 mg, 4 mg, Intravenous, Q6H PRN, Hetul Solis, DO    QUEtiapine (SEROquel) tablet 12 5 mg, 12 5 mg, Oral, HS PRN, Celeste Moura PA-C    sodium chloride 0 9 % inhalation solution 3 mL, 3 mL, Nebulization, Q6H PRN, Selma Evans MD, 3 mL at 03/21/20 0228    vancomycin (VANCOCIN) 1,500 mg in sodium chloride 0 9 % 250 mL IVPB, 20 mg/kg, Intravenous, Q24H, Celeste Moura PA-C    warfarin (COUMADIN) tablet 5 mg, 5 mg, Oral, Daily (warfarin), Kendra Cano PA-C     Labs & Results:    Results from last 7 days   Lab Units 03/18/20  1533   TROPONIN I ng/mL 0 03     Results from last 7 days   Lab Units 03/21/20  0500 03/20/20  0509 03/18/20  2135 03/18/20  1533   WBC Thousand/uL 14 35* 9 79  --  8 75   HEMOGLOBIN g/dL 10 1* 11 2*  --  10 5*   HEMATOCRIT % 32 5* 36 7  --  34 7*   PLATELETS Thousands/uL 230 247 233 274         Results from last 7 days   Lab Units 03/21/20  0500 03/20/20  0509 03/19/20  0429 03/18/20  1533   POTASSIUM mmol/L 3 4* 4 0 3 9 3 8   CHLORIDE mmol/L 102 102 105 106   CO2 mmol/L 28 27 30 29   BUN mg/dL 44* 34* 27* 28*   CREATININE mg/dL 1 65* 1 73* 1 71* 1 70*   CALCIUM mg/dL 8 2* 8 7 7 9* 8 2*   ALK PHOS U/L  --   --  117* 136*   ALT U/L  --   --  38 41   AST U/L  --   --  37 39     Results from last 7 days   Lab Units 03/21/20  0500 03/20/20  0509 03/19/20  0429 03/18/20  1533   INR  1 92* 1 45* 1 71* 1 90*   PTT seconds  --   --   --  40*     Results from last 7 days   Lab Units 03/19/20 0429   MAGNESIUM mg/dL 1 9

## 2020-03-21 NOTE — PROGRESS NOTES
Vancomycin Assessment    Rodo Chery is a 80 y o  male who is currently receiving vancomycin vancomycin 1000 mg IV q12h  for Pneumonia     Relevant clinical data and objective history reviewed:  Creatinine   Date Value Ref Range Status   03/21/2020 1 65 (H) 0 60 - 1 30 mg/dL Final     Comment:     Standardized to IDMS reference method   03/20/2020 1 73 (H) 0 60 - 1 30 mg/dL Final     Comment:     Standardized to IDMS reference method   03/19/2020 1 71 (H) 0 60 - 1 30 mg/dL Final     Comment:     Standardized to IDMS reference method   12/16/2015 1 29 0 60 - 1 30 mg/dL Final     Comment:     Standardized to IDMS reference method   11/27/2015 1 44 (H) 0 60 - 1 30 mg/dL Final     Comment:     Standardized to IDMS reference method   07/24/2015 1 3 0 8 - 1 3 mg/dL Final     Comment:     Standardized to IDMS reference method     /87 (BP Location: Right arm)   Pulse 92   Temp 97 6 °F (36 4 °C) (Oral)   Resp 18   Ht 5' 11" (1 803 m)   Wt 77 9 kg (171 lb 11 8 oz)   SpO2 98%   BMI 23 95 kg/m²   I/O last 3 completed shifts: In: 540 [P O :440; IV Piggyback:100]  Out: -   Lab Results   Component Value Date/Time    BUN 44 (H) 03/21/2020 05:00 AM    BUN 22 12/16/2015 12:40 PM    WBC 14 35 (H) 03/21/2020 05:00 AM    WBC 9 6 07/24/2015 08:23 AM    HGB 10 1 (L) 03/21/2020 05:00 AM    HGB 14 0 07/24/2015 08:23 AM    HCT 32 5 (L) 03/21/2020 05:00 AM    HCT 40 7 07/24/2015 08:23 AM    MCV 93 03/21/2020 05:00 AM    MCV 91 07/24/2015 08:23 AM     03/21/2020 05:00 AM     07/24/2015 08:23 AM     Temp Readings from Last 3 Encounters:   03/20/20 97 6 °F (36 4 °C) (Oral)   01/22/20 (!) 97 3 °F (36 3 °C)   01/07/20 98 8 °F (37 1 °C)     Vancomycin Days of Therapy: 1    Assessment/Plan  The patient is currently on vancomycin utilizing scheduled dosing based on actual body weight  Baseline risks associated with therapy include: pre-existing renal impairment, concomitant nephrotoxic medications and advanced age  The patient is currently receiving vancomycin 1000 mg IV q12h  and after clinical evaluation will be changed to vancomycin 1500 mg IV q24h  Pharmacy will also follow closely for s/sx of nephrotoxicity, infusion reactions and appropriateness of therapy  BMP and CBC will be ordered per protocol  Plan for trough as patient approaches steady state, prior to the 4th  dose at approximately 3/24/20 at 0700  Due to infection severity, will target a trough of 15-20 (appropriate for most indications)  Pharmacy will continue to follow the patients culture results and clinical progress daily      Shalom Chin, Pharmacist

## 2020-03-21 NOTE — ASSESSMENT & PLAN NOTE
Wt Readings from Last 3 Encounters:   03/18/20 77 9 kg (171 lb 11 8 oz)   02/18/20 83 9 kg (185 lb)   01/28/20 80 7 kg (178 lb)     · Continue Metoprolol, lisinopril on hold secondary to acute kidney injury POA  · Received 1x dose IV Lasix without significant change in pleural effusion  · Status post thoracentesis 3/20 for 1 5 L right lung, left lung had < 400 and was not tapped  · Patient significantly improved post thoracentesis, will continue to monitor closely  · Nephrology has resumed home dose Lasix 40 mg daily  · Cardiology following as all, appreciate input INR subtherapeutic

## 2020-03-22 ENCOUNTER — APPOINTMENT (INPATIENT)
Dept: RADIOLOGY | Facility: HOSPITAL | Age: 85
DRG: 871 | End: 2020-03-22
Payer: MEDICARE

## 2020-03-22 PROBLEM — J18.9 PNEUMONIA DUE TO INFECTIOUS ORGANISM: Status: ACTIVE | Noted: 2020-03-22

## 2020-03-22 LAB
ANION GAP SERPL CALCULATED.3IONS-SCNC: 10 MMOL/L (ref 4–13)
BACTERIA SPT RESP CULT: ABNORMAL
BACTERIA SPT RESP CULT: ABNORMAL
BUN SERPL-MCNC: 42 MG/DL (ref 5–25)
CALCIUM SERPL-MCNC: 8.3 MG/DL (ref 8.3–10.1)
CHLORIDE SERPL-SCNC: 103 MMOL/L (ref 100–108)
CO2 SERPL-SCNC: 27 MMOL/L (ref 21–32)
CREAT SERPL-MCNC: 1.48 MG/DL (ref 0.6–1.3)
ERYTHROCYTE [DISTWIDTH] IN BLOOD BY AUTOMATED COUNT: 16.5 % (ref 11.6–15.1)
GFR SERPL CREATININE-BSD FRML MDRD: 42 ML/MIN/1.73SQ M
GLUCOSE SERPL-MCNC: 184 MG/DL (ref 65–140)
GLUCOSE SERPL-MCNC: 190 MG/DL (ref 65–140)
GLUCOSE SERPL-MCNC: 200 MG/DL (ref 65–140)
GLUCOSE SERPL-MCNC: 204 MG/DL (ref 65–140)
GLUCOSE SERPL-MCNC: 234 MG/DL (ref 65–140)
GRAM STN SPEC: ABNORMAL
HCT VFR BLD AUTO: 37.8 % (ref 36.5–49.3)
HGB BLD-MCNC: 11.6 G/DL (ref 12–17)
INR PPP: 2.2 (ref 0.84–1.19)
MCH RBC QN AUTO: 28.7 PG (ref 26.8–34.3)
MCHC RBC AUTO-ENTMCNC: 30.7 G/DL (ref 31.4–37.4)
MCV RBC AUTO: 94 FL (ref 82–98)
NRBC BLD AUTO-RTO: 0 /100 WBCS
PLATELET # BLD AUTO: 260 THOUSANDS/UL (ref 149–390)
PMV BLD AUTO: 9.6 FL (ref 8.9–12.7)
POTASSIUM SERPL-SCNC: 3.4 MMOL/L (ref 3.5–5.3)
PROCALCITONIN SERPL-MCNC: 0.37 NG/ML
PROTHROMBIN TIME: 24.7 SECONDS (ref 11.6–14.5)
RBC # BLD AUTO: 4.04 MILLION/UL (ref 3.88–5.62)
SODIUM SERPL-SCNC: 140 MMOL/L (ref 136–145)
WBC # BLD AUTO: 15.21 THOUSAND/UL (ref 4.31–10.16)

## 2020-03-22 PROCEDURE — 80048 BASIC METABOLIC PNL TOTAL CA: CPT | Performed by: INTERNAL MEDICINE

## 2020-03-22 PROCEDURE — 71045 X-RAY EXAM CHEST 1 VIEW: CPT

## 2020-03-22 PROCEDURE — 94640 AIRWAY INHALATION TREATMENT: CPT

## 2020-03-22 PROCEDURE — 94668 MNPJ CHEST WALL SBSQ: CPT

## 2020-03-22 PROCEDURE — 85610 PROTHROMBIN TIME: CPT | Performed by: PHYSICIAN ASSISTANT

## 2020-03-22 PROCEDURE — 94760 N-INVAS EAR/PLS OXIMETRY 1: CPT

## 2020-03-22 PROCEDURE — 99232 SBSQ HOSP IP/OBS MODERATE 35: CPT | Performed by: PHYSICIAN ASSISTANT

## 2020-03-22 PROCEDURE — 85027 COMPLETE CBC AUTOMATED: CPT | Performed by: INTERNAL MEDICINE

## 2020-03-22 PROCEDURE — 82948 REAGENT STRIP/BLOOD GLUCOSE: CPT

## 2020-03-22 PROCEDURE — 99232 SBSQ HOSP IP/OBS MODERATE 35: CPT | Performed by: INTERNAL MEDICINE

## 2020-03-22 PROCEDURE — 84145 PROCALCITONIN (PCT): CPT | Performed by: PHYSICIAN ASSISTANT

## 2020-03-22 RX ORDER — BENZONATATE 100 MG/1
100 CAPSULE ORAL 3 TIMES DAILY PRN
Qty: 20 CAPSULE | Refills: 0 | Status: SHIPPED | OUTPATIENT
Start: 2020-03-22 | End: 2020-11-13 | Stop reason: ALTCHOICE

## 2020-03-22 RX ORDER — CEFDINIR 300 MG/1
300 CAPSULE ORAL EVERY 12 HOURS SCHEDULED
Qty: 5 CAPSULE | Refills: 0 | Status: SHIPPED | OUTPATIENT
Start: 2020-03-23 | End: 2020-03-26

## 2020-03-22 RX ORDER — ERGOCALCIFEROL 1.25 MG/1
50000 CAPSULE ORAL WEEKLY
Qty: 12 CAPSULE | Refills: 0 | Status: SHIPPED | OUTPATIENT
Start: 2020-03-27 | End: 2020-06-12 | Stop reason: SDUPTHER

## 2020-03-22 RX ORDER — HYDRALAZINE HYDROCHLORIDE 20 MG/ML
5 INJECTION INTRAMUSCULAR; INTRAVENOUS EVERY 6 HOURS PRN
Status: DISCONTINUED | OUTPATIENT
Start: 2020-03-22 | End: 2020-03-23 | Stop reason: HOSPADM

## 2020-03-22 RX ORDER — BUDESONIDE 0.5 MG/2ML
0.5 INHALANT ORAL
Status: DISCONTINUED | OUTPATIENT
Start: 2020-03-22 | End: 2020-03-23 | Stop reason: HOSPADM

## 2020-03-22 RX ORDER — FUROSEMIDE 40 MG/1
40 TABLET ORAL DAILY
Qty: 30 TABLET | Refills: 0 | Status: SHIPPED | OUTPATIENT
Start: 2020-03-22 | End: 2020-04-13

## 2020-03-22 RX ORDER — PREDNISONE 20 MG/1
TABLET ORAL
Qty: 14 TABLET | Refills: 0 | Status: SHIPPED | OUTPATIENT
Start: 2020-03-22 | End: 2020-04-21 | Stop reason: CLARIF

## 2020-03-22 RX ORDER — CEFDINIR 300 MG/1
300 CAPSULE ORAL EVERY 12 HOURS SCHEDULED
Status: DISCONTINUED | OUTPATIENT
Start: 2020-03-22 | End: 2020-03-23 | Stop reason: HOSPADM

## 2020-03-22 RX ORDER — IPRATROPIUM BROMIDE AND ALBUTEROL SULFATE 2.5; .5 MG/3ML; MG/3ML
3 SOLUTION RESPIRATORY (INHALATION) 4 TIMES DAILY
Qty: 60 VIAL | Refills: 0 | Status: SHIPPED | OUTPATIENT
Start: 2020-03-22 | End: 2020-04-01

## 2020-03-22 RX ORDER — LEVALBUTEROL 1.25 MG/.5ML
1.25 SOLUTION, CONCENTRATE RESPIRATORY (INHALATION)
Status: DISCONTINUED | OUTPATIENT
Start: 2020-03-22 | End: 2020-03-23 | Stop reason: HOSPADM

## 2020-03-22 RX ORDER — BUDESONIDE 0.5 MG/2ML
0.5 INHALANT ORAL
Qty: 30 VIAL | Refills: 0 | Status: SHIPPED | OUTPATIENT
Start: 2020-03-22 | End: 2020-04-22 | Stop reason: ALTCHOICE

## 2020-03-22 RX ADMIN — DONEPEZIL HYDROCHLORIDE 10 MG: 5 TABLET ORAL at 22:13

## 2020-03-22 RX ADMIN — METHYLPREDNISOLONE SODIUM SUCCINATE 40 MG: 40 INJECTION, POWDER, FOR SOLUTION INTRAMUSCULAR; INTRAVENOUS at 10:46

## 2020-03-22 RX ADMIN — CEFDINIR 300 MG: 300 CAPSULE ORAL at 22:14

## 2020-03-22 RX ADMIN — INSULIN LISPRO 1 UNITS: 100 INJECTION, SOLUTION INTRAVENOUS; SUBCUTANEOUS at 07:37

## 2020-03-22 RX ADMIN — LEVALBUTEROL HYDROCHLORIDE 1.25 MG: 1.25 SOLUTION, CONCENTRATE RESPIRATORY (INHALATION) at 08:35

## 2020-03-22 RX ADMIN — IPRATROPIUM BROMIDE 0.5 MG: 0.5 SOLUTION RESPIRATORY (INHALATION) at 19:52

## 2020-03-22 RX ADMIN — WARFARIN SODIUM 5 MG: 5 TABLET ORAL at 17:23

## 2020-03-22 RX ADMIN — GABAPENTIN 100 MG: 100 CAPSULE ORAL at 17:23

## 2020-03-22 RX ADMIN — FLUTICASONE PROPIONATE 2 SPRAY: 50 SPRAY, METERED NASAL at 10:39

## 2020-03-22 RX ADMIN — METHYLPREDNISOLONE SODIUM SUCCINATE 40 MG: 40 INJECTION, POWDER, FOR SOLUTION INTRAMUSCULAR; INTRAVENOUS at 22:14

## 2020-03-22 RX ADMIN — IPRATROPIUM BROMIDE 0.5 MG: 0.5 SOLUTION RESPIRATORY (INHALATION) at 08:35

## 2020-03-22 RX ADMIN — BUDESONIDE 0.5 MG: 0.5 INHALANT RESPIRATORY (INHALATION) at 19:52

## 2020-03-22 RX ADMIN — FUROSEMIDE 40 MG: 40 TABLET ORAL at 10:39

## 2020-03-22 RX ADMIN — LEVALBUTEROL HYDROCHLORIDE 1.25 MG: 1.25 SOLUTION, CONCENTRATE RESPIRATORY (INHALATION) at 19:52

## 2020-03-22 RX ADMIN — HYDRALAZINE HYDROCHLORIDE 5 MG: 20 INJECTION INTRAMUSCULAR; INTRAVENOUS at 04:19

## 2020-03-22 RX ADMIN — METOPROLOL TARTRATE 50 MG: 50 TABLET, FILM COATED ORAL at 22:13

## 2020-03-22 RX ADMIN — VANCOMYCIN HYDROCHLORIDE 1500 MG: 5 INJECTION, POWDER, LYOPHILIZED, FOR SOLUTION INTRAVENOUS at 07:42

## 2020-03-22 RX ADMIN — ATORVASTATIN CALCIUM 10 MG: 10 TABLET, FILM COATED ORAL at 22:14

## 2020-03-22 RX ADMIN — CEFDINIR 300 MG: 300 CAPSULE ORAL at 10:38

## 2020-03-22 RX ADMIN — METOPROLOL TARTRATE 50 MG: 50 TABLET, FILM COATED ORAL at 10:39

## 2020-03-22 RX ADMIN — ENOXAPARIN SODIUM 40 MG: 40 INJECTION SUBCUTANEOUS at 10:40

## 2020-03-22 RX ADMIN — GABAPENTIN 100 MG: 100 CAPSULE ORAL at 10:39

## 2020-03-22 RX ADMIN — LEVOTHYROXINE SODIUM 100 MCG: 100 TABLET ORAL at 05:41

## 2020-03-22 RX ADMIN — INSULIN LISPRO 1 UNITS: 100 INJECTION, SOLUTION INTRAVENOUS; SUBCUTANEOUS at 12:08

## 2020-03-22 RX ADMIN — BUDESONIDE 0.5 MG: 0.5 INHALANT RESPIRATORY (INHALATION) at 08:35

## 2020-03-22 RX ADMIN — INSULIN LISPRO 1 UNITS: 100 INJECTION, SOLUTION INTRAVENOUS; SUBCUTANEOUS at 17:21

## 2020-03-22 NOTE — ASSESSMENT & PLAN NOTE
· Presented with URI symptoms, SIRS vs sepsis criteria secondary to RSV  · Antibiotics started in the ED  His repeat CXR shows no infiltrate, however is procalcitonin is elevated 1 1 -> 1 3 -> 0 85  · Transition oral antibiotics today if sputum culture results finalized  · Leukocytosis likely reactive secondary to the steroids, he remains afebrile and without oxygen requirement  · Continue supportive treatment for viral RSV  · Bronchodilator therapy   incentive spirometer, flutter valve, respiratory protocol while hospitalized

## 2020-03-22 NOTE — ASSESSMENT & PLAN NOTE
Wt Readings from Last 3 Encounters:   03/21/20 77 6 kg (171 lb)   02/18/20 83 9 kg (185 lb)   01/28/20 80 7 kg (178 lb)     · Continue Metoprolol, lisinopril on hold secondary to acute kidney injury POA  · Received 1x dose IV Lasix without significant change in pleural effusion  · Status post thoracentesis 3/20 for 1 5 L right lung, left lung had < 400 and was not tapped  · Patient significantly improved post thoracentesis, will continue to monitor closely  · Nephrology has resumed home dose Lasix 40 mg daily  · Cardiology following as all, appreciate input INR subtherapeutic

## 2020-03-22 NOTE — PROGRESS NOTES
NEPHROLOGY PROGRESS NOTE    Patient: Dyllan Salcedo               Sex: male          DOA: 3/18/2020  2:57 PM   YOB: 1932        Age:  80 y o         LOS:  LOS: 4 days       HPI     Patient with acute on chronic failure admitted with shortness of breath and cough    SUBJECTIVE     Feeling better    Still has a cough for seems to be improving    No chest pain no palpitation    CURRENT MEDICATIONS       Current Facility-Administered Medications:     acetaminophen (TYLENOL) tablet 650 mg, 650 mg, Oral, Q6H PRN, Hetul Solis, DO, 650 mg at 03/19/20 1658    atorvastatin (LIPITOR) tablet 10 mg, 10 mg, Oral, HS, Hetul Solis, DO, 10 mg at 03/21/20 2104    benzonatate (TESSALON PERLES) capsule 100 mg, 100 mg, Oral, TID PRN, MILVIA Rice-C, 100 mg at 03/20/20 2104    budesonide (PULMICORT) inhalation solution 0 5 mg, 0 5 mg, Nebulization, Q12H, Celeste Moura PA-C, 0 5 mg at 03/22/20 3512    cefdinir (OMNICEF) capsule 300 mg, 300 mg, Oral, Q12H [address], Celeste Moura PA-C    donepezil (ARICEPT) tablet 10 mg, 10 mg, Oral, HS, Hetul Solis, DO, 10 mg at 03/21/20 2103    enoxaparin (LOVENOX) subcutaneous injection 40 mg, 40 mg, Subcutaneous, Daily, Hetul Solis, DO, 40 mg at 03/21/20 2495    ergocalciferol (VITAMIN D2) capsule 50,000 Units, 50,000 Units, Oral, Weekly, Feng Clemens MD, 50,000 Units at 03/20/20 1547    fluticasone (FLONASE) 50 mcg/act nasal spray 2 spray, 2 spray, Nasal, Daily, Hetul Solis, DO, 2 spray at 03/21/20 0929    furosemide (LASIX) tablet 40 mg, 40 mg, Oral, Daily, Feng Clemens MD, 40 mg at 03/21/20 5821    gabapentin (NEURONTIN) capsule 100 mg, 100 mg, Oral, BID, Hetul Solis, DO, 100 mg at 03/21/20 1736    hydrALAZINE (APRESOLINE) injection 5 mg, 5 mg, Intravenous, Q6H PRN, Merari Logan MD, 5 mg at 03/22/20 0419    insulin lispro (HumaLOG) 100 units/mL subcutaneous injection 1-5 Units, 1-5 Units, Subcutaneous, TID AC, 1 Units at 03/22/20 0737 **AND** Fingerstick Glucose (POCT), , , TID AC, Eric Kamaraa, DO    ipratropium (ATROVENT) 0 02 % inhalation solution 0 5 mg, 0 5 mg, Nebulization, BID, Alexis Pena MD, 0 5 mg at 03/22/20 0835    levalbuterol ([place]) inhalation solution 1 25 mg, 1 25 mg, Nebulization, BID, Alexis Pena MD, 1 25 mg at 03/22/20 0835    levothyroxine tablet 100 mcg, 100 mcg, Oral, QAM, Eric Solis DO, 100 mcg at 03/22/20 0541    methylPREDNISolone sodium succinate (Solu-MEDROL) injection 40 mg, 40 mg, Intravenous, Q12H [facility], Celeste Moura PA-C, 40 mg at 03/21/20 2103    metoprolol tartrate (LOPRESSOR) tablet 50 mg, 50 mg, Oral, Q12H [facility], Eric Solis DO, 50 mg at 03/21/20 2104    ondansetron (ZOFRAN) injection 4 mg, 4 mg, Intravenous, Q6H PRN, Eric Solis DO    QUEtiapine (SEROquel) tablet 12 5 mg, 12 5 mg, Oral, BID PRN, Celeste Moura PA-C    warfarin (COUMADIN) tablet 5 mg, 5 mg, Oral, Daily (warfarin), Celeste Moura PA-C, 5 mg at 03/21/20 1736    OBJECTIVE     Current Weight: Weight - Scale: 77 6 kg (171 lb)  Vitals:    03/22/20 0836   BP:    Pulse:    Resp:    Temp:    SpO2: 98%       Intake/Output Summary (Last 24 hours) at 3/22/2020 1032  Last data filed at 3/22/2020 2707  Gross per 24 hour   Intake 480 ml   Output 200 ml   Net 280 ml       PHYSICAL EXAMINATION     Physical Exam   Constitutional: He is oriented to person, place, and time  He appears well-developed  No distress  HENT:   Head: Normocephalic  Mouth/Throat: Oropharynx is clear and moist    Eyes: Conjunctivae are normal  No scleral icterus  Neck: Neck supple  No JVD present  Cardiovascular: Normal rate and normal heart sounds  Pulmonary/Chest: Effort normal  He has wheezes  Abdominal: Soft  There is no tenderness  Musculoskeletal: Normal range of motion  He exhibits no edema  Neurological: He is alert and oriented to person, place, and time  Skin: Skin is warm  No rash noted  Psychiatric: He has a normal mood and affect   His behavior is normal  LAB RESULTS     Results from last 7 days   Lab Units 03/22/20  0528 03/21/20  0500 03/20/20  0509 03/19/20  0429 03/18/20  2135 03/18/20  1533   WBC Thousand/uL 15 21* 14 35* 9 79  --   --  8 75   HEMOGLOBIN g/dL 11 6* 10 1* 11 2*  --   --  10 5*   HEMATOCRIT % 37 8 32 5* 36 7  --   --  34 7*   PLATELETS Thousands/uL 260 230 247  --  233 274   POTASSIUM mmol/L 3 4* 3 4* 4 0 3 9  --  3 8   CHLORIDE mmol/L 103 102 102 105  --  106   CO2 mmol/L 27 28 27 30  --  29   BUN mg/dL 42* 44* 34* 27*  --  28*   CREATININE mg/dL 1 48* 1 65* 1 73* 1 71*  --  1 70*   EGFR ml/min/1 73sq m 42 37 35 35  --  35   CALCIUM mg/dL 8 3 8 2* 8 7 7 9*  --  8 2*   MAGNESIUM mg/dL  --   --   --  1 9  --   --    PHOSPHORUS mg/dL  --   --  3 8 4 4*  --   --        RADIOLOGY RESULTS      Results for orders placed during the hospital encounter of 03/18/20   XR chest portable    Narrative CHEST     INDICATION:   follow up small post-thoracentesis pneumonthorax right apex  COMPARISON:  3/21/2020    EXAM PERFORMED/VIEWS:  XR CHEST PORTABLE  Single portable view    FINDINGS:  Tiny right apical pneumothorax has resolved    Cardiomediastinal silhouette appears unremarkable  Persistent infiltrative opacity right lung base  Small right pleural effusion  Osseous structures appear within normal limits for patient age  Impression Resolution of previous small right apical pneumothorax status post thoracentesis    Persistent right basal infiltrate and trace effusion    Workstation performed: [id number]       Results for orders placed during the hospital encounter of 03/18/20   XR chest pa & lateral    Narrative CHEST     INDICATION:   Worsening leukocytosis, elevated procalcitonin, (+) RSV, rule out infiltrate  COMPARISON:  Chest x-ray 3/19/2020    EXAM PERFORMED/VIEWS:  XR CHEST PA & LATERAL      FINDINGS:      Heart is moderately enlarged, stable  Decreased right pleural effusion with trace pleural effusion remaining   Patchy opacity in the right lower lobe suspicious for pneumonia  Left lung is clear  Trace right apical pneumothorax, new  Patient is status post thoracentesis  Osseous structures appear within normal limits for patient age  Impression 1  Decreased right pleural effusion status post thoracentesis  Patchy opacity in the right lower lobe suspicious for pneumonia  2   New trace right apical pneumothorax status post thoracentesis  Continued follow-up is advised  The study was marked in [facility] for immediate notification  Workstation performed: [id number]       Results for orders placed during the hospital encounter of 01/07/20   CT chest without contrast    Narrative CT CHEST WITHOUT IV CONTRAST    INDICATION:   Pneumonia  fever; f/u cxr  COMPARISON:  Two-view chest from earlier today    TECHNIQUE: CT examination of the chest was performed without intravenous contrast   Axial, sagittal, and coronal 2D reformatted images were created from the source data and submitted for interpretation  Radiation dose length product (DLP) for this visit:  219 mGy-cm   This examination, like all CT scans performed in the [facility], was performed utilizing techniques to minimize radiation dose exposure, including the use of iterative   reconstruction and automated exposure control  FINDINGS:    LUNGS:  Bibasilar compressive atelectasis secondary to bilateral pleural effusions  No endotracheal or endobronchial lesion  PLEURA:  Moderate right and small left pleural effusions  HEART/GREAT VESSELS:  Mild cardiomegaly  Coronary artery calcifications  MEDIASTINUM AND TUAN:  Unremarkable  CHEST WALL AND LOWER NECK:   Unremarkable  VISUALIZED STRUCTURES IN THE UPPER ABDOMEN:  Nodular contour of the partially imaged liver may indicate cirrhosis  OSSEOUS STRUCTURES:  No acute fracture or destructive osseous lesion  Impression Moderate right and small left pleural effusions      Mild cardiomegaly  Workstation performed: [id number]       No results found for this or any previous visit  No results found for this or any previous visit  No results found for this or any previous visit  PLAN / RECOMMENDATIONS      Acute kidney injury kidney improved and seems to be with baseline    CKD stage three:  Patient seems to be at baseline at this point    Heart failure:  Patient is of diuretic but will resume diuretic as part of the management    COPD:  Antibiotic and steroid which will continue    Will continue to monitor    Thomos Leyden, MD  Nephrology  3/22/2020        Portions of the record may have been created with voice recognition software  Occasional wrong word or "sound a like" substitutions may have occurred due to the inherent limitations of voice recognition software  Read the chart carefully and recognize, using context, where substitutions have occurred

## 2020-03-22 NOTE — PLAN OF CARE
Problem: RESPIRATORY - ADULT  Goal: Achieves optimal ventilation and oxygenation  Description  INTERVENTIONS:  - Assess for changes in respiratory status  - Assess for changes in mentation and behavior  - Position to facilitate oxygenation and minimize respiratory effort  - Oxygen administered by appropriate delivery if ordered  - Initiate smoking cessation education as indicated  - Encourage broncho-pulmonary hygiene including cough, deep breathe, Incentive Spirometry  - Assess the need for suctioning and aspirate as needed  - Assess and instruct to report SOB or any respiratory difficulty  - Respiratory Therapy support as indicated  Outcome: Progressing     Problem: INFECTION - ADULT  Goal: Absence or prevention of progression during hospitalization  Description  INTERVENTIONS:  - Assess and monitor for signs and symptoms of infection  - Monitor lab/diagnostic results  - Monitor all insertion sites, i e  indwelling lines, tubes, and drains  - Monitor endotracheal if appropriate and nasal secretions for changes in amount and color  - [place] appropriate cooling/warming therapies per order  - Administer medications as ordered  - Instruct and encourage patient and family to use good hand hygiene technique  - Identify and instruct in appropriate isolation precautions for identified infection/condition  Outcome: Progressing     Problem: SAFETY ADULT  Goal: Patient will remain free of falls  Description  INTERVENTIONS:  - Assess patient frequently for physical needs  -  Identify cognitive and physical deficits and behaviors that affect risk of falls    -  [place] fall precautions as indicated by assessment   - Educate patient/family on patient safety including physical limitations  - Instruct patient to call for assistance with activity based on assessment  - Modify environment to reduce risk of injury  - Consider OT/PT consult to assist with strengthening/mobility  Outcome: Progressing  Goal: Maintain or return to baseline ADL function  Description  INTERVENTIONS:  -  Assess patient's ability to carry out ADLs; assess patient's baseline for ADL function and identify physical deficits which impact ability to perform ADLs (bathing, care of mouth/teeth, toileting, grooming, dressing, etc )  - Assess/evaluate cause of self-care deficits   - Assess range of motion  - Assess patient's mobility; develop plan if impaired  - Assess patient's need for assistive devices and provide as appropriate  - Encourage maximum independence but intervene and supervise when necessary  - Involve family in performance of ADLs  - Assess for home care needs following discharge   - Consider OT consult to assist with ADL evaluation and planning for discharge  - Provide patient education as appropriate  Outcome: Progressing  Goal: Maintain or return mobility status to optimal level  Description  INTERVENTIONS:  - Assess patient's baseline mobility status (ambulation, transfers, stairs, etc )    - Identify cognitive and physical deficits and behaviors that affect mobility  - Identify mobility aids required to assist with transfers and/or ambulation (gait belt, sit-to-stand, lift, walker, cane, etc )  - [place] fall precautions as indicated by assessment  - Record patient progress and toleration of activity level on Mobility SBAR; progress patient to next Phase/Stage  - Instruct patient to call for assistance with activity based on assessment  - Consider rehabilitation consult to assist with strengthening/weightbearing, etc   Outcome: Progressing     Problem: DISCHARGE PLANNING  Goal: Discharge to home or other facility with appropriate resources  Description  INTERVENTIONS:  - Identify barriers to discharge w/patient and caregiver  - Arrange for needed discharge resources and transportation as appropriate  - Identify discharge learning needs (meds, wound care, etc )  - Arrange for interpretive services to assist at discharge as needed  - Refer to Case Management Department for coordinating discharge planning if the patient needs post-hospital services based on physician/advanced practitioner order or complex needs related to functional status, cognitive ability, or social support system  Outcome: Progressing     Problem: Potential for Falls  Goal: Patient will remain free of falls  Description  INTERVENTIONS:  - Assess patient frequently for physical needs  -  Identify cognitive and physical deficits and behaviors that affect risk of falls  -  [place] fall precautions as indicated by assessment   - Educate patient/family on patient safety including physical limitations  - Instruct patient to call for assistance with activity based on assessment  - Modify environment to reduce risk of injury  - Consider OT/PT consult to assist with strengthening/mobility  Outcome: Progressing     Problem: Nutrition/Hydration-ADULT  Goal: Nutrient/Hydration intake appropriate for improving, restoring or maintaining nutritional needs  Description  Monitor and assess patient's nutrition/hydration status for malnutrition  Collaborate with interdisciplinary team and initiate plan and interventions as ordered  Monitor patient's weight and dietary intake as ordered or per policy  Utilize nutrition screening tool and intervene as necessary  Determine patient's food preferences and provide high-protein, high-caloric foods as appropriate       INTERVENTIONS:  - Monitor oral intake, urinary output, labs, and treatment plans  - Assess nutrition and hydration status and recommend course of action  - Evaluate amount of meals eaten  - Assist patient with eating if necessary   - Allow adequate time for meals  - Recommend/ encourage appropriate diets, oral nutritional supplements, and vitamin/mineral supplements  - Order, calculate, and assess calorie counts as needed  - Recommend, monitor, and adjust tube feedings and TPN/PPN based on assessed needs  - Assess need for intravenous fluids  - Provide specific nutrition/hydration education as appropriate  - Include patient/family/caregiver in decisions related to nutrition  Outcome: Progressing

## 2020-03-22 NOTE — ASSESSMENT & PLAN NOTE
· Metoprolol/ Coumadin    INR remains now therapeutic; will continue Coumadin and monitoring    Lab Results   Component Value Date    INR 2 20 (H) 03/22/2020    INR 1 92 (H) 03/21/2020    INR 1 45 (H) 03/20/2020

## 2020-03-22 NOTE — ASSESSMENT & PLAN NOTE
Lab Results   Component Value Date    HGBA1C 6 3 (H) 01/14/2020       Recent Labs     03/21/20  1106 03/21/20  1604 03/21/20  2138 03/22/20  0716   POCGLU 253* 112 114 190*       Blood Sugar Average: Last 72 hrs:  (P) 187 125   · Monitor BG, continue SSI  · CCO diet

## 2020-03-22 NOTE — ASSESSMENT & PLAN NOTE
· Resolved    Secondary to RSV, see treatment plan above  · Remains stable on room air >95% on my evaluation  · Suspect that the pleural effusion was contributing to symptoms - s/p thoracentesis 3/20 for 1 5 L   · Repeat CXR pending for today, very small apical pneumothorax following thoracentesis noted on 3/21

## 2020-03-22 NOTE — PROGRESS NOTES
Progress Note - Eileen Hem 11/21/1932, 80 y o  male MRN: [id number]    Unit/Bed#: -01 Encounter: [id number]    Primary Care Provider: Skylar Jones DO   Date and time admitted to hospital: 3/18/2020  2:57 PM    * Fever  Assessment & Plan  · Presented with URI symptoms, SIRS vs sepsis criteria secondary to RSV  · Antibiotics started in the ED  His repeat CXR shows no infiltrate, however is procalcitonin is elevated 1 1 -> 1 3 -> 0 85  · Transition oral antibiotics today if sputum culture results finalized  · Leukocytosis likely reactive secondary to the steroids, he remains afebrile and without oxygen requirement  · Continue supportive treatment for viral RSV  · Bronchodilator therapy  incentive spirometer, flutter valve, respiratory protocol while hospitalized    Hypoxic  Assessment & Plan  · Resolved  Secondary to RSV, see treatment plan above  · Remains stable on room air >95% on my evaluation  · Suspect that the pleural effusion was contributing to symptoms - s/p thoracentesis 3/20 for 1 5 L   · Repeat CXR pending for today, very small apical pneumothorax following thoracentesis noted on 3/21    Chronic combined systolic (congestive) and diastolic (congestive) heart failure (HCC)  Assessment & Plan  Wt Readings from Last 3 Encounters:   03/21/20 77 6 kg (171 lb)   02/18/20 83 9 kg (185 lb)   01/28/20 80 7 kg (178 lb)     · Continue Metoprolol, lisinopril on hold secondary to acute kidney injury POA  · Received 1x dose IV Lasix without significant change in pleural effusion  · Status post thoracentesis 3/20 for 1 5 L right lung, left lung had < 400 and was not tapped  · Patient significantly improved post thoracentesis, will continue to monitor closely  · Nephrology has resumed home dose Lasix 40 mg daily  · Cardiology following as all, appreciate input INR subtherapeutic    Chronic a-fib  Assessment & Plan  · Metoprolol/ Coumadin    INR remains now therapeutic; will continue Coumadin and monitoring    Lab Results   Component Value Date    INR 2 20 (H) 03/22/2020    INR 1 92 (H) 03/21/2020    INR 1 45 (H) 03/20/2020       Adult hypothyroidism  Assessment & Plan  · Continue home dose levothyroxine, TSH within range 1/2020    Essential hypertension  Assessment & Plan  · Have been holding Zestril for elevated creatinine, received Lasix in ER  · Nephrology consulted    Type 2 diabetes mellitus with diabetic polyneuropathy, without long-term current use of insulin [facility])  Assessment & Plan  Lab Results   Component Value Date    HGBA1C 6 3 (H) 01/14/2020       Recent Labs     03/21/20  1106 03/21/20  1604 03/21/20  2138 03/22/20  0716   POCGLU 253* 112 114 190*       Blood Sugar Average: Last 72 hrs:  (P) 187 125   · Monitor BG, continue SSI  · CCO diet     Stage 3 chronic kidney disease ([address] )  Assessment & Plan  · Baseline creatinine around 1 3     · Nephrology evaluation appreciated  · Mild fluid overload on presentation, now euvolemic on exam  · Creatinine improving, continue to monitor         VTE Pharmacologic Prophylaxis:   Pharmacologic: Warfarin (Coumadin)  Mechanical VTE Prophylaxis in Place: Yes    Patient Centered Rounds: I have performed bedside rounds with nursing staff today  Discussions with Specialists or Other Care Team Provider: CM - dispo planning back to [facility] and Discussions with Family / Patient: patient; updated daughter Skip Savers by phone regarding stability for return to long-term tomorrow     Time Spent for Care: 20 minutes  More than 50% of total time spent on counseling and coordination of care as described above      Current Length of Stay: 4 day(s)    Current Patient Status: Inpatient   Certification Statement: The patient will continue to require additional inpatient hospital stay due to long-term to accept back tomorrow    Discharge Plan: to [facility] tomorrow     Code Status: Level 1 - Full Code    Subjective:   Seen this morning, no acute events overnight per PCA at the bedside  No fevers or chills  Patient reports shortness breath with exertion at times, however overall feels improved  Initially he thought he was here for the cellulitis, however reoriented that he is here for RSV bronchitis/pneumonia and recalls his hospital stay  Denies any abdominal pain  Cough is getting better  Objective:     Vitals:   Temp (24hrs), Av 3 °F (36 3 °C), Min:97 °F (36 1 °C), Max:97 5 °F (36 4 °C)    Temp:  [97 °F (36 1 °C)-97 5 °F (36 4 °C)] 97 5 °F (36 4 °C)  HR:  [69-92] 86  Resp:  [17] 17  BP: (117-172)/() 167/93  SpO2:  [97 %-100 %] 98 %  Body mass index is 23 85 kg/m²  Input and Output Summary (last 24 hours): Intake/Output Summary (Last 24 hours) at 3/22/2020 0840  Last data filed at 3/22/2020 7553  Gross per 24 hour   Intake 480 ml   Output 200 ml   Net 280 ml       Physical Exam:     Physical Exam   Constitutional: Vital signs are normal  He appears well-developed and well-nourished  No distress  Cardiovascular: Normal rate, regular rhythm, S1 normal, S2 normal, normal heart sounds and intact distal pulses  Pulmonary/Chest: Effort normal  No accessory muscle usage  No tachypnea  No respiratory distress  He has wheezes in the right upper field and the right middle field  He has rhonchi in the right upper field, the right middle field, the left upper field and the left middle field  He has no rales  99% RA with conversation  Lung sound remain coarse and slightly improved, wheezing improved   Abdominal: Soft  Bowel sounds are normal  He exhibits no distension  There is no tenderness  Musculoskeletal: He exhibits no edema  Psychiatric: He has a normal mood and affect  Nursing note and vitals reviewed      Additional Data:     Labs:    Results from last 7 days   Lab Units 20  0528  20  0509  20  1533   WBC Thousand/uL 15 21*   < > 9 79  --  8 75   HEMOGLOBIN g/dL 11 6*   < > 11 2*  --  10 5*   HEMATOCRIT % 37 8   < > 36 7  --  34 7* PLATELETS Thousands/uL 260   < > 247   < > 274   NEUTROS PCT %  --   --   --   --  79*   LYMPHS PCT %  --   --   --   --  8*   LYMPHO PCT %  --   --  5*  --   --    MONOS PCT %  --   --   --   --  11   MONO PCT %  --   --  3*  --   --    EOS PCT %  --   --  0  --  1    < > = values in this interval not displayed  Results from last 7 days   Lab Units 03/22/20  0528  03/19/20  0429   SODIUM mmol/L 140   < > 143   POTASSIUM mmol/L 3 4*   < > 3 9   CHLORIDE mmol/L 103   < > 105   CO2 mmol/L 27   < > 30   BUN mg/dL 42*   < > 27*   CREATININE mg/dL 1 48*   < > 1 71*   ANION GAP mmol/L 10   < > 8   CALCIUM mg/dL 8 3   < > 7 9*   ALBUMIN g/dL  --   --  2 5*   TOTAL BILIRUBIN mg/dL  --   --  1 30*   ALK PHOS U/L  --   --  117*   ALT U/L  --   --  38   AST U/L  --   --  37   GLUCOSE RANDOM mg/dL 204*   < > 107    < > = values in this interval not displayed  Results from last 7 days   Lab Units 03/22/20  0528   INR  2 20*     Results from last 7 days   Lab Units 03/22/20  0716 03/21/20  2138 03/21/20  1604 03/21/20  1106 03/21/20  0902 03/21/20  3545 03/20/20  2126 03/20/20  1602 03/20/20  1115 03/20/20  0604 03/19/20  2135 03/19/20  1657   POC GLUCOSE mg/dl 190* 114 112 253* 304* 192* 250* 234* 342* 182* 110 141*         Results from last 7 days   Lab Units 03/21/20  0500 03/20/20  0509 03/19/20  0429 03/18/20  2135 03/18/20  1639 03/18/20  1533   LACTIC ACID mmol/L  --   --   --  1 1  --  1 1   PROCALCITONIN ng/ml 0 85* 1 33* 1 10* 0 16 <0 05  --            * I Have Reviewed All Lab Data Listed Above  * Additional Pertinent Lab Tests Reviewed: All Labs Within Last 24 Hours Reviewed    Imaging:    Imaging Reports Reviewed Today Include: CXR x2  Imaging Personally Reviewed by Myself Includes:  CXR x2 - resolution of apical PTX    Recent Cultures (last 7 days):     Results from last 7 days   Lab Units 03/19/20  2334 03/18/20  1539 03/18/20  1533   BLOOD CULTURE   --  No Growth at 72 hrs  No Growth at 72 hrs     SPUTUM CULTURE  4+ Growth of   Commensal respiratory aroldo only; No significant growth of Staph aureus/MRSA or Pseudomonas aeruginosa  --   --    GRAM STAIN RESULT  2+ Epithelial cells per low power field*  No polys seen*  2+ Gram positive cocci in pairs*  2+ Gram negative rods*  --   --        Last 24 Hours Medication List:     Current Facility-Administered Medications:  acetaminophen 650 mg Oral Q6H PRN Hetul Solis, DO    atorvastatin 10 mg Oral HS Hetul Solis, DO    benzonatate 100 mg Oral TID PRN Maurilio Maldonado PA-C    budesonide 0 5 mg Nebulization Q12H Celeste Moura PA-C    cefepime 1,000 mg Intravenous Q12H Hetul Solis, DO Last Rate: 1,000 mg (03/21/20 2104)   donepezil 10 mg Oral HS Hetul Solis, DO    enoxaparin 40 mg Subcutaneous Daily Hetul Solis, DO    ergocalciferol 50,000 Units Oral Weekly Lynda Carl MD    fluticasone 2 spray Nasal Daily Hetul Solis, DO    furosemide 40 mg Oral Daily Lynda Carl MD    gabapentin 100 mg Oral BID Hetul Solis, DO    hydrALAZINE 5 mg Intravenous Q6H PRN Barbara Ferguson MD    insulin lispro 1-5 Units Subcutaneous TID AC Hetul Solis, DO    ipratropium 0 5 mg Nebulization BID Deb Locke MD    levalbuterol 1 25 mg Nebulization BID Deb Locke MD    levothyroxine 100 mcg Oral QAM Hetul Solis, DO    methylPREDNISolone sodium succinate 40 mg Intravenous Q12H [address] Celeste Moura PA-C    metoprolol tartrate 50 mg Oral Q12H [address] Hetul Solis, DO    ondansetron 4 mg Intravenous Q6H PRN Hetul Solis, DO    QUEtiapine 12 5 mg Oral BID PRN Celeste Moura PA-C    vancomycin 20 mg/kg Intravenous Q24H Celeste Moura PA-C Last Rate: 1,500 mg (03/22/20 0742)   warfarin 5 mg Oral Daily (warfarin) Polly Ramirez PA-C         Today, Patient Was Seen By: oPlly Ramirez PA-C    ** Please Note: Dictation voice to text software may have been used in the creation of this document   **

## 2020-03-22 NOTE — DISCHARGE INSTRUCTIONS
Bacterial Pneumonia   WHAT YOU NEED TO KNOW:   Bacterial pneumonia is a lung infection caused by bacteria  It makes your lungs inflamed, which means they cannot work well  Bacterial pneumonia germs are easily spread when an infected person coughs, sneezes, or has close contact with others  DISCHARGE INSTRUCTIONS:   Seek care immediately if:   · You are confused and cannot think clearly  · You are urinating less or not at all  · You cough up blood  · You have more trouble breathing, or your breathing seems faster than normal     · Your heart or pulse beats more than 100 times in 1 minute  · Your lips or fingernails turn blue  Contact your healthcare provider if:   · Your symptoms are the same or get worse 48 hours after you start antibiotics  · You cannot eat or have loss of appetite, nausea, or are vomiting  · You have questions or concerns about your condition or care  Medicines:   · Antibiotics  treat pneumonia caused by bacteria  · Acetaminophen  decreases pain and fever  It is available without a doctor's order  Ask how much to take and how often to take it  Follow directions  Read the labels of all other medicines you are using to see if they also contain acetaminophen, or ask your doctor or pharmacist  Acetaminophen can cause liver damage if not taken correctly  Do not use more than 4 grams (4,000 milligrams) total of acetaminophen in one day  · NSAIDs , such as ibuprofen, help decrease swelling, pain, and fever  This medicine is available with or without a doctor's order  NSAIDs can cause stomach bleeding or kidney problems in certain people  If you take blood thinner medicine, always ask your healthcare provider if NSAIDs are safe for you  Always read the medicine label and follow directions  · Take your medicine as directed  Contact your healthcare provider if you think your medicine is not helping or if you have side effects   Tell him or her if you are allergic to any medicine  Keep a list of the medicines, vitamins, and herbs you take  Include the amounts, and when and why you take them  Bring the list or the pill bottles to follow-up visits  Carry your medicine list with you in case of an emergency  Follow up with your healthcare provider as directed:  Write down your questions so you remember to ask them during your visits  Manage your symptoms:   · Rest as needed  Rest often while you recover  Slowly start to do more each day  · Drink liquids as directed  Ask how much liquid to drink each day and which liquids are best for you  Liquids help thin your mucus, which may make it easier for you to cough it up  · Do not smoke  Avoid secondhand smoke  Smoking increases your risk for pneumonia  Smoking also makes it harder for you to get better after you have had pneumonia  Ask your healthcare provider for information if you currently smoke and need help to quit  E-cigarettes or smokeless tobacco still contain nicotine  Talk to your healthcare provider before you use these products  · Use a cool mist humidifier  to increase air moisture in your home  This may make it easier for you to breathe and help decrease your cough  · Keep your head elevated  You may be able to breathe better if you lie down with the head of your bed up  Prevent bacterial pneumonia:   · Prevent the spread of germs  Wash your hands often with soap and water  Use gel hand cleanser when there is no soap and water available  Do not touch your eyes, nose, or mouth unless you have washed your hands first  Cover your mouth when you cough  Cough into a tissue or your shirtsleeve so you do not spread germs from your hands  If you are sick, stay away from others as much as possible  · Limit alcohol  Women should limit alcohol to 1 drink a day  Men should limit alcohol to 2 drinks a day  A drink of alcohol is 12 ounces of beer, 5 ounces of wine, or 1½ ounces of liquor       · Ask about vaccines  You may need a vaccine to help prevent pneumonia  Get an influenza (flu) vaccine every year as soon as it becomes available  © 2017 [address] [employer] Information is for End User's use only and may not be sold, redistributed or otherwise used for commercial purposes  All illustrations and images included in CareNotes® are the copyrighted property of [employer] , [employer]  or Branden Hernandez  The above information is an  only  It is not intended as medical advice for individual conditions or treatments  Talk to your doctor, nurse or pharmacist before following any medical regimen to see if it is safe and effective for you

## 2020-03-23 ENCOUNTER — TRANSITIONAL CARE MANAGEMENT (OUTPATIENT)
Dept: INTERNAL MEDICINE CLINIC | Facility: CLINIC | Age: 85
End: 2020-03-23

## 2020-03-23 VITALS
WEIGHT: 171 LBS | SYSTOLIC BLOOD PRESSURE: 140 MMHG | HEART RATE: 75 BPM | RESPIRATION RATE: 18 BRPM | BODY MASS INDEX: 23.94 KG/M2 | OXYGEN SATURATION: 99 % | TEMPERATURE: 97.8 F | HEIGHT: 71 IN | DIASTOLIC BLOOD PRESSURE: 100 MMHG

## 2020-03-23 LAB
BACTERIA BLD CULT: NORMAL
BACTERIA BLD CULT: NORMAL
GLUCOSE SERPL-MCNC: 220 MG/DL (ref 65–140)
GLUCOSE SERPL-MCNC: 250 MG/DL (ref 65–140)
INR PPP: 2.45 (ref 0.84–1.19)
PROTHROMBIN TIME: 26.9 SECONDS (ref 11.6–14.5)

## 2020-03-23 PROCEDURE — 94668 MNPJ CHEST WALL SBSQ: CPT

## 2020-03-23 PROCEDURE — 94760 N-INVAS EAR/PLS OXIMETRY 1: CPT

## 2020-03-23 PROCEDURE — 94640 AIRWAY INHALATION TREATMENT: CPT

## 2020-03-23 PROCEDURE — 85610 PROTHROMBIN TIME: CPT | Performed by: PHYSICIAN ASSISTANT

## 2020-03-23 PROCEDURE — 82948 REAGENT STRIP/BLOOD GLUCOSE: CPT

## 2020-03-23 PROCEDURE — 99239 HOSP IP/OBS DSCHRG MGMT >30: CPT | Performed by: PHYSICIAN ASSISTANT

## 2020-03-23 RX ADMIN — INSULIN LISPRO 1 UNITS: 100 INJECTION, SOLUTION INTRAVENOUS; SUBCUTANEOUS at 09:30

## 2020-03-23 RX ADMIN — BUDESONIDE 0.5 MG: 0.5 INHALANT RESPIRATORY (INHALATION) at 08:07

## 2020-03-23 RX ADMIN — METOPROLOL TARTRATE 50 MG: 50 TABLET, FILM COATED ORAL at 09:29

## 2020-03-23 RX ADMIN — LEVOTHYROXINE SODIUM 100 MCG: 100 TABLET ORAL at 06:30

## 2020-03-23 RX ADMIN — ENOXAPARIN SODIUM 40 MG: 40 INJECTION SUBCUTANEOUS at 09:29

## 2020-03-23 RX ADMIN — IPRATROPIUM BROMIDE 0.5 MG: 0.5 SOLUTION RESPIRATORY (INHALATION) at 08:07

## 2020-03-23 RX ADMIN — FLUTICASONE PROPIONATE 2 SPRAY: 50 SPRAY, METERED NASAL at 09:30

## 2020-03-23 RX ADMIN — CEFDINIR 300 MG: 300 CAPSULE ORAL at 09:29

## 2020-03-23 RX ADMIN — METHYLPREDNISOLONE SODIUM SUCCINATE 40 MG: 40 INJECTION, POWDER, FOR SOLUTION INTRAMUSCULAR; INTRAVENOUS at 09:29

## 2020-03-23 RX ADMIN — GABAPENTIN 100 MG: 100 CAPSULE ORAL at 09:29

## 2020-03-23 RX ADMIN — LEVALBUTEROL HYDROCHLORIDE 1.25 MG: 1.25 SOLUTION, CONCENTRATE RESPIRATORY (INHALATION) at 08:07

## 2020-03-23 RX ADMIN — FUROSEMIDE 40 MG: 40 TABLET ORAL at 09:29

## 2020-03-23 NOTE — ASSESSMENT & PLAN NOTE
· Baseline creatinine around 1 3-1 5  · Nephrology evaluation appreciated  · Mild fluid overload on presentation, now euvolemic on exam  · Creatinine stable within baseline

## 2020-03-23 NOTE — SOCIAL WORK
CM spoke with Shlomo Floyd whom had a few more questions regarding droplet and wanted to know how pt was treated  CM informed her that pt was treated with vanco for RSV which was completed 2 days ago per RN  She said that pt is appropriate to return today  CM informed her that pt's daughter will transport  CM discussed with RN regarding discharge time and was informed that pt can be transferred to [facility] at 12:30pm  CM contacted pt's daughter via telephone to provide the update and she said that she will be at the [facility] a little before 12:30 pm  RN notified

## 2020-03-23 NOTE — ASSESSMENT & PLAN NOTE
· Have been holding Zestril for elevated creatinine, received Lasix in ER  · Nephrology consulted - likely resume ACEi on discharge

## 2020-03-23 NOTE — SOCIAL WORK
ROSALBA notified by [facility] that pt is medically cleared for discharge today  CM contacted pt's daughter, Eugenio Piedra to inform her of this and discuss transport  She said that she is able to transport pt back to [facility]  CM informed her that CM will contact [facility] to make them aware that pt is able to return and obtain a determination if they are able to accept back at this time  ROSALBA contacted [facility] at [phone] and spoke with Brandon Parra  She asked that clinicals are faxed to her at [phone] and then she will contact CM back with a determination on whether pt can return at this time  CM faxed clinicals to this fax number  Awaiting call back from Brandon Parra

## 2020-03-23 NOTE — DISCHARGE SUMMARY
Discharge- Matilda Martinez 11/21/1932, 80 y o  male MRN: [id number]  Unit/Bed#: -01 Encounter: [id number]  Primary Care Provider: Kandi Siegel DO   Date and time admitted to hospital: 3/18/2020  2:57 PM      * Fever  Assessment & Plan  · Presented with URI symptoms, sepsis secondary to RSV/RLL infiltrate  · Antibiotics started in the ED  Initial imaging showing no infiltrates, however is procalcitonin is elevated 1 1 -> 1 3 -> 0 85 ->0 37  · Continue supportive treatment for viral RSV  · Bronchodilator therapy, incentive spirometer, flutter valve, respiratory protocol while hospitalized  · Wean solumedrol on discharge to prednisone and taper  · Will continue duonebs and budesonide BID on discharge     Pneumonia due to infectious organism  Assessment & Plan  · Suspect present on admission, was likely obscured by right pleural effusion  · No history of aspiration, suspect bacterial pneumonia secondary to acute viral respiratory infection  · Procalcitonin trending down, patient remains afebrile with improvement since admission  · Continue cefdinir on discharge  · Sputum culture grew normal respiratory aroldo  · Blood cultures no growth to date  · Suspect leukocytosis at this time is related to steroid use, will wean and taper on discharge  · Needs repeat CXR in 4-6 weeks outpatient to monitor for resolution    Hypoxic  Assessment & Plan  · Resolved    Secondary to RSV, effusion, and RLL PNA  · Remains stable on room air for several days  · Suspect that the pleural effusion was contributing to symptoms - s/p thoracentesis 3/20 for 1 5 L   · Repeat CXR shows stable RLL pneumonia, small effusion, and resolution of post thoracentesis apical PTX    Chronic combined systolic (congestive) and diastolic (congestive) heart failure (HCC)  Assessment & Plan  Wt Readings from Last 3 Encounters:   03/21/20 77 6 kg (171 lb)   02/18/20 83 9 kg (185 lb)   01/28/20 80 7 kg (178 lb)     · Continue Metoprolol, lisinopril on hold secondary to acute kidney injury POA  · Received 1x dose IV Lasix without significant change in pleural effusion  · Status post thoracentesis 3/20 for 1 5 L right lung, left lung had < 400 and was not tapped  · Patient significantly improved post thoracentesis, will continue to monitor closely  · Nephrology has resumed home dose Lasix 40 mg daily  · Cardiology has s/o - appreciate input    Type 2 diabetes mellitus with diabetic polyneuropathy, without long-term current use of insulin [facility])  Assessment & Plan  Lab Results   Component Value Date    HGBA1C 6 3 (H) 01/14/2020       Recent Labs     03/21/20  2138 03/22/20  0716 03/22/20  1134 03/22/20  1543   POCGLU 114 190* 200* 184*       Blood Sugar Average: Last 72 hrs:  (P) 417 [id number]   · Monitor BG on steroids, continue SSI  · CCO diet     Stage 3 chronic kidney disease ([facility] [address] )  Assessment & Plan  · Baseline creatinine around 1 3-1 5  · Nephrology evaluation appreciated  · Mild fluid overload on presentation, now euvolemic on exam  · Creatinine stable within baseline    Hyperlipidemia  Assessment & Plan  · Noted, continue Lipitor    Essential hypertension  Assessment & Plan  · Have been holding Zestril for elevated creatinine, received Lasix in ER  · Nephrology consulted - likely resume ACEi on discharge    Chronic a-fib  Assessment & Plan  · Metoprolol/ Coumadin    INR remains now therapeutic; will continue Coumadin and monitoring    Lab Results   Component Value Date    INR 2 20 (H) 03/22/2020    INR 1 92 (H) 03/21/2020    INR 1 45 (H) 03/20/2020       Adult hypothyroidism  Assessment & Plan  · Continue home dose levothyroxine, TSH within range 1/2020        Discharging Physician / Practitioner: Mame Dallas PA-C  PCP: Char Spangler DO  Admission Date:   Admission Orders (From admission, onward)     Ordered        03/18/20 1746  Inpatient Admission  Once                   Discharge Date: 03/23/20    Resolved Problems  Date Reviewed: 3/23/2020 None          Consultations During Hospital Stay:  · Pulmonology  · PT/OT  · Cardiology  · Nephrology    Procedures Performed:   · Thoracentesis 03/20/2020 with interventional radiology, 1 5L removed    Significant Findings / Test Results:   · None    Incidental Findings:   · None     Test Results Pending at Discharge (will require follow up): · None     Outpatient Tests Requested:  · None    Complications:  None    Reason for Admission:  Fever    Hospital Course:     Alexey Lira is a 80 y o  male patient past medical history of nonischemic cardiomyopathy, CKD, COPD, chronic atrial fibrillation, hypothyroidism, DM, and hypertension who originally presented to the hospital on 3/18/2020 due to fever  Patient met sepsis criteria on presentation with fever, tachycardia  He was found to have RSV  Also suspicion for pneumonia given elevated procalcitonin and chest x-ray findings  Patient was initially hypoxic  He also had element of fluid overload on presentation and was started on IV Lasix daily with Cardiology Service  Patient found to have acute kidney injury on presentation  This was likely due to volume depletion and cardio renal syndrome  Nephrology assessed the patient in conjunction with other specialists  1 5L clear yellow fluid was removed from right lung in thoracentesis on 03/20/2020  With further diuresis, patient's breathing improved  He was started on antibiotic for possible pneumonia this has been transition to p o  Course  He is now maintaining saturations on room air without supplementation  Repeat chest x-ray was performed 03/23/2020 which shows resolution of prior pneumothorax s/p thoracentesis  Stable right infiltrate  Patient had episode confusion on 03/21/2020 and required a temporary 1-1  This was after a room change  He is nonaggressive  He has been assessed by PT OT  [facility]  Please see above list of diagnoses and related plan for additional information  Condition at Discharge: good     Discharge Day Visit / Exam:     Subjective:  Patient feels fine - no complaints  Element of dementia  Vitals: Blood Pressure: 140/100 (03/23/20 0742)  Pulse: 94 (03/22/20 2249)  Temperature: 97 8 °F (36 6 °C) (03/23/20 0742)  Temp Source: Oral (03/21/20 0900)  Respirations: 18 (03/23/20 0742)  Height: 5' 11" (180 3 cm) (03/21/20 0924)  Weight - Scale: 77 6 kg (171 lb) (03/21/20 0924)  SpO2: 99 % (03/23/20 0809)  Exam:   Physical Exam   Constitutional: He appears well-developed and well-nourished  No distress  Patient is pleasant and fully conversational   HENT:   Head: Normocephalic and atraumatic  Mouth/Throat: No oropharyngeal exudate  Eyes: Right eye exhibits no discharge  Left eye exhibits no discharge  No scleral icterus  Neck: No JVD present  No tracheal deviation present  No thyromegaly present  Cardiovascular: Regular rhythm  Exam reveals no gallop and no friction rub  No murmur heard  Pulmonary/Chest: Effort normal  No respiratory distress  He has wheezes  He has no rales  He exhibits no tenderness  Abdominal: Soft  Bowel sounds are normal  He exhibits no distension  There is no tenderness  There is no rebound  Musculoskeletal: He exhibits no edema, tenderness or deformity  Skin: Skin is warm and dry  He is not diaphoretic  No erythema  No pallor  Psychiatric: He has a normal mood and affect  His behavior is normal    Nursing note and vitals reviewed  Discussion with Family:  Discussed with patient  Phone call made to patient's daughterRuth  Discharge instructions/Information to patient and family:   See after visit summary for information provided to patient and family  Provisions for Follow-Up Care:  See after visit summary for information related to follow-up care and any pertinent home health orders        Disposition:     Home    For Discharges to [facility]:   · Not Applicable to this Patient - Not Applicable to this Patient    Planned Readmission: none     Discharge Statement:  I spent 45 minutes discharging the patient  This time was spent on the day of discharge  I had direct contact with the patient on the day of discharge  Greater than 50% of the total time was spent examining patient, answering all patient questions, arranging and discussing plan of care with patient as well as directly providing post-discharge instructions  Additional time then spent on discharge activities  Discharge Medications:  See after visit summary for reconciled discharge medications provided to patient and family        ** Please Note: This note has been constructed using a voice recognition system **

## 2020-03-23 NOTE — ASSESSMENT & PLAN NOTE
· Resolved    Secondary to RSV, effusion, and RLL PNA  · Remains stable on room air for several days  · Suspect that the pleural effusion was contributing to symptoms - s/p thoracentesis 3/20 for 1 5 L   · Repeat CXR shows stable RLL pneumonia, small effusion, and resolution of post thoracentesis apical PTX

## 2020-03-23 NOTE — ASSESSMENT & PLAN NOTE
· Presented with URI symptoms, sepsis secondary to RSV/RLL infiltrate  · Antibiotics started in the ED    Initial imaging showing no infiltrates, however is procalcitonin is elevated 1 1 -> 1 3 -> 0 85 ->0 37  · Continue supportive treatment for viral RSV  · Bronchodilator therapy, incentive spirometer, flutter valve, respiratory protocol while hospitalized  · Wean solumedrol on discharge to prednisone and taper  · Will continue duonebs and budesonide BID on discharge

## 2020-03-23 NOTE — ASSESSMENT & PLAN NOTE
Lab Results   Component Value Date    HGBA1C 6 3 (H) 01/14/2020       Recent Labs     03/21/20  2138 03/22/20  0716 03/22/20  1134 03/22/20  1543   POCGLU 114 190* 200* 184*       Blood Sugar Average: Last 72 hrs:  (P) 969 [id number]   · Monitor BG on steroids, continue SSI  · CCO diet

## 2020-03-23 NOTE — ASSESSMENT & PLAN NOTE
· Suspect present on admission, was likely obscured by right pleural effusion  · No history of aspiration, suspect bacterial pneumonia secondary to acute viral respiratory infection  · Procalcitonin trending down, patient remains afebrile with improvement since admission  · Continue cefdinir on discharge  · Sputum culture grew normal respiratory aroldo  · Blood cultures no growth to date  · Suspect leukocytosis at this time is related to steroid use, will wean and taper on discharge  · Needs repeat CXR in 4-6 weeks outpatient to monitor for resolution

## 2020-03-23 NOTE — ASSESSMENT & PLAN NOTE
Wt Readings from Last 3 Encounters:   03/21/20 77 6 kg (171 lb)   02/18/20 83 9 kg (185 lb)   01/28/20 80 7 kg (178 lb)     · Continue Metoprolol, lisinopril on hold secondary to acute kidney injury POA  · Received 1x dose IV Lasix without significant change in pleural effusion  · Status post thoracentesis 3/20 for 1 5 L right lung, left lung had < 400 and was not tapped  · Patient significantly improved post thoracentesis, will continue to monitor closely  · Nephrology has resumed home dose Lasix 40 mg daily  · Cardiology has s/o - appreciate input

## 2020-03-24 ENCOUNTER — PATIENT OUTREACH (OUTPATIENT)
Dept: INTERNAL MEDICINE CLINIC | Facility: CLINIC | Age: 85
End: 2020-03-24

## 2020-03-24 DIAGNOSIS — Z71.89 COMPLEX CARE COORDINATION: Primary | ICD-10-CM

## 2020-03-24 NOTE — PROGRESS NOTES
1st attempt-LVM asking pt to return call for TCM documentation and TCM appointment        By Richardson Oh

## 2020-03-24 NOTE — TELEPHONE ENCOUNTER
S/w Gabby from [facility]  States pt has a 7lb weight gain in 1 day  He now has a fever of 100 8 which was not present yesterday, has a productive cough and abnormal lung sounds  Advised to take him to the ER  25-Mar-2020 00:47

## 2020-03-25 ENCOUNTER — TELEPHONE (OUTPATIENT)
Dept: INTERNAL MEDICINE CLINIC | Facility: CLINIC | Age: 85
End: 2020-03-25

## 2020-03-25 DIAGNOSIS — E11.42 TYPE 2 DIABETES MELLITUS WITH DIABETIC POLYNEUROPATHY, WITHOUT LONG-TERM CURRENT USE OF INSULIN (HCC): Primary | Chronic | ICD-10-CM

## 2020-03-26 ENCOUNTER — TELEPHONE (OUTPATIENT)
Dept: INTERNAL MEDICINE CLINIC | Facility: CLINIC | Age: 85
End: 2020-03-26

## 2020-03-26 DIAGNOSIS — R26.9 GAIT DISTURBANCE: ICD-10-CM

## 2020-03-26 DIAGNOSIS — R41.0 CONFUSION: Primary | ICD-10-CM

## 2020-03-26 DIAGNOSIS — E11.42 TYPE 2 DIABETES MELLITUS WITH DIABETIC POLYNEUROPATHY, WITHOUT LONG-TERM CURRENT USE OF INSULIN (HCC): Chronic | ICD-10-CM

## 2020-03-26 DIAGNOSIS — I50.42 CHRONIC COMBINED SYSTOLIC (CONGESTIVE) AND DIASTOLIC (CONGESTIVE) HEART FAILURE (HCC): Chronic | ICD-10-CM

## 2020-03-26 DIAGNOSIS — J18.9 PNEUMONIA OF RIGHT LOWER LOBE DUE TO INFECTIOUS ORGANISM: ICD-10-CM

## 2020-03-31 ENCOUNTER — PATIENT OUTREACH (OUTPATIENT)
Dept: INTERNAL MEDICINE CLINIC | Facility: CLINIC | Age: 85
End: 2020-03-31

## 2020-03-31 DIAGNOSIS — I50.22 CHRONIC SYSTOLIC CONGESTIVE HEART FAILURE (HCC): ICD-10-CM

## 2020-03-31 RX ORDER — LISINOPRIL 2.5 MG/1
TABLET ORAL
Qty: 28 TABLET | Refills: 4 | Status: SHIPPED | OUTPATIENT
Start: 2020-03-31 | End: 2020-08-20

## 2020-03-31 NOTE — PROGRESS NOTES
Spoke to nurse Alessandra Quiroz from [facility]  She stated that Wyatt Ramos is doing very well  His vitals stable, tolerating his prescribed medications  He has been eating well  Reviewed over upcoming appointment with Dr Aiyana Karimi on 4/21/20  She stated that they have appointment date and time written down  Currently has no questions or concerns

## 2020-04-01 ENCOUNTER — ANTICOAG VISIT (OUTPATIENT)
Dept: CARDIOLOGY CLINIC | Facility: CLINIC | Age: 85
End: 2020-04-01

## 2020-04-01 LAB — INR PPP: 2.5 (ref 0.84–1.19)

## 2020-04-13 DIAGNOSIS — R60.0 BILATERAL LOWER EXTREMITY EDEMA: ICD-10-CM

## 2020-04-13 RX ORDER — FUROSEMIDE 40 MG/1
TABLET ORAL
Qty: 30 TABLET | Refills: 5 | Status: SHIPPED | OUTPATIENT
Start: 2020-04-13 | End: 2020-08-20

## 2020-04-14 ENCOUNTER — ANTICOAG VISIT (OUTPATIENT)
Dept: CARDIOLOGY CLINIC | Facility: CLINIC | Age: 85
End: 2020-04-14

## 2020-04-14 ENCOUNTER — TELEPHONE (OUTPATIENT)
Dept: CARDIOLOGY CLINIC | Facility: CLINIC | Age: 85
End: 2020-04-14

## 2020-04-14 LAB — INR PPP: 4.2 (ref 0.84–1.19)

## 2020-04-21 ENCOUNTER — TELEMEDICINE (OUTPATIENT)
Dept: INTERNAL MEDICINE CLINIC | Facility: CLINIC | Age: 85
End: 2020-04-21
Payer: MEDICARE

## 2020-04-21 ENCOUNTER — TELEPHONE (OUTPATIENT)
Dept: CARDIOLOGY CLINIC | Facility: CLINIC | Age: 85
End: 2020-04-21

## 2020-04-21 ENCOUNTER — TELEPHONE (OUTPATIENT)
Dept: INTERNAL MEDICINE CLINIC | Facility: CLINIC | Age: 85
End: 2020-04-21

## 2020-04-21 VITALS
SYSTOLIC BLOOD PRESSURE: 112 MMHG | TEMPERATURE: 97.4 F | RESPIRATION RATE: 12 BRPM | OXYGEN SATURATION: 100 % | WEIGHT: 159 LBS | BODY MASS INDEX: 22.26 KG/M2 | DIASTOLIC BLOOD PRESSURE: 68 MMHG | HEART RATE: 83 BPM | HEIGHT: 71 IN

## 2020-04-21 DIAGNOSIS — E11.42 TYPE 2 DIABETES MELLITUS WITH DIABETIC POLYNEUROPATHY, WITHOUT LONG-TERM CURRENT USE OF INSULIN (HCC): Primary | Chronic | ICD-10-CM

## 2020-04-21 DIAGNOSIS — I50.42 CHRONIC COMBINED SYSTOLIC (CONGESTIVE) AND DIASTOLIC (CONGESTIVE) HEART FAILURE (HCC): Chronic | ICD-10-CM

## 2020-04-21 DIAGNOSIS — I48.20 CHRONIC A-FIB (HCC): Chronic | ICD-10-CM

## 2020-04-21 DIAGNOSIS — E03.9 ADULT HYPOTHYROIDISM: Chronic | ICD-10-CM

## 2020-04-21 DIAGNOSIS — N18.30 STAGE 3 CHRONIC KIDNEY DISEASE (HCC): ICD-10-CM

## 2020-04-21 PROBLEM — R09.02 HYPOXIC: Status: RESOLVED | Noted: 2020-03-18 | Resolved: 2020-04-21

## 2020-04-21 PROBLEM — J18.9 PNEUMONIA DUE TO INFECTIOUS ORGANISM: Status: RESOLVED | Noted: 2020-03-22 | Resolved: 2020-04-21

## 2020-04-21 PROCEDURE — 99214 OFFICE O/P EST MOD 30 MIN: CPT | Performed by: INTERNAL MEDICINE

## 2020-04-22 ENCOUNTER — TELEMEDICINE (OUTPATIENT)
Dept: CARDIOLOGY CLINIC | Facility: CLINIC | Age: 85
End: 2020-04-22
Payer: MEDICARE

## 2020-04-22 VITALS — WEIGHT: 151 LBS | BODY MASS INDEX: 21.06 KG/M2

## 2020-04-22 DIAGNOSIS — I50.22 CHRONIC SYSTOLIC CONGESTIVE HEART FAILURE (HCC): ICD-10-CM

## 2020-04-22 DIAGNOSIS — I42.8 OTHER CARDIOMYOPATHY (HCC): ICD-10-CM

## 2020-04-22 DIAGNOSIS — I48.11 LONGSTANDING PERSISTENT ATRIAL FIBRILLATION (HCC): Primary | ICD-10-CM

## 2020-04-22 PROCEDURE — 99214 OFFICE O/P EST MOD 30 MIN: CPT | Performed by: INTERNAL MEDICINE

## 2020-04-22 RX ORDER — IPRATROPIUM BROMIDE AND ALBUTEROL SULFATE 2.5; .5 MG/3ML; MG/3ML
3 SOLUTION RESPIRATORY (INHALATION) 4 TIMES DAILY
COMMUNITY
End: 2020-11-13 | Stop reason: ALTCHOICE

## 2020-04-24 ENCOUNTER — ANTICOAG VISIT (OUTPATIENT)
Dept: CARDIOLOGY CLINIC | Facility: CLINIC | Age: 85
End: 2020-04-24

## 2020-04-24 LAB — INR PPP: 1.2 (ref 0.84–1.19)

## 2020-04-28 ENCOUNTER — ANTICOAG VISIT (OUTPATIENT)
Dept: CARDIOLOGY CLINIC | Facility: CLINIC | Age: 85
End: 2020-04-28

## 2020-04-28 LAB — INR PPP: 1.5 (ref 0.84–1.19)

## 2020-04-29 ENCOUNTER — TELEPHONE (OUTPATIENT)
Dept: CARDIOLOGY CLINIC | Facility: CLINIC | Age: 85
End: 2020-04-29

## 2020-04-29 DIAGNOSIS — I48.0 PAROXYSMAL ATRIAL FIBRILLATION (HCC): Primary | ICD-10-CM

## 2020-04-30 DIAGNOSIS — Z00.00 ADULT GENERAL MEDICAL EXAMINATION: Primary | ICD-10-CM

## 2020-05-05 ENCOUNTER — TELEMEDICINE (OUTPATIENT)
Dept: HEMATOLOGY ONCOLOGY | Facility: CLINIC | Age: 85
End: 2020-05-05
Payer: MEDICARE

## 2020-05-05 ENCOUNTER — ANTICOAG VISIT (OUTPATIENT)
Dept: CARDIOLOGY CLINIC | Facility: CLINIC | Age: 85
End: 2020-05-05

## 2020-05-05 DIAGNOSIS — D72.829 LEUKOCYTOSIS, UNSPECIFIED TYPE: Primary | ICD-10-CM

## 2020-05-05 LAB — INR PPP: 1.5 (ref 0.84–1.19)

## 2020-05-05 PROCEDURE — 99203 OFFICE O/P NEW LOW 30 MIN: CPT | Performed by: INTERNAL MEDICINE

## 2020-05-06 ENCOUNTER — TRANSCRIBE ORDERS (OUTPATIENT)
Dept: HEMATOLOGY ONCOLOGY | Facility: MEDICAL CENTER | Age: 85
End: 2020-05-06

## 2020-05-06 ENCOUNTER — DOCUMENTATION (OUTPATIENT)
Dept: HEMATOLOGY ONCOLOGY | Facility: MEDICAL CENTER | Age: 85
End: 2020-05-06

## 2020-05-06 DIAGNOSIS — D72.829 LEUKOCYTOSIS, UNSPECIFIED TYPE: Primary | ICD-10-CM

## 2020-05-12 ENCOUNTER — ANTICOAG VISIT (OUTPATIENT)
Dept: CARDIOLOGY CLINIC | Facility: CLINIC | Age: 85
End: 2020-05-12

## 2020-05-12 ENCOUNTER — TELEPHONE (OUTPATIENT)
Dept: CARDIOLOGY CLINIC | Facility: CLINIC | Age: 85
End: 2020-05-12

## 2020-05-12 LAB — INR PPP: 3.4 (ref 0.84–1.19)

## 2020-05-14 ENCOUNTER — TELEMEDICINE (OUTPATIENT)
Dept: DERMATOLOGY | Facility: CLINIC | Age: 85
End: 2020-05-14
Payer: MEDICARE

## 2020-05-14 DIAGNOSIS — L82.1 SEBORRHEIC KERATOSIS: ICD-10-CM

## 2020-05-14 DIAGNOSIS — Z85.828 HISTORY OF SKIN CANCER: ICD-10-CM

## 2020-05-14 DIAGNOSIS — L28.1 PRURIGO NODULARIS: Primary | ICD-10-CM

## 2020-05-14 DIAGNOSIS — Z13.89 SCREENING FOR SKIN CONDITION: ICD-10-CM

## 2020-05-14 PROCEDURE — 99213 OFFICE O/P EST LOW 20 MIN: CPT | Performed by: DERMATOLOGY

## 2020-05-19 ENCOUNTER — ANTICOAG VISIT (OUTPATIENT)
Dept: CARDIOLOGY CLINIC | Facility: CLINIC | Age: 85
End: 2020-05-19

## 2020-05-19 LAB — INR PPP: 2.3 (ref 0.84–1.19)

## 2020-05-21 ENCOUNTER — TELEPHONE (OUTPATIENT)
Dept: INTERNAL MEDICINE CLINIC | Facility: CLINIC | Age: 85
End: 2020-05-21

## 2020-05-22 ENCOUNTER — TELEMEDICINE (OUTPATIENT)
Dept: INTERNAL MEDICINE CLINIC | Facility: CLINIC | Age: 85
End: 2020-05-22
Payer: MEDICARE

## 2020-05-22 ENCOUNTER — TELEPHONE (OUTPATIENT)
Dept: CARDIOLOGY CLINIC | Facility: CLINIC | Age: 85
End: 2020-05-22

## 2020-05-22 VITALS — HEART RATE: 102 BPM | TEMPERATURE: 98.3 F | RESPIRATION RATE: 18 BRPM | OXYGEN SATURATION: 99 %

## 2020-05-22 DIAGNOSIS — I87.2 VENOUS STASIS DERMATITIS OF BOTH LOWER EXTREMITIES: Primary | ICD-10-CM

## 2020-05-22 PROCEDURE — 99213 OFFICE O/P EST LOW 20 MIN: CPT | Performed by: INTERNAL MEDICINE

## 2020-05-22 RX ORDER — AMMONIUM LACTATE 12 G/100G
LOTION TOPICAL 2 TIMES DAILY
Qty: 400 G | Refills: 5 | Status: SHIPPED | OUTPATIENT
Start: 2020-05-22 | End: 2020-08-12 | Stop reason: SDUPTHER

## 2020-05-26 ENCOUNTER — ANTICOAG VISIT (OUTPATIENT)
Dept: CARDIOLOGY CLINIC | Facility: CLINIC | Age: 85
End: 2020-05-26

## 2020-05-26 ENCOUNTER — TELEPHONE (OUTPATIENT)
Dept: CARDIOLOGY CLINIC | Facility: CLINIC | Age: 85
End: 2020-05-26

## 2020-05-26 LAB — INR PPP: 1.2 (ref 0.84–1.19)

## 2020-06-02 ENCOUNTER — ANTICOAG VISIT (OUTPATIENT)
Dept: CARDIOLOGY CLINIC | Facility: CLINIC | Age: 85
End: 2020-06-02

## 2020-06-02 DIAGNOSIS — E78.2 MIXED HYPERLIPIDEMIA: Chronic | ICD-10-CM

## 2020-06-02 LAB — INR PPP: 1.4 (ref 0.84–1.19)

## 2020-06-02 RX ORDER — ATORVASTATIN CALCIUM 10 MG/1
TABLET, FILM COATED ORAL
Qty: 28 TABLET | Refills: 5 | Status: SHIPPED | OUTPATIENT
Start: 2020-06-02

## 2020-06-08 ENCOUNTER — TELEPHONE (OUTPATIENT)
Dept: INTERNAL MEDICINE CLINIC | Facility: CLINIC | Age: 85
End: 2020-06-08

## 2020-06-09 ENCOUNTER — ANTICOAG VISIT (OUTPATIENT)
Dept: CARDIOLOGY CLINIC | Facility: CLINIC | Age: 85
End: 2020-06-09

## 2020-06-09 LAB — INR PPP: 2.5 (ref 0.84–1.19)

## 2020-06-12 DIAGNOSIS — N18.30 STAGE 3 CHRONIC KIDNEY DISEASE (HCC): ICD-10-CM

## 2020-06-12 RX ORDER — ERGOCALCIFEROL 1.25 MG/1
50000 CAPSULE ORAL WEEKLY
Qty: 11 CAPSULE | Refills: 3 | Status: SHIPPED | OUTPATIENT
Start: 2020-06-12 | End: 2020-11-10

## 2020-06-16 ENCOUNTER — TELEPHONE (OUTPATIENT)
Dept: OTHER | Facility: OTHER | Age: 85
End: 2020-06-16

## 2020-06-16 ENCOUNTER — TELEPHONE (OUTPATIENT)
Dept: NON INVASIVE DIAGNOSTICS | Facility: HOSPITAL | Age: 85
End: 2020-06-16

## 2020-06-16 ENCOUNTER — TELEPHONE (OUTPATIENT)
Dept: CARDIOLOGY CLINIC | Facility: CLINIC | Age: 85
End: 2020-06-16

## 2020-06-16 ENCOUNTER — ANTICOAG VISIT (OUTPATIENT)
Dept: CARDIOLOGY CLINIC | Facility: CLINIC | Age: 85
End: 2020-06-16

## 2020-06-16 LAB — INR PPP: 2.5 (ref 0.84–1.19)

## 2020-06-18 ENCOUNTER — TELEPHONE (OUTPATIENT)
Dept: INTERNAL MEDICINE CLINIC | Facility: CLINIC | Age: 85
End: 2020-06-18

## 2020-06-23 ENCOUNTER — ANTICOAG VISIT (OUTPATIENT)
Dept: CARDIOLOGY CLINIC | Facility: CLINIC | Age: 85
End: 2020-06-23

## 2020-06-23 LAB — INR PPP: 2.6 (ref 0.84–1.19)

## 2020-06-24 ENCOUNTER — TELEPHONE (OUTPATIENT)
Dept: INTERNAL MEDICINE CLINIC | Facility: CLINIC | Age: 85
End: 2020-06-24

## 2020-06-24 DIAGNOSIS — I50.22 CHRONIC SYSTOLIC CHF (CONGESTIVE HEART FAILURE) (HCC): ICD-10-CM

## 2020-06-24 DIAGNOSIS — I10 HYPERTENSION, UNCONTROLLED: ICD-10-CM

## 2020-06-24 DIAGNOSIS — E03.9 HYPOTHYROIDISM, UNSPECIFIED TYPE: ICD-10-CM

## 2020-06-24 DIAGNOSIS — I50.9 ACUTE EXACERBATION OF CHF (CONGESTIVE HEART FAILURE) (HCC): ICD-10-CM

## 2020-06-24 DIAGNOSIS — I48.20 CHRONIC A-FIB (HCC): ICD-10-CM

## 2020-06-24 DIAGNOSIS — M17.0 BILATERAL PRIMARY OSTEOARTHRITIS OF KNEE: Primary | ICD-10-CM

## 2020-06-24 DIAGNOSIS — G31.84 MILD COGNITIVE IMPAIRMENT: ICD-10-CM

## 2020-06-24 DIAGNOSIS — E11.42 TYPE 2 DIABETES MELLITUS WITH DIABETIC POLYNEUROPATHY, WITHOUT LONG-TERM CURRENT USE OF INSULIN (HCC): Chronic | ICD-10-CM

## 2020-06-24 RX ORDER — METOPROLOL TARTRATE 50 MG/1
TABLET, FILM COATED ORAL
Qty: 56 TABLET | Refills: 4 | Status: SHIPPED | OUTPATIENT
Start: 2020-06-24 | End: 2020-11-24 | Stop reason: SDUPTHER

## 2020-06-24 RX ORDER — LEVOTHYROXINE SODIUM 0.1 MG/1
TABLET ORAL
Qty: 28 TABLET | Refills: 4 | Status: SHIPPED | OUTPATIENT
Start: 2020-06-24

## 2020-06-24 RX ORDER — POTASSIUM CHLORIDE 20 MEQ/1
TABLET, EXTENDED RELEASE ORAL
Qty: 56 TABLET | Refills: 4 | Status: SHIPPED | OUTPATIENT
Start: 2020-06-24

## 2020-06-24 RX ORDER — GABAPENTIN 100 MG/1
CAPSULE ORAL
Qty: 56 CAPSULE | Refills: 4 | Status: SHIPPED | OUTPATIENT
Start: 2020-06-24 | End: 2020-09-29 | Stop reason: SDUPTHER

## 2020-06-24 RX ORDER — DONEPEZIL HYDROCHLORIDE 10 MG/1
TABLET, FILM COATED ORAL
Qty: 28 TABLET | Refills: 4 | Status: SHIPPED | OUTPATIENT
Start: 2020-06-24 | End: 2020-09-29 | Stop reason: SDUPTHER

## 2020-07-08 ENCOUNTER — TELEPHONE (OUTPATIENT)
Dept: CARDIOLOGY CLINIC | Facility: CLINIC | Age: 85
End: 2020-07-08

## 2020-07-08 ENCOUNTER — ANTICOAG VISIT (OUTPATIENT)
Dept: CARDIOLOGY CLINIC | Facility: CLINIC | Age: 85
End: 2020-07-08

## 2020-07-08 LAB — INR PPP: 3.6 (ref 0.84–1.19)

## 2020-07-08 NOTE — PROGRESS NOTES
Pt to hold today then take 5mg daily except Sun take 7 5mg  Retest again in 1 week    (7/15/2020)  Faxed to [facility] [place] [phone]

## 2020-07-08 NOTE — TELEPHONE ENCOUNTER
Pt to hold today then take 5mg daily except Sun take 7 5mg  Retest again in 1 week    (7/15/2020)  Faxed to [facility] [place] [phone]

## 2020-07-08 NOTE — TELEPHONE ENCOUNTER
Gabby [phone] called with PT 34 8 INR 3 6   Wenceslao Montrose also refax the results to [phone]

## 2020-07-09 ENCOUNTER — TELEPHONE (OUTPATIENT)
Dept: CARDIOLOGY CLINIC | Facility: CLINIC | Age: 85
End: 2020-07-09

## 2020-07-09 NOTE — TELEPHONE ENCOUNTER
Yusra from [facility]  Said patient has leg edema, SOB and increased weight  Weights on 7/6 174, weight on 7/7 177, weight in a Tshirt 7/9 180  Loki Rachel Ramos He has  PRN for Furosemide  She is going to give today    pls call back with any instructions or Q's [phone]

## 2020-07-10 NOTE — TELEPHONE ENCOUNTER
Please let her know that he should continue to take furosemide daily until his weight has improved   Please advise her to update us with any changes and schedule appt if needed

## 2020-07-13 NOTE — TELEPHONE ENCOUNTER
Left message for Yusra from [facility] [place] to call office back

## 2020-07-14 ENCOUNTER — ANTICOAG VISIT (OUTPATIENT)
Dept: CARDIOLOGY CLINIC | Facility: CLINIC | Age: 85
End: 2020-07-14

## 2020-07-14 LAB — INR PPP: 3 (ref 0.84–1.19)

## 2020-07-14 NOTE — PROGRESS NOTES
Pt to take 5mg daily except Sun take 7 5mg  Retest again in 1 week    (7/21/2020)  Faxed to [facility] [phone]

## 2020-07-14 NOTE — TELEPHONE ENCOUNTER
Spoke with [employer] @ [facility]  She stated that patient only need the llasix for 2-3 days and his weight came back down  SOB has improved     Will call back if any other concerns

## 2020-07-16 ENCOUNTER — TELEPHONE (OUTPATIENT)
Dept: CARDIOLOGY CLINIC | Facility: CLINIC | Age: 85
End: 2020-07-16

## 2020-07-16 DIAGNOSIS — I48.0 PAROXYSMAL ATRIAL FIBRILLATION (HCC): Primary | ICD-10-CM

## 2020-07-16 NOTE — TELEPHONE ENCOUNTER
Radha Allen from [facility] called requesting orders for Sõmeru, GDD#[phone], SKDR#[phone]

## 2020-07-21 ENCOUNTER — ANTICOAG VISIT (OUTPATIENT)
Dept: CARDIOLOGY CLINIC | Facility: CLINIC | Age: 85
End: 2020-07-21

## 2020-07-21 LAB — INR PPP: 2.7 (ref 0.84–1.19)

## 2020-07-22 DIAGNOSIS — I48.20 CHRONIC A-FIB (HCC): Chronic | ICD-10-CM

## 2020-07-22 DIAGNOSIS — J30.9 ALLERGIC RHINITIS, UNSPECIFIED SEASONALITY, UNSPECIFIED TRIGGER: ICD-10-CM

## 2020-07-22 DIAGNOSIS — E11.29 CONTROLLED TYPE 2 DIABETES MELLITUS WITH MICROALBUMINURIA, WITHOUT LONG-TERM CURRENT USE OF INSULIN (HCC): ICD-10-CM

## 2020-07-22 DIAGNOSIS — R80.9 CONTROLLED TYPE 2 DIABETES MELLITUS WITH MICROALBUMINURIA, WITHOUT LONG-TERM CURRENT USE OF INSULIN (HCC): ICD-10-CM

## 2020-07-22 DIAGNOSIS — L29.9 PRURITUS: ICD-10-CM

## 2020-07-22 RX ORDER — WARFARIN SODIUM 5 MG/1
TABLET ORAL
Qty: 30 TABLET | Refills: 4 | Status: SHIPPED | OUTPATIENT
Start: 2020-07-22 | End: 2020-11-13 | Stop reason: DRUGHIGH

## 2020-07-22 RX ORDER — HYDROXYZINE HYDROCHLORIDE 10 MG/1
TABLET, FILM COATED ORAL
Qty: 84 TABLET | Refills: 4 | Status: SHIPPED | OUTPATIENT
Start: 2020-07-22 | End: 2020-12-09

## 2020-07-22 RX ORDER — GLIPIZIDE 5 MG/1
TABLET, FILM COATED, EXTENDED RELEASE ORAL
Qty: 28 TABLET | Refills: 4 | Status: SHIPPED | OUTPATIENT
Start: 2020-07-22 | End: 2020-12-09

## 2020-07-22 RX ORDER — LORATADINE 10 MG/1
TABLET ORAL
Qty: 28 TABLET | Refills: 4 | Status: SHIPPED | OUTPATIENT
Start: 2020-07-22 | End: 2020-12-09

## 2020-07-23 LAB — HBA1C MFR BLD HPLC: 5.8 %

## 2020-07-24 ENCOUNTER — TELEPHONE (OUTPATIENT)
Dept: INTERNAL MEDICINE CLINIC | Facility: CLINIC | Age: 85
End: 2020-07-24

## 2020-07-29 ENCOUNTER — ANTICOAG VISIT (OUTPATIENT)
Dept: CARDIOLOGY CLINIC | Facility: CLINIC | Age: 85
End: 2020-07-29

## 2020-07-29 LAB — INR PPP: 2.7 (ref 0.84–1.19)

## 2020-07-29 NOTE — PROGRESS NOTES
Pt to take 5mg daily except Sun take 7 5mg   Retest again in 1 week  (8/4/2020)  Faxed to [facility] [place] [phone]

## 2020-08-04 ENCOUNTER — ANTICOAG VISIT (OUTPATIENT)
Dept: CARDIOLOGY CLINIC | Facility: CLINIC | Age: 85
End: 2020-08-04

## 2020-08-04 LAB — INR PPP: 3.2 (ref 0.84–1.19)

## 2020-08-04 NOTE — PROGRESS NOTES
Pt is to hold for one day then resume his regular does retest in 2 weeks  Spoke with Rebecca Luna she verbally understood

## 2020-08-05 ENCOUNTER — TELEPHONE (OUTPATIENT)
Dept: CARDIOLOGY CLINIC | Facility: CLINIC | Age: 85
End: 2020-08-05

## 2020-08-05 NOTE — TELEPHONE ENCOUNTER
Spoke to patient's nurse Nelida Lagunas and gave her the instructions that Nolberto SETEL indicated and Nelida Lagunas would like for the order to be faxed to her  Nelida Lagunas understood the instructions  Thank you

## 2020-08-05 NOTE — TELEPHONE ENCOUNTER
----- Message from Angie Shen PA-C sent at 8/4/2020  4:04 PM EDT -----  Hold one day then same dose 2-3 weeks

## 2020-08-06 ENCOUNTER — TELEPHONE (OUTPATIENT)
Dept: GASTROENTEROLOGY | Facility: CLINIC | Age: 85
End: 2020-08-06

## 2020-08-12 DIAGNOSIS — I87.2 VENOUS STASIS DERMATITIS OF BOTH LOWER EXTREMITIES: ICD-10-CM

## 2020-08-12 RX ORDER — AMMONIUM LACTATE 12 G/100G
LOTION TOPICAL 2 TIMES DAILY
Qty: 400 G | Refills: 5 | Status: SHIPPED | OUTPATIENT
Start: 2020-08-12 | End: 2020-08-14 | Stop reason: SDUPTHER

## 2020-08-12 NOTE — TELEPHONE ENCOUNTER
P#: [phone]  F#: [phone]    Gabby with [facility] left this message about putting in a New order for: Amlactin; cream    Change to once a day at 6AM    I don't see it by that name; unless it's listed differently

## 2020-08-14 DIAGNOSIS — I87.2 VENOUS STASIS DERMATITIS OF BOTH LOWER EXTREMITIES: ICD-10-CM

## 2020-08-14 RX ORDER — AMMONIUM LACTATE 12 G/100G
LOTION TOPICAL
Qty: 400 G | Refills: 5 | Status: SHIPPED | OUTPATIENT
Start: 2020-08-14

## 2020-08-18 LAB — INR PPP: 2.1 (ref 0.84–1.19)

## 2020-08-19 ENCOUNTER — ANTICOAG VISIT (OUTPATIENT)
Dept: CARDIOLOGY CLINIC | Facility: CLINIC | Age: 85
End: 2020-08-19

## 2020-08-19 ENCOUNTER — TELEPHONE (OUTPATIENT)
Dept: CARDIOLOGY CLINIC | Facility: CLINIC | Age: 85
End: 2020-08-19

## 2020-08-19 NOTE — PROGRESS NOTES
Results from 8/18 received 8/19  Pt is to stay on the same dose  Pt is to take 5mg daily except Sun take 7 5mg  Retest again in 2 weeks   Faxed to [facility] [phone]

## 2020-08-20 DIAGNOSIS — I50.22 CHRONIC SYSTOLIC CONGESTIVE HEART FAILURE (HCC): ICD-10-CM

## 2020-08-20 DIAGNOSIS — R60.0 BILATERAL LOWER EXTREMITY EDEMA: ICD-10-CM

## 2020-08-20 RX ORDER — LISINOPRIL 2.5 MG/1
TABLET ORAL
Qty: 28 TABLET | Refills: 4 | Status: SHIPPED | OUTPATIENT
Start: 2020-08-20 | End: 2021-01-01 | Stop reason: ALTCHOICE

## 2020-08-20 RX ORDER — FUROSEMIDE 40 MG/1
TABLET ORAL
Qty: 28 TABLET | Refills: 4 | Status: SHIPPED | OUTPATIENT
Start: 2020-08-20 | End: 2020-08-31

## 2020-08-31 DIAGNOSIS — R60.0 BILATERAL LOWER EXTREMITY EDEMA: ICD-10-CM

## 2020-08-31 RX ORDER — FUROSEMIDE 40 MG/1
TABLET ORAL
Qty: 15 TABLET | Refills: 4 | Status: SHIPPED | OUTPATIENT
Start: 2020-08-31 | End: 2020-10-30 | Stop reason: ALTCHOICE

## 2020-09-02 ENCOUNTER — TELEPHONE (OUTPATIENT)
Dept: CARDIOLOGY CLINIC | Facility: CLINIC | Age: 85
End: 2020-09-02

## 2020-09-02 ENCOUNTER — ANTICOAG VISIT (OUTPATIENT)
Dept: CARDIOLOGY CLINIC | Facility: CLINIC | Age: 85
End: 2020-09-02

## 2020-09-02 LAB — INR PPP: 3 (ref 0.84–1.19)

## 2020-09-08 ENCOUNTER — TELEPHONE (OUTPATIENT)
Dept: HEMATOLOGY ONCOLOGY | Facility: CLINIC | Age: 85
End: 2020-09-08

## 2020-09-08 NOTE — TELEPHONE ENCOUNTER
Reschedule Appointment     Who is calling in [facility]    Doctor Appointment Scheduled with Dr Beny Torres date and time 9/28/2020   New date and time 10/27/2020   Location [place]    Patient verbalized understanding

## 2020-09-08 NOTE — TELEPHONE ENCOUNTER
Appointment Confirmation     Appointment with  Dr Adelina Scherer   Appointment date & time  09/08 at 11:40am    Location [place]   Patient verbilized Understanding  Yes

## 2020-09-09 ENCOUNTER — ANTICOAG VISIT (OUTPATIENT)
Dept: CARDIOLOGY CLINIC | Facility: CLINIC | Age: 85
End: 2020-09-09

## 2020-09-09 LAB — INR PPP: 2.9 (ref 0.84–1.19)

## 2020-09-09 NOTE — PROGRESS NOTES
Results from 9/8 received 9/9  Pt is to stay on the same dose  Pt is to take 5mg daily except Sun take 7 5mg  Retest again in 1 week   Faxed to [facility] [phone]

## 2020-09-15 ENCOUNTER — ANTICOAG VISIT (OUTPATIENT)
Dept: CARDIOLOGY CLINIC | Facility: CLINIC | Age: 85
End: 2020-09-15

## 2020-09-15 LAB — INR PPP: 2.9 (ref 0.84–1.19)

## 2020-09-15 NOTE — PROGRESS NOTES
Pt is to stay on the same dose  Pt is to take 5mg daily except Sun take 7 5mg  Retest again in 2 weeks   Faxed to [facility] [phone]

## 2020-09-16 ENCOUNTER — TELEPHONE (OUTPATIENT)
Dept: CARDIOLOGY CLINIC | Facility: CLINIC | Age: 85
End: 2020-09-16

## 2020-09-25 ENCOUNTER — TELEPHONE (OUTPATIENT)
Dept: INTERNAL MEDICINE CLINIC | Facility: CLINIC | Age: 85
End: 2020-09-25

## 2020-09-25 DIAGNOSIS — I50.42 CHRONIC COMBINED SYSTOLIC (CONGESTIVE) AND DIASTOLIC (CONGESTIVE) HEART FAILURE (HCC): Primary | Chronic | ICD-10-CM

## 2020-09-25 NOTE — TELEPHONE ENCOUNTER
Wenceslao Iqbal from [facility] called requesting compression stockings    Requesting that we fax order to   # [phone]    Any questions call [facility] # [phone]

## 2020-09-29 ENCOUNTER — TELEMEDICINE (OUTPATIENT)
Dept: NEUROLOGY | Facility: CLINIC | Age: 85
End: 2020-09-29
Payer: MEDICARE

## 2020-09-29 ENCOUNTER — TELEPHONE (OUTPATIENT)
Dept: NEUROLOGY | Facility: CLINIC | Age: 85
End: 2020-09-29

## 2020-09-29 ENCOUNTER — ANTICOAG VISIT (OUTPATIENT)
Dept: CARDIOLOGY CLINIC | Facility: CLINIC | Age: 85
End: 2020-09-29

## 2020-09-29 ENCOUNTER — TELEPHONE (OUTPATIENT)
Dept: CARDIOLOGY CLINIC | Facility: CLINIC | Age: 85
End: 2020-09-29

## 2020-09-29 VITALS
HEIGHT: 71 IN | TEMPERATURE: 98.1 F | BODY MASS INDEX: 21.06 KG/M2 | RESPIRATION RATE: 18 BRPM | HEART RATE: 76 BPM | DIASTOLIC BLOOD PRESSURE: 62 MMHG | SYSTOLIC BLOOD PRESSURE: 130 MMHG

## 2020-09-29 DIAGNOSIS — G31.84 MILD COGNITIVE IMPAIRMENT: ICD-10-CM

## 2020-09-29 DIAGNOSIS — E11.42 TYPE 2 DIABETES MELLITUS WITH DIABETIC POLYNEUROPATHY, WITHOUT LONG-TERM CURRENT USE OF INSULIN (HCC): Primary | Chronic | ICD-10-CM

## 2020-09-29 LAB — INR PPP: 3.9 (ref 0.84–1.19)

## 2020-09-29 PROCEDURE — 99441 PR PHYS/QHP TELEPHONE EVALUATION 5-10 MIN: CPT | Performed by: PSYCHIATRY & NEUROLOGY

## 2020-09-29 RX ORDER — GABAPENTIN 100 MG/1
100 CAPSULE ORAL 2 TIMES DAILY
Qty: 60 CAPSULE | Refills: 6 | Status: SHIPPED | OUTPATIENT
Start: 2020-09-29 | End: 2020-10-13

## 2020-09-29 RX ORDER — DONEPEZIL HYDROCHLORIDE 10 MG/1
10 TABLET, FILM COATED ORAL
Qty: 30 TABLET | Refills: 6 | Status: SHIPPED | OUTPATIENT
Start: 2020-09-29 | End: 2020-10-13

## 2020-09-29 NOTE — PROGRESS NOTES
Virtual Brief Visit    Assessment/Plan:    Problem List Items Addressed This Visit        Endocrine    Type 2 diabetes mellitus with diabetic polyneuropathy, without long-term current use of insulin (HCC) - Primary (Chronic)    Relevant Medications    gabapentin (NEURONTIN) 100 mg capsule      Other Visit Diagnoses     Mild cognitive impairment        Relevant Medications    donepezil (ARICEPT) 10 mg tablet                Reason for visit is   Chief Complaint   Patient presents with    Neurologic Problem     Type 2 DM w/ diabetic polyneuropathy    Virtual Brief Visit        Encounter provider Seda Little MD    Provider located at [address] [address]    Recent Visits  No visits were found meeting these conditions  Showing recent visits within past 7 days and meeting all other requirements     Today's Visits  Date Type Provider Dept   09/29/20 Telemedicine Seda Little MD [address] [place] [address] today's visits and meeting all other requirements     Future Appointments  No visits were found meeting these conditions  Showing future appointments within next 150 days and meeting all other requirements        After connecting through telephone, the patient was identified by name and date of birth  Jennifer Gould was informed that this is a telemedicine visit and that the visit is being conducted through telephone  It was my intent to perform this visit via video technology but the patient was not able to do a video connection so the visit was completed via audio telephone only  My office door was closed  No one else was in the room  He acknowledged consent and understanding of privacy and security of the platform  The patient has agreed to participate and understands he can discontinue the visit at any time  Patient is aware this is a billable service       Jae Gould is a 80 y o  male who sees me for diabetic polyneuropathy and cognitive impairment, lives in a personal care facility and complains of skin lesions in both lower extremities for which he is followed up with Dermatology  During his last visit he had also had significant swelling in both lower extremities  Patient has been using gabapentin 100 mg twice a day for his neuropathy symptoms which have been generally under good control and from the cognitive standpoint he remains on Aricept 10 mg daily and denies any worsening  His caregiver at the personal care facility also confirmed the same  Overall he has been doing well  HPI     Past Medical History:   Diagnosis Date    Acute systolic congestive heart failure ([facility] [address] ) 7/26/2018    Allergic contact dermatitis due to plants, except food     Atrial fibrillation (HCC)     Cancer (HCC)     skin cancer    Cataract     Chronic kidney disease     Stage 3    Coagulation test abnormality     [facility]     [facility]     Disease of thyroid gland     hypothyroidism    DVT (deep venous thrombosis) (HCC)     Eczema     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HCC)     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HCC)     GERD (gastroesophageal reflux disease)     Gout     Hyperlipidemia     Hypertension     Hypokalemia     Leukocytosis     Lichen planus     Nonmelanoma skin cancer     Phlebitis or thrombophlebitis of deep vessel of lower extremity (HCC)     Pneumonia due to infectious organism 3/22/2020    Proteinuria        Past Surgical History:   Procedure Laterality Date    CATARACT EXTRACTION      CHOLECYSTECTOMY      IR THORACENTESIS  3/20/2020    NH ESOPHAGOGASTRODUODENOSCOPY TRANSORAL DIAGNOSTIC N/A 6/14/2017    Procedure: EGD AND COLONOSCOPY;  Surgeon: Thomas Gimenez MD;  Location: [facility];   Service: Gastroenterology    TONSILLECTOMY         Current Outpatient Medications   Medication Sig Dispense Refill    acetaminophen (TYLENOL) 325 mg tablet Take 2 tablets (650 mg total) by mouth every 8 (eight) hours as needed for headaches or fever (pain) 90 tablet 5    ammonium lactate (LAC-HYDRIN) 12 % lotion Apply topically once daily at 6 am **Dx: Dry Skin** 400 g 5    atorvastatin (LIPITOR) 10 mg tablet 1 TAB ORALLY AT BEDTIME DX:HYPERLIPIDEMIA 28 tablet 5    donepezil (ARICEPT) 10 mg tablet Take 1 tablet (10 mg total) by mouth daily at bedtime Dementia 30 tablet 6    ergocalciferol (VITAMIN D2) 50,000 units Take 1 capsule (50,000 Units total) by mouth once a week for 48 doses 11 capsule 3    fluticasone (FLONASE) 50 mcg/act nasal spray 2 sprays into each nostril daily For allergies 16 g 3    Folic Acid-Vit I1-AJY H34 2 5-25-1 MG TABS 1 TAB ORALLY EVERY MORNING DX: SUPPLEMENT 28 each 5    furosemide (LASIX) 40 mg tablet 1 TAB ORALLY EVERY MORNING W/40MG AS NEEDED FOR WEIGHT GAIN 3LBS/DAY OR 5LBS/WEEK UNTIL WEIGHT RETURNS TO BASELINE 15 tablet 4    gabapentin (NEURONTIN) 100 mg capsule Take 1 capsule (100 mg total) by mouth 2 (two) times a day 60 capsule 6    glipiZIDE (GLUCOTROL XL) 5 mg 24 hr tablet 1 TAB ORALLY EVERY MORNING DX: DIABETES (NIDDM) 28 tablet 4    hydrOXYzine HCL (ATARAX) 10 mg tablet 1 TAB ORALLY TWICE DAILY DX: ITCH 1 TAB ORALLY DAILY AT 2PM DX: ITCH **FULL-CARD** (Patient taking differently: 3rd dose at 2 pm) 84 tablet 4    ipratropium-albuterol (DUO-NEB) 0 5-2 5 mg/3 mL nebulizer solution Take 3 mL by nebulization 4 (four) times a day      levothyroxine 100 mcg tablet 1 TAB ORALLY EVERY MORNING DX: HYPOTHYROIDISM 28 tablet 4    lisinopril (ZESTRIL) 2 5 mg tablet 1 TAB ORALLY EVERY MORNING DX: HYPERTENSION 28 tablet 4    loratadine (CLARITIN) 10 mg tablet 1 TAB ORALLY EVERY MORNING DX:ALLERGY 28 tablet 4    metoprolol tartrate (LOPRESSOR) 50 mg tablet 1 TAB ORALLY TWICE DAILY DX: HYPERTENSION 56 tablet 4    Misc   Devices (BED WEDGE) MISC by Does not apply route daily 2 each 0    potassium chloride (K-DUR,KLOR-CON) 20 mEq tablet 2 TABS (40MEQ) ORALLY EVERY MORNING DX:ELECTROLYTE DEFICIENCY 56 tablet 4    warfarin (COUMADIN) 5 mg tablet 0 5 TAB (2 5MG) ORALLY ONCE WEEKLY ON SUNDAY AT 8PM W/5MG [=7 5MG] DX:BLOOD CLOT PREVENTION **NIOSH 3 HAZARDOUS DRUG** 1 TAB ORALLY AT BEDTI 30 tablet 4    albuterol (5 mg/mL) 0 5 % nebulizer solution Take 2 5 mg by nebulization every 6 (six) hours as needed for wheezing      benzonatate (TESSALON PERLES) 100 mg capsule Take 1 capsule (100 mg total) by mouth 3 (three) times a day as needed for cough (Patient not taking: Reported on 9/29/2020) 20 capsule 0    glucose blood (TRUE METRIX BLOOD GLUCOSE TEST) test strip Patient to test once daily DX code E11 8 100 each 5     No current facility-administered medications for this visit  No Known Allergies    Review of Systems   Constitutional: Negative  Negative for appetite change and fever  HENT: Negative  Negative for hearing loss, tinnitus, trouble swallowing and voice change  Eyes: Negative  Negative for photophobia and pain  Respiratory: Negative  Negative for shortness of breath  Cardiovascular: Negative  Negative for palpitations  Gastrointestinal: Negative  Negative for nausea and vomiting  Endocrine: Negative  Negative for cold intolerance  Genitourinary: Negative  Negative for dysuria, frequency and urgency  Musculoskeletal: Negative  Negative for myalgias and neck pain  Skin: Negative  Negative for rash  Neurological: Negative  Negative for dizziness, tremors, seizures, syncope, facial asymmetry, speech difficulty, weakness, light-headedness, numbness and headaches  Hematological: Negative  Does not bruise/bleed easily  Psychiatric/Behavioral: Positive for confusion  Negative for hallucinations and sleep disturbance  MA review of systems was reviewed by myself      Vitals:    09/29/20 0943   BP: 130/62   Pulse: 76   Resp: 18   Temp: 98 1 °F (36 7 °C)   Height: 5' 11" (1 803 m)     Patient with mild cognitive impairment, diabetic polyneuropathy, his blood sugars seem to be under decent control, and he denies any negative sensory symptoms, remains on gabapentin 100 mg twice a day and Aricept 10 mg daily  He also denies any side effects from the medications  Patient is advised to continue present medications and will return back to see me in 6 months  I spent 10 minutes directly with the patient during this visit    [facility] acknowledges that he has consented to an online visit or consultation  He understands that the online visit is based solely on information provided by him, and that, in the absence of a face-to-face physical evaluation by the physician, the diagnosis he receives is both limited and provisional in terms of accuracy and completeness  This is not intended to replace a full medical face-to-face evaluation by the physician  Uriel Shore understands and accepts these terms

## 2020-09-29 NOTE — PROGRESS NOTES
Progress Note - Neurology   Marie Marquez 80 y o  male MRN: [id number]  Unit/Bed#:  Encounter: [id number]      Subjective:   ***    ROS:   Review of Systems   Constitutional: Negative  Negative for appetite change and fever  HENT: Negative  Negative for hearing loss, tinnitus, trouble swallowing and voice change  Eyes: Negative  Negative for photophobia and pain  Respiratory: Negative  Negative for shortness of breath  Cardiovascular: Negative  Negative for palpitations  Gastrointestinal: Negative  Negative for nausea and vomiting  Endocrine: Negative  Negative for cold intolerance  Genitourinary: Negative  Negative for dysuria, frequency and urgency  Musculoskeletal: Negative  Negative for myalgias and neck pain  Skin: Negative  Negative for rash  Allergic/Immunologic: Negative  Neurological: Negative  Negative for dizziness, tremors, seizures, syncope, facial asymmetry, speech difficulty, weakness, light-headedness, numbness and headaches  Hematological: Negative  Does not bruise/bleed easily  Psychiatric/Behavioral: Positive for confusion  Negative for hallucinations and sleep disturbance  Vitals:   Vitals:    09/29/20 0943   BP: 130/62   Pulse: 76   Resp: 18   Temp: 98 1 °F (36 7 °C)   Height: 5' 11" (1 803 m)   ,Body mass index is 21 06 kg/m²      MEDS:      Current Outpatient Medications:     acetaminophen (TYLENOL) 325 mg tablet, Take 2 tablets (650 mg total) by mouth every 8 (eight) hours as needed for headaches or fever (pain), Disp: 90 tablet, Rfl: 5    ammonium lactate (LAC-HYDRIN) 12 % lotion, Apply topically once daily at 6 am **Dx: Dry Skin**, Disp: 400 g, Rfl: 5    atorvastatin (LIPITOR) 10 mg tablet, 1 TAB ORALLY AT BEDTIME DX:HYPERLIPIDEMIA, Disp: 28 tablet, Rfl: 5    donepezil (ARICEPT) 10 mg tablet, 1 TAB ORALLY AT BEDTIME DX: DEMENTIA, Disp: 28 tablet, Rfl: 4    ergocalciferol (VITAMIN D2) 50,000 units, Take 1 capsule (50,000 Units total) by mouth once a week for 48 doses, Disp: 11 capsule, Rfl: 3    fluticasone (FLONASE) 50 mcg/act nasal spray, 2 sprays into each nostril daily For allergies, Disp: 16 g, Rfl: 3    Folic Acid-Vit Y1-FXT A30 2 5-25-1 MG TABS, 1 TAB ORALLY EVERY MORNING DX: SUPPLEMENT, Disp: 28 each, Rfl: 5    furosemide (LASIX) 40 mg tablet, 1 TAB ORALLY EVERY MORNING W/40MG AS NEEDED FOR WEIGHT GAIN 3LBS/DAY OR 5LBS/WEEK UNTIL WEIGHT RETURNS TO BASELINE, Disp: 15 tablet, Rfl: 4    gabapentin (NEURONTIN) 100 mg capsule, 1 CAP ORALLY TWICE DAILY DX: NEUROPATHY, Disp: 56 capsule, Rfl: 4    glipiZIDE (GLUCOTROL XL) 5 mg 24 hr tablet, 1 TAB ORALLY EVERY MORNING DX: DIABETES (NIDDM), Disp: 28 tablet, Rfl: 4    hydrOXYzine HCL (ATARAX) 10 mg tablet, 1 TAB ORALLY TWICE DAILY DX: ITCH 1 TAB ORALLY DAILY AT 2PM DX: ITCH **FULL-CARD** (Patient taking differently: 3rd dose at 2 pm), Disp: 84 tablet, Rfl: 4    ipratropium-albuterol (DUO-NEB) 0 5-2 5 mg/3 mL nebulizer solution, Take 3 mL by nebulization 4 (four) times a day, Disp: , Rfl:     levothyroxine 100 mcg tablet, 1 TAB ORALLY EVERY MORNING DX: HYPOTHYROIDISM, Disp: 28 tablet, Rfl: 4    lisinopril (ZESTRIL) 2 5 mg tablet, 1 TAB ORALLY EVERY MORNING DX: HYPERTENSION, Disp: 28 tablet, Rfl: 4    loratadine (CLARITIN) 10 mg tablet, 1 TAB ORALLY EVERY MORNING DX:ALLERGY, Disp: 28 tablet, Rfl: 4    metoprolol tartrate (LOPRESSOR) 50 mg tablet, 1 TAB ORALLY TWICE DAILY DX: HYPERTENSION, Disp: 56 tablet, Rfl: 4    Misc   Devices (BED WEDGE) MISC, by Does not apply route daily, Disp: 2 each, Rfl: 0    potassium chloride (K-DUR,KLOR-CON) 20 mEq tablet, 2 TABS (40MEQ) ORALLY EVERY MORNING DX:ELECTROLYTE DEFICIENCY, Disp: 56 tablet, Rfl: 4    warfarin (COUMADIN) 5 mg tablet, 0 5 TAB (2 5MG) ORALLY ONCE WEEKLY ON SUNDAY AT 8PM W/5MG [=7 5MG] DX:BLOOD CLOT PREVENTION **NIOSH 3 HAZARDOUS DRUG** 1 TAB ORALLY AT BEDTI, Disp: 30 tablet, Rfl: 4    albuterol (5 mg/mL) 0 5 % nebulizer solution, Take 2 5 mg by nebulization every 6 (six) hours as needed for wheezing, Disp: , Rfl:     benzonatate (TESSALON PERLES) 100 mg capsule, Take 1 capsule (100 mg total) by mouth 3 (three) times a day as needed for cough (Patient not taking: Reported on 9/29/2020), Disp: 20 capsule, Rfl: 0    glucose blood (TRUE METRIX BLOOD GLUCOSE TEST) test strip, Patient to test once daily DX code E11 8, Disp: 100 each, Rfl: 5  :    Physical Exam:  General appearance: alert, appears stated age and cooperative  Head: Normocephalic, without obvious abnormality, atraumatic    ***    Lab Results: I have personally reviewed pertinent reports  Imaging Studies: {Results Review Statement:65095}      Assessment:  1  ***    Plan:  ***    Counseling / Coordination of Care  Total time spent today *** minutes  Greater than 50% of total time was spent with the patient and / or family counseling and / or coordination of care  9/29/2020,9:59 AM    Dictation voice to text software has been used in the creation of this document  Please consider this in light of any contextual or grammatical errors

## 2020-09-29 NOTE — PROGRESS NOTES
Pt is to hold today, then take 5mg daily, retest again in 1 week 10/6/2020  Faxed  to [facility] [phone]

## 2020-09-29 NOTE — TELEPHONE ENCOUNTER
Received a call from [address] [place] [address] with Segundo Lindquist  She requested the patient's office notes from today with Dr Sydnee Pedersen    Faxed notes to: [phone]

## 2020-10-01 ENCOUNTER — TELEPHONE (OUTPATIENT)
Dept: CARDIOLOGY CLINIC | Facility: CLINIC | Age: 85
End: 2020-10-01

## 2020-10-01 DIAGNOSIS — R60.0 BILATERAL LOWER EXTREMITY EDEMA: ICD-10-CM

## 2020-10-02 DIAGNOSIS — J30.9 ALLERGIC RHINITIS, UNSPECIFIED SEASONALITY, UNSPECIFIED TRIGGER: ICD-10-CM

## 2020-10-02 RX ORDER — FLUTICASONE PROPIONATE 50 MCG
2 SPRAY, SUSPENSION (ML) NASAL DAILY
Qty: 16 G | Refills: 5 | Status: SHIPPED | OUTPATIENT
Start: 2020-10-02

## 2020-10-06 ENCOUNTER — ANTICOAG VISIT (OUTPATIENT)
Dept: CARDIOLOGY CLINIC | Facility: CLINIC | Age: 85
End: 2020-10-06

## 2020-10-06 LAB — INR PPP: 3.7 (ref 0.84–1.19)

## 2020-10-07 ENCOUNTER — TELEPHONE (OUTPATIENT)
Dept: INTERNAL MEDICINE CLINIC | Facility: CLINIC | Age: 85
End: 2020-10-07

## 2020-10-07 DIAGNOSIS — R23.8 SKIN BREAKDOWN: Primary | ICD-10-CM

## 2020-10-07 RX ORDER — ANORECTAL OINTMENT 15.7; .44; 24; 20.6 G/100G; G/100G; G/100G; G/100G
OINTMENT TOPICAL 2 TIMES DAILY
Qty: 71 G | Refills: 5 | Status: SHIPPED | OUTPATIENT
Start: 2020-10-07

## 2020-10-13 ENCOUNTER — ANTICOAG VISIT (OUTPATIENT)
Dept: CARDIOLOGY CLINIC | Facility: CLINIC | Age: 85
End: 2020-10-13

## 2020-10-13 DIAGNOSIS — Z00.00 ADULT GENERAL MEDICAL EXAMINATION: ICD-10-CM

## 2020-10-13 DIAGNOSIS — G31.84 MILD COGNITIVE IMPAIRMENT: ICD-10-CM

## 2020-10-13 DIAGNOSIS — E11.42 TYPE 2 DIABETES MELLITUS WITH DIABETIC POLYNEUROPATHY, WITHOUT LONG-TERM CURRENT USE OF INSULIN (HCC): Chronic | ICD-10-CM

## 2020-10-13 DIAGNOSIS — I48.0 PAROXYSMAL ATRIAL FIBRILLATION (HCC): Primary | ICD-10-CM

## 2020-10-13 LAB — INR PPP: 2.8 (ref 0.84–1.19)

## 2020-10-13 RX ORDER — GABAPENTIN 100 MG/1
CAPSULE ORAL
Qty: 56 CAPSULE | Refills: 4 | Status: SHIPPED | OUTPATIENT
Start: 2020-10-13 | End: 2021-03-02

## 2020-10-13 RX ORDER — WARFARIN SODIUM 2.5 MG/1
TABLET ORAL
Qty: 48 TABLET | Refills: 4 | Status: SHIPPED | OUTPATIENT
Start: 2020-10-13 | End: 2020-11-13 | Stop reason: DRUGHIGH

## 2020-10-13 RX ORDER — DONEPEZIL HYDROCHLORIDE 10 MG/1
TABLET, FILM COATED ORAL
Qty: 28 TABLET | Refills: 4 | Status: SHIPPED | OUTPATIENT
Start: 2020-10-13 | End: 2021-03-02

## 2020-10-13 RX ORDER — CYANOCOBALAMIN/FOLIC AC/VIT B6 1-2.5-25MG
TABLET ORAL
Qty: 28 EACH | Refills: 11 | Status: SHIPPED | OUTPATIENT
Start: 2020-10-13 | End: 2020-11-18 | Stop reason: HOSPADM

## 2020-10-21 ENCOUNTER — ANTICOAG VISIT (OUTPATIENT)
Dept: CARDIOLOGY CLINIC | Facility: CLINIC | Age: 85
End: 2020-10-21

## 2020-10-21 LAB — INR PPP: 3.1 (ref 0.84–1.19)

## 2020-10-26 ENCOUNTER — TELEPHONE (OUTPATIENT)
Dept: HEMATOLOGY ONCOLOGY | Facility: CLINIC | Age: 85
End: 2020-10-26

## 2020-10-27 ENCOUNTER — OFFICE VISIT (OUTPATIENT)
Dept: HEMATOLOGY ONCOLOGY | Facility: CLINIC | Age: 85
End: 2020-10-27
Payer: MEDICARE

## 2020-10-27 ENCOUNTER — TELEPHONE (OUTPATIENT)
Dept: HEMATOLOGY ONCOLOGY | Facility: CLINIC | Age: 85
End: 2020-10-27

## 2020-10-27 ENCOUNTER — ANTICOAG VISIT (OUTPATIENT)
Dept: CARDIOLOGY CLINIC | Facility: CLINIC | Age: 85
End: 2020-10-27

## 2020-10-27 VITALS
HEART RATE: 83 BPM | DIASTOLIC BLOOD PRESSURE: 80 MMHG | HEIGHT: 71 IN | RESPIRATION RATE: 18 BRPM | TEMPERATURE: 98.1 F | SYSTOLIC BLOOD PRESSURE: 142 MMHG | BODY MASS INDEX: 25.48 KG/M2 | WEIGHT: 182 LBS | OXYGEN SATURATION: 90 %

## 2020-10-27 DIAGNOSIS — D72.829 LEUKOCYTOSIS, UNSPECIFIED TYPE: Primary | ICD-10-CM

## 2020-10-27 LAB — INR PPP: 2.4 (ref 0.84–1.19)

## 2020-10-27 PROCEDURE — 99213 OFFICE O/P EST LOW 20 MIN: CPT | Performed by: INTERNAL MEDICINE

## 2020-10-27 RX ORDER — TORSEMIDE 10 MG/1
TABLET ORAL
COMMUNITY
Start: 2020-10-23 | End: 2020-11-13 | Stop reason: SDUPTHER

## 2020-10-28 ENCOUNTER — TELEPHONE (OUTPATIENT)
Dept: CARDIOLOGY CLINIC | Facility: CLINIC | Age: 85
End: 2020-10-28

## 2020-10-30 DIAGNOSIS — I50.9 CHF (CONGESTIVE HEART FAILURE) (HCC): Primary | ICD-10-CM

## 2020-10-30 RX ORDER — TORSEMIDE 20 MG/1
20 TABLET ORAL DAILY
Qty: 30 TABLET | Refills: 3 | Status: SHIPPED | OUTPATIENT
Start: 2020-10-30 | End: 2020-11-18 | Stop reason: HOSPADM

## 2020-11-03 ENCOUNTER — ANTICOAG VISIT (OUTPATIENT)
Dept: CARDIOLOGY CLINIC | Facility: CLINIC | Age: 85
End: 2020-11-03

## 2020-11-03 LAB — INR PPP: 2.8 (ref 0.84–1.19)

## 2020-11-10 ENCOUNTER — ANTICOAG VISIT (OUTPATIENT)
Dept: CARDIOLOGY CLINIC | Facility: CLINIC | Age: 85
End: 2020-11-10

## 2020-11-10 DIAGNOSIS — N18.30 STAGE 3 CHRONIC KIDNEY DISEASE (HCC): ICD-10-CM

## 2020-11-10 LAB — INR PPP: 3.2 (ref 0.84–1.19)

## 2020-11-10 RX ORDER — ERGOCALCIFEROL 1.25 MG/1
CAPSULE ORAL
Qty: 4 CAPSULE | Refills: 4 | Status: SHIPPED | OUTPATIENT
Start: 2020-11-10 | End: 2020-11-11 | Stop reason: SDUPTHER

## 2020-11-11 DIAGNOSIS — N18.30 STAGE 3 CHRONIC KIDNEY DISEASE (HCC): ICD-10-CM

## 2020-11-11 RX ORDER — ERGOCALCIFEROL 1.25 MG/1
CAPSULE ORAL
Qty: 4 CAPSULE | Refills: 4 | Status: SHIPPED | OUTPATIENT
Start: 2020-11-11

## 2020-11-13 ENCOUNTER — HOSPITAL ENCOUNTER (INPATIENT)
Facility: HOSPITAL | Age: 85
LOS: 5 days | Discharge: HOME HEALTH | DRG: 291 | End: 2020-11-18
Attending: EMERGENCY MEDICINE | Admitting: INTERNAL MEDICINE
Payer: MEDICARE

## 2020-11-13 ENCOUNTER — APPOINTMENT (EMERGENCY)
Dept: RADIOLOGY | Facility: HOSPITAL | Age: 85
DRG: 291 | End: 2020-11-13
Payer: MEDICARE

## 2020-11-13 ENCOUNTER — APPOINTMENT (INPATIENT)
Dept: INTERVENTIONAL RADIOLOGY/VASCULAR | Facility: HOSPITAL | Age: 85
DRG: 291 | End: 2020-11-13
Payer: MEDICARE

## 2020-11-13 ENCOUNTER — APPOINTMENT (INPATIENT)
Dept: ULTRASOUND IMAGING | Facility: HOSPITAL | Age: 85
DRG: 291 | End: 2020-11-13
Payer: MEDICARE

## 2020-11-13 DIAGNOSIS — N17.9 ACUTE KIDNEY INJURY (HCC): ICD-10-CM

## 2020-11-13 DIAGNOSIS — J90 PLEURAL EFFUSION: ICD-10-CM

## 2020-11-13 DIAGNOSIS — I50.9 CHF (CONGESTIVE HEART FAILURE) (HCC): ICD-10-CM

## 2020-11-13 DIAGNOSIS — I50.42 CHRONIC COMBINED SYSTOLIC (CONGESTIVE) AND DIASTOLIC (CONGESTIVE) HEART FAILURE (HCC): Chronic | ICD-10-CM

## 2020-11-13 DIAGNOSIS — I48.20 CHRONIC A-FIB (HCC): Chronic | ICD-10-CM

## 2020-11-13 DIAGNOSIS — R06.00 DYSPNEA: Primary | ICD-10-CM

## 2020-11-13 PROBLEM — R17 SERUM TOTAL BILIRUBIN ELEVATED: Status: ACTIVE | Noted: 2020-11-13

## 2020-11-13 LAB
25(OH)D3 SERPL-MCNC: 85 NG/ML (ref 30–100)
ALBUMIN SERPL BCP-MCNC: 2.6 G/DL (ref 3.5–5)
ALP SERPL-CCNC: 140 U/L (ref 46–116)
ALT SERPL W P-5'-P-CCNC: 54 U/L (ref 12–78)
ANION GAP SERPL CALCULATED.3IONS-SCNC: 9 MMOL/L (ref 4–13)
APPEARANCE FLD: ABNORMAL
APTT PPP: 54 SECONDS (ref 23–37)
AST SERPL W P-5'-P-CCNC: 54 U/L (ref 5–45)
BACTERIA UR QL AUTO: ABNORMAL /HPF
BASOPHILS # BLD AUTO: 0.05 THOUSANDS/ΜL (ref 0–0.1)
BASOPHILS NFR BLD AUTO: 0 % (ref 0–1)
BILIRUB DIRECT SERPL-MCNC: 0.63 MG/DL (ref 0–0.2)
BILIRUB SERPL-MCNC: 2.1 MG/DL (ref 0.2–1)
BILIRUB UR QL STRIP: NEGATIVE
BUN SERPL-MCNC: 35 MG/DL (ref 5–25)
CALCIUM SERPL-MCNC: 8.6 MG/DL (ref 8.3–10.1)
CHLORIDE SERPL-SCNC: 111 MMOL/L (ref 100–108)
CLARITY UR: CLEAR
CO2 SERPL-SCNC: 28 MMOL/L (ref 21–32)
COLOR FLD: ABNORMAL
COLOR UR: YELLOW
CREAT SERPL-MCNC: 1.95 MG/DL (ref 0.6–1.3)
EOSINOPHIL # BLD AUTO: 0.33 THOUSAND/ΜL (ref 0–0.61)
EOSINOPHIL NFR BLD AUTO: 3 % (ref 0–6)
EOSINOPHIL NFR FLD MANUAL: 4 %
ERYTHROCYTE [DISTWIDTH] IN BLOOD BY AUTOMATED COUNT: 16.7 % (ref 11.6–15.1)
FLUAV RNA RESP QL NAA+PROBE: NEGATIVE
FLUBV RNA RESP QL NAA+PROBE: NEGATIVE
GFR SERPL CREATININE-BSD FRML MDRD: 30 ML/MIN/1.73SQ M
GLUCOSE FLD-MCNC: 86 MG/DL
GLUCOSE SERPL-MCNC: 104 MG/DL (ref 65–140)
GLUCOSE SERPL-MCNC: 147 MG/DL (ref 65–140)
GLUCOSE SERPL-MCNC: 205 MG/DL (ref 65–140)
GLUCOSE SERPL-MCNC: 59 MG/DL (ref 65–140)
GLUCOSE UR STRIP-MCNC: NEGATIVE MG/DL
HCT VFR BLD AUTO: 34.1 % (ref 36.5–49.3)
HGB BLD-MCNC: 10.1 G/DL (ref 12–17)
HGB UR QL STRIP.AUTO: NEGATIVE
HYALINE CASTS #/AREA URNS LPF: ABNORMAL /LPF
IMM GRANULOCYTES # BLD AUTO: 0.05 THOUSAND/UL (ref 0–0.2)
IMM GRANULOCYTES NFR BLD AUTO: 0 % (ref 0–2)
INR PPP: 2.41 (ref 0.84–1.19)
KETONES UR STRIP-MCNC: NEGATIVE MG/DL
LACTATE SERPL-SCNC: 1.6 MMOL/L (ref 0.5–2)
LACTATE SERPL-SCNC: 2.2 MMOL/L (ref 0.5–2)
LACTATE SERPL-SCNC: 2.3 MMOL/L (ref 0.5–2)
LACTATE SERPL-SCNC: 2.8 MMOL/L (ref 0.5–2)
LDH FLD L TO P-CCNC: 110 U/L
LEUKOCYTE ESTERASE UR QL STRIP: NEGATIVE
LYMPHOCYTES # BLD AUTO: 0.42 THOUSANDS/ΜL (ref 0.6–4.47)
LYMPHOCYTES NFR BLD AUTO: 31 %
LYMPHOCYTES NFR BLD AUTO: 4 % (ref 14–44)
MCH RBC QN AUTO: 27.3 PG (ref 26.8–34.3)
MCHC RBC AUTO-ENTMCNC: 29.6 G/DL (ref 31.4–37.4)
MCV RBC AUTO: 92 FL (ref 82–98)
MONOCYTES # BLD AUTO: 0.91 THOUSAND/ΜL (ref 0.17–1.22)
MONOCYTES NFR BLD AUTO: 4 %
MONOCYTES NFR BLD AUTO: 8 % (ref 4–12)
MUCOUS THREADS UR QL AUTO: ABNORMAL
NEUTROPHILS # BLD AUTO: 10.16 THOUSANDS/ΜL (ref 1.85–7.62)
NEUTS BAND NFR FLD MANUAL: 3 %
NEUTS SEG NFR BLD AUTO: 58 %
NEUTS SEG NFR BLD AUTO: 85 % (ref 43–75)
NITRITE UR QL STRIP: NEGATIVE
NON-SQ EPI CELLS URNS QL MICRO: ABNORMAL /HPF
NRBC BLD AUTO-RTO: 0 /100 WBCS
NT-PROBNP SERPL-MCNC: ABNORMAL PG/ML
PH BODY FLUID: 7.6
PH UR STRIP.AUTO: 5.5 [PH]
PLATELET # BLD AUTO: 297 THOUSANDS/UL (ref 149–390)
PLATELET # BLD AUTO: 349 THOUSANDS/UL (ref 149–390)
PMV BLD AUTO: 9.2 FL (ref 8.9–12.7)
PMV BLD AUTO: 9.4 FL (ref 8.9–12.7)
POTASSIUM SERPL-SCNC: 4.5 MMOL/L (ref 3.5–5.3)
PROT FLD-MCNC: <2 G/DL
PROT SERPL-MCNC: 7.9 G/DL (ref 6.4–8.2)
PROT UR STRIP-MCNC: ABNORMAL MG/DL
PROTHROMBIN TIME: 26.5 SECONDS (ref 11.6–14.5)
PTH-INTACT SERPL-MCNC: 83.6 PG/ML (ref 18.4–80.1)
RBC # BLD AUTO: 3.7 MILLION/UL (ref 3.88–5.62)
RBC #/AREA URNS AUTO: ABNORMAL /HPF
RSV RNA RESP QL NAA+PROBE: NEGATIVE
SARS-COV-2 RNA RESP QL NAA+PROBE: NEGATIVE
SITE: ABNORMAL
SODIUM 24H UR-SCNC: 58 MOL/L
SODIUM SERPL-SCNC: 148 MMOL/L (ref 136–145)
SP GR UR STRIP.AUTO: 1.02 (ref 1–1.03)
TOTAL CELLS COUNTED SPEC: 100
TROPONIN I SERPL-MCNC: 0.02 NG/ML
UROBILINOGEN UR QL STRIP.AUTO: 0.2 E.U./DL
WBC # BLD AUTO: 11.92 THOUSAND/UL (ref 4.31–10.16)
WBC # FLD MANUAL: 377 /UL
WBC #/AREA URNS AUTO: ABNORMAL /HPF

## 2020-11-13 PROCEDURE — 99223 1ST HOSP IP/OBS HIGH 75: CPT | Performed by: INTERNAL MEDICINE

## 2020-11-13 PROCEDURE — 87070 CULTURE OTHR SPECIMN AEROBIC: CPT | Performed by: NURSE PRACTITIONER

## 2020-11-13 PROCEDURE — 87040 BLOOD CULTURE FOR BACTERIA: CPT | Performed by: EMERGENCY MEDICINE

## 2020-11-13 PROCEDURE — 83615 LACTATE (LD) (LDH) ENZYME: CPT | Performed by: NURSE PRACTITIONER

## 2020-11-13 PROCEDURE — 85049 AUTOMATED PLATELET COUNT: CPT | Performed by: NURSE PRACTITIONER

## 2020-11-13 PROCEDURE — 84300 ASSAY OF URINE SODIUM: CPT | Performed by: NURSE PRACTITIONER

## 2020-11-13 PROCEDURE — 99285 EMERGENCY DEPT VISIT HI MDM: CPT | Performed by: EMERGENCY MEDICINE

## 2020-11-13 PROCEDURE — 0241U HB NFCT DS VIR RESP RNA 4 TRGT: CPT | Performed by: EMERGENCY MEDICINE

## 2020-11-13 PROCEDURE — 85610 PROTHROMBIN TIME: CPT | Performed by: EMERGENCY MEDICINE

## 2020-11-13 PROCEDURE — 88112 CYTOPATH CELL ENHANCE TECH: CPT | Performed by: PATHOLOGY

## 2020-11-13 PROCEDURE — 89051 BODY FLUID CELL COUNT: CPT | Performed by: NURSE PRACTITIONER

## 2020-11-13 PROCEDURE — 84157 ASSAY OF PROTEIN OTHER: CPT | Performed by: NURSE PRACTITIONER

## 2020-11-13 PROCEDURE — 90662 IIV NO PRSV INCREASED AG IM: CPT | Performed by: INTERNAL MEDICINE

## 2020-11-13 PROCEDURE — 81001 URINALYSIS AUTO W/SCOPE: CPT | Performed by: NURSE PRACTITIONER

## 2020-11-13 PROCEDURE — 93005 ELECTROCARDIOGRAM TRACING: CPT

## 2020-11-13 PROCEDURE — 85025 COMPLETE CBC W/AUTO DIFF WBC: CPT | Performed by: EMERGENCY MEDICINE

## 2020-11-13 PROCEDURE — 99222 1ST HOSP IP/OBS MODERATE 55: CPT | Performed by: INTERNAL MEDICINE

## 2020-11-13 PROCEDURE — 82306 VITAMIN D 25 HYDROXY: CPT | Performed by: NURSE PRACTITIONER

## 2020-11-13 PROCEDURE — 71045 X-RAY EXAM CHEST 1 VIEW: CPT

## 2020-11-13 PROCEDURE — 36415 COLL VENOUS BLD VENIPUNCTURE: CPT | Performed by: EMERGENCY MEDICINE

## 2020-11-13 PROCEDURE — 82945 GLUCOSE OTHER FLUID: CPT | Performed by: NURSE PRACTITIONER

## 2020-11-13 PROCEDURE — 82948 REAGENT STRIP/BLOOD GLUCOSE: CPT

## 2020-11-13 PROCEDURE — 83970 ASSAY OF PARATHORMONE: CPT | Performed by: NURSE PRACTITIONER

## 2020-11-13 PROCEDURE — 83605 ASSAY OF LACTIC ACID: CPT | Performed by: NURSE PRACTITIONER

## 2020-11-13 PROCEDURE — 32555 ASPIRATE PLEURA W/ IMAGING: CPT | Performed by: RADIOLOGY

## 2020-11-13 PROCEDURE — 80048 BASIC METABOLIC PNL TOTAL CA: CPT | Performed by: EMERGENCY MEDICINE

## 2020-11-13 PROCEDURE — 85730 THROMBOPLASTIN TIME PARTIAL: CPT | Performed by: EMERGENCY MEDICINE

## 2020-11-13 PROCEDURE — 87205 SMEAR GRAM STAIN: CPT | Performed by: NURSE PRACTITIONER

## 2020-11-13 PROCEDURE — 83605 ASSAY OF LACTIC ACID: CPT | Performed by: EMERGENCY MEDICINE

## 2020-11-13 PROCEDURE — 80076 HEPATIC FUNCTION PANEL: CPT | Performed by: EMERGENCY MEDICINE

## 2020-11-13 PROCEDURE — 99285 EMERGENCY DEPT VISIT HI MDM: CPT

## 2020-11-13 PROCEDURE — 84145 PROCALCITONIN (PCT): CPT | Performed by: NURSE PRACTITIONER

## 2020-11-13 PROCEDURE — 76770 US EXAM ABDO BACK WALL COMP: CPT

## 2020-11-13 PROCEDURE — G0008 ADMIN INFLUENZA VIRUS VAC: HCPCS | Performed by: INTERNAL MEDICINE

## 2020-11-13 PROCEDURE — NC001 PR NO CHARGE: Performed by: INTERNAL MEDICINE

## 2020-11-13 PROCEDURE — 88305 TISSUE EXAM BY PATHOLOGIST: CPT | Performed by: PATHOLOGY

## 2020-11-13 PROCEDURE — 84484 ASSAY OF TROPONIN QUANT: CPT | Performed by: EMERGENCY MEDICINE

## 2020-11-13 PROCEDURE — 83880 ASSAY OF NATRIURETIC PEPTIDE: CPT | Performed by: EMERGENCY MEDICINE

## 2020-11-13 PROCEDURE — 83986 ASSAY PH BODY FLUID NOS: CPT | Performed by: NURSE PRACTITIONER

## 2020-11-13 PROCEDURE — 32555 ASPIRATE PLEURA W/ IMAGING: CPT

## 2020-11-13 PROCEDURE — 0W993ZX DRAINAGE OF RIGHT PLEURAL CAVITY, PERCUTANEOUS APPROACH, DIAGNOSTIC: ICD-10-PCS | Performed by: INTERNAL MEDICINE

## 2020-11-13 RX ORDER — HEPARIN SODIUM 5000 [USP'U]/ML
5000 INJECTION, SOLUTION INTRAVENOUS; SUBCUTANEOUS EVERY 8 HOURS SCHEDULED
Status: DISCONTINUED | OUTPATIENT
Start: 2020-11-13 | End: 2020-11-14

## 2020-11-13 RX ORDER — METOPROLOL TARTRATE 50 MG/1
50 TABLET, FILM COATED ORAL EVERY 12 HOURS SCHEDULED
Status: DISCONTINUED | OUTPATIENT
Start: 2020-11-13 | End: 2020-11-14

## 2020-11-13 RX ORDER — COLCHICINE 0.6 MG/1
0.6 TABLET ORAL DAILY
COMMUNITY

## 2020-11-13 RX ORDER — LISINOPRIL 2.5 MG/1
2.5 TABLET ORAL EVERY MORNING
Status: DISCONTINUED | OUTPATIENT
Start: 2020-11-14 | End: 2020-11-14

## 2020-11-13 RX ORDER — WARFARIN SODIUM 2.5 MG/1
2.5 TABLET ORAL
Status: DISCONTINUED | OUTPATIENT
Start: 2020-11-13 | End: 2020-11-16

## 2020-11-13 RX ORDER — DONEPEZIL HYDROCHLORIDE 5 MG/1
10 TABLET, FILM COATED ORAL
Status: DISCONTINUED | OUTPATIENT
Start: 2020-11-13 | End: 2020-11-18 | Stop reason: HOSPADM

## 2020-11-13 RX ORDER — FUROSEMIDE 40 MG/1
40 TABLET ORAL AS NEEDED
COMMUNITY
End: 2020-11-18 | Stop reason: HOSPADM

## 2020-11-13 RX ORDER — CALCIUM CARBONATE 200(500)MG
1000 TABLET,CHEWABLE ORAL DAILY PRN
Status: DISCONTINUED | OUTPATIENT
Start: 2020-11-13 | End: 2020-11-18 | Stop reason: HOSPADM

## 2020-11-13 RX ORDER — LIDOCAINE WITH 8.4% SOD BICARB 0.9%(10ML)
SYRINGE (ML) INJECTION CODE/TRAUMA/SEDATION MEDICATION
Status: COMPLETED | OUTPATIENT
Start: 2020-11-13 | End: 2020-11-13

## 2020-11-13 RX ORDER — ACETAMINOPHEN 325 MG/1
650 TABLET ORAL EVERY 8 HOURS PRN
Status: DISCONTINUED | OUTPATIENT
Start: 2020-11-13 | End: 2020-11-18 | Stop reason: HOSPADM

## 2020-11-13 RX ORDER — FUROSEMIDE 10 MG/ML
40 INJECTION INTRAMUSCULAR; INTRAVENOUS
Status: DISCONTINUED | OUTPATIENT
Start: 2020-11-13 | End: 2020-11-14

## 2020-11-13 RX ORDER — TORSEMIDE 10 MG/1
10 TABLET ORAL 3 TIMES WEEKLY
COMMUNITY
End: 2020-11-18 | Stop reason: HOSPADM

## 2020-11-13 RX ORDER — LEVOTHYROXINE SODIUM 0.1 MG/1
100 TABLET ORAL EVERY MORNING
Status: DISCONTINUED | OUTPATIENT
Start: 2020-11-14 | End: 2020-11-18 | Stop reason: HOSPADM

## 2020-11-13 RX ORDER — WARFARIN SODIUM 2.5 MG/1
2.5 TABLET ORAL
COMMUNITY
End: 2020-11-18 | Stop reason: HOSPADM

## 2020-11-13 RX ORDER — WARFARIN SODIUM 2.5 MG/1
2.5 TABLET ORAL
COMMUNITY
End: 2020-12-15

## 2020-11-13 RX ORDER — POTASSIUM CHLORIDE 20 MEQ/1
40 TABLET, EXTENDED RELEASE ORAL DAILY
Status: DISCONTINUED | OUTPATIENT
Start: 2020-11-14 | End: 2020-11-18 | Stop reason: HOSPADM

## 2020-11-13 RX ORDER — COLCHICINE 0.6 MG/1
0.6 TABLET ORAL DAILY
Status: DISCONTINUED | OUTPATIENT
Start: 2020-11-14 | End: 2020-11-18 | Stop reason: HOSPADM

## 2020-11-13 RX ORDER — ATORVASTATIN CALCIUM 10 MG/1
10 TABLET, FILM COATED ORAL
Status: DISCONTINUED | OUTPATIENT
Start: 2020-11-13 | End: 2020-11-18 | Stop reason: HOSPADM

## 2020-11-13 RX ORDER — LORATADINE 10 MG/1
10 TABLET ORAL EVERY MORNING
Status: DISCONTINUED | OUTPATIENT
Start: 2020-11-14 | End: 2020-11-18 | Stop reason: HOSPADM

## 2020-11-13 RX ORDER — GABAPENTIN 100 MG/1
100 CAPSULE ORAL 2 TIMES DAILY
Status: DISCONTINUED | OUTPATIENT
Start: 2020-11-13 | End: 2020-11-18 | Stop reason: HOSPADM

## 2020-11-13 RX ORDER — FLUTICASONE PROPIONATE 50 MCG
2 SPRAY, SUSPENSION (ML) NASAL DAILY
Status: DISCONTINUED | OUTPATIENT
Start: 2020-11-14 | End: 2020-11-18 | Stop reason: HOSPADM

## 2020-11-13 RX ADMIN — CEFEPIME HYDROCHLORIDE 2000 MG: 2 INJECTION, POWDER, FOR SOLUTION INTRAVENOUS at 11:13

## 2020-11-13 RX ADMIN — DONEPEZIL HYDROCHLORIDE 10 MG: 5 TABLET ORAL at 22:27

## 2020-11-13 RX ADMIN — Medication 10 ML: at 15:23

## 2020-11-13 RX ADMIN — WARFARIN SODIUM 2.5 MG: 2.5 TABLET ORAL at 22:27

## 2020-11-13 RX ADMIN — GABAPENTIN 100 MG: 100 CAPSULE ORAL at 18:10

## 2020-11-13 RX ADMIN — HEPARIN SODIUM 5000 UNITS: 5000 INJECTION INTRAVENOUS; SUBCUTANEOUS at 22:27

## 2020-11-13 RX ADMIN — HEPARIN SODIUM 5000 UNITS: 5000 INJECTION INTRAVENOUS; SUBCUTANEOUS at 15:56

## 2020-11-13 RX ADMIN — INFLUENZA A VIRUS A/MICHIGAN/45/2015 X-275 (H1N1) ANTIGEN (FORMALDEHYDE INACTIVATED), INFLUENZA A VIRUS A/SINGAPORE/INFIMH-16-0019/2016 IVR-186 (H3N2) ANTIGEN (FORMALDEHYDE INACTIVATED), INFLUENZA B VIRUS B/PHUKET/3073/2013 ANTIGEN (FORMALDEHYDE INACTIVATED), AND INFLUENZA B VIRUS B/MARYLAND/15/2016 BX-69A ANTIGEN (FORMALDEHYDE INACTIVATED) 0.7 ML: 60; 60; 60; 60 INJECTION, SUSPENSION INTRAMUSCULAR at 18:13

## 2020-11-13 RX ADMIN — ATORVASTATIN CALCIUM 10 MG: 10 TABLET, FILM COATED ORAL at 22:27

## 2020-11-13 RX ADMIN — ANORECTAL OINTMENT: 15.7; .44; 24; 20.6 OINTMENT TOPICAL at 22:27

## 2020-11-13 RX ADMIN — FUROSEMIDE 40 MG: 10 INJECTION, SOLUTION INTRAMUSCULAR; INTRAVENOUS at 16:03

## 2020-11-14 LAB
ALBUMIN SERPL BCP-MCNC: 2.1 G/DL (ref 3.5–5)
ALP SERPL-CCNC: 115 U/L (ref 46–116)
ALT SERPL W P-5'-P-CCNC: 46 U/L (ref 12–78)
ANION GAP SERPL CALCULATED.3IONS-SCNC: 7 MMOL/L (ref 4–13)
AST SERPL W P-5'-P-CCNC: 44 U/L (ref 5–45)
BILIRUB SERPL-MCNC: 1.6 MG/DL (ref 0.2–1)
BUN SERPL-MCNC: 34 MG/DL (ref 5–25)
CALCIUM ALBUM COR SERPL-MCNC: 9.8 MG/DL (ref 8.3–10.1)
CALCIUM SERPL-MCNC: 8.3 MG/DL (ref 8.3–10.1)
CHLORIDE SERPL-SCNC: 111 MMOL/L (ref 100–108)
CO2 SERPL-SCNC: 30 MMOL/L (ref 21–32)
CREAT SERPL-MCNC: 1.83 MG/DL (ref 0.6–1.3)
ERYTHROCYTE [DISTWIDTH] IN BLOOD BY AUTOMATED COUNT: 16.4 % (ref 11.6–15.1)
GFR SERPL CREATININE-BSD FRML MDRD: 32 ML/MIN/1.73SQ M
GLUCOSE SERPL-MCNC: 117 MG/DL (ref 65–140)
GLUCOSE SERPL-MCNC: 150 MG/DL (ref 65–140)
GLUCOSE SERPL-MCNC: 170 MG/DL (ref 65–140)
GLUCOSE SERPL-MCNC: 83 MG/DL (ref 65–140)
GLUCOSE SERPL-MCNC: 84 MG/DL (ref 65–140)
HCT VFR BLD AUTO: 30.6 % (ref 36.5–49.3)
HGB BLD-MCNC: 9.2 G/DL (ref 12–17)
INR PPP: 3.4 (ref 0.84–1.19)
LACTATE SERPL-SCNC: 1 MMOL/L (ref 0.5–2)
LACTATE SERPL-SCNC: 1.7 MMOL/L (ref 0.5–2)
LACTATE SERPL-SCNC: 2.6 MMOL/L (ref 0.5–2)
MCH RBC QN AUTO: 27.3 PG (ref 26.8–34.3)
MCHC RBC AUTO-ENTMCNC: 30.1 G/DL (ref 31.4–37.4)
MCV RBC AUTO: 91 FL (ref 82–98)
PLATELET # BLD AUTO: 288 THOUSANDS/UL (ref 149–390)
PMV BLD AUTO: 9.3 FL (ref 8.9–12.7)
POTASSIUM SERPL-SCNC: 3.8 MMOL/L (ref 3.5–5.3)
PROCALCITONIN SERPL-MCNC: 0.07 NG/ML
PROCALCITONIN SERPL-MCNC: 0.11 NG/ML
PROT SERPL-MCNC: 6.7 G/DL (ref 6.4–8.2)
PROTHROMBIN TIME: 34.6 SECONDS (ref 11.6–14.5)
RBC # BLD AUTO: 3.37 MILLION/UL (ref 3.88–5.62)
SODIUM SERPL-SCNC: 148 MMOL/L (ref 136–145)
TSH SERPL DL<=0.05 MIU/L-ACNC: 0.5 UIU/ML (ref 0.36–3.74)
WBC # BLD AUTO: 11.66 THOUSAND/UL (ref 4.31–10.16)

## 2020-11-14 PROCEDURE — 85027 COMPLETE CBC AUTOMATED: CPT | Performed by: NURSE PRACTITIONER

## 2020-11-14 PROCEDURE — 83605 ASSAY OF LACTIC ACID: CPT | Performed by: NURSE PRACTITIONER

## 2020-11-14 PROCEDURE — 99233 SBSQ HOSP IP/OBS HIGH 50: CPT | Performed by: INTERNAL MEDICINE

## 2020-11-14 PROCEDURE — 84443 ASSAY THYROID STIM HORMONE: CPT | Performed by: NURSE PRACTITIONER

## 2020-11-14 PROCEDURE — 83605 ASSAY OF LACTIC ACID: CPT | Performed by: PHYSICIAN ASSISTANT

## 2020-11-14 PROCEDURE — 99232 SBSQ HOSP IP/OBS MODERATE 35: CPT | Performed by: NURSE PRACTITIONER

## 2020-11-14 PROCEDURE — 80053 COMPREHEN METABOLIC PANEL: CPT | Performed by: NURSE PRACTITIONER

## 2020-11-14 PROCEDURE — 84145 PROCALCITONIN (PCT): CPT | Performed by: NURSE PRACTITIONER

## 2020-11-14 PROCEDURE — 85610 PROTHROMBIN TIME: CPT | Performed by: NURSE PRACTITIONER

## 2020-11-14 PROCEDURE — 82948 REAGENT STRIP/BLOOD GLUCOSE: CPT

## 2020-11-14 RX ORDER — TORSEMIDE 20 MG/1
20 TABLET ORAL DAILY
Status: DISCONTINUED | OUTPATIENT
Start: 2020-11-14 | End: 2020-11-18 | Stop reason: HOSPADM

## 2020-11-14 RX ORDER — METOPROLOL TARTRATE 50 MG/1
50 TABLET, FILM COATED ORAL EVERY 12 HOURS SCHEDULED
Status: DISCONTINUED | OUTPATIENT
Start: 2020-11-14 | End: 2020-11-15

## 2020-11-14 RX ORDER — METOPROLOL TARTRATE 50 MG/1
50 TABLET, FILM COATED ORAL EVERY 12 HOURS SCHEDULED
Status: DISCONTINUED | OUTPATIENT
Start: 2020-11-14 | End: 2020-11-14

## 2020-11-14 RX ORDER — LISINOPRIL 2.5 MG/1
2.5 TABLET ORAL EVERY MORNING
Status: DISCONTINUED | OUTPATIENT
Start: 2020-11-15 | End: 2020-11-18 | Stop reason: HOSPADM

## 2020-11-14 RX ADMIN — FLUTICASONE PROPIONATE 2 SPRAY: 50 SPRAY, METERED NASAL at 08:11

## 2020-11-14 RX ADMIN — GABAPENTIN 100 MG: 100 CAPSULE ORAL at 17:58

## 2020-11-14 RX ADMIN — ANORECTAL OINTMENT: 15.7; .44; 24; 20.6 OINTMENT TOPICAL at 17:59

## 2020-11-14 RX ADMIN — LEVOTHYROXINE SODIUM 100 MCG: 100 TABLET ORAL at 08:11

## 2020-11-14 RX ADMIN — CEFEPIME HYDROCHLORIDE 2000 MG: 2 INJECTION, POWDER, FOR SOLUTION INTRAVENOUS at 11:31

## 2020-11-14 RX ADMIN — METOPROLOL TARTRATE 50 MG: 50 TABLET, FILM COATED ORAL at 11:30

## 2020-11-14 RX ADMIN — ANORECTAL OINTMENT: 15.7; .44; 24; 20.6 OINTMENT TOPICAL at 08:13

## 2020-11-14 RX ADMIN — COLCHICINE 0.6 MG: 0.6 TABLET, FILM COATED ORAL at 08:09

## 2020-11-14 RX ADMIN — DONEPEZIL HYDROCHLORIDE 10 MG: 5 TABLET ORAL at 21:41

## 2020-11-14 RX ADMIN — GABAPENTIN 100 MG: 100 CAPSULE ORAL at 08:09

## 2020-11-14 RX ADMIN — METOPROLOL TARTRATE 50 MG: 50 TABLET, FILM COATED ORAL at 21:40

## 2020-11-14 RX ADMIN — LORATADINE 10 MG: 10 TABLET ORAL at 08:10

## 2020-11-14 RX ADMIN — POTASSIUM CHLORIDE 40 MEQ: 1500 TABLET, EXTENDED RELEASE ORAL at 08:12

## 2020-11-14 RX ADMIN — ATORVASTATIN CALCIUM 10 MG: 10 TABLET, FILM COATED ORAL at 21:40

## 2020-11-14 RX ADMIN — INSULIN LISPRO 1 UNITS: 100 INJECTION, SOLUTION INTRAVENOUS; SUBCUTANEOUS at 13:25

## 2020-11-14 RX ADMIN — WARFARIN SODIUM 2.5 MG: 2.5 TABLET ORAL at 21:41

## 2020-11-15 ENCOUNTER — APPOINTMENT (INPATIENT)
Dept: RADIOLOGY | Facility: HOSPITAL | Age: 85
DRG: 291 | End: 2020-11-15
Payer: MEDICARE

## 2020-11-15 LAB
ALBUMIN SERPL BCP-MCNC: 2.1 G/DL (ref 3.5–5)
ALP SERPL-CCNC: 129 U/L (ref 46–116)
ALT SERPL W P-5'-P-CCNC: 45 U/L (ref 12–78)
ANION GAP SERPL CALCULATED.3IONS-SCNC: 7 MMOL/L (ref 4–13)
AST SERPL W P-5'-P-CCNC: 37 U/L (ref 5–45)
ATRIAL RATE: 108 BPM
BILIRUB SERPL-MCNC: 1.7 MG/DL (ref 0.2–1)
BUN SERPL-MCNC: 35 MG/DL (ref 5–25)
CALCIUM ALBUM COR SERPL-MCNC: 9.9 MG/DL (ref 8.3–10.1)
CALCIUM SERPL-MCNC: 8.4 MG/DL (ref 8.3–10.1)
CHLORIDE SERPL-SCNC: 110 MMOL/L (ref 100–108)
CO2 SERPL-SCNC: 28 MMOL/L (ref 21–32)
CREAT SERPL-MCNC: 1.88 MG/DL (ref 0.6–1.3)
ERYTHROCYTE [DISTWIDTH] IN BLOOD BY AUTOMATED COUNT: 16.3 % (ref 11.6–15.1)
GFR SERPL CREATININE-BSD FRML MDRD: 31 ML/MIN/1.73SQ M
GLUCOSE SERPL-MCNC: 110 MG/DL (ref 65–140)
GLUCOSE SERPL-MCNC: 127 MG/DL (ref 65–140)
GLUCOSE SERPL-MCNC: 137 MG/DL (ref 65–140)
GLUCOSE SERPL-MCNC: 177 MG/DL (ref 65–140)
GLUCOSE SERPL-MCNC: 199 MG/DL (ref 65–140)
HCT VFR BLD AUTO: 34.2 % (ref 36.5–49.3)
HGB BLD-MCNC: 10.1 G/DL (ref 12–17)
MAGNESIUM SERPL-MCNC: 2.2 MG/DL (ref 1.6–2.6)
MCH RBC QN AUTO: 26.9 PG (ref 26.8–34.3)
MCHC RBC AUTO-ENTMCNC: 29.5 G/DL (ref 31.4–37.4)
MCV RBC AUTO: 91 FL (ref 82–98)
PLATELET # BLD AUTO: 296 THOUSANDS/UL (ref 149–390)
PMV BLD AUTO: 9.4 FL (ref 8.9–12.7)
POTASSIUM SERPL-SCNC: 4.7 MMOL/L (ref 3.5–5.3)
PROT SERPL-MCNC: 7.1 G/DL (ref 6.4–8.2)
QRS AXIS: -57 DEGREES
QRSD INTERVAL: 82 MS
QT INTERVAL: 354 MS
QTC INTERVAL: 481 MS
RBC # BLD AUTO: 3.76 MILLION/UL (ref 3.88–5.62)
SODIUM SERPL-SCNC: 145 MMOL/L (ref 136–145)
T WAVE AXIS: 98 DEGREES
VENTRICULAR RATE: 111 BPM
WBC # BLD AUTO: 11.91 THOUSAND/UL (ref 4.31–10.16)

## 2020-11-15 PROCEDURE — 82948 REAGENT STRIP/BLOOD GLUCOSE: CPT

## 2020-11-15 PROCEDURE — 87040 BLOOD CULTURE FOR BACTERIA: CPT | Performed by: INTERNAL MEDICINE

## 2020-11-15 PROCEDURE — 83735 ASSAY OF MAGNESIUM: CPT | Performed by: INTERNAL MEDICINE

## 2020-11-15 PROCEDURE — 85027 COMPLETE CBC AUTOMATED: CPT | Performed by: INTERNAL MEDICINE

## 2020-11-15 PROCEDURE — 99233 SBSQ HOSP IP/OBS HIGH 50: CPT | Performed by: INTERNAL MEDICINE

## 2020-11-15 PROCEDURE — 99232 SBSQ HOSP IP/OBS MODERATE 35: CPT | Performed by: INTERNAL MEDICINE

## 2020-11-15 PROCEDURE — 99232 SBSQ HOSP IP/OBS MODERATE 35: CPT | Performed by: NURSE PRACTITIONER

## 2020-11-15 PROCEDURE — 71045 X-RAY EXAM CHEST 1 VIEW: CPT

## 2020-11-15 PROCEDURE — 93010 ELECTROCARDIOGRAM REPORT: CPT | Performed by: INTERNAL MEDICINE

## 2020-11-15 PROCEDURE — 80053 COMPREHEN METABOLIC PANEL: CPT | Performed by: INTERNAL MEDICINE

## 2020-11-15 RX ORDER — METOPROLOL TARTRATE 50 MG/1
50 TABLET, FILM COATED ORAL EVERY 6 HOURS
Status: DISCONTINUED | OUTPATIENT
Start: 2020-11-15 | End: 2020-11-18 | Stop reason: HOSPADM

## 2020-11-15 RX ADMIN — CEFEPIME HYDROCHLORIDE 2000 MG: 2 INJECTION, POWDER, FOR SOLUTION INTRAVENOUS at 11:51

## 2020-11-15 RX ADMIN — INSULIN LISPRO 1 UNITS: 100 INJECTION, SOLUTION INTRAVENOUS; SUBCUTANEOUS at 21:25

## 2020-11-15 RX ADMIN — WARFARIN SODIUM 2.5 MG: 2.5 TABLET ORAL at 21:23

## 2020-11-15 RX ADMIN — COLCHICINE 0.6 MG: 0.6 TABLET, FILM COATED ORAL at 09:08

## 2020-11-15 RX ADMIN — POTASSIUM CHLORIDE 40 MEQ: 1500 TABLET, EXTENDED RELEASE ORAL at 09:07

## 2020-11-15 RX ADMIN — ATORVASTATIN CALCIUM 10 MG: 10 TABLET, FILM COATED ORAL at 21:23

## 2020-11-15 RX ADMIN — ANORECTAL OINTMENT: 15.7; .44; 24; 20.6 OINTMENT TOPICAL at 09:08

## 2020-11-15 RX ADMIN — METOPROLOL TARTRATE 50 MG: 50 TABLET, FILM COATED ORAL at 14:34

## 2020-11-15 RX ADMIN — GABAPENTIN 100 MG: 100 CAPSULE ORAL at 09:08

## 2020-11-15 RX ADMIN — INSULIN LISPRO 2 UNITS: 100 INJECTION, SOLUTION INTRAVENOUS; SUBCUTANEOUS at 11:55

## 2020-11-15 RX ADMIN — GABAPENTIN 100 MG: 100 CAPSULE ORAL at 16:54

## 2020-11-15 RX ADMIN — METOPROLOL TARTRATE 25 MG: 25 TABLET, FILM COATED ORAL at 13:22

## 2020-11-15 RX ADMIN — LORATADINE 10 MG: 10 TABLET ORAL at 09:08

## 2020-11-15 RX ADMIN — DONEPEZIL HYDROCHLORIDE 10 MG: 5 TABLET ORAL at 21:23

## 2020-11-15 RX ADMIN — METOPROLOL TARTRATE 50 MG: 50 TABLET, FILM COATED ORAL at 09:07

## 2020-11-15 RX ADMIN — LISINOPRIL 2.5 MG: 2.5 TABLET ORAL at 09:08

## 2020-11-15 RX ADMIN — FLUTICASONE PROPIONATE 2 SPRAY: 50 SPRAY, METERED NASAL at 09:08

## 2020-11-15 RX ADMIN — TORSEMIDE 20 MG: 20 TABLET ORAL at 09:07

## 2020-11-15 RX ADMIN — ANORECTAL OINTMENT: 15.7; .44; 24; 20.6 OINTMENT TOPICAL at 16:55

## 2020-11-15 RX ADMIN — LEVOTHYROXINE SODIUM 100 MCG: 100 TABLET ORAL at 09:08

## 2020-11-16 ENCOUNTER — APPOINTMENT (INPATIENT)
Dept: RADIOLOGY | Facility: HOSPITAL | Age: 85
DRG: 291 | End: 2020-11-16
Payer: MEDICARE

## 2020-11-16 LAB
ANION GAP SERPL CALCULATED.3IONS-SCNC: 8 MMOL/L (ref 4–13)
BACTERIA SPEC BFLD CULT: NO GROWTH
BASOPHILS # BLD AUTO: 0.05 THOUSANDS/ΜL (ref 0–0.1)
BASOPHILS NFR BLD AUTO: 1 % (ref 0–1)
BUN SERPL-MCNC: 34 MG/DL (ref 5–25)
CALCIUM SERPL-MCNC: 8.1 MG/DL (ref 8.3–10.1)
CHLORIDE SERPL-SCNC: 107 MMOL/L (ref 100–108)
CO2 SERPL-SCNC: 28 MMOL/L (ref 21–32)
CREAT SERPL-MCNC: 1.65 MG/DL (ref 0.6–1.3)
EOSINOPHIL # BLD AUTO: 0.7 THOUSAND/ΜL (ref 0–0.61)
EOSINOPHIL NFR BLD AUTO: 6 % (ref 0–6)
ERYTHROCYTE [DISTWIDTH] IN BLOOD BY AUTOMATED COUNT: 16.3 % (ref 11.6–15.1)
GFR SERPL CREATININE-BSD FRML MDRD: 37 ML/MIN/1.73SQ M
GLUCOSE SERPL-MCNC: 103 MG/DL (ref 65–140)
GLUCOSE SERPL-MCNC: 111 MG/DL (ref 65–140)
GLUCOSE SERPL-MCNC: 115 MG/DL (ref 65–140)
GLUCOSE SERPL-MCNC: 140 MG/DL (ref 65–140)
GLUCOSE SERPL-MCNC: 162 MG/DL (ref 65–140)
GRAM STN SPEC: NORMAL
GRAM STN SPEC: NORMAL
HCT VFR BLD AUTO: 29.7 % (ref 36.5–49.3)
HGB BLD-MCNC: 9.1 G/DL (ref 12–17)
IMM GRANULOCYTES # BLD AUTO: 0.07 THOUSAND/UL (ref 0–0.2)
IMM GRANULOCYTES NFR BLD AUTO: 1 % (ref 0–2)
INR PPP: 2.8 (ref 0.84–1.19)
LYMPHOCYTES # BLD AUTO: 0.42 THOUSANDS/ΜL (ref 0.6–4.47)
LYMPHOCYTES NFR BLD AUTO: 4 % (ref 14–44)
MCH RBC QN AUTO: 27.7 PG (ref 26.8–34.3)
MCHC RBC AUTO-ENTMCNC: 30.6 G/DL (ref 31.4–37.4)
MCV RBC AUTO: 90 FL (ref 82–98)
MONOCYTES # BLD AUTO: 0.81 THOUSAND/ΜL (ref 0.17–1.22)
MONOCYTES NFR BLD AUTO: 8 % (ref 4–12)
NEUTROPHILS # BLD AUTO: 8.81 THOUSANDS/ΜL (ref 1.85–7.62)
NEUTS SEG NFR BLD AUTO: 80 % (ref 43–75)
NRBC BLD AUTO-RTO: 0 /100 WBCS
PLATELET # BLD AUTO: 263 THOUSANDS/UL (ref 149–390)
PMV BLD AUTO: 9.3 FL (ref 8.9–12.7)
POTASSIUM SERPL-SCNC: 3.7 MMOL/L (ref 3.5–5.3)
PROTHROMBIN TIME: 28.2 SECONDS (ref 11.6–14.5)
RBC # BLD AUTO: 3.29 MILLION/UL (ref 3.88–5.62)
SODIUM SERPL-SCNC: 143 MMOL/L (ref 136–145)
WBC # BLD AUTO: 10.86 THOUSAND/UL (ref 4.31–10.16)

## 2020-11-16 PROCEDURE — 85025 COMPLETE CBC W/AUTO DIFF WBC: CPT | Performed by: INTERNAL MEDICINE

## 2020-11-16 PROCEDURE — 80048 BASIC METABOLIC PNL TOTAL CA: CPT | Performed by: INTERNAL MEDICINE

## 2020-11-16 PROCEDURE — 99233 SBSQ HOSP IP/OBS HIGH 50: CPT | Performed by: INTERNAL MEDICINE

## 2020-11-16 PROCEDURE — 82948 REAGENT STRIP/BLOOD GLUCOSE: CPT

## 2020-11-16 PROCEDURE — 71045 X-RAY EXAM CHEST 1 VIEW: CPT

## 2020-11-16 PROCEDURE — 85610 PROTHROMBIN TIME: CPT | Performed by: INTERNAL MEDICINE

## 2020-11-16 PROCEDURE — 99232 SBSQ HOSP IP/OBS MODERATE 35: CPT | Performed by: INTERNAL MEDICINE

## 2020-11-16 PROCEDURE — 97163 PT EVAL HIGH COMPLEX 45 MIN: CPT

## 2020-11-16 RX ORDER — WARFARIN SODIUM 2.5 MG/1
2.5 TABLET ORAL
Status: DISCONTINUED | OUTPATIENT
Start: 2020-11-16 | End: 2020-11-18 | Stop reason: HOSPADM

## 2020-11-16 RX ADMIN — COLCHICINE 0.6 MG: 0.6 TABLET, FILM COATED ORAL at 09:08

## 2020-11-16 RX ADMIN — INSULIN LISPRO 1 UNITS: 100 INJECTION, SOLUTION INTRAVENOUS; SUBCUTANEOUS at 16:57

## 2020-11-16 RX ADMIN — POTASSIUM CHLORIDE 40 MEQ: 1500 TABLET, EXTENDED RELEASE ORAL at 09:05

## 2020-11-16 RX ADMIN — DONEPEZIL HYDROCHLORIDE 10 MG: 5 TABLET ORAL at 22:15

## 2020-11-16 RX ADMIN — GABAPENTIN 100 MG: 100 CAPSULE ORAL at 09:05

## 2020-11-16 RX ADMIN — ANORECTAL OINTMENT: 15.7; .44; 24; 20.6 OINTMENT TOPICAL at 18:58

## 2020-11-16 RX ADMIN — METOPROLOL TARTRATE 50 MG: 50 TABLET, FILM COATED ORAL at 10:06

## 2020-11-16 RX ADMIN — METOPROLOL TARTRATE 50 MG: 50 TABLET, FILM COATED ORAL at 05:13

## 2020-11-16 RX ADMIN — TORSEMIDE 20 MG: 20 TABLET ORAL at 09:05

## 2020-11-16 RX ADMIN — GABAPENTIN 100 MG: 100 CAPSULE ORAL at 18:57

## 2020-11-16 RX ADMIN — METOPROLOL TARTRATE 50 MG: 50 TABLET, FILM COATED ORAL at 16:08

## 2020-11-16 RX ADMIN — CEFEPIME HYDROCHLORIDE 2000 MG: 2 INJECTION, POWDER, FOR SOLUTION INTRAVENOUS at 11:42

## 2020-11-16 RX ADMIN — ANORECTAL OINTMENT: 15.7; .44; 24; 20.6 OINTMENT TOPICAL at 08:11

## 2020-11-16 RX ADMIN — METOPROLOL TARTRATE 50 MG: 50 TABLET, FILM COATED ORAL at 22:15

## 2020-11-16 RX ADMIN — LEVOTHYROXINE SODIUM 100 MCG: 100 TABLET ORAL at 09:06

## 2020-11-16 RX ADMIN — LORATADINE 10 MG: 10 TABLET ORAL at 09:07

## 2020-11-16 RX ADMIN — WARFARIN SODIUM 2.5 MG: 2.5 TABLET ORAL at 22:15

## 2020-11-16 RX ADMIN — ATORVASTATIN CALCIUM 10 MG: 10 TABLET, FILM COATED ORAL at 22:15

## 2020-11-16 RX ADMIN — FLUTICASONE PROPIONATE 2 SPRAY: 50 SPRAY, METERED NASAL at 08:10

## 2020-11-17 ENCOUNTER — TELEPHONE (OUTPATIENT)
Dept: CARDIOLOGY CLINIC | Facility: CLINIC | Age: 85
End: 2020-11-17

## 2020-11-17 LAB
ALBUMIN SERPL BCP-MCNC: 2.1 G/DL (ref 3.5–5)
ALP SERPL-CCNC: 145 U/L (ref 46–116)
ALT SERPL W P-5'-P-CCNC: 44 U/L (ref 12–78)
ANION GAP SERPL CALCULATED.3IONS-SCNC: 8 MMOL/L (ref 4–13)
AST SERPL W P-5'-P-CCNC: 36 U/L (ref 5–45)
BASOPHILS # BLD AUTO: 0.06 THOUSANDS/ΜL (ref 0–0.1)
BASOPHILS NFR BLD AUTO: 1 % (ref 0–1)
BILIRUB SERPL-MCNC: 1.5 MG/DL (ref 0.2–1)
BUN SERPL-MCNC: 36 MG/DL (ref 5–25)
CALCIUM ALBUM COR SERPL-MCNC: 10.2 MG/DL (ref 8.3–10.1)
CALCIUM SERPL-MCNC: 8.7 MG/DL (ref 8.3–10.1)
CHLORIDE SERPL-SCNC: 105 MMOL/L (ref 100–108)
CO2 SERPL-SCNC: 28 MMOL/L (ref 21–32)
CREAT SERPL-MCNC: 1.73 MG/DL (ref 0.6–1.3)
EOSINOPHIL # BLD AUTO: 1.01 THOUSAND/ΜL (ref 0–0.61)
EOSINOPHIL NFR BLD AUTO: 8 % (ref 0–6)
ERYTHROCYTE [DISTWIDTH] IN BLOOD BY AUTOMATED COUNT: 16 % (ref 11.6–15.1)
FLUAV RNA RESP QL NAA+PROBE: NEGATIVE
FLUBV RNA RESP QL NAA+PROBE: NEGATIVE
GFR SERPL CREATININE-BSD FRML MDRD: 35 ML/MIN/1.73SQ M
GLUCOSE SERPL-MCNC: 111 MG/DL (ref 65–140)
GLUCOSE SERPL-MCNC: 137 MG/DL (ref 65–140)
GLUCOSE SERPL-MCNC: 209 MG/DL (ref 65–140)
GLUCOSE SERPL-MCNC: 255 MG/DL (ref 65–140)
GLUCOSE SERPL-MCNC: 96 MG/DL (ref 65–140)
HCT VFR BLD AUTO: 35.5 % (ref 36.5–49.3)
HGB BLD-MCNC: 10.6 G/DL (ref 12–17)
IMM GRANULOCYTES # BLD AUTO: 0.08 THOUSAND/UL (ref 0–0.2)
IMM GRANULOCYTES NFR BLD AUTO: 1 % (ref 0–2)
LYMPHOCYTES # BLD AUTO: 0.53 THOUSANDS/ΜL (ref 0.6–4.47)
LYMPHOCYTES NFR BLD AUTO: 4 % (ref 14–44)
MCH RBC QN AUTO: 27.2 PG (ref 26.8–34.3)
MCHC RBC AUTO-ENTMCNC: 29.9 G/DL (ref 31.4–37.4)
MCV RBC AUTO: 91 FL (ref 82–98)
MONOCYTES # BLD AUTO: 1.11 THOUSAND/ΜL (ref 0.17–1.22)
MONOCYTES NFR BLD AUTO: 9 % (ref 4–12)
NEUTROPHILS # BLD AUTO: 9.51 THOUSANDS/ΜL (ref 1.85–7.62)
NEUTS SEG NFR BLD AUTO: 77 % (ref 43–75)
NRBC BLD AUTO-RTO: 0 /100 WBCS
PLATELET # BLD AUTO: 324 THOUSANDS/UL (ref 149–390)
PMV BLD AUTO: 9.4 FL (ref 8.9–12.7)
POTASSIUM SERPL-SCNC: 4 MMOL/L (ref 3.5–5.3)
PROT SERPL-MCNC: 7.4 G/DL (ref 6.4–8.2)
RBC # BLD AUTO: 3.9 MILLION/UL (ref 3.88–5.62)
RSV RNA RESP QL NAA+PROBE: NEGATIVE
SARS-COV-2 RNA RESP QL NAA+PROBE: NEGATIVE
SODIUM SERPL-SCNC: 141 MMOL/L (ref 136–145)
WBC # BLD AUTO: 12.3 THOUSAND/UL (ref 4.31–10.16)

## 2020-11-17 PROCEDURE — 82948 REAGENT STRIP/BLOOD GLUCOSE: CPT

## 2020-11-17 PROCEDURE — 99232 SBSQ HOSP IP/OBS MODERATE 35: CPT | Performed by: INTERNAL MEDICINE

## 2020-11-17 PROCEDURE — 80053 COMPREHEN METABOLIC PANEL: CPT | Performed by: INTERNAL MEDICINE

## 2020-11-17 PROCEDURE — 0241U HB NFCT DS VIR RESP RNA 4 TRGT: CPT | Performed by: INTERNAL MEDICINE

## 2020-11-17 PROCEDURE — 85025 COMPLETE CBC W/AUTO DIFF WBC: CPT | Performed by: INTERNAL MEDICINE

## 2020-11-17 RX ORDER — CEFPODOXIME PROXETIL 200 MG/1
200 TABLET, FILM COATED ORAL 2 TIMES DAILY
Qty: 2 TABLET | Refills: 0 | Status: CANCELLED | OUTPATIENT
Start: 2020-11-17 | End: 2020-11-18

## 2020-11-17 RX ADMIN — GABAPENTIN 100 MG: 100 CAPSULE ORAL at 10:07

## 2020-11-17 RX ADMIN — ANORECTAL OINTMENT 1 APPLICATION: 15.7; .44; 24; 20.6 OINTMENT TOPICAL at 10:09

## 2020-11-17 RX ADMIN — POTASSIUM CHLORIDE 40 MEQ: 1500 TABLET, EXTENDED RELEASE ORAL at 10:07

## 2020-11-17 RX ADMIN — LEVOTHYROXINE SODIUM 100 MCG: 100 TABLET ORAL at 10:07

## 2020-11-17 RX ADMIN — INSULIN LISPRO 2 UNITS: 100 INJECTION, SOLUTION INTRAVENOUS; SUBCUTANEOUS at 21:30

## 2020-11-17 RX ADMIN — GABAPENTIN 100 MG: 100 CAPSULE ORAL at 17:25

## 2020-11-17 RX ADMIN — METOPROLOL TARTRATE 50 MG: 50 TABLET, FILM COATED ORAL at 03:28

## 2020-11-17 RX ADMIN — TORSEMIDE 20 MG: 20 TABLET ORAL at 10:08

## 2020-11-17 RX ADMIN — WARFARIN SODIUM 2.5 MG: 2.5 TABLET ORAL at 21:30

## 2020-11-17 RX ADMIN — ATORVASTATIN CALCIUM 10 MG: 10 TABLET, FILM COATED ORAL at 21:30

## 2020-11-17 RX ADMIN — DONEPEZIL HYDROCHLORIDE 10 MG: 5 TABLET ORAL at 21:30

## 2020-11-17 RX ADMIN — LISINOPRIL 2.5 MG: 2.5 TABLET ORAL at 10:08

## 2020-11-17 RX ADMIN — FLUTICASONE PROPIONATE 2 SPRAY: 50 SPRAY, METERED NASAL at 10:09

## 2020-11-17 RX ADMIN — LORATADINE 10 MG: 10 TABLET ORAL at 10:07

## 2020-11-17 RX ADMIN — ANORECTAL OINTMENT: 15.7; .44; 24; 20.6 OINTMENT TOPICAL at 21:34

## 2020-11-17 RX ADMIN — METOPROLOL TARTRATE 50 MG: 50 TABLET, FILM COATED ORAL at 10:09

## 2020-11-17 RX ADMIN — METOPROLOL TARTRATE 50 MG: 50 TABLET, FILM COATED ORAL at 15:25

## 2020-11-17 RX ADMIN — COLCHICINE 0.6 MG: 0.6 TABLET, FILM COATED ORAL at 10:07

## 2020-11-17 RX ADMIN — METOPROLOL TARTRATE 50 MG: 50 TABLET, FILM COATED ORAL at 21:30

## 2020-11-17 NOTE — TELEPHONE ENCOUNTER
[facility]ITAL[facility]  DISCHARGE SUMMARY     Dorina Ray Patient Status:  Inpatient    3/5/1984 MRN [id number]   [facility] 3SW-A Attending No att. providers found   [facility] Day # 1 PCP None Pcp     Date of Admission: 11/15/2020  Date of Dis I s/w Toshia dolan   See other task and anticoag visit The patient was initiated on insulin therapy for discharge.   Patient was discharged in good condition    Procedures during hospitalization:   • Incision and drainage of left hand    Incidental or significant findings and recommendations (brief description HYDROcodone-acetaminophen 5-325 MG Tabs  Commonly known as: Frances Nuñez  What changed: reasons to take this      Take 1 tablet by mouth every 6 (six) hours as needed.    Quantity: 10 tablet  Refills: 0        STOP taking these medications    Clindamycin HCl 300

## 2020-11-18 ENCOUNTER — TRANSITIONAL CARE MANAGEMENT (OUTPATIENT)
Dept: INTERNAL MEDICINE CLINIC | Facility: CLINIC | Age: 85
End: 2020-11-18

## 2020-11-18 VITALS
TEMPERATURE: 97.8 F | RESPIRATION RATE: 19 BRPM | SYSTOLIC BLOOD PRESSURE: 118 MMHG | WEIGHT: 187.83 LBS | HEART RATE: 63 BPM | HEIGHT: 71 IN | DIASTOLIC BLOOD PRESSURE: 72 MMHG | OXYGEN SATURATION: 99 % | BODY MASS INDEX: 26.3 KG/M2

## 2020-11-18 LAB
ANION GAP SERPL CALCULATED.3IONS-SCNC: 6 MMOL/L (ref 4–13)
BACTERIA BLD CULT: NORMAL
BACTERIA BLD CULT: NORMAL
BASOPHILS # BLD AUTO: 0.07 THOUSANDS/ΜL (ref 0–0.1)
BASOPHILS NFR BLD AUTO: 1 % (ref 0–1)
BUN SERPL-MCNC: 31 MG/DL (ref 5–25)
CALCIUM SERPL-MCNC: 8.2 MG/DL (ref 8.3–10.1)
CHLORIDE SERPL-SCNC: 106 MMOL/L (ref 100–108)
CO2 SERPL-SCNC: 30 MMOL/L (ref 21–32)
CREAT SERPL-MCNC: 1.7 MG/DL (ref 0.6–1.3)
EOSINOPHIL # BLD AUTO: 1.18 THOUSAND/ΜL (ref 0–0.61)
EOSINOPHIL NFR BLD AUTO: 10 % (ref 0–6)
ERYTHROCYTE [DISTWIDTH] IN BLOOD BY AUTOMATED COUNT: 15.8 % (ref 11.6–15.1)
GFR SERPL CREATININE-BSD FRML MDRD: 35 ML/MIN/1.73SQ M
GLUCOSE SERPL-MCNC: 171 MG/DL (ref 65–140)
GLUCOSE SERPL-MCNC: 94 MG/DL (ref 65–140)
GLUCOSE SERPL-MCNC: 99 MG/DL (ref 65–140)
HCT VFR BLD AUTO: 32.7 % (ref 36.5–49.3)
HGB BLD-MCNC: 9.8 G/DL (ref 12–17)
IMM GRANULOCYTES # BLD AUTO: 0.06 THOUSAND/UL (ref 0–0.2)
IMM GRANULOCYTES NFR BLD AUTO: 1 % (ref 0–2)
LYMPHOCYTES # BLD AUTO: 0.43 THOUSANDS/ΜL (ref 0.6–4.47)
LYMPHOCYTES NFR BLD AUTO: 4 % (ref 14–44)
MCH RBC QN AUTO: 27 PG (ref 26.8–34.3)
MCHC RBC AUTO-ENTMCNC: 30 G/DL (ref 31.4–37.4)
MCV RBC AUTO: 90 FL (ref 82–98)
MONOCYTES # BLD AUTO: 1.08 THOUSAND/ΜL (ref 0.17–1.22)
MONOCYTES NFR BLD AUTO: 9 % (ref 4–12)
NEUTROPHILS # BLD AUTO: 8.67 THOUSANDS/ΜL (ref 1.85–7.62)
NEUTS SEG NFR BLD AUTO: 75 % (ref 43–75)
NRBC BLD AUTO-RTO: 0 /100 WBCS
PLATELET # BLD AUTO: 303 THOUSANDS/UL (ref 149–390)
PMV BLD AUTO: 9.1 FL (ref 8.9–12.7)
POTASSIUM SERPL-SCNC: 3.5 MMOL/L (ref 3.5–5.3)
RBC # BLD AUTO: 3.63 MILLION/UL (ref 3.88–5.62)
SODIUM SERPL-SCNC: 142 MMOL/L (ref 136–145)
WBC # BLD AUTO: 11.49 THOUSAND/UL (ref 4.31–10.16)

## 2020-11-18 PROCEDURE — 82948 REAGENT STRIP/BLOOD GLUCOSE: CPT

## 2020-11-18 PROCEDURE — 85025 COMPLETE CBC W/AUTO DIFF WBC: CPT | Performed by: INTERNAL MEDICINE

## 2020-11-18 PROCEDURE — 80048 BASIC METABOLIC PNL TOTAL CA: CPT | Performed by: INTERNAL MEDICINE

## 2020-11-18 PROCEDURE — 97116 GAIT TRAINING THERAPY: CPT

## 2020-11-18 PROCEDURE — 97167 OT EVAL HIGH COMPLEX 60 MIN: CPT

## 2020-11-18 PROCEDURE — 97110 THERAPEUTIC EXERCISES: CPT

## 2020-11-18 PROCEDURE — 99239 HOSP IP/OBS DSCHRG MGMT >30: CPT | Performed by: INTERNAL MEDICINE

## 2020-11-18 RX ORDER — TORSEMIDE 20 MG/1
20 TABLET ORAL DAILY
Qty: 30 TABLET | Refills: 0 | Status: SHIPPED | OUTPATIENT
Start: 2020-11-18

## 2020-11-18 RX ADMIN — GABAPENTIN 100 MG: 100 CAPSULE ORAL at 08:23

## 2020-11-18 RX ADMIN — LISINOPRIL 2.5 MG: 2.5 TABLET ORAL at 08:23

## 2020-11-18 RX ADMIN — METOPROLOL TARTRATE 50 MG: 50 TABLET, FILM COATED ORAL at 03:07

## 2020-11-18 RX ADMIN — TORSEMIDE 20 MG: 20 TABLET ORAL at 08:23

## 2020-11-18 RX ADMIN — LEVOTHYROXINE SODIUM 100 MCG: 100 TABLET ORAL at 08:23

## 2020-11-18 RX ADMIN — ANORECTAL OINTMENT: 15.7; .44; 24; 20.6 OINTMENT TOPICAL at 08:24

## 2020-11-18 RX ADMIN — CEFEPIME HYDROCHLORIDE 2000 MG: 2 INJECTION, POWDER, FOR SOLUTION INTRAVENOUS at 11:22

## 2020-11-18 RX ADMIN — LORATADINE 10 MG: 10 TABLET ORAL at 08:23

## 2020-11-18 RX ADMIN — POTASSIUM CHLORIDE 40 MEQ: 1500 TABLET, EXTENDED RELEASE ORAL at 08:23

## 2020-11-18 RX ADMIN — METOPROLOL TARTRATE 50 MG: 50 TABLET, FILM COATED ORAL at 08:29

## 2020-11-18 RX ADMIN — COLCHICINE 0.6 MG: 0.6 TABLET, FILM COATED ORAL at 08:23

## 2020-11-18 RX ADMIN — FLUTICASONE PROPIONATE 2 SPRAY: 50 SPRAY, METERED NASAL at 08:24

## 2020-11-18 RX ADMIN — INSULIN LISPRO 1 UNITS: 100 INJECTION, SOLUTION INTRAVENOUS; SUBCUTANEOUS at 11:04

## 2020-11-19 ENCOUNTER — TELEMEDICINE (OUTPATIENT)
Dept: DERMATOLOGY | Facility: CLINIC | Age: 85
End: 2020-11-19
Payer: MEDICARE

## 2020-11-19 DIAGNOSIS — Z13.89 SCREENING FOR SKIN CONDITION: ICD-10-CM

## 2020-11-19 DIAGNOSIS — Z85.828 HISTORY OF SKIN CANCER: ICD-10-CM

## 2020-11-19 DIAGNOSIS — L82.1 SEBORRHEIC KERATOSIS: ICD-10-CM

## 2020-11-19 DIAGNOSIS — L28.1 PRURIGO NODULARIS: Primary | ICD-10-CM

## 2020-11-19 PROCEDURE — 99213 OFFICE O/P EST LOW 20 MIN: CPT | Performed by: DERMATOLOGY

## 2020-11-20 ENCOUNTER — TELEPHONE (OUTPATIENT)
Dept: CARDIOLOGY CLINIC | Facility: CLINIC | Age: 85
End: 2020-11-20

## 2020-11-20 ENCOUNTER — ANTICOAG VISIT (OUTPATIENT)
Dept: CARDIOLOGY CLINIC | Facility: CLINIC | Age: 85
End: 2020-11-20

## 2020-11-20 LAB
BACTERIA BLD CULT: NORMAL
BACTERIA BLD CULT: NORMAL
INR PPP: 2.1 (ref 0.84–1.19)

## 2020-11-23 ENCOUNTER — PATIENT OUTREACH (OUTPATIENT)
Dept: CASE MANAGEMENT | Facility: OTHER | Age: 85
End: 2020-11-23

## 2020-11-23 DIAGNOSIS — I50.42 CHRONIC COMBINED SYSTOLIC (CONGESTIVE) AND DIASTOLIC (CONGESTIVE) HEART FAILURE (HCC): Primary | Chronic | ICD-10-CM

## 2020-11-24 ENCOUNTER — OFFICE VISIT (OUTPATIENT)
Dept: CARDIOLOGY CLINIC | Facility: CLINIC | Age: 85
End: 2020-11-24
Payer: MEDICARE

## 2020-11-24 VITALS
RESPIRATION RATE: 18 BRPM | WEIGHT: 184 LBS | DIASTOLIC BLOOD PRESSURE: 82 MMHG | HEIGHT: 71 IN | OXYGEN SATURATION: 99 % | BODY MASS INDEX: 25.76 KG/M2 | SYSTOLIC BLOOD PRESSURE: 140 MMHG | HEART RATE: 117 BPM

## 2020-11-24 DIAGNOSIS — I48.20 CHRONIC A-FIB (HCC): ICD-10-CM

## 2020-11-24 DIAGNOSIS — I10 HYPERTENSION, UNCONTROLLED: ICD-10-CM

## 2020-11-24 DIAGNOSIS — I50.22 CHRONIC SYSTOLIC CHF (CONGESTIVE HEART FAILURE) (HCC): ICD-10-CM

## 2020-11-24 PROCEDURE — 99214 OFFICE O/P EST MOD 30 MIN: CPT | Performed by: INTERNAL MEDICINE

## 2020-11-24 RX ORDER — METOPROLOL TARTRATE 50 MG/1
50 TABLET, FILM COATED ORAL EVERY 6 HOURS
Qty: 180 TABLET | Refills: 3 | Status: SHIPPED | OUTPATIENT
Start: 2020-11-24 | End: 2021-01-01 | Stop reason: SDUPTHER

## 2020-12-03 ENCOUNTER — ANTICOAG VISIT (OUTPATIENT)
Dept: CARDIOLOGY CLINIC | Facility: CLINIC | Age: 85
End: 2020-12-03

## 2020-12-03 LAB — INR PPP: 1.8 (ref 0.84–1.19)

## 2020-12-03 NOTE — RESULT ENCOUNTER NOTE
Please advise him to take an extra 2 5mg today (5mg total)  His new dose should be:     2 5mg Sunday and Tuesday    5 mg all other days  Recheck in 1 week

## 2020-12-08 DIAGNOSIS — L29.9 PRURITUS: ICD-10-CM

## 2020-12-08 DIAGNOSIS — E11.29 CONTROLLED TYPE 2 DIABETES MELLITUS WITH MICROALBUMINURIA, WITHOUT LONG-TERM CURRENT USE OF INSULIN (HCC): ICD-10-CM

## 2020-12-08 DIAGNOSIS — R80.9 CONTROLLED TYPE 2 DIABETES MELLITUS WITH MICROALBUMINURIA, WITHOUT LONG-TERM CURRENT USE OF INSULIN (HCC): ICD-10-CM

## 2020-12-08 DIAGNOSIS — J30.9 ALLERGIC RHINITIS, UNSPECIFIED SEASONALITY, UNSPECIFIED TRIGGER: ICD-10-CM

## 2020-12-09 ENCOUNTER — PATIENT OUTREACH (OUTPATIENT)
Dept: CASE MANAGEMENT | Facility: OTHER | Age: 85
End: 2020-12-09

## 2020-12-09 RX ORDER — GLIPIZIDE 5 MG/1
TABLET, FILM COATED, EXTENDED RELEASE ORAL
Qty: 28 TABLET | Refills: 4 | Status: SHIPPED | OUTPATIENT
Start: 2020-12-09

## 2020-12-09 RX ORDER — HYDROXYZINE HYDROCHLORIDE 10 MG/1
TABLET, FILM COATED ORAL
Qty: 84 TABLET | Refills: 4 | Status: SHIPPED | OUTPATIENT
Start: 2020-12-09

## 2020-12-09 RX ORDER — LORATADINE 10 MG/1
TABLET ORAL
Qty: 28 TABLET | Refills: 4 | Status: SHIPPED | OUTPATIENT
Start: 2020-12-09

## 2020-12-14 DIAGNOSIS — I48.91 ATRIAL FIBRILLATION, UNSPECIFIED TYPE (HCC): Primary | ICD-10-CM

## 2020-12-15 RX ORDER — WARFARIN SODIUM 2.5 MG/1
TABLET ORAL
Qty: 44 TABLET | Refills: 3 | Status: SHIPPED | OUTPATIENT
Start: 2020-12-15 | End: 2021-03-02

## 2020-12-16 LAB — INR PPP: 2.2 (ref 0.84–1.19)

## 2020-12-18 ENCOUNTER — TELEPHONE (OUTPATIENT)
Dept: CARDIOLOGY CLINIC | Facility: CLINIC | Age: 85
End: 2020-12-18

## 2020-12-18 ENCOUNTER — ANTICOAG VISIT (OUTPATIENT)
Dept: CARDIOLOGY CLINIC | Facility: CLINIC | Age: 85
End: 2020-12-18

## 2020-12-30 ENCOUNTER — ANTICOAG VISIT (OUTPATIENT)
Dept: CARDIOLOGY CLINIC | Facility: CLINIC | Age: 85
End: 2020-12-30

## 2020-12-30 LAB — INR PPP: 2 (ref 0.84–1.19)

## 2021-01-01 ENCOUNTER — ANTICOAG VISIT (OUTPATIENT)
Dept: CARDIOLOGY CLINIC | Facility: CLINIC | Age: 86
End: 2021-01-01

## 2021-01-01 ENCOUNTER — OFFICE VISIT (OUTPATIENT)
Dept: HEMATOLOGY ONCOLOGY | Facility: CLINIC | Age: 86
End: 2021-01-01
Payer: MEDICARE

## 2021-01-01 ENCOUNTER — TELEPHONE (OUTPATIENT)
Dept: HEMATOLOGY ONCOLOGY | Facility: CLINIC | Age: 86
End: 2021-01-01

## 2021-01-01 ENCOUNTER — OFFICE VISIT (OUTPATIENT)
Dept: NEUROLOGY | Facility: CLINIC | Age: 86
End: 2021-01-01
Payer: MEDICARE

## 2021-01-01 ENCOUNTER — OFFICE VISIT (OUTPATIENT)
Dept: CARDIOLOGY CLINIC | Facility: CLINIC | Age: 86
End: 2021-01-01
Payer: MEDICARE

## 2021-01-01 ENCOUNTER — TELEPHONE (OUTPATIENT)
Dept: CARDIOLOGY CLINIC | Facility: CLINIC | Age: 86
End: 2021-01-01

## 2021-01-01 VITALS
HEIGHT: 71 IN | SYSTOLIC BLOOD PRESSURE: 130 MMHG | TEMPERATURE: 97.3 F | BODY MASS INDEX: 25.2 KG/M2 | DIASTOLIC BLOOD PRESSURE: 80 MMHG | WEIGHT: 180 LBS | HEART RATE: 80 BPM

## 2021-01-01 VITALS
RESPIRATION RATE: 20 BRPM | WEIGHT: 180 LBS | HEART RATE: 116 BPM | DIASTOLIC BLOOD PRESSURE: 82 MMHG | BODY MASS INDEX: 25.1 KG/M2 | OXYGEN SATURATION: 95 % | SYSTOLIC BLOOD PRESSURE: 156 MMHG

## 2021-01-01 VITALS
BODY MASS INDEX: 25.93 KG/M2 | OXYGEN SATURATION: 98 % | SYSTOLIC BLOOD PRESSURE: 128 MMHG | DIASTOLIC BLOOD PRESSURE: 80 MMHG | HEART RATE: 77 BPM | WEIGHT: 185.2 LBS | HEIGHT: 71 IN

## 2021-01-01 DIAGNOSIS — I10 HYPERTENSION, UNCONTROLLED: ICD-10-CM

## 2021-01-01 DIAGNOSIS — I48.0 PAROXYSMAL ATRIAL FIBRILLATION (HCC): ICD-10-CM

## 2021-01-01 DIAGNOSIS — E11.42 TYPE 2 DIABETES MELLITUS WITH DIABETIC POLYNEUROPATHY, WITHOUT LONG-TERM CURRENT USE OF INSULIN (HCC): Chronic | ICD-10-CM

## 2021-01-01 DIAGNOSIS — Z79.01 ANTICOAGULATION MONITORING, INR RANGE 2-3: Primary | ICD-10-CM

## 2021-01-01 DIAGNOSIS — D72.829 LEUKOCYTOSIS, UNSPECIFIED TYPE: ICD-10-CM

## 2021-01-01 DIAGNOSIS — I48.91 ATRIAL FIBRILLATION, UNSPECIFIED TYPE (HCC): ICD-10-CM

## 2021-01-01 DIAGNOSIS — I48.20 CHRONIC A-FIB (HCC): ICD-10-CM

## 2021-01-01 DIAGNOSIS — I50.22 CHRONIC SYSTOLIC CHF (CONGESTIVE HEART FAILURE) (HCC): ICD-10-CM

## 2021-01-01 DIAGNOSIS — R80.9 CONTROLLED TYPE 2 DIABETES MELLITUS WITH MICROALBUMINURIA, WITHOUT LONG-TERM CURRENT USE OF INSULIN (HCC): Primary | ICD-10-CM

## 2021-01-01 DIAGNOSIS — D63.1 ANEMIA OF CHRONIC RENAL FAILURE, STAGE 3 (MODERATE), UNSPECIFIED WHETHER STAGE 3A OR 3B CKD (HCC): Primary | ICD-10-CM

## 2021-01-01 DIAGNOSIS — J32.0 CHRONIC MAXILLARY SINUSITIS: Primary | ICD-10-CM

## 2021-01-01 DIAGNOSIS — E11.29 CONTROLLED TYPE 2 DIABETES MELLITUS WITH MICROALBUMINURIA, WITHOUT LONG-TERM CURRENT USE OF INSULIN (HCC): Primary | ICD-10-CM

## 2021-01-01 DIAGNOSIS — N18.30 ANEMIA OF CHRONIC RENAL FAILURE, STAGE 3 (MODERATE), UNSPECIFIED WHETHER STAGE 3A OR 3B CKD (HCC): Primary | ICD-10-CM

## 2021-01-01 DIAGNOSIS — G31.84 MILD COGNITIVE IMPAIRMENT: ICD-10-CM

## 2021-01-01 LAB
INR PPP: 2.4 (ref 0.84–1.19)
INR PPP: 2.5 (ref 0.84–1.19)
INR PPP: 2.5 (ref 0.84–1.19)
INR PPP: 2.6 (ref 0.84–1.19)
INR PPP: 2.7 (ref 0.84–1.19)
INR PPP: 2.8 (ref 0.84–1.19)
INR PPP: 2.9 (ref 0.84–1.19)
INR PPP: 2.9 (ref 0.84–1.19)
INR PPP: 3 (ref 0.84–1.19)
INR PPP: 3.4 (ref 0.84–1.19)

## 2021-01-01 PROCEDURE — 99214 OFFICE O/P EST MOD 30 MIN: CPT | Performed by: STUDENT IN AN ORGANIZED HEALTH CARE EDUCATION/TRAINING PROGRAM

## 2021-01-01 PROCEDURE — 99214 OFFICE O/P EST MOD 30 MIN: CPT | Performed by: PSYCHIATRY & NEUROLOGY

## 2021-01-01 PROCEDURE — 99214 OFFICE O/P EST MOD 30 MIN: CPT | Performed by: INTERNAL MEDICINE

## 2021-01-01 RX ORDER — LOSARTAN POTASSIUM 25 MG/1
25 TABLET ORAL DAILY
Start: 2021-01-01

## 2021-01-01 RX ORDER — WARFARIN SODIUM 2.5 MG/1
TABLET ORAL
Qty: 44 TABLET | Refills: 4 | Status: SHIPPED | OUTPATIENT
Start: 2021-01-01

## 2021-01-01 RX ORDER — GABAPENTIN 100 MG/1
CAPSULE ORAL
Qty: 56 CAPSULE | Refills: 4 | Status: SHIPPED | OUTPATIENT
Start: 2021-01-01 | End: 2022-01-01

## 2021-01-01 RX ORDER — LOSARTAN POTASSIUM 100 MG/1
25 TABLET ORAL DAILY
COMMUNITY
End: 2021-01-01 | Stop reason: SDUPTHER

## 2021-01-01 RX ORDER — METOPROLOL TARTRATE 50 MG/1
100 TABLET, FILM COATED ORAL EVERY 12 HOURS SCHEDULED
Qty: 60 TABLET | Refills: 3 | Status: SHIPPED | OUTPATIENT
Start: 2021-01-01

## 2021-01-01 RX ORDER — PEN NEEDLE, DIABETIC 31 GX5/16"
NEEDLE, DISPOSABLE MISCELLANEOUS
Qty: 100 EACH | Refills: 10 | Status: SHIPPED | OUTPATIENT
Start: 2021-01-01

## 2021-01-12 ENCOUNTER — ANTICOAG VISIT (OUTPATIENT)
Dept: CARDIOLOGY CLINIC | Facility: CLINIC | Age: 86
End: 2021-01-12

## 2021-01-12 LAB — INR PPP: 2.4 (ref 0.84–1.19)

## 2021-01-12 NOTE — PROGRESS NOTES
Pt is to remain on the same dose   Current dose  Is pt to take 2 5mg on Sun, Tues, 5mg the rest of the days and retest in 2 weeks

## 2021-01-26 ENCOUNTER — ANTICOAG VISIT (OUTPATIENT)
Dept: CARDIOLOGY CLINIC | Facility: CLINIC | Age: 86
End: 2021-01-26

## 2021-01-26 LAB — INR PPP: 2.3 (ref 0.84–1.19)

## 2021-01-26 NOTE — PROGRESS NOTES
Pt is to take 2 5mg on Sun, Tues, 5mg the rest of the days and retest in 2 weeks  Faxed  to [facility] [phone]

## 2021-02-12 ENCOUNTER — ANTICOAG VISIT (OUTPATIENT)
Dept: CARDIOLOGY CLINIC | Facility: CLINIC | Age: 86
End: 2021-02-12

## 2021-02-12 LAB — INR PPP: 2.3 (ref 0.84–1.19)

## 2021-02-12 NOTE — PROGRESS NOTES
Results from 2/9 rec'd 2/12  Pt is to stay on the same dose  Current dose is 2 5mg Sun/Tues, 5mg the rest and retest in 2 weeks   Faxed  to [facility] [phone]

## 2021-02-23 ENCOUNTER — ANTICOAG VISIT (OUTPATIENT)
Dept: CARDIOLOGY CLINIC | Facility: CLINIC | Age: 86
End: 2021-02-23

## 2021-02-23 LAB — INR PPP: 2.2 (ref 0.84–1.19)

## 2021-02-23 NOTE — PROGRESS NOTES
Pt is to stay on the same dose  Current dose is 2 5mg Sun/Tues, 5mg the rest and retest in 2 weeks   Faxed  to [facility] [phone]

## 2021-03-02 DIAGNOSIS — E11.42 TYPE 2 DIABETES MELLITUS WITH DIABETIC POLYNEUROPATHY, WITHOUT LONG-TERM CURRENT USE OF INSULIN (HCC): Chronic | ICD-10-CM

## 2021-03-02 DIAGNOSIS — G31.84 MILD COGNITIVE IMPAIRMENT: ICD-10-CM

## 2021-03-02 DIAGNOSIS — I48.91 ATRIAL FIBRILLATION, UNSPECIFIED TYPE (HCC): ICD-10-CM

## 2021-03-02 RX ORDER — DONEPEZIL HYDROCHLORIDE 10 MG/1
TABLET, FILM COATED ORAL
Qty: 28 TABLET | Refills: 4 | Status: SHIPPED | OUTPATIENT
Start: 2021-03-02

## 2021-03-02 RX ORDER — WARFARIN SODIUM 2.5 MG/1
TABLET ORAL
Qty: 48 TABLET | Refills: 3 | Status: SHIPPED | OUTPATIENT
Start: 2021-03-02 | End: 2021-01-01

## 2021-03-02 RX ORDER — GABAPENTIN 100 MG/1
CAPSULE ORAL
Qty: 56 CAPSULE | Refills: 4 | Status: SHIPPED | OUTPATIENT
Start: 2021-03-02 | End: 2021-01-01

## 2021-03-09 ENCOUNTER — ANTICOAG VISIT (OUTPATIENT)
Dept: CARDIOLOGY CLINIC | Facility: CLINIC | Age: 86
End: 2021-03-09

## 2021-03-09 LAB — INR PPP: 2 (ref 0.84–1.19)

## 2021-03-09 NOTE — PROGRESS NOTES
Pt is to stay on the same dose  Current dose is 2 5mg Sun/Tues, 5mg the rest and retest in 2 weeks   Faxed  to [facility] [phone]

## 2021-03-23 LAB
HBA1C MFR BLD HPLC: 6.1 %
INR PPP: 2.1 (ref 0.84–1.19)

## 2021-03-24 ENCOUNTER — ANTICOAG VISIT (OUTPATIENT)
Dept: CARDIOLOGY CLINIC | Facility: CLINIC | Age: 86
End: 2021-03-24

## 2021-03-24 NOTE — PROGRESS NOTES
Results from 3/23 rec'd on 3/24  Pt is to stay on the same dose  Current dose is 2 5mg Sun/Tues, 5mg the rest and retest in 2 weeks   Faxed  to [facility] [phone]

## 2021-04-16 ENCOUNTER — TELEPHONE (OUTPATIENT)
Dept: HEMATOLOGY ONCOLOGY | Facility: CLINIC | Age: 86
End: 2021-04-16

## 2021-04-16 NOTE — TELEPHONE ENCOUNTER
Appointment Cancellation Or Reschedule     Person calling in [facility]    Provider Dr Salo Thorne   Office Visit Date and Time 04/20 at 9:20am    Office Visit Location [facility]   Did patient want to reschedule their office appointment? If so, when was it scheduled to? Yes, appt dante to 05/25 at 1:40pm   Is this patient calling to reschedule an infusion appointment? No    Is this patient a Chemo patient? no   When is their next infusion visit? n/a   Reason for Cancellation or Reschedule Patient is currently in rehab      If the patient is a treatment patient, please route this to the office nurse  If the patient is not on treatment, please route to the office MA

## 2021-04-29 ENCOUNTER — ANTICOAG VISIT (OUTPATIENT)
Dept: CARDIOLOGY CLINIC | Facility: CLINIC | Age: 86
End: 2021-04-29

## 2021-04-29 DIAGNOSIS — Z79.01 ANTICOAGULATION MONITORING, INR RANGE 2-3: Primary | ICD-10-CM

## 2021-04-29 DIAGNOSIS — I48.0 PAROXYSMAL ATRIAL FIBRILLATION (HCC): ICD-10-CM

## 2021-04-29 LAB — INR PPP: 1.7 (ref 0.84–1.19)

## 2021-04-29 NOTE — PROGRESS NOTES
Pt is to take 5mg daily except Sun take 7 5mg and retest in 1 week   Faxed  to [facility] [phone]

## 2021-05-06 ENCOUNTER — ANTICOAG VISIT (OUTPATIENT)
Dept: CARDIOLOGY CLINIC | Facility: CLINIC | Age: 86
End: 2021-05-06

## 2021-05-06 LAB — INR PPP: 4 (ref 0.84–1.19)

## 2021-05-13 ENCOUNTER — ANTICOAG VISIT (OUTPATIENT)
Dept: CARDIOLOGY CLINIC | Facility: CLINIC | Age: 86
End: 2021-05-13

## 2021-05-13 LAB — INR PPP: 4.7 (ref 0.84–1.19)

## 2021-05-13 NOTE — PROGRESS NOTES
Pt is to hold x 1 day then take 2 5mg Mon/Fri, 5mg the rest and retest in 1 week    [facility]

## 2021-05-20 ENCOUNTER — ANTICOAG VISIT (OUTPATIENT)
Dept: CARDIOLOGY CLINIC | Facility: CLINIC | Age: 86
End: 2021-05-20

## 2021-05-20 LAB — INR PPP: 2.4 (ref 0.84–1.19)

## 2021-05-20 NOTE — PROGRESS NOTES
Pt is to take 2 5mg Mon/Fri, 5mg the rest and retest in 2 weeks  Faxed  to [facility] [phone]

## 2021-05-21 NOTE — PROGRESS NOTES
[facility] - Hematology & Medical Oncology  Outpatient Visit Encounter Note      Cristin Villanueva 80 y o  male THG[birth year] [id number] Date:  5/25/2021      SUBJECTIVE      Cristin Villanueva is a 80 y o  here transferring his care from Dr Robi Greco   He was last seen on 10/27/20 as an office visit  He was being managed for high WBC  In review of the chart and talking with the patient, the following was noted in the chart:    "The most recent CBC did not demonstrate an elevated white count  Differential demonstrated a slightly elevated neutrophil count but the platelet count was within normal limits  Hemoglobin level is mildly to moderately decreased but about the same as before  Mr Paulo Ruvalcaba does not seem to be symptomatic from his anemia  The etiology for the anemia is likely multifactorial (age, anemia of chronic renal insufficiency, possible brewing primary bone marrow disorder etc) but I do not believe that a specific anemia workup is needed at this time  The plan is just to monitor the CBC parameters"    His most recent CBC from 4/20/21 showed Hb 11 4 and Plts 400K  He has no acute complaints  Denies f/c/n/v/cp  I have reviewed the relevant past medical, surgical, social and family history  I have also reviewed allergies and medications for this patient  Review of Systems  Review of Systems   All other systems reviewed and are negative  OBJECTIVE     Physical Exam  Vitals:    05/25/21 1349   BP: 152/86   Pulse: 91   Resp: 18   Temp: 98 2 °F (36 8 °C)   TempSrc: Oral   SpO2: 94%   Weight: 78 9 kg (174 lb)   Height: 5' 11" (1 803 m)       Physical Exam  Vitals signs reviewed  Constitutional:       General: He is not in acute distress  Eyes:      General: No scleral icterus  Cardiovascular:      Rate and Rhythm: Normal rate  Pulmonary:      Effort: Pulmonary effort is normal  No respiratory distress  Abdominal:      General: There is no distension  Musculoskeletal:         General: No swelling  Neurological:      General: No focal deficit present  Mental Status: He is alert and oriented to person, place, and time  Imaging  Relevant imaging reviewed in chart    Labs  Relevant labs reviewed in chart   ASSESSMENT & PLAN      Diagnosis ICD-10-CM Associated Orders   1  Anemia, unspecified type  D64 9 Leukemia/Lymphoma flow cytometry     Iron Panel (Includes Ferritin, Iron Sat%, Iron, and TIBC)     Vitamin B12     Folate   2  Leukocytosis, unspecified type  D72 829 Leukemia/Lymphoma flow cytometry     Iron Panel (Includes Ferritin, Iron Sat%, Iron, and TIBC)     Vitamin B12     Folate   3  Protein-calorie malnutrition, unspecified severity ([facility] [address] )   E46 Vitamin B12     Folate         80 y o  male seen in consultation for probably multifactorial anemia from chronic disease and also borderline high WBC     · Discussion  · As Dr Christiano Rasmussen noted, it seems like there is a multifactorial cause for his normocytic anemic state with anemia of chronic disease as the diagnosis  · His higher WBC is likely chronic reactive state but recommend flow to assess  The diff showed mostly PMN dominance  · Plan/Labs  · Iron panel, Vitamin B12, Folate and Flow Cytometry      Follow Up   1 months with PA      All questions were answered to the patient's satisfaction during this encounter  They appreciated and thanked me for spending time with them  The patient knows the contact information for our office and know to reach out for any relevant concerns related to this encounter  For all other listed problems and medical diagnosis in his chart - they are managed by PCP and/or other specialists, which patient acknowledges  Dr David Leigh MD  Hematology & Medical Oncology

## 2021-05-24 ENCOUNTER — TELEPHONE (OUTPATIENT)
Dept: HEMATOLOGY ONCOLOGY | Facility: CLINIC | Age: 86
End: 2021-05-24

## 2021-05-24 NOTE — TELEPHONE ENCOUNTER
Appointment Confirmation     Appointment with Dr Mendez Headings   Appointment date & time  05- @ 1:40 pm   Appointment location [place]   Patient verbilized Understanding

## 2021-05-25 ENCOUNTER — OFFICE VISIT (OUTPATIENT)
Dept: HEMATOLOGY ONCOLOGY | Facility: CLINIC | Age: 86
End: 2021-05-25
Payer: MEDICARE

## 2021-05-25 ENCOUNTER — OFFICE VISIT (OUTPATIENT)
Dept: DERMATOLOGY | Facility: CLINIC | Age: 86
End: 2021-05-25
Payer: MEDICARE

## 2021-05-25 VITALS
OXYGEN SATURATION: 94 % | TEMPERATURE: 98.2 F | HEIGHT: 71 IN | SYSTOLIC BLOOD PRESSURE: 152 MMHG | WEIGHT: 174 LBS | HEART RATE: 91 BPM | RESPIRATION RATE: 18 BRPM | DIASTOLIC BLOOD PRESSURE: 86 MMHG | BODY MASS INDEX: 24.36 KG/M2

## 2021-05-25 DIAGNOSIS — D72.829 LEUKOCYTOSIS, UNSPECIFIED TYPE: ICD-10-CM

## 2021-05-25 DIAGNOSIS — Z85.828 HISTORY OF SKIN CANCER: ICD-10-CM

## 2021-05-25 DIAGNOSIS — E46 PROTEIN-CALORIE MALNUTRITION, UNSPECIFIED SEVERITY (HCC): ICD-10-CM

## 2021-05-25 DIAGNOSIS — D64.9 ANEMIA, UNSPECIFIED TYPE: Primary | ICD-10-CM

## 2021-05-25 DIAGNOSIS — L28.1 PRURIGO NODULARIS: Primary | ICD-10-CM

## 2021-05-25 DIAGNOSIS — L82.1 SEBORRHEIC KERATOSIS: ICD-10-CM

## 2021-05-25 DIAGNOSIS — Z13.89 SCREENING FOR SKIN CONDITION: ICD-10-CM

## 2021-05-25 PROCEDURE — 99214 OFFICE O/P EST MOD 30 MIN: CPT | Performed by: INTERNAL MEDICINE

## 2021-05-25 PROCEDURE — 99214 OFFICE O/P EST MOD 30 MIN: CPT | Performed by: DERMATOLOGY

## 2021-05-25 RX ORDER — LATANOPROST 50 UG/ML
1 SOLUTION/ DROPS OPHTHALMIC
COMMUNITY
Start: 2021-04-02 | End: 2022-01-01

## 2021-05-25 RX ORDER — BRIMONIDINE TARTRATE 2 MG/ML
1 SOLUTION/ DROPS OPHTHALMIC 3 TIMES DAILY
COMMUNITY
Start: 2021-04-02 | End: 2022-01-01

## 2021-05-25 RX ORDER — BETAMETHASONE DIPROPIONATE 0.05 %
OINTMENT (GRAM) TOPICAL 2 TIMES DAILY
Qty: 45 G | Refills: 1 | Status: SHIPPED | OUTPATIENT
Start: 2021-05-25

## 2021-05-25 NOTE — PATIENT INSTRUCTIONS
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

## 2021-05-25 NOTE — PROGRESS NOTES
[facility] DERMATOLOGY  [address] [place] [id number]  [phone]  [phone]     MRN: [id number] : 1932  Encounter: [id number]  Patient Information: Ty Hernandez  Chief complaint: yearly check up    History of present illness:  80-year-old male with previous noted history of skin cancer and 's nodules presents for overall checkup patient has not been seen by me for awhile has been noted some areas of irritation on his arms and legs  No other concerns noted  Past Medical History:   Diagnosis Date    Acute systolic congestive heart failure ([facility] [address] [address] ) 2018    Allergic contact dermatitis due to plants, except food     Atrial fibrillation (HCC)     Cancer (HCC)     skin cancer    Cataract     Chronic kidney disease     Stage 3    Coagulation test abnormality     Congestive heart failure (CHF) (HCC)     Diabetes mellitus ([facility] [address] [address] )     Disease of thyroid gland     hypothyroidism    DVT (deep venous thrombosis) (HCC)     Eczema     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HCC)     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HCC)     GERD (gastroesophageal reflux disease)     Gout     Hyperlipidemia     Hypertension     Hypokalemia     Leukocytosis     Lichen planus     Nonmelanoma skin cancer     Phlebitis or thrombophlebitis of deep vessel of lower extremity (HCC)     Pneumonia due to infectious organism 3/22/2020    Proteinuria      Past Surgical History:   Procedure Laterality Date    CATARACT EXTRACTION      CHOLECYSTECTOMY      IR THORACENTESIS  3/20/2020    IR THORACENTESIS  2020    AL ESOPHAGOGASTRODUODENOSCOPY TRANSORAL DIAGNOSTIC N/A 2017    Procedure: EGD AND COLONOSCOPY;  Surgeon: Aubrey Milligan MD;  Location: [facility];   Service: Gastroenterology    TONSILLECTOMY       Social History   Social History     Substance and Sexual Activity   Alcohol Use Never    Frequency: Never    Binge frequency: Never     Social History     Substance and Sexual Activity   Drug Use Never     Social History     Tobacco Use   Smoking Status Never Smoker   Smokeless Tobacco Never Used     Family History   Problem Relation Age of Onset    Alcohol abuse Mother     Heart attack Father     Dementia Brother     Other Son         AUTO ACCIDENT     Meds/Allergies   No Known Allergies    Meds:  Prior to Admission medications    Medication Sig Start Date End Date Taking?  Authorizing Provider   acetaminophen (TYLENOL) 325 mg tablet Take 2 tablets (650 mg total) by mouth every 8 (eight) hours as needed for headaches or fever (pain) 2/18/20  Yes Rubén Coleman,    ammonium lactate (LAC-HYDRIN) 12 % lotion Apply topically once daily at 6 am **Dx: Dry Skin** 8/14/20  Yes Rubén Coleman DO   atorvastatin (LIPITOR) 10 mg tablet 1 TAB ORALLY AT BEDTIME DX:HYPERLIPIDEMIA 6/2/20  Yes Rubén Coleman DO   brimonidine tartrate 0 2 % ophthalmic solution Apply 1 drop to eye Three times a day 4/2/21 4/2/22 Yes Historical Provider, MD   colchicine (COLCRYS) 0 6 mg tablet Take 0 6 mg by mouth daily   Yes Historical Provider, MD   donepezil (ARICEPT) 10 mg tablet 1 TAB ORALLY AT BEDTIME DX: DEMENTIA 3/2/21  Yes Leana Quezada MD   ergocalciferol (VITAMIN D2) 50,000 units 1 CAP ORALLY ONCE WEEKLY ON SATURDAY AT 6AM DX: SUPPLEMENT 11/11/20  Yes Rubén Coleman DO   fluticasone (FLONASE) 50 mcg/act nasal spray 2 sprays into each nostril daily For allergies 10/2/20  Yes Rubén Coleman DO   gabapentin (NEURONTIN) 100 mg capsule 1 CAP ORALLY TWICE DAILY DX: NEUROPATHY 3/2/21  Yes Leana Quezada MD   glipiZIDE (GLUCOTROL XL) 5 mg 24 hr tablet 1 TAB ORALLY EVERY MORNING DX: DIABETES (NIDDM) 12/9/20  Yes Rubén Coleman DO   hydrOXYzine HCL (ATARAX) 10 mg tablet 1 TAB ORALLY TWICE DAILY DX: ITCH 1 TAB ORALLY DAILY AT 2PM DX: ITCH **FULL-CARD** 12/9/20  Yes Rubén Coleman DO   latanoprost (XALATAN) 0 005 % ophthalmic solution Apply 1 drop to eye daily after dinner Apply 1 drop to both eyes in the evening 4/2/21 4/2/22 Yes Frederic Lu MD   levothyroxine 100 mcg tablet 1 TAB ORALLY EVERY MORNING DX: HYPOTHYROIDISM 6/24/20  Yes Rubén Coleman DO   loratadine (CLARITIN) 10 mg tablet 1 TAB ORALLY EVERY MORNING DX:ALLERGY 12/9/20  Yes Rubén Coleman DO   metoprolol tartrate (LOPRESSOR) 50 mg tablet Take 1 tablet (50 mg total) by mouth every 6 (six) hours 2 times daily 11/24/20  Yes Emelyn Becker MD   Misc   Devices (BED WEDGE) MISC by Does not apply route daily 8/6/19  Yes Rubén Coleman DO   potassium chloride (K-DUR,KLOR-CON) 20 mEq tablet 2 TABS (40MEQ) ORALLY EVERY MORNING DX:ELECTROLYTE DEFICIENCY 6/24/20  Yes Adelfo Arshad MD   torsemide (DEMADEX) 20 mg tablet Take 1 tablet (20 mg total) by mouth daily 11/18/20  Yes Rosaria Meigs, MD   warfarin (COUMADIN) 2 5 mg tablet 1 TAB ORALLY 5 TIMES/WK (OMIT GODINEZ,TU) AT 8PM W/2 5MG [TOTAL=5MG] **NOT ON CYCLE-NO REFILL** DX:BLOOD CLOT PREVENTION **NIOSH 3 HAZARDOUS DRUG** 1 TAB AT BEDTIME DX: BLOOD CLOT PREVENTION **NIOSH 3 HAZA 3/2/21  Yes Emelyn Becker MD   lisinopril (ZESTRIL) 2 5 mg tablet 1 TAB ORALLY EVERY MORNING DX: HYPERTENSION  Patient not taking: Reported on 5/25/2021 8/20/20   MERI Santizo   menthol-zinc oxide (Calmoseptine) 0 44-20 6 % OINT Apply topically 2 (two) times a day  Patient not taking: Reported on 5/25/2021 10/7/20   Cherelle Coleman DO       Subjective:     Review of Systems:    General: negative for - chills, fatigue, fever,  weight gain or weight loss  Psychological: negative for - anxiety, behavioral disorder, concentration difficulties, decreased libido, depression, irritability, memory difficulties, mood swings, sleep disturbances or suicidal ideation  ENT: negative for - hearing difficulties , nasal congestion, nasal discharge, oral lesions, sinus pain, sneezing, sore throat  Allergy and Immunology: negative for - hives, insect bite sensitivity,  Hematological and Lymphatic: negative for - bleeding problems, blood clots,bruising, swollen lymph nodes  Endocrine: negative for - hair pattern changes, hot flashes, malaise/lethargy, mood swings, palpitations, polydipsia/polyuria, skin changes, temperature intolerance or unexpected weight change  Respiratory: negative for - cough, hemoptysis, orthopnea, shortness of breath, or wheezing  Cardiovascular: negative for - chest pain, dyspnea on exertion, edema,  Gastrointestinal: negative for - abdominal pain, nausea/vomiting  Genito-Urinary: negative for - dysuria, incontinence, irregular/heavy menses or urinary frequency/urgency  Musculoskeletal: negative for - gait disturbance, joint pain, joint stiffness, joint swelling, muscle pain, muscular weakness  Dermatological:  As in HPI  Neurological: negative for confusion, dizziness, headaches, impaired coordination/balance, memory loss, numbness/tingling, seizures, speech problems, tremors or weakness       Objective: There were no vitals taken for this visit  Physical Exam:    General Appearance:    Alert, cooperative, no distress   Head:    Normocephalic, without obvious abnormality, atraumatic           Skin:   A full skin exam was performed including scalp, head scalp, eyes, ears, nose, lips, neck, chest, axilla, abdomen, back, buttocks, bilateral upper extremities, bilateral lower extremities, hands, feet, fingers, toes, fingernails, and toenails normal keratotic papules with greasy stuck on appearance previous site for skin cancers well healed without recurrence excoriated keratotic nodules noted on areas we can reach on his arms and legs nothing else of concern noted     Assessment:     1  Prurigo nodularis  betamethasone dipropionate (DIPROSONE) 0 05 % ointment   2  Seborrheic keratosis     3  Screening for skin condition     4   History of skin cancer           Plan:   Prurigo nodularis this is an ongoing problem for Pamela Crawley this is been going on for many years where he picks at the skin and creates the still nodules at present will go ahead treat with topical steroids  If no improvement may need to consider medical management with Dupixent  Seborrheic keratosis patient reassured these are normal growths we acquire with age no treatment needed  History of skin cancer in no recurrence nothing else atypical sunblock recommended follow-up in 1 year  Screening for dermatologic disorders nothing else of concern noted on complete exam follow-up in 1 year      Alejandra Villa MD  5/25/2021,10:02 AM    Portions of the record may have been created with voice recognition software   Occasional wrong word or "sound a like" substitutions may have occurred due to the inherent limitations of voice recognition software   Read the chart carefully and recognize, using context, where substitutions have occurred

## 2021-05-26 ENCOUNTER — TELEPHONE (OUTPATIENT)
Dept: HEMATOLOGY ONCOLOGY | Facility: CLINIC | Age: 86
End: 2021-05-26

## 2021-05-26 NOTE — TELEPHONE ENCOUNTER
Felipe Ridley calling in requesting office note from follow up with Dr Zack Villaseñor 05/25/2021 be faxed to [phone] attn: Mague Vasquez

## 2021-06-10 NOTE — PROGRESS NOTES
Pt is to take 2 5mg Sun/Tues/Thurs, 5mg the rest of the days and retest in 1 week    Faxed  to [facility] [phone]

## 2021-06-11 NOTE — PROGRESS NOTES
This was done yesterday  Pt is to remain on the same dose and retest in 1 week  Faxed order to [phone]

## 2021-06-17 NOTE — PROGRESS NOTES
Pt is to take 2 5mg Sun/Tues/Thurs, 5mg the rest of the days and retest in 2 weeks  Faxed  to [facility] [place] [phone]

## 2021-06-25 NOTE — PROGRESS NOTES
[address] 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    Bleeding History Denies   Cancer History Denies   Family Cancer History Denies   H/O COVID Infection November 2020   H/O COVID Vaccination 2/2 Aspen Evian   H/O Blood/Plt Transfusion Denies   Tobacco Use Denies   Alcohol Use Denies   Occupation Retired, , , banker       Isaak Jean is a 80 y o  with chronic a fib, CHF, CKD stage 3, factor V Leiden, and history of DVT/PE here for follow up with me today for anemia of chronic disease and elevated WBC       Initial Visit with Dr Gamal Carty  In review of the chart and talking with the patient, the following was noted in the chart:     "The most recent CBC did not demonstrate an elevated white count   Differential demonstrated a slightly elevated neutrophil count but the platelet count was within normal limits   Hemoglobin level is mildly to moderately decreased but about the same as before   Mr Olimpia Huffman not seem to be symptomatic from his anemia   The etiology for the anemia is likely multifactorial (age, anemia of chronic renal insufficiency, possible brewing primary bone marrow disorder etc) but I do not believe that a specific anemia workup is needed at this time   The plan is just to monitor the CBC parameters"      4/20/21   Hemoglobin 11 4 (L)   hematocrit 35 9 (L)   WBC 9 6    RBC 4 13   Platelet count 702 (H)   MPV 7 9   MCV 87   MCH 27 6   MCHC 31 8   RDW 15 9       4/20/21   Absolute neutrophils 7 9 (H)   Absolute lymphocytes 0 5 (L)   Absolute monocytes 0 9   Absolute eosinophils 0 2   Absolute basophils 0 1   Neutrophils 82   Lymphocytes 5   Monocytes 10   Eosinophils 2   Basophils 1      6/22/21   Folate 13 0   Iron 55 Vitamin B12 719     His leukemia/lymphoma flow cytometry on 6/22 showed "no immunophenotypic evidence of a clonal B or T-cell population or increase in CD34+ blasts "    This Visit  He is here with his daughter Cindy Lainez today  he voices no acute complaints  He tells me that he has had a runny nose and occasional epistaxis with it  Otherwise, he denies hemoptysis, melena/hematochezia, hematuria, petechiae/purpura, increased ecchymosis  He participates in exercise activities in his living facility that he is residing in  He denies fevers, chills, unintentional weight loss, night sweats, fatigue, headaches, lightheadedness/dizziness, cough, SOB, palpitations, chest pain, abdominal tenderness/distension, nausea/vomiting, constipation/diarrhea, or LE swelling  I have reviewed the relevant past medical, surgical, social and family history  I have also reviewed allergies and medications for this patient  Review of Systems  Review of Systems   All other systems reviewed and are negative  OBJECTIVE     Physical Exam  Vitals:    06/29/21 1000   BP: 156/82   Pulse: (!) 116   Resp: 20   SpO2: 95%   Weight: 81 6 kg (180 lb)       Physical Exam  Vitals reviewed  Constitutional:       General: He is not in acute distress  Appearance: Normal appearance  He is normal weight  He is not ill-appearing, toxic-appearing or diaphoretic  Comments: Pleasant 61-year-old male sitting comfortably on an exam room chair with walker for increased stability  Eyes:      General: No scleral icterus  Extraocular Movements: Extraocular movements intact  Conjunctiva/sclera: Conjunctivae normal       Comments: No conjunctival pallor  Cardiovascular:      Rate and Rhythm: Normal rate  Rhythm irregular  Heart sounds: Normal heart sounds  No murmur heard  No gallop  Comments: Known atrial fibrillation  Pulmonary:      Effort: Pulmonary effort is normal  No respiratory distress        Breath sounds: Normal breath sounds  No wheezing or rales  Abdominal:      General: Bowel sounds are normal  There is no distension  Palpations: Abdomen is soft  There is no mass  Tenderness: There is no abdominal tenderness  There is no guarding or rebound  Comments: Palpable abdominal hernia at baseline  Musculoskeletal:         General: Normal range of motion  Cervical back: Normal range of motion and neck supple  No rigidity  Right lower leg: No edema  Left lower leg: No edema  Lymphadenopathy:      Cervical: No cervical adenopathy  Skin:     General: Skin is warm and dry  Coloration: Skin is not jaundiced or pale  Findings: No bruising, erythema or rash  Neurological:      General: No focal deficit present  Mental Status: He is alert  Mental status is at baseline  Comments: Short term memory loss at baseline  Psychiatric:         Mood and Affect: Mood normal          Behavior: Behavior normal           Imaging  Relevant imaging reviewed in chart    Labs  Relevant labs reviewed in chart     ASSESSMENT & PLAN      Diagnosis ICD-10-CM Associated Orders   1  Anemia of chronic renal failure, stage 3 (moderate), unspecified whether stage 3a or 3b CKD (HCC)  N18 30 CBC and Platelet    J08 6 CBC and Platelet   2  Leukocytosis, unspecified type  D72 829 CBC and Platelet     CBC and Platelet       I discussed Jessica Haley's case with Dr Miriam Hutchins and we formulated the plan together  80 y o  male seen in follow-up for anemia of CKD stage 3 and also borderline high WBC  Discussion  Anemia of CKD  · I extensively reviewed his blood work with him and his daughter Florence Perez  As stated in previous notes, his persistent normocytic anemia is likely multifactorial and due to anemia of chronic kidney disease stage 3  His nutritional blood work did not show evidence of nutritional deficiencies  I extensively reviewed his blood work with him   I explained that his baseline anemia is likely secondary to anemia of chronic kidney disease  I explained the pathophysiology, diagnosis, and management of anemia of CKD and explained that if he demonstrates persistently decreasing hemoglobin in the 8-9 g/dL range with secondary symptoms, we will consider initiation of DOMENICO therapy  Given that his  hemoglobin is above 8 g/dL and the presence of cardiac comorbidities, a blood transfusion is not indicated unless his  hemoglobin decreases below 8 g/dL  Given that he has an increased risk of worsening anemia due to his CKD, we will continue to monitor his CBC and platelet every 3 months  Chronic leukocytosis  · His chronic leukocytosis is reactive in nature and his leukemia/lymphoma flow cytometry thankfully confirmed no evidence of clonal B or T-cell population or increase in CD34+ blasts  I explained that given his age and activity status, as well as his longstanding history of leukocytosis, I do not recommend a bone marrow biopsy at this point for further investigation  He and Marques Casas agreed and voiced understanding  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  · He is currently on lifelong anticoagulation therapy with warfarin for VTE prophylaxis and CVA prophylaxis given his atrial fibrillation  His cardiologist is currently managing his anticoagulation regimen  Plan/Labs   CBC and platelet q 3 months    Follow Up   6 months      All questions were answered to the patient's satisfaction during this encounter  They appreciated and thanked me for spending time with them  The patient knows the contact information for our office and know to reach out for any relevant concerns related to this encounter  For all other listed problems and medical diagnosis in his chart - they are managed by PCP and/or other specialists, which patient acknowledges        Francie Small PA-C  Hematology & Medical Oncology

## 2021-06-29 NOTE — TELEPHONE ENCOUNTER
Suzette calling from [facility] would like to request a fax of patients future lab orders that would need to be done before his next follow up appt with  C[facility]: [phone]  Fax: [phone]

## 2021-07-01 NOTE — PROGRESS NOTES
Pt is to take 2 5mg Sun/Tues/Thurs, 5mg the rest of the days and retest in 2 weeks  Faxed  to [facility] [place] [phone]

## 2021-07-15 NOTE — PROGRESS NOTES
Pt is to stay on the same dose of 2 5mg Sun/Tues/Thurs, 5mg the rest of the days and retest in 2 weeks  Faxed  to [facility] [place] [phone]

## 2021-07-29 NOTE — PROGRESS NOTES
Pt is to stay on the same dose of 2 5mg Sun/Tues/Thurs, 5mg the rest of the days and retest in 1 week    Faxed  to [facility] [phone]

## 2021-08-06 NOTE — PROGRESS NOTES
Pt is to stay on the same dose of 2 5mg Sun/Tues/Thurs, 5mg the rest of the days and retest in 2 weeks  Faxed  to [facility] [place] [phone]

## 2021-08-19 NOTE — PROGRESS NOTES
Pt is to stay on the same dose of 2 5mg Sun/Tues/Thurs, 5mg the rest of the days and retest in 2 weeks  Faxed  to [facility] [phone]

## 2021-09-03 NOTE — PROGRESS NOTES
Pt to remain on the same dose of 2 5mg Sun/Tues/Thurs, 5mg the rest of the days and retest in 2 weeks

## 2021-09-07 NOTE — PROGRESS NOTES
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  He was last seen by us in September of last year, denies any worsening of his memory loss and remains on Aricept 10 mg daily  Patient also has been on gabapentin 100 mg twice a day swelling in his lower extremities has resolved, and denies any negative sensory symptoms  He ambulates with the help of a walker  Earlier this year in July the patient suffered a fall with a laceration on his right frontal head region was seen at [facility] emergency room had a CT scan of the brain which showed no evidence of any fracture or any intracranial abnormalities however patient reports a constant drip  From the right nostril since then  CT scan of the head also showed  Mucosal thickening of the right maxillary sinus  Patient also describes a skin rash in the sacral region for which he is evaluated by his PCP  ROS:   Review of Systems   Constitutional: Negative  Negative for appetite change and fever  HENT: Negative  Negative for hearing loss, tinnitus, trouble swallowing and voice change  Eyes: Negative  Negative for photophobia and pain  Respiratory: Negative  Negative for shortness of breath  Cardiovascular: Negative  Negative for palpitations  Gastrointestinal: Negative  Negative for nausea and vomiting  Endocrine: Negative  Negative for cold intolerance  Genitourinary: Negative  Negative for dysuria, frequency and urgency  Musculoskeletal: Positive for gait problem  Negative for myalgias and neck pain  Skin: Negative  Negative for rash  Neurological: Negative for dizziness, tremors, seizures, syncope, facial asymmetry, speech difficulty, weakness, light-headedness, numbness and headaches  Hematological: Negative    Does not bruise/bleed easily  Psychiatric/Behavioral: Negative  Negative for confusion, hallucinations and sleep disturbance  MA review of systems was reviewed by myself  Vitals:   Vitals:    09/07/21 1144   BP: 130/80   BP Location: Right arm   Patient Position: Sitting   Cuff Size: Large   Pulse: 80   Temp: (!) 97 3 °F (36 3 °C)   TempSrc: Temporal   Weight: 81 6 kg (180 lb)   Height: 5' 11" (1 803 m)   ,Body mass index is 25 1 kg/m²      MEDS:      Current Outpatient Medications:     acetaminophen (TYLENOL) 325 mg tablet, Take 2 tablets (650 mg total) by mouth every 8 (eight) hours as needed for headaches or fever (pain), Disp: 90 tablet, Rfl: 5    Alcohol Swabs (Alcohol Prep) PADS, USE AS DIRECTED, Disp: 100 each, Rfl: 10    ammonium lactate (LAC-HYDRIN) 12 % lotion, Apply topically once daily at 6 am **Dx: Dry Skin**, Disp: 400 g, Rfl: 5    atorvastatin (LIPITOR) 10 mg tablet, 1 TAB ORALLY AT BEDTIME DX:HYPERLIPIDEMIA, Disp: 28 tablet, Rfl: 5    betamethasone dipropionate (DIPROSONE) 0 05 % ointment, Apply topically 2 (two) times a day To irritated lesions on arms and legs until clear, Disp: 45 g, Rfl: 1    brimonidine tartrate 0 2 % ophthalmic solution, Apply 1 drop to eye Three times a day, Disp: , Rfl:     colchicine (COLCRYS) 0 6 mg tablet, Take 0 6 mg by mouth daily, Disp: , Rfl:     donepezil (ARICEPT) 10 mg tablet, 1 TAB ORALLY AT BEDTIME DX: DEMENTIA, Disp: 28 tablet, Rfl: 4    ergocalciferol (VITAMIN D2) 50,000 units, 1 CAP ORALLY ONCE WEEKLY ON SATURDAY AT 6AM DX: SUPPLEMENT, Disp: 4 capsule, Rfl: 4    fluticasone (FLONASE) 50 mcg/act nasal spray, 2 sprays into each nostril daily For allergies, Disp: 16 g, Rfl: 5    gabapentin (NEURONTIN) 100 mg capsule, 1 CAP ORALLY TWICE DAILY DX: NEUROPATHY, Disp: 56 capsule, Rfl: 4    glipiZIDE (GLUCOTROL XL) 5 mg 24 hr tablet, 1 TAB ORALLY EVERY MORNING DX: DIABETES (NIDDM), Disp: 28 tablet, Rfl: 4    hydrOXYzine HCL (ATARAX) 10 mg tablet, 1 TAB ORALLY TWICE DAILY DX: ITCH 1 TAB ORALLY DAILY AT 2PM DX: ITCH **FULL-CARD**, Disp: 84 tablet, Rfl: 4    latanoprost (XALATAN) 0 005 % ophthalmic solution, Apply 1 drop to eye daily after dinner Apply 1 drop to both eyes in the evening, Disp: , Rfl:     levothyroxine 100 mcg tablet, 1 TAB ORALLY EVERY MORNING DX: HYPOTHYROIDISM, Disp: 28 tablet, Rfl: 4    lisinopril (ZESTRIL) 2 5 mg tablet, 1 TAB ORALLY EVERY MORNING DX: HYPERTENSION, Disp: 28 tablet, Rfl: 4    loratadine (CLARITIN) 10 mg tablet, 1 TAB ORALLY EVERY MORNING DX:ALLERGY, Disp: 28 tablet, Rfl: 4    menthol-zinc oxide (Calmoseptine) 0 44-20 6 % OINT, Apply topically 2 (two) times a day, Disp: 71 g, Rfl: 5    metoprolol tartrate (LOPRESSOR) 50 mg tablet, Take 1 tablet (50 mg total) by mouth every 6 (six) hours 2 times daily, Disp: 180 tablet, Rfl: 3    Misc  Devices (BED WEDGE) MISC, by Does not apply route daily, Disp: 2 each, Rfl: 0    potassium chloride (K-DUR,KLOR-CON) 20 mEq tablet, 2 TABS (40MEQ) ORALLY EVERY MORNING DX:ELECTROLYTE DEFICIENCY, Disp: 56 tablet, Rfl: 4    torsemide (DEMADEX) 20 mg tablet, Take 1 tablet (20 mg total) by mouth daily, Disp: 30 tablet, Rfl: 0    warfarin (COUMADIN) 2 5 mg tablet, 1 TAB ORALLY 4 TIMES/WK (MO,WE,FR,SA) AT 8PM W/2 5MG [=5MG] DX:BLOOD CLOT PREVENTION **NIOSH 3 HAZARDOUS DRUG** 1 TAB ORALLY AT BEDTIME DX: BLOOD CLOT PREVENTION, Disp: 44 tablet, Rfl: 4  :    Physical Exam:  General appearance: alert, appears stated age and cooperative  Head: Normocephalic, without obvious abnormality, atraumatic      On neurological examination patient is alert awake oriented, speech is fluent, cranial nerves 2-12 intact, no motor or sensory deficits were noted in the upper lower extremities deep tendon reflexes are diminished bilaterally, no evidence of any dysmetria was noted and patient ambulates with the help of a walker  There is tenderness noted  In the ethmoid region without any maxillary tenderness    No bruits appreciable in the neck  Lab Results: I have personally reviewed pertinent reports  Imaging Studies: I have personally reviewed pertinent reports  Assessment:  1  Chronic sinusitis  2  Mild cognitive impairment  3  Diabetic polyneuropathy  Plan: Will request referral to ENT for his chronic sinusitis at this time  Continue Aricept and gabapentin, patient does participate in mental stimulating exercises and is advised to return back to see me in 6 months  Counseling / Coordination of Care  Total time spent today 25 minutes  Greater than 50% of total time was spent with the patient and / or family counseling and / or coordination of care  9/7/2021,2:20 PM    Dictation voice to text software has been used in the creation of this document  Please consider this in light of any contextual or grammatical errors

## 2021-09-07 NOTE — PROGRESS NOTES
Consultation - Neurology   Faisal Lopez 80 y o  male MRN: [id number]  Unit/Bed#:  Encounter: [id number]      Physician Requesting Consult: No att  providers found  Chief complaint     HPI:    Review of Systems:  Review of Systems   Constitutional: Negative  Negative for appetite change and fever  HENT: Negative  Negative for hearing loss, tinnitus, trouble swallowing and voice change  Eyes: Negative  Negative for photophobia and pain  Respiratory: Negative  Negative for shortness of breath  Cardiovascular: Negative  Negative for palpitations  Gastrointestinal: Negative  Negative for nausea and vomiting  Endocrine: Negative  Negative for cold intolerance  Genitourinary: Negative  Negative for dysuria, frequency and urgency  Musculoskeletal: Positive for gait problem  Negative for myalgias and neck pain  Skin: Negative  Negative for rash  Neurological: Negative for dizziness, tremors, seizures, syncope, facial asymmetry, speech difficulty, weakness, light-headedness, numbness and headaches  Hematological: Negative  Does not bruise/bleed easily  Psychiatric/Behavioral: Negative  Negative for confusion, hallucinations and sleep disturbance         Historical Information   Past Medical History:   Diagnosis Date    Acute systolic congestive heart failure ([facility] ) 7/26/2018    Allergic contact dermatitis due to plants, except food     Atrial fibrillation (HCC)     Cancer (HCC)     skin cancer    Cataract     Chronic kidney disease     Stage 3    Coagulation test abnormality     Congestive heart failure (CHF) (HCC)     [facility]     Disease of thyroid gland     hypothyroidism    DVT (deep venous thrombosis) (HCC)     Eczema     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HCC)     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HCC)     GERD (gastroesophageal reflux disease)     Gout     Hyperlipidemia     Hypertension     Hypokalemia     Leukocytosis     Lichen planus     Nonmelanoma skin cancer     Phlebitis or thrombophlebitis of deep vessel of lower extremity (HCC)     Pneumonia due to infectious organism 3/22/2020    Proteinuria      Past Surgical History:   Procedure Laterality Date    CATARACT EXTRACTION      CHOLECYSTECTOMY      IR THORACENTESIS  3/20/2020    IR THORACENTESIS  11/13/2020    IN ESOPHAGOGASTRODUODENOSCOPY TRANSORAL DIAGNOSTIC N/A 6/14/2017    Procedure: EGD AND COLONOSCOPY;  Surgeon: Jona Carbajal MD;  Location: [facility]; Service: Gastroenterology    TONSILLECTOMY       Social History   Social History     Tobacco Use   Smoking Status Never Smoker   Smokeless Tobacco Never Used     Social History     Substance and Sexual Activity   Alcohol Use Never     Social History     Substance and Sexual Activity   Drug Use Never       Family History:   Family History   Problem Relation Age of Onset    Alcohol abuse Mother     Heart attack Father     Dementia Brother     Other Son         AUTO ACCIDENT       No Known Allergies    Meds:  All current active meds have been reviewed    Scheduled Meds:  PRN Meds:     Physical Exam:       Objective   Vitals:   Vitals:    09/07/21 1144   BP: 130/80   BP Location: Right arm   Patient Position: Sitting   Cuff Size: Large   Pulse: 80   Temp: (!) 97 3 °F (36 3 °C)   TempSrc: Temporal   Weight: 81 6 kg (180 lb)   Height: 5' 11" (1 803 m)   ,Body mass index is 25 1 kg/m²  General appearance: Cooperative in no acute distress  Head & neck head is atraumatic and normocephalic  Neck is supple with full range of motion  Cardiovascular: Carotid arteries-no carotid bruits  Assessment:      Plan:              Cesar Conroy MA  9/7/2021,11:50 AM    Dictation voice to text software has been used in the creation of this document

## 2021-09-16 NOTE — PROGRESS NOTES
Pt is to stay on the same dose of 2 5mg Sun/Tues/Thurs, 5mg the rest of the days and retest in 2 weeks  Faxed  to [facility] [place] [phone]

## 2021-09-30 NOTE — PROGRESS NOTES
Pt is to stay on the same dose of 2 5mg Sun/Tues/Thurs, 5mg the rest of the days and retest in 2 weeks  Faxed  to [facility] [phone]

## 2021-09-30 NOTE — PROGRESS NOTES
Cardiology Office Note    Gabriela Arevalo 80 y o  male MRN: [id number]    09/30/21          Assessment  1  Chronic HFmEF  2  NICM with EF: 45%  3  Chronic Atrial fibrillation  4  History of PE  5  Hypertension  6  CKD  7  Hx of CVA  8  Mild cognitive impairment    Plan:  · He is euvolemic on torsemide 20 mg/day  · He is rate controlled on Lopressor 100 mg b i d   · Cont losartan  · Cont warfarin; he is enrolled in the coumadin clinic     Follow up: 6 months or sooner as needed    1  Chronic a-fib (HCC)  metoprolol tartrate (LOPRESSOR) 50 mg tablet   2  Hypertension, uncontrolled  metoprolol tartrate (LOPRESSOR) 50 mg tablet    losartan (COZAAR) 25 mg tablet   3  Chronic systolic CHF (congestive heart failure) (HCC)  metoprolol tartrate (LOPRESSOR) 50 mg tablet       HPI: Gabriela Arevalo is a 80y o  year old male with history of chronic atrial fibrillation and NICM with EF: 45% who presents for a routine follow up  Past cardiac evaluation:   · TTE: EF: 45% G2DD, moderate MR, moderate TR, PASP: 60mmHg  · [place] 8/2018 with no evidence of ischemia  · He presented to [facility] in 11/2020 for evaluation of chills and tachypnea  Testing for COVID-19 was negative  He was noted to have a right pleural effusion and underwent a right thoracentesis with 1200 cc aspirated  He was diuresed and discharged on torsemide 20mg/day  Lopressor was increased to q6h for improved rate control  He has been doing well from a cardiac standpoint since his last evaluation  He is a limited historian due to mild cognitive impairment  He has been euvolemic on torsemide  He has been rate controlled on Lopressor  He denies chest pain, palpitations, dyspnea on exertion, falls, bleeding, or any other cardiac concerns at this time         Patient Active Problem List   Diagnosis    Adult hypothyroidism    Chronic [facility]fib [facility] )    Essential hypertension    Type 2 diabetes mellitus with diabetic polyneuropathy, without long-term current use of insulin ([facility] )    Hyperlipidemia    Skin abnormalities    GERD (gastroesophageal reflux disease)    Chronic combined systolic (congestive) and diastolic (congestive) heart failure (HCC)    History of pulmonary embolism    Stage 3 chronic kidney disease (HCC)    Chronic anticoagulation    Fever    Leukocytosis    Pleural effusion, right    CARLO (acute kidney injury) (HCC)    Serum total bilirubin elevated    Anemia       No Known Allergies      Current Outpatient Medications:     acetaminophen (TYLENOL) 325 mg tablet, Take 2 tablets (650 mg total) by mouth every 8 (eight) hours as needed for headaches or fever (pain), Disp: 90 tablet, Rfl: 5    Alcohol Swabs (Alcohol Prep) PADS, USE AS DIRECTED, Disp: 100 each, Rfl: 10    ammonium lactate (LAC-HYDRIN) 12 % lotion, Apply topically once daily at 6 am **Dx: Dry Skin**, Disp: 400 g, Rfl: 5    atorvastatin (LIPITOR) 10 mg tablet, 1 TAB ORALLY AT BEDTIME DX:HYPERLIPIDEMIA, Disp: 28 tablet, Rfl: 5    brimonidine tartrate 0 2 % ophthalmic solution, Apply 1 drop to eye Three times a day, Disp: , Rfl:     colchicine (COLCRYS) 0 6 mg tablet, Take 0 6 mg by mouth daily, Disp: , Rfl:     donepezil (ARICEPT) 10 mg tablet, 1 TAB ORALLY AT BEDTIME DX: DEMENTIA, Disp: 28 tablet, Rfl: 4    ergocalciferol (VITAMIN D2) 50,000 units, 1 CAP ORALLY ONCE WEEKLY ON SATURDAY AT 6AM DX: SUPPLEMENT, Disp: 4 capsule, Rfl: 4    fluticasone (FLONASE) 50 mcg/act nasal spray, 2 sprays into each nostril daily For allergies, Disp: 16 g, Rfl: 5    gabapentin (NEURONTIN) 100 mg capsule, 1 CAP ORALLY TWICE DAILY DX: NEUROPATHY, Disp: 56 capsule, Rfl: 4    glipiZIDE (GLUCOTROL XL) 5 mg 24 hr tablet, 1 TAB ORALLY EVERY MORNING DX: DIABETES (NIDDM), Disp: 28 tablet, Rfl: 4    hydrOXYzine HCL (ATARAX) 10 mg tablet, 1 TAB ORALLY TWICE DAILY DX: ITCH 1 TAB ORALLY DAILY AT 2PM DX: ITCH **FULL-CARD**, Disp: 84 tablet, Rfl: 4    latanoprost (XALATAN) 0 005 % ophthalmic solution, Apply 1 drop to eye daily after dinner Apply 1 drop to both eyes in the evening, Disp: , Rfl:     levothyroxine 100 mcg tablet, 1 TAB ORALLY EVERY MORNING DX: HYPOTHYROIDISM (Patient taking differently: 88 mcg ), Disp: 28 tablet, Rfl: 4    loratadine (CLARITIN) 10 mg tablet, 1 TAB ORALLY EVERY MORNING DX:ALLERGY, Disp: 28 tablet, Rfl: 4    losartan (COZAAR) 25 mg tablet, Take 1 tablet (25 mg total) by mouth daily, Disp: , Rfl:     metoprolol tartrate (LOPRESSOR) 50 mg tablet, Take 2 tablets (100 mg total) by mouth every 12 (twelve) hours 2 times daily, Disp: 60 tablet, Rfl: 3    Misc   Devices (BED WEDGE) MISC, by Does not apply route daily, Disp: 2 each, Rfl: 0    potassium chloride (K-DUR,KLOR-CON) 20 mEq tablet, 2 TABS (40MEQ) ORALLY EVERY MORNING DX:ELECTROLYTE DEFICIENCY, Disp: 56 tablet, Rfl: 4    torsemide (DEMADEX) 20 mg tablet, Take 1 tablet (20 mg total) by mouth daily, Disp: 30 tablet, Rfl: 0    warfarin (COUMADIN) 2 5 mg tablet, 1 TAB ORALLY 4 TIMES/WK (MO,WE,FR,SA) AT 8PM W/2 5MG [=5MG] DX:BLOOD CLOT PREVENTION **NIOSH 3 HAZARDOUS DRUG** 1 TAB ORALLY AT BEDTIME DX: BLOOD CLOT PREVENTION, Disp: 44 tablet, Rfl: 4    betamethasone dipropionate (DIPROSONE) 0 05 % ointment, Apply topically 2 (two) times a day To irritated lesions on arms and legs until clear (Patient not taking: Reported on 9/30/2021), Disp: 45 g, Rfl: 1    menthol-zinc oxide (Calmoseptine) 0 44-20 6 % OINT, Apply topically 2 (two) times a day (Patient not taking: Reported on 9/30/2021), Disp: 71 g, Rfl: 5    Past Medical History:   Diagnosis Date    Acute systolic congestive heart failure (HCC) 7/26/2018    Allergic contact dermatitis due to plants, except food     Atrial fibrillation (HCC)     Cancer (HCC)     skin cancer    Cataract     Chronic kidney disease     Stage 3    Coagulation test abnormality     Congestive heart failure (CHF) (HCC)     Diabetes mellitus (HCC)     Disease of thyroid gland     hypothyroidism    DVT (deep venous thrombosis) (HCC)     Eczema     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HCC)     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HCC)     GERD (gastroesophageal reflux disease)     Gout     Hyperlipidemia     Hypertension     Hypokalemia     Leukocytosis     Lichen planus     Nonmelanoma skin cancer     Phlebitis or thrombophlebitis of deep vessel of lower extremity (HCC)     Pneumonia due to infectious organism 3/22/2020    Proteinuria        Family History   Problem Relation Age of Onset    Alcohol abuse Mother     Heart attack Father     Dementia Brother     Other Son         AUTO ACCIDENT       Past Surgical History:   Procedure Laterality Date    CATARACT EXTRACTION      CHOLECYSTECTOMY      IR THORACENTESIS  3/20/2020    IR THORACENTESIS  11/13/2020    GA ESOPHAGOGASTRODUODENOSCOPY TRANSORAL DIAGNOSTIC N/A 6/14/2017    Procedure: EGD AND COLONOSCOPY;  Surgeon: Jona Carbajal MD;  Location: [facility]; Service: Gastroenterology    TONSILLECTOMY         Social History     Socioeconomic History    Marital status:      Spouse name: Not on file    Number of children: Not on file    Years of education: Not on file    Highest education level: Not on file   Occupational History    Occupation: retired   Tobacco Use    Smoking status: Never Smoker    Smokeless tobacco: Never Used   Vaping Use    Vaping Use: Never used   Substance and Sexual Activity    Alcohol use: Never    Drug use: Never    Sexual activity: Not Currently   Other Topics Concern    Not on file   Social History Narrative    Sedentary lifestyle     Social Determinants of Health     Financial Resource Strain:     Difficulty of Paying Living Expenses:    Food Insecurity:     Worried About Running Out of Food in the Last Year:     [address] in the Last Year:    Transportation Needs:     Lack of Transportation (Medical):      Lack of Transportation (Non-Medical):    Physical Activity:     Days of Exercise per Week:     Minutes of Exercise per Session:    Stress:     Feeling of Stress :    Social Connections:     Frequency of Communication with Friends and Family:     Frequency of Social Gatherings with Friends and Family:     Attends [employer] Services:     Active Member of Clubs or Organizations:     Attends Club or Organization Meetings:     Marital Status:    Intimate Partner Violence:     Fear of Current or Ex-Partner:     Emotionally Abused:     Physically Abused:     Sexually Abused:        Review of Systems   Constitutional: Negative for diaphoresis, weight gain and weight loss  HENT: Negative for congestion  Cardiovascular: Negative for chest pain, dyspnea on exertion, irregular heartbeat, leg swelling, near-syncope, orthopnea, palpitations, paroxysmal nocturnal dyspnea and syncope  Respiratory: Negative for shortness of breath, sleep disturbances due to breathing and snoring  Hematologic/Lymphatic: Does not bruise/bleed easily  Skin: Negative for rash  Musculoskeletal: Negative for myalgias  Gastrointestinal: Negative for nausea and vomiting  Neurological: Negative for excessive daytime sleepiness and light-headedness  Psychiatric/Behavioral: The patient is not nervous/anxious  Vitals: /80 (BP Location: Left arm, Patient Position: Sitting, Cuff Size: Standard)   Pulse 77   Ht 5' 11" (1 803 m)   Wt 84 kg (185 lb 3 2 oz)   SpO2 98%   BMI 25 83 kg/m²       Physical Exam:     GEN: Alert and oriented x 3, in no acute distress  Well appearing and well nourished  HEENT: Sclera anicteric, conjunctivae pink, mucous membranes moist  Oropharynx clear  NECK: Supple, no carotid bruits, no significant JVD  Trachea midline, no thyromegaly  HEART:  Irregularly irregular rhythm, normal S1 and S2, no murmurs, clicks, gallops or rubs  PMI nondisplaced, no thrills  LUNGS: Clear to auscultation bilaterally; no wheezes, rales, or rhonchi   No increased work of breathing or signs of respiratory distress  ABDOMEN: Soft, nontender, nondistended, normoactive bowel sounds  EXTREMITIES: Skin warm and well perfused, no clubbing, cyanosis, or edema  NEURO: No focal findings  Normal speech  Mood and affect normal    SKIN: Normal without suspicious lesions on exposed skin        Lab Results:       Lab Results   Component Value Date    HGBA1C 6 3 (H) 09/09/2021    HGBA1C 6 3 09/09/2021    HGBA1C 6 5 (H) 07/08/2021     Lab Results   Component Value Date    CHOL 108 11/27/2015    CHOL 166 07/24/2015    CHOL 129 01/28/2015     Lab Results   Component Value Date    HDL 50 01/30/2019    HDL 43 07/20/2018    HDL 46 10/28/2017     Lab Results   Component Value Date    LDLCALC 117 (H) 01/30/2019    LDLCALC 70 07/20/2018    LDLCALC 53 10/28/2017     Lab Results   Component Value Date    TRIG 128 01/30/2019    TRIG 97 07/20/2018    TRIG 98 10/28/2017     No results found for: CHOLHDL

## 2021-12-09 NOTE — TELEPHONE ENCOUNTER
Name of Caller: Suzette from [address] [place] [address] needs order for INR to be faxed to : [phone]      Call back number: [phone]           ,

## 2022-01-01 ENCOUNTER — ANTICOAG VISIT (OUTPATIENT)
Dept: CARDIOLOGY CLINIC | Facility: CLINIC | Age: 87
End: 2022-01-01

## 2022-01-01 ENCOUNTER — TELEPHONE (OUTPATIENT)
Dept: CARDIOLOGY CLINIC | Facility: CLINIC | Age: 87
End: 2022-01-01

## 2022-01-01 ENCOUNTER — TELEPHONE (OUTPATIENT)
Dept: GASTROENTEROLOGY | Facility: CLINIC | Age: 87
End: 2022-01-01

## 2022-01-01 DIAGNOSIS — E11.42 TYPE 2 DIABETES MELLITUS WITH DIABETIC POLYNEUROPATHY, WITHOUT LONG-TERM CURRENT USE OF INSULIN (HCC): Chronic | ICD-10-CM

## 2022-01-01 LAB
INR PPP: 2.6 (ref 0.84–1.19)

## 2022-01-01 RX ORDER — GABAPENTIN 100 MG/1
CAPSULE ORAL
Qty: 56 CAPSULE | Refills: 4 | Status: SHIPPED | OUTPATIENT
Start: 2022-01-01

## 2022-01-05 NOTE — PROGRESS NOTES
Pt is to stay on the same dose of 2 5mg Sun/Tues/Thurs, 5mg the rest of the days and retest in 2 weeks   [facility] [address] [phone]

## 2022-02-02 NOTE — PROGRESS NOTES
Pt is to stay on the same dose of 2 5mg Sun/Tues/Thurs, 5mg the rest of the days and retest in 2 weeks  Faxed  to [facility] [phone]

## 2022-03-02 NOTE — PROGRESS NOTES
Pt is to stay on the same dose of 2 5mg Sun/Tues/Thurs, 5mg the rest of the days and retest in 2 weeks  Faxed  to [facility] [place] [phone]

## 2022-03-04 NOTE — TELEPHONE ENCOUNTER
Rec'd a fax from [facility] that his INR is now being managed by his PCP and to not send over any more orders for his INR  He is in hospice  Fax scanned in to the pt's chart   Removed him from [facility]

## 2022-07-06 NOTE — TELEPHONE ENCOUNTER
NEEDS THE ORDER FOR THE OINTMENT OR LOTION TO BE CHANGED SAYING APPLY TO BOTH LEGS    ALSO NEEDS THE ATARAX 10MG CHANGED FROM PRN TO A STRAIGHT ORDER  FAX #   [id number]  CK [address] # [phone]
OK TO SEND ORDER
ORDER FAXED
That's fine
72.96

## 2023-03-16 NOTE — PROGRESS NOTES
Results from 9/1 received 9/2  Pt is to stay on the same dose  Pt is to take 5mg daily except Sun take 7 5mg  Retest again in 1 week   Faxed to [facility] [phone]
no

## 2025-02-21 NOTE — PATIENT INSTRUCTIONS
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  Pt unaware of these episodes and denies any prodrome.  Wife at bedside says that at approx 5:30-6 p.m. day of admission, pt was sitting down talking to his wife when suddenly his head lowered, eyes closed, and pt was unresponsive for 20-30 seconds.  No convulsions, no bowel or bladder incontinence, no tongue biting.  Pt denies prior dizziness, change to vision, diaphoresis, palpitations.  Wife does report significant diaphoresis noted after 1st syncopal event.  Wife says she called EMS during 1st episode.  Pt was awake for a few seconds and had another very similar episode.  Pt recovered after 20-30 seconds and seemed fine.  He stood up and went to the restroom with no problems.  EMS arrived and ultimately, pt had a 3rd episode.  According to ED physician:  EMS reported witnessed pink frothy sputum, intense sweating, dilated pupils, and bradycardia to the 20s.  Transcutaneous paced by EMS at a rate of 60.  Responsive within 25 seconds .  Pt says that he does remember vomiting in the ambulance but denies any associated abdominal pain or nausea.  He also reports intermittent palpitations since cardioversion on 02/03.  Currently, pt feels at his usual state of health and denies any headache, change to vision, cough, fever, chills, chest pain, palpitations, abdominal pain, nausea, vomiting, diarrhea, constipation, blood in stool or urine, pain with urination, or lower extremity pain or swelling.   Leg edema Patient advise that this is causing the inflammation and redness around area do not feel that this is medication related patient needs to be seen by primary care physician to figure out what is causing him to retain fluid    Patient with history of psoriasis does not appear active at this point but the skin on the legs may be just swelling causing some flaring and scaling in this area

## 2025-03-13 NOTE — PROGRESS NOTES
1st attempt-No answer, no voicemail  asking pt to return call for TCM documentation    TCM appt has been scheduled for 1/14-8:00 w/ PIERRE      By Josue Thapa Appointment scheduled 4/10/25